# Patient Record
Sex: FEMALE | Race: WHITE | NOT HISPANIC OR LATINO | Employment: OTHER | URBAN - METROPOLITAN AREA
[De-identification: names, ages, dates, MRNs, and addresses within clinical notes are randomized per-mention and may not be internally consistent; named-entity substitution may affect disease eponyms.]

---

## 2023-04-21 ENCOUNTER — HOSPITAL ENCOUNTER (OUTPATIENT)
Dept: DATA CONVERSION | Facility: HOSPITAL | Age: 88
End: 2023-04-21
Attending: ANESTHESIOLOGY | Admitting: ANESTHESIOLOGY
Payer: MEDICARE

## 2023-04-21 DIAGNOSIS — M54.16 RADICULOPATHY, LUMBAR REGION: ICD-10-CM

## 2023-04-21 DIAGNOSIS — M54.50 LOW BACK PAIN, UNSPECIFIED: ICD-10-CM

## 2023-04-28 LAB — URINE CULTURE: NORMAL

## 2023-08-29 ENCOUNTER — HOSPITAL ENCOUNTER (OUTPATIENT)
Dept: DATA CONVERSION | Facility: HOSPITAL | Age: 88
End: 2023-08-29
Attending: ANESTHESIOLOGY | Admitting: ANESTHESIOLOGY
Payer: MEDICARE

## 2023-08-29 DIAGNOSIS — M54.50 LOW BACK PAIN, UNSPECIFIED: ICD-10-CM

## 2023-08-29 DIAGNOSIS — M54.16 RADICULOPATHY, LUMBAR REGION: ICD-10-CM

## 2023-09-07 VITALS — HEIGHT: 63 IN | BODY MASS INDEX: 39.97 KG/M2

## 2023-09-29 VITALS — HEIGHT: 65 IN | WEIGHT: 176.37 LBS | BODY MASS INDEX: 29.38 KG/M2

## 2023-10-18 PROBLEM — I87.2 VENOUS INSUFFICIENCY: Status: ACTIVE | Noted: 2023-10-18

## 2023-10-18 PROBLEM — E66.812 OBESITY, CLASS II, BMI 35-39.9: Status: ACTIVE | Noted: 2023-04-28

## 2023-10-18 PROBLEM — N18.30 STAGE 3 CHRONIC KIDNEY DISEASE (MULTI): Status: ACTIVE | Noted: 2023-05-08

## 2023-10-18 PROBLEM — M43.16 SPONDYLOLISTHESIS, LUMBAR REGION: Status: ACTIVE | Noted: 2023-10-18

## 2023-10-18 PROBLEM — R35.0 FREQUENT URINATION: Status: ACTIVE | Noted: 2023-10-18

## 2023-10-18 PROBLEM — I25.10 CORONARY ARTERY DISEASE: Status: ACTIVE | Noted: 2023-10-18

## 2023-10-18 PROBLEM — N39.41 URGE INCONTINENCE OF URINE: Status: ACTIVE | Noted: 2023-10-18

## 2023-10-18 PROBLEM — E78.5 HYPERLIPIDEMIA: Status: ACTIVE | Noted: 2023-10-18

## 2023-10-18 PROBLEM — R06.02 SHORTNESS OF BREATH AT REST: Status: ACTIVE | Noted: 2023-10-18

## 2023-10-18 PROBLEM — M54.50 CHRONIC LOW BACK PAIN: Status: ACTIVE | Noted: 2023-10-18

## 2023-10-18 PROBLEM — G89.29 CHRONIC LOW BACK PAIN: Status: ACTIVE | Noted: 2023-10-18

## 2023-10-18 PROBLEM — I48.21 PERMANENT ATRIAL FIBRILLATION (MULTI): Status: ACTIVE | Noted: 2023-10-18

## 2023-10-18 PROBLEM — E66.9 OBESITY, CLASS II, BMI 35-39.9: Status: ACTIVE | Noted: 2023-04-28

## 2023-10-18 PROBLEM — R60.0 LEG EDEMA: Status: ACTIVE | Noted: 2023-10-18

## 2023-10-18 PROBLEM — R39.15 URINARY URGENCY: Status: ACTIVE | Noted: 2023-10-18

## 2023-10-18 PROBLEM — M54.17 LUMBOSACRAL NEURITIS: Status: ACTIVE | Noted: 2023-10-18

## 2023-10-18 PROBLEM — R09.02 HYPOXIA: Status: ACTIVE | Noted: 2023-10-18

## 2023-10-18 PROBLEM — N39.3 STRESS INCONTINENCE, FEMALE: Status: ACTIVE | Noted: 2023-10-18

## 2023-10-18 PROBLEM — S99.929A TOE INJURY: Status: ACTIVE | Noted: 2023-10-18

## 2023-10-18 PROBLEM — R35.1 NOCTURIA: Status: ACTIVE | Noted: 2023-10-18

## 2023-10-18 PROBLEM — M51.26 LUMBAR DISC HERNIATION: Status: ACTIVE | Noted: 2023-10-18

## 2023-10-18 PROBLEM — M48.07 LUMBOSACRAL STENOSIS: Status: ACTIVE | Noted: 2023-10-18

## 2023-10-18 PROBLEM — I50.32 CHRONIC DIASTOLIC HEART FAILURE (MULTI): Status: ACTIVE | Noted: 2023-05-08

## 2023-10-18 PROBLEM — G47.33 OBSTRUCTIVE SLEEP APNEA, ADULT: Status: ACTIVE | Noted: 2023-10-18

## 2023-10-18 PROBLEM — E78.5 DYSLIPIDEMIA: Status: ACTIVE | Noted: 2023-10-18

## 2023-10-18 PROBLEM — M17.0 OSTEOARTHRITIS OF KNEES, BILATERAL: Status: ACTIVE | Noted: 2023-10-18

## 2023-10-18 RX ORDER — TOPIRAMATE 25 MG/1
TABLET ORAL
COMMUNITY
Start: 2018-06-19 | End: 2023-11-13 | Stop reason: WASHOUT

## 2023-10-18 RX ORDER — DILTIAZEM HYDROCHLORIDE 120 MG/1
120 CAPSULE, EXTENDED RELEASE ORAL DAILY
COMMUNITY
Start: 2023-04-20 | End: 2023-11-14 | Stop reason: SDUPTHER

## 2023-10-18 RX ORDER — RIVAROXABAN 20 MG/1
TABLET, FILM COATED ORAL
COMMUNITY
Start: 2018-03-05 | End: 2023-11-13 | Stop reason: WASHOUT

## 2023-10-18 RX ORDER — TORSEMIDE 10 MG/1
10 TABLET ORAL
COMMUNITY
Start: 2020-05-28 | End: 2024-01-12

## 2023-10-18 RX ORDER — RIVAROXABAN 15 MG/1
15 TABLET, FILM COATED ORAL
COMMUNITY
Start: 2023-06-03 | End: 2024-02-15 | Stop reason: DRUGHIGH

## 2023-10-18 RX ORDER — DULOXETIN HYDROCHLORIDE 30 MG/1
30 CAPSULE, DELAYED RELEASE ORAL 2 TIMES DAILY
COMMUNITY
Start: 2018-10-19 | End: 2023-12-19

## 2023-10-18 RX ORDER — OXYBUTYNIN CHLORIDE 5 MG/1
5 TABLET ORAL NIGHTLY
COMMUNITY
Start: 2020-07-25 | End: 2023-11-13 | Stop reason: WASHOUT

## 2023-10-18 RX ORDER — LOSARTAN POTASSIUM 100 MG/1
100 TABLET ORAL
COMMUNITY
Start: 2018-08-03 | End: 2023-11-14 | Stop reason: SDUPTHER

## 2023-10-18 RX ORDER — DOXYCYCLINE HYCLATE 100 MG
100 TABLET ORAL EVERY 12 HOURS
COMMUNITY
Start: 2023-05-10 | End: 2023-11-13 | Stop reason: WASHOUT

## 2023-10-18 RX ORDER — METOPROLOL SUCCINATE 50 MG/1
1 TABLET, EXTENDED RELEASE ORAL
COMMUNITY
Start: 2023-04-24

## 2023-10-18 RX ORDER — TROSPIUM CHLORIDE 20 MG/1
TABLET, FILM COATED ORAL
COMMUNITY
Start: 2020-09-15 | End: 2023-11-13 | Stop reason: WASHOUT

## 2023-10-18 RX ORDER — VIBEGRON 75 MG/1
1 TABLET, FILM COATED ORAL DAILY
COMMUNITY
End: 2024-02-08

## 2023-10-18 RX ORDER — BENAZEPRIL HYDROCHLORIDE 20 MG/1
1 TABLET ORAL DAILY
COMMUNITY
Start: 2017-03-07 | End: 2023-11-13 | Stop reason: WASHOUT

## 2023-10-18 RX ORDER — FLECAINIDE ACETATE 50 MG/1
50 TABLET ORAL 2 TIMES DAILY
COMMUNITY
Start: 2017-10-07 | End: 2023-11-13 | Stop reason: WASHOUT

## 2023-10-18 RX ORDER — PREGABALIN 100 MG/1
100 CAPSULE ORAL 2 TIMES DAILY
COMMUNITY
Start: 2019-08-22 | End: 2024-03-25 | Stop reason: SDUPTHER

## 2023-10-18 RX ORDER — ATORVASTATIN CALCIUM 20 MG/1
20 TABLET, FILM COATED ORAL
COMMUNITY
Start: 2023-01-25

## 2023-10-18 RX ORDER — AMLODIPINE BESYLATE 10 MG/1
10 TABLET ORAL
COMMUNITY
Start: 2020-09-18 | End: 2023-11-13 | Stop reason: WASHOUT

## 2023-10-18 RX ORDER — ATORVASTATIN CALCIUM 40 MG/1
1 TABLET, FILM COATED ORAL DAILY
COMMUNITY
End: 2023-11-13 | Stop reason: WASHOUT

## 2023-10-18 RX ORDER — NEOMYCIN/POLYMYXIN B/PRAMOXINE 3.5-10K-1
CREAM (GRAM) TOPICAL
COMMUNITY
End: 2023-11-13 | Stop reason: WASHOUT

## 2023-10-19 ENCOUNTER — OFFICE VISIT (OUTPATIENT)
Dept: UROLOGY | Facility: CLINIC | Age: 88
End: 2023-10-19
Payer: MEDICARE

## 2023-10-19 VITALS
BODY MASS INDEX: 40.85 KG/M2 | HEART RATE: 66 BPM | TEMPERATURE: 97.5 F | DIASTOLIC BLOOD PRESSURE: 68 MMHG | WEIGHT: 222 LBS | SYSTOLIC BLOOD PRESSURE: 122 MMHG | HEIGHT: 62 IN

## 2023-10-19 DIAGNOSIS — N39.41 URGENCY INCONTINENCE: ICD-10-CM

## 2023-10-19 DIAGNOSIS — R35.0 FREQUENCY OF MICTURITION: ICD-10-CM

## 2023-10-19 DIAGNOSIS — N39.41 URGE INCONTINENCE: Primary | ICD-10-CM

## 2023-10-19 PROCEDURE — 64566 NEUROELTRD STIM POST TIBIAL: CPT | Performed by: NURSE PRACTITIONER

## 2023-10-19 PROCEDURE — 1125F AMNT PAIN NOTED PAIN PRSNT: CPT | Performed by: NURSE PRACTITIONER

## 2023-10-19 PROCEDURE — 1159F MED LIST DOCD IN RCRD: CPT | Performed by: NURSE PRACTITIONER

## 2023-10-19 PROCEDURE — 3074F SYST BP LT 130 MM HG: CPT | Performed by: NURSE PRACTITIONER

## 2023-10-19 PROCEDURE — 3078F DIAST BP <80 MM HG: CPT | Performed by: NURSE PRACTITIONER

## 2023-10-19 PROCEDURE — 1036F TOBACCO NON-USER: CPT | Performed by: NURSE PRACTITIONER

## 2023-10-19 NOTE — PROGRESS NOTES
Subjective   Patient ID: Shreya Acuña is a 88 y.o. female.    HPI Happy w PTNS combined w medication; some bladder symptoms in past week but also more swelling in legs, has follow up next week w providers regarding swelling per patient;     Review of Systems    Objective   Physical Exam  Constitutional:       Appearance: Normal appearance. She is obese.   HENT:      Head: Normocephalic.   Pulmonary:      Effort: Pulmonary effort is normal.   Musculoskeletal:      Cervical back: Normal range of motion.      Comments: Wheel chair   Skin:     General: Skin is warm and dry.   Neurological:      General: No focal deficit present.      Mental Status: She is alert.   Psychiatric:         Mood and Affect: Mood normal.         Thought Content: Thought content normal.         Judgment: Judgment normal.           Percutaneous Tibial Nerve Stimulation    Date/Time: 10/19/2023 4:49 PM    Performed by: CHECO Siddiqi  Authorized by: CHECO Siddiqi    Procedure discussed: discussed risks, benefits and alternatives      Procedure Details     Indications: urge incontinence, urinary frequency and urinary urgency      PTNS session #: monthly maintenance    Ankle used: right      Stimulator intensity (mA): 2

## 2023-11-07 ENCOUNTER — APPOINTMENT (OUTPATIENT)
Dept: PAIN MEDICINE | Facility: CLINIC | Age: 88
End: 2023-11-07
Payer: MEDICARE

## 2023-11-13 ENCOUNTER — OFFICE VISIT (OUTPATIENT)
Dept: CARDIOLOGY | Facility: CLINIC | Age: 88
End: 2023-11-13
Payer: MEDICARE

## 2023-11-13 VITALS
SYSTOLIC BLOOD PRESSURE: 132 MMHG | HEIGHT: 64 IN | DIASTOLIC BLOOD PRESSURE: 70 MMHG | WEIGHT: 230 LBS | HEART RATE: 73 BPM | OXYGEN SATURATION: 97 % | BODY MASS INDEX: 39.27 KG/M2

## 2023-11-13 DIAGNOSIS — I50.32 CHRONIC DIASTOLIC HEART FAILURE (MULTI): ICD-10-CM

## 2023-11-13 DIAGNOSIS — I48.21 PERMANENT ATRIAL FIBRILLATION (MULTI): ICD-10-CM

## 2023-11-13 DIAGNOSIS — I10 HYPERTENSION, UNSPECIFIED TYPE: ICD-10-CM

## 2023-11-13 PROCEDURE — 99214 OFFICE O/P EST MOD 30 MIN: CPT | Performed by: INTERNAL MEDICINE

## 2023-11-13 PROCEDURE — 3075F SYST BP GE 130 - 139MM HG: CPT | Performed by: INTERNAL MEDICINE

## 2023-11-13 PROCEDURE — 3078F DIAST BP <80 MM HG: CPT | Performed by: INTERNAL MEDICINE

## 2023-11-13 PROCEDURE — 1125F AMNT PAIN NOTED PAIN PRSNT: CPT | Performed by: INTERNAL MEDICINE

## 2023-11-13 PROCEDURE — 1036F TOBACCO NON-USER: CPT | Performed by: INTERNAL MEDICINE

## 2023-11-13 PROCEDURE — 93000 ELECTROCARDIOGRAM COMPLETE: CPT | Performed by: INTERNAL MEDICINE

## 2023-11-13 PROCEDURE — 1159F MED LIST DOCD IN RCRD: CPT | Performed by: INTERNAL MEDICINE

## 2023-11-13 RX ORDER — SPIRONOLACTONE 25 MG/1
25 TABLET ORAL DAILY
Qty: 90 TABLET | Refills: 3 | Status: SHIPPED | OUTPATIENT
Start: 2023-11-13 | End: 2024-11-12

## 2023-11-13 ASSESSMENT — ENCOUNTER SYMPTOMS
WEAKNESS: 1
DYSPNEA ON EXERTION: 1

## 2023-11-13 NOTE — PATIENT INSTRUCTIONS
Start spironolactone 25 mg daily to help improve your swelling in your legs.    Reduce the xarelto to 10 mg daily

## 2023-11-13 NOTE — PROGRESS NOTES
Subjective   Shreya Acuña is a 88 y.o. female.    Chief Complaint:  Follow-up atrial fibrillation.    HPI    She is here for follow-up of congestive heart failure and her for atrial fibrillation.  Her major problems continue to be that of orthopedic problems.  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  Mostly she sits during the day.  This is resulted in increasing lower extremity edema.  She is very inactive during the day.  She also also had multiple falls.  Since her last visit she has had 2 falls 1 of which she hit her shoulder and head.    A stress test performed in 2019 showed an ejection fraction of 65% with no ischemia.     We initially saw her for an abnormal EKG. It turned out that she had atrial fibrillation. We were able to control this on medical management and with the use of flecainide therapy.     Her past cardiac history is significant for a history of hypertension. There is no history of diabetes. There is a past smoking history. There is no family history of premature coronary artery disease. She is being treated for hyperlipidemia.     Diagnosis of coronary artery disease is based on findings of calcifications in the distribution of the coronary arteries seen on the CT scan of the chest done in 2022.    Allergies  Medication    · No Known Drug Allergies   Recorded By: Cassi Solorio; 2017 9:09:32 AM     Family History  Mother    · Family history of   Father    · Family history of      Social History  Problems    · Does not use illicit drugs (V49.89) (Z78.9)   · Former smoker (V15.82) (Z87.891)   · Occasional alcohol use    Review of Systems   Constitutional: Positive for malaise/fatigue.   Cardiovascular:  Positive for dyspnea on exertion and leg swelling.   Musculoskeletal:  Positive for arthritis and joint pain.   Neurological:  Positive for weakness.       Visit Vitals  Smoking Status Former        Objective  "    Constitutional:       Appearance: Not in distress.   Neck:      Vascular: JVD normal.   Pulmonary:      Breath sounds: Normal breath sounds.   Cardiovascular:      Normal rate. Regular rhythm. Normal S1. Normal S2.       Murmurs: There is no murmur.      No gallop.    Pulses:     Intact distal pulses.   Edema:     Peripheral edema present.     Pretibial: bilateral 2+ edema of the pretibial area.     Ankle: bilateral 2+ edema of the ankle.  Abdominal:      General: There is no distension.      Palpations: Abdomen is soft.   Neurological:      Mental Status: Alert.         Lab Review:   Lab Results   Component Value Date     05/08/2023    K 3.5 05/08/2023     05/08/2023    CO2 30 05/08/2023    BUN 22 05/08/2023    CREATININE 0.90 05/08/2023    GLUCOSE 162 (H) 05/08/2023    CALCIUM 8.6 05/08/2023     No results found for: \"CHOL\", \"TRIG\", \"HDL\", \"LDLDIRECT\"    Assessment:    1.  Chronic diastolic congestive heart failure.  Increasing heart failure.  We will add spironolactone 25 mg daily.  She tends to run low potassium levels.  Discussed with her at length the potential side effects and complications of spironolactone therapy.  We will have her get a BMP in 1 week after initiation of therapy.    2.  Coronary artery disease.  No anginal symptoms.  However activity is levels are very limited.    3.  Atrial fibrillation.  Somewhat slow ventricular response.  However by exam heart rates in the 60s to 70s.    4.  Deconditioning.  Discussed some options to try to improve her activity levels.  "

## 2023-11-14 DIAGNOSIS — I10 HYPERTENSION, UNSPECIFIED TYPE: Primary | ICD-10-CM

## 2023-11-15 RX ORDER — DILTIAZEM HYDROCHLORIDE 120 MG/1
120 CAPSULE, EXTENDED RELEASE ORAL DAILY
Qty: 90 CAPSULE | Refills: 3 | Status: SHIPPED | OUTPATIENT
Start: 2023-11-15

## 2023-11-15 RX ORDER — LOSARTAN POTASSIUM 100 MG/1
100 TABLET ORAL
Qty: 90 TABLET | Refills: 3 | Status: SHIPPED | OUTPATIENT
Start: 2023-11-15 | End: 2024-05-06 | Stop reason: SDUPTHER

## 2023-11-21 ENCOUNTER — OFFICE VISIT (OUTPATIENT)
Dept: UROLOGY | Facility: CLINIC | Age: 88
End: 2023-11-21
Payer: MEDICARE

## 2023-11-21 DIAGNOSIS — R35.0 FREQUENCY OF MICTURITION: ICD-10-CM

## 2023-11-21 DIAGNOSIS — N39.41 URGE INCONTINENCE: Primary | ICD-10-CM

## 2023-11-21 DIAGNOSIS — N39.41 URGENCY INCONTINENCE: ICD-10-CM

## 2023-11-21 PROCEDURE — 64566 NEUROELTRD STIM POST TIBIAL: CPT | Performed by: NURSE PRACTITIONER

## 2023-11-21 PROCEDURE — 1125F AMNT PAIN NOTED PAIN PRSNT: CPT | Performed by: NURSE PRACTITIONER

## 2023-11-21 PROCEDURE — 1159F MED LIST DOCD IN RCRD: CPT | Performed by: NURSE PRACTITIONER

## 2023-11-21 PROCEDURE — 1036F TOBACCO NON-USER: CPT | Performed by: NURSE PRACTITIONER

## 2023-11-21 RX ORDER — CEPHALEXIN 500 MG/1
500 CAPSULE ORAL 2 TIMES DAILY
COMMUNITY
Start: 2023-11-15 | End: 2023-11-25

## 2023-11-21 NOTE — PROGRESS NOTES
11/21/23   [id number]    PTNS monthly maintenance     Subjective      HPI Shreya Acuña is a 88 y.o. female who presents for monthly maintenance PTNS.  [place] PTNS needed when she came today, combination therapy w Gemtesa has worked well; started on diuretic for swelling in legs, improving;    Objective     There were no vitals taken for this visit.   Physical Exam  General: Appears comfortable and in no apparent distress, well nourished  Head: Normocephalic, atraumatic  Neck: trachea midline  Respiratory: respirations unlabored, no wheezes, and no use of accessory muscles  Cardiovascular: at rest no dyspnea, well perfused  Skin: no visible rashes or lesions  Neurologic: grossly intact, oriented to person, place, and time  Psychiatric: mood and affect appropriate  Musculoskeletal: in chair for appt. no difficulty w upper body movement, in wheel chair for appt.      Assessment/Plan   Problem List Items Addressed This Visit    None  Visit Diagnoses       Urge incontinence    -  Primary    Frequency of micturition        Urgency incontinence              No orders of the defined types were placed in this encounter.     Continue PTNS  Gemtesa 75 mgPatient ID: Shreya Acuña is a 88 y.o. female.    Percutaneous Tibial Nerve Stimulation    Date/Time: 11/21/2023 4:25 PM    Performed by: CHECO Siddiqi  Authorized by: CHECO Siddiqi    Procedure Details     Indications: urge incontinence, urinary frequency and urinary urgency      Ankle used: right      Stimulator intensity (mA): 11        Continue as planned  Nurse line [phone] if anything needed       CHECO Siddiqi  Lab Results   Component Value Date    GLUCOSE 162 (H) 05/08/2023    CALCIUM 8.6 05/08/2023     05/08/2023    K 3.5 05/08/2023    CO2 30 05/08/2023     05/08/2023    BUN 22 05/08/2023    CREATININE 0.90 05/08/2023

## 2023-11-24 ENCOUNTER — APPOINTMENT (OUTPATIENT)
Dept: RADIOLOGY | Facility: CLINIC | Age: 88
End: 2023-11-24
Payer: MEDICARE

## 2023-11-30 ENCOUNTER — APPOINTMENT (OUTPATIENT)
Dept: RADIOLOGY | Facility: CLINIC | Age: 88
End: 2023-11-30
Payer: MEDICARE

## 2023-12-01 ENCOUNTER — HOSPITAL ENCOUNTER (OUTPATIENT)
Dept: PAIN MEDICINE | Facility: CLINIC | Age: 88
Discharge: HOME | End: 2023-12-01
Payer: MEDICARE

## 2023-12-01 ENCOUNTER — APPOINTMENT (OUTPATIENT)
Dept: LAB | Facility: LAB | Age: 88
End: 2023-12-01
Payer: MEDICARE

## 2023-12-01 ENCOUNTER — ANCILLARY PROCEDURE (OUTPATIENT)
Dept: RADIOLOGY | Facility: CLINIC | Age: 88
End: 2023-12-01
Payer: MEDICARE

## 2023-12-01 VITALS
DIASTOLIC BLOOD PRESSURE: 63 MMHG | SYSTOLIC BLOOD PRESSURE: 141 MMHG | RESPIRATION RATE: 20 BRPM | HEART RATE: 72 BPM | TEMPERATURE: 96.4 F | OXYGEN SATURATION: 97 %

## 2023-12-01 DIAGNOSIS — M25.561 PAIN IN RIGHT KNEE: ICD-10-CM

## 2023-12-01 DIAGNOSIS — M54.17 LUMBOSACRAL NEURITIS: ICD-10-CM

## 2023-12-01 DIAGNOSIS — M54.17 LUMBOSACRAL NEURITIS: Primary | ICD-10-CM

## 2023-12-01 DIAGNOSIS — I10 ESSENTIAL (PRIMARY) HYPERTENSION: Primary | ICD-10-CM

## 2023-12-01 PROBLEM — M25.512 CHRONIC LEFT SHOULDER PAIN: Status: ACTIVE | Noted: 2023-12-01

## 2023-12-01 PROBLEM — G89.29 CHRONIC LEFT SHOULDER PAIN: Status: ACTIVE | Noted: 2023-12-01

## 2023-12-01 LAB
ALBUMIN SERPL BCP-MCNC: 3.7 G/DL (ref 3.4–5)
ANION GAP SERPL CALC-SCNC: 14 MMOL/L (ref 10–20)
BUN SERPL-MCNC: 20 MG/DL (ref 6–23)
CALCIUM SERPL-MCNC: 9.6 MG/DL (ref 8.6–10.3)
CHLORIDE SERPL-SCNC: 101 MMOL/L (ref 98–107)
CO2 SERPL-SCNC: 28 MMOL/L (ref 21–32)
CREAT SERPL-MCNC: 1.23 MG/DL (ref 0.5–1.05)
GFR SERPL CREATININE-BSD FRML MDRD: 43 ML/MIN/1.73M*2
GLUCOSE SERPL-MCNC: 102 MG/DL (ref 74–99)
PHOSPHATE SERPL-MCNC: 3.8 MG/DL (ref 2.5–4.9)
POTASSIUM SERPL-SCNC: 5 MMOL/L (ref 3.5–5.3)
SODIUM SERPL-SCNC: 138 MMOL/L (ref 136–145)

## 2023-12-01 PROCEDURE — 2500000004 HC RX 250 GENERAL PHARMACY W/ HCPCS (ALT 636 FOR OP/ED)

## 2023-12-01 PROCEDURE — 2500000005 HC RX 250 GENERAL PHARMACY W/O HCPCS

## 2023-12-01 PROCEDURE — 7100000009 HC PHASE TWO TIME - INITIAL BASE CHARGE: Performed by: ANESTHESIOLOGY

## 2023-12-01 PROCEDURE — 36415 COLL VENOUS BLD VENIPUNCTURE: CPT

## 2023-12-01 PROCEDURE — 80069 RENAL FUNCTION PANEL: CPT

## 2023-12-01 PROCEDURE — 77003 FLUOROGUIDE FOR SPINE INJECT: CPT

## 2023-12-01 PROCEDURE — 20610 DRAIN/INJ JOINT/BURSA W/O US: CPT | Performed by: ANESTHESIOLOGY

## 2023-12-01 PROCEDURE — 7100000010 HC PHASE TWO TIME - EACH INCREMENTAL 1 MINUTE: Performed by: ANESTHESIOLOGY

## 2023-12-01 PROCEDURE — 62323 NJX INTERLAMINAR LMBR/SAC: CPT | Performed by: ANESTHESIOLOGY

## 2023-12-01 PROCEDURE — A4216 STERILE WATER/SALINE, 10 ML: HCPCS

## 2023-12-01 RX ORDER — LIDOCAINE HYDROCHLORIDE 5 MG/ML
INJECTION, SOLUTION INFILTRATION; INTRAVENOUS
Status: COMPLETED
Start: 2023-12-01 | End: 2023-12-01

## 2023-12-01 RX ORDER — ROPIVACAINE HYDROCHLORIDE 5 MG/ML
INJECTION, SOLUTION EPIDURAL; INFILTRATION; PERINEURAL
Status: COMPLETED
Start: 2023-12-01 | End: 2023-12-01

## 2023-12-01 RX ORDER — SODIUM CHLORIDE 9 MG/ML
INJECTION, SOLUTION INTRAMUSCULAR; INTRAVENOUS; SUBCUTANEOUS
Status: COMPLETED
Start: 2023-12-01 | End: 2023-12-01

## 2023-12-01 RX ORDER — TRIAMCINOLONE ACETONIDE 40 MG/ML
INJECTION, SUSPENSION INTRA-ARTICULAR; INTRAMUSCULAR
Status: COMPLETED
Start: 2023-12-01 | End: 2023-12-01

## 2023-12-01 RX ADMIN — LIDOCAINE HYDROCHLORIDE 250 MG: 5 INJECTION, SOLUTION INFILTRATION at 14:05

## 2023-12-01 RX ADMIN — SODIUM CHLORIDE 10 ML: 9 INJECTION, SOLUTION INTRAMUSCULAR; INTRAVENOUS; SUBCUTANEOUS at 14:05

## 2023-12-01 RX ADMIN — ROPIVACAINE HYDROCHLORIDE 100 MG: 5 INJECTION, SOLUTION EPIDURAL; INFILTRATION; PERINEURAL at 14:06

## 2023-12-01 RX ADMIN — TRIAMCINOLONE ACETONIDE 40 MG: 40 INJECTION, SUSPENSION INTRA-ARTICULAR; INTRAMUSCULAR at 14:04

## 2023-12-01 ASSESSMENT — ENCOUNTER SYMPTOMS
BLOOD IN STOOL: 0
DIZZINESS: 0
WEAKNESS: 0
SPEECH DIFFICULTY: 0
SHORTNESS OF BREATH: 0
NAUSEA: 0
DIAPHORESIS: 0
WHEEZING: 0
BACK PAIN: 1
EYE DISCHARGE: 0
DIFFICULTY URINATING: 0
FEVER: 0
DIARRHEA: 0
NECK PAIN: 0
CHILLS: 0
CHEST TIGHTNESS: 0
NUMBNESS: 1
COUGH: 0
VOMITING: 0
SEIZURES: 0
ARTHRALGIAS: 1
NECK STIFFNESS: 0
CONSTIPATION: 0

## 2023-12-01 ASSESSMENT — COLUMBIA-SUICIDE SEVERITY RATING SCALE - C-SSRS
1. IN THE PAST MONTH, HAVE YOU WISHED YOU WERE DEAD OR WISHED YOU COULD GO TO SLEEP AND NOT WAKE UP?: NO
2. HAVE YOU ACTUALLY HAD ANY THOUGHTS OF KILLING YOURSELF?: NO
6. HAVE YOU EVER DONE ANYTHING, STARTED TO DO ANYTHING, OR PREPARED TO DO ANYTHING TO END YOUR LIFE?: NO

## 2023-12-01 ASSESSMENT — PAIN SCALES - GENERAL
PAINLEVEL_OUTOF10: 10 - WORST POSSIBLE PAIN
PAINLEVEL_OUTOF10: 0 - NO PAIN

## 2023-12-01 ASSESSMENT — PAIN - FUNCTIONAL ASSESSMENT: PAIN_FUNCTIONAL_ASSESSMENT: 0-10

## 2023-12-01 NOTE — Clinical Note
Prepped with ChloraPrep, a minimum of 3 minute dry time, longer if needed, no pooling noted, patient draped in sterile fashion and back.

## 2023-12-01 NOTE — H&P
History Of Present Illness  Shreya Acuña is a 88 y.o. female presenting with low back and left shoulder pain     Past Medical History  Past Medical History:   Diagnosis Date    Other specified health status     No pertinent past medical history       Surgical History  Past Surgical History:   Procedure Laterality Date    OTHER SURGICAL HISTORY  12/02/2020    Cardioversion        Social History  She reports that she has quit smoking. Her smoking use included cigarettes. She has never used smokeless tobacco. She reports that she does not currently use alcohol. She reports that she does not use drugs.    Family History  No family history on file.     Allergies  Patient has no known allergies.    Review of Systems   Constitutional:  Negative for chills, diaphoresis and fever.   HENT:  Negative for ear discharge and tinnitus.    Eyes:  Negative for discharge.   Respiratory:  Negative for cough, chest tightness, shortness of breath and wheezing.    Cardiovascular:  Negative for chest pain.   Gastrointestinal:  Negative for blood in stool, constipation, diarrhea, nausea and vomiting.   Endocrine: Negative for polyuria.   Genitourinary:  Negative for difficulty urinating.   Musculoskeletal:  Positive for arthralgias, back pain and gait problem. Negative for neck pain and neck stiffness.   Skin:  Negative for rash.   Neurological:  Positive for numbness. Negative for dizziness, seizures, speech difficulty and weakness.        Physical Exam  Constitutional:       Appearance: Normal appearance.   HENT:      Head: Normocephalic.      Nose: Nose normal.      Mouth/Throat:      Mouth: Mucous membranes are moist.      Pharynx: Oropharynx is clear.   Eyes:      Extraocular Movements: Extraocular movements intact.      Conjunctiva/sclera: Conjunctivae normal.      Pupils: Pupils are equal, round, and reactive to light.   Cardiovascular:      Rate and Rhythm: Normal rate.   Pulmonary:      Effort: Pulmonary effort is normal. No  respiratory distress.      Breath sounds: No wheezing.   Chest:      Chest wall: No tenderness.   Abdominal:      Palpations: Abdomen is soft.   Musculoskeletal:      Cervical back: No rigidity.   Skin:     General: Skin is warm and dry.      Findings: No rash.   Neurological:      Mental Status: She is alert and oriented to person, place, and time.      Sensory: Sensory deficit present.      Motor: Weakness present.      Gait: Gait abnormal.   Psychiatric:         Mood and Affect: Mood normal.         Behavior: Behavior normal.          Last Recorded Vitals  Blood pressure 141/63, pulse 71, temperature 35.8 °C (96.4 °F), resp. rate 18, SpO2 97 %.       Assessment/Plan   1. Lumbosacral neuritis  FL pain management TC         Lumbar epidural injection  Left shoulder joint steroid injection     Jose Luis Quiñones MD

## 2023-12-01 NOTE — OP NOTE
12/1/2023    Pre procedure Diagnosis: Lumbar neuritis  Post procedure Diagnosis: Lumbar neuritis    Procedure:     1. L5/S1 interlaminar epidural steroid injection   2. Fluoroscopic guidance     Complications: None    Assistants: None     EBL: None    PROCEDURE: The patient was identified in the preoperative area. After risks and benefits were explained, informed consent was obtained. The patient was brought back to the operating room and placed in the prone position on the operating table. Standard ASA monitors were applied and monitored throughout the procedure. Their vital signs remained stable throughout the procedure. The patient's lumbosacral spine was prepped and draped in usual sterile fashion. Using fluoroscopic guidance the skin overlying the trajectory to the L5/S1 space was anesthetized with a total of 5 ml of 0.5% Lidocaine. Thereafter, a 3.5 in long 18G TouhDirecta Plus needle was advanced through the anesthitized skin. Then, the needle was advanced into the L5/S1 ligamentum flavum under multiplanar fluoroscopic guidance.  Then using a loss of resistance syringe and technique the epidural space was accessed. After negative aspiration for heme, or CSF a total of 4mL of Preservative Free Normal Saline and 20 mg of KENALOG was injected. The needle was removed and a sterile dressing was applied.     Next the patient's LEFT SHOULDER AREA was prepped and draped in usual sterile fashion. Using fluoroscopic guidance the skin overlying the trajectory to the shoulder joint was anesthetized with a total of 5ml of 0.5% lidocaine via a 25 G needle. Next the joint was accessed with a 22G spinal needle using the posterior approach. After negative aspiration a total of 4ml of 0.5% Ropivacaine and 20mg of KENALOG was injected. The needle was removed and a sterile dressing was applied. The patient tolerated the procedure well and was transported to PACU in stable condition.    PLAN: The pt will follow up in the office in one to  two months to report their results with the procedure. Discharge instructions were reviewed in recovery and provided to the patient in writing. They were advised to call should they have any questions or concerns.

## 2023-12-05 ENCOUNTER — ANCILLARY PROCEDURE (OUTPATIENT)
Dept: RADIOLOGY | Facility: CLINIC | Age: 88
End: 2023-12-05
Payer: MEDICARE

## 2023-12-05 DIAGNOSIS — R91.1 SOLITARY PULMONARY NODULE: ICD-10-CM

## 2023-12-05 PROCEDURE — 71250 CT THORAX DX C-: CPT | Performed by: STUDENT IN AN ORGANIZED HEALTH CARE EDUCATION/TRAINING PROGRAM

## 2023-12-05 PROCEDURE — 71250 CT THORAX DX C-: CPT

## 2023-12-06 ENCOUNTER — LAB (OUTPATIENT)
Dept: LAB | Facility: LAB | Age: 88
End: 2023-12-06
Payer: MEDICARE

## 2023-12-06 DIAGNOSIS — I10 HYPERTENSION, UNSPECIFIED TYPE: ICD-10-CM

## 2023-12-06 DIAGNOSIS — I50.32 CHRONIC DIASTOLIC HEART FAILURE (MULTI): ICD-10-CM

## 2023-12-06 PROCEDURE — 36415 COLL VENOUS BLD VENIPUNCTURE: CPT

## 2023-12-06 PROCEDURE — 80048 BASIC METABOLIC PNL TOTAL CA: CPT

## 2023-12-07 ENCOUNTER — TELEPHONE (OUTPATIENT)
Dept: UROLOGY | Facility: CLINIC | Age: 88
End: 2023-12-07
Payer: MEDICARE

## 2023-12-07 LAB
ANION GAP SERPL CALC-SCNC: 13 MMOL/L (ref 10–20)
BUN SERPL-MCNC: 26 MG/DL (ref 6–23)
CALCIUM SERPL-MCNC: 9.3 MG/DL (ref 8.6–10.6)
CHLORIDE SERPL-SCNC: 102 MMOL/L (ref 98–107)
CO2 SERPL-SCNC: 30 MMOL/L (ref 21–32)
CREAT SERPL-MCNC: 1.12 MG/DL (ref 0.5–1.05)
GFR SERPL CREATININE-BSD FRML MDRD: 48 ML/MIN/1.73M*2
GLUCOSE SERPL-MCNC: 105 MG/DL (ref 74–99)
POTASSIUM SERPL-SCNC: 4.1 MMOL/L (ref 3.5–5.3)
SODIUM SERPL-SCNC: 141 MMOL/L (ref 136–145)

## 2023-12-07 NOTE — TELEPHONE ENCOUNTER
Pt , Ender, left vague message asking for return call as he has some questions/concerns. Tried calling him back at [phone] but he did not answer. Left message asking him to call back and leave us a detailed message.

## 2023-12-08 NOTE — TELEPHONE ENCOUNTER
Spoke with  who states his wife is soaking the overnight diapers and is asking if we have any recommendations for a super absorbant overnight diapers. Spoke with Karina who doesn't really have any ideas and told Ender it is kind of a trial and error thing to see which ones work the best but I have heard that [employer] products are supposed to be good for incontinence.

## 2023-12-18 DIAGNOSIS — M54.17 LUMBOSACRAL NEURITIS: Primary | ICD-10-CM

## 2023-12-19 RX ORDER — DULOXETIN HYDROCHLORIDE 30 MG/1
30 CAPSULE, DELAYED RELEASE ORAL 2 TIMES DAILY
Qty: 180 CAPSULE | Refills: 2 | Status: SHIPPED | OUTPATIENT
Start: 2023-12-19

## 2023-12-21 ENCOUNTER — OFFICE VISIT (OUTPATIENT)
Dept: UROLOGY | Facility: CLINIC | Age: 88
End: 2023-12-21
Payer: MEDICARE

## 2023-12-21 DIAGNOSIS — R35.0 FREQUENCY OF MICTURITION: ICD-10-CM

## 2023-12-21 DIAGNOSIS — N39.41 URGE INCONTINENCE: Primary | ICD-10-CM

## 2023-12-21 DIAGNOSIS — N39.41 URGENCY INCONTINENCE: ICD-10-CM

## 2023-12-21 PROCEDURE — 1036F TOBACCO NON-USER: CPT | Performed by: NURSE PRACTITIONER

## 2023-12-21 PROCEDURE — 64566 NEUROELTRD STIM POST TIBIAL: CPT | Performed by: NURSE PRACTITIONER

## 2023-12-21 PROCEDURE — 1125F AMNT PAIN NOTED PAIN PRSNT: CPT | Performed by: NURSE PRACTITIONER

## 2023-12-21 PROCEDURE — 1159F MED LIST DOCD IN RCRD: CPT | Performed by: NURSE PRACTITIONER

## 2023-12-21 NOTE — PROGRESS NOTES
12/21/23   [id number]    PTNS     Subjective      HPI Shreya Acuña is a 88 y.o. female who presents for PTNS monthly maintenance; Happy w combination therapy PTNS w Gemtesa has worked well; More difficulty w diuretics but doing ok; swelling in lower extremities has improved and enjoying  holidays;        Objective     There were no vitals taken for this visit.   Physical Exam  General: Appears comfortable and in no apparent distress, well nourished  Head: Normocephalic, atraumatic  Neck: trachea midline  Respiratory: respirations unlabored, no wheezes, and no use of accessory muscles  Cardiovascular: at rest no dyspnea, well perfused  Skin: no visible rashes or lesions  Neurologic: grossly intact, oriented to person, place, and time  Psychiatric: mood and affect appropriate  Musculoskeletal: in wheel Patient ID: Shreya Acuña is a 88 y.o. female.    Percutaneous Tibial Nerve Stimulation    Date/Time: 12/21/2023 5:14 PM    Performed by: CHECO Siddiqi  Authorized by: CHECO Siddiqi    Procedure discussed: discussed risks, benefits and alternatives      Procedure Details     Indications: urge incontinence, urinary frequency and urinary urgency      PTNS session #: monthly maintenance    Bladder symptoms: unchanged      Ankle used: right      Stimulator intensity (mA): 5      chair for appt. no difficulty w upper body movement;    Assessment/Plan   Problem List Items Addressed This Visit    None  Visit Diagnoses       Urge incontinence    -  Primary    Frequency of micturition        Urgency incontinence              No orders of the defined types were placed in this encounter.     PTNS monthly maintenance  Gemtesa  Call nurse line if any issues [phone]       CHECO Siddiqi  Lab Results   Component Value Date    GLUCOSE 105 (H) 12/06/2023    CALCIUM 9.3 12/06/2023     12/06/2023    K 4.1 12/06/2023    CO2 30 12/06/2023     12/06/2023    BUN 26 (H) 12/06/2023    CREATININE  1.12 (H) 12/06/2023

## 2024-01-04 ENCOUNTER — APPOINTMENT (OUTPATIENT)
Dept: RADIOLOGY | Facility: HOSPITAL | Age: 89
End: 2024-01-04
Payer: MEDICARE

## 2024-01-04 ENCOUNTER — HOSPITAL ENCOUNTER (EMERGENCY)
Facility: HOSPITAL | Age: 89
Discharge: HOME | End: 2024-01-05
Attending: STUDENT IN AN ORGANIZED HEALTH CARE EDUCATION/TRAINING PROGRAM
Payer: MEDICARE

## 2024-01-04 ENCOUNTER — APPOINTMENT (OUTPATIENT)
Dept: CARDIOLOGY | Facility: HOSPITAL | Age: 89
End: 2024-01-04
Payer: MEDICARE

## 2024-01-04 DIAGNOSIS — R06.02 SHORTNESS OF BREATH: Primary | ICD-10-CM

## 2024-01-04 PROCEDURE — 93005 ELECTROCARDIOGRAM TRACING: CPT

## 2024-01-04 PROCEDURE — 99284 EMERGENCY DEPT VISIT MOD MDM: CPT | Performed by: STUDENT IN AN ORGANIZED HEALTH CARE EDUCATION/TRAINING PROGRAM

## 2024-01-04 PROCEDURE — 99283 EMERGENCY DEPT VISIT LOW MDM: CPT

## 2024-01-04 PROCEDURE — 71045 X-RAY EXAM CHEST 1 VIEW: CPT

## 2024-01-04 PROCEDURE — 96360 HYDRATION IV INFUSION INIT: CPT

## 2024-01-04 RX ORDER — IPRATROPIUM BROMIDE AND ALBUTEROL SULFATE 2.5; .5 MG/3ML; MG/3ML
3 SOLUTION RESPIRATORY (INHALATION) EVERY 20 MIN
Status: ACTIVE | OUTPATIENT
Start: 2024-01-04 | End: 2024-01-05

## 2024-01-04 ASSESSMENT — COLUMBIA-SUICIDE SEVERITY RATING SCALE - C-SSRS
1. IN THE PAST MONTH, HAVE YOU WISHED YOU WERE DEAD OR WISHED YOU COULD GO TO SLEEP AND NOT WAKE UP?: NO
6. HAVE YOU EVER DONE ANYTHING, STARTED TO DO ANYTHING, OR PREPARED TO DO ANYTHING TO END YOUR LIFE?: NO
2. HAVE YOU ACTUALLY HAD ANY THOUGHTS OF KILLING YOURSELF?: NO

## 2024-01-04 NOTE — ED TRIAGE NOTES
Pt presents to ED for chills, SOB, and wheezing. Pt has hx of emphysema. Pt has audible wheezing in triage. Pt denies SOB, CP. Pt has no complaints.

## 2024-01-05 ENCOUNTER — APPOINTMENT (OUTPATIENT)
Dept: PAIN MEDICINE | Facility: CLINIC | Age: 89
End: 2024-01-05
Payer: MEDICARE

## 2024-01-05 ENCOUNTER — APPOINTMENT (OUTPATIENT)
Dept: CARDIOLOGY | Facility: HOSPITAL | Age: 89
End: 2024-01-05
Payer: MEDICARE

## 2024-01-05 VITALS
DIASTOLIC BLOOD PRESSURE: 70 MMHG | TEMPERATURE: 96.8 F | HEART RATE: 74 BPM | RESPIRATION RATE: 16 BRPM | HEIGHT: 63 IN | SYSTOLIC BLOOD PRESSURE: 149 MMHG | BODY MASS INDEX: 37.21 KG/M2 | OXYGEN SATURATION: 96 % | WEIGHT: 210 LBS

## 2024-01-05 LAB
ANION GAP BLDV CALCULATED.4IONS-SCNC: 5 MMOL/L (ref 10–25)
ANION GAP SERPL CALC-SCNC: 12 MMOL/L (ref 10–20)
BASE EXCESS BLDV CALC-SCNC: 6.5 MMOL/L (ref -2–3)
BASOPHILS # BLD AUTO: 0.04 X10*3/UL (ref 0–0.1)
BASOPHILS NFR BLD AUTO: 0.9 %
BNP SERPL-MCNC: 82 PG/ML (ref 0–99)
BODY TEMPERATURE: 37 DEGREES CELSIUS
BUN SERPL-MCNC: 42 MG/DL (ref 6–23)
CA-I BLDV-SCNC: 1.38 MMOL/L (ref 1.1–1.33)
CALCIUM SERPL-MCNC: 9.6 MG/DL (ref 8.6–10.3)
CARDIAC TROPONIN I PNL SERPL HS: 10 NG/L (ref 0–13)
CHLORIDE BLDV-SCNC: 99 MMOL/L (ref 98–107)
CHLORIDE SERPL-SCNC: 100 MMOL/L (ref 98–107)
CO2 SERPL-SCNC: 28 MMOL/L (ref 21–32)
CREAT SERPL-MCNC: 1.75 MG/DL (ref 0.5–1.05)
EOSINOPHIL # BLD AUTO: 0.34 X10*3/UL (ref 0–0.4)
EOSINOPHIL NFR BLD AUTO: 7.3 %
ERYTHROCYTE [DISTWIDTH] IN BLOOD BY AUTOMATED COUNT: 15.6 % (ref 11.5–14.5)
FLUAV RNA RESP QL NAA+PROBE: NOT DETECTED
FLUBV RNA RESP QL NAA+PROBE: NOT DETECTED
GFR SERPL CREATININE-BSD FRML MDRD: 28 ML/MIN/1.73M*2
GLUCOSE BLDV-MCNC: 113 MG/DL (ref 74–99)
GLUCOSE SERPL-MCNC: 100 MG/DL (ref 74–99)
HCO3 BLDV-SCNC: 33.7 MMOL/L (ref 22–26)
HCT VFR BLD AUTO: 38.5 % (ref 36–46)
HCT VFR BLD EST: 46 % (ref 36–46)
HGB BLD-MCNC: 12.2 G/DL (ref 12–16)
HGB BLDV-MCNC: 15.2 G/DL (ref 12–16)
IMM GRANULOCYTES # BLD AUTO: 0.08 X10*3/UL (ref 0–0.5)
IMM GRANULOCYTES NFR BLD AUTO: 1.7 % (ref 0–0.9)
INHALED O2 CONCENTRATION: 21 %
LACTATE BLDV-SCNC: 1.1 MMOL/L (ref 0.4–2)
LYMPHOCYTES # BLD AUTO: 1.38 X10*3/UL (ref 0.8–3)
LYMPHOCYTES NFR BLD AUTO: 29.7 %
MCH RBC QN AUTO: 32 PG (ref 26–34)
MCHC RBC AUTO-ENTMCNC: 31.7 G/DL (ref 32–36)
MCV RBC AUTO: 101 FL (ref 80–100)
MONOCYTES # BLD AUTO: 0.8 X10*3/UL (ref 0.05–0.8)
MONOCYTES NFR BLD AUTO: 17.2 %
NEUTROPHILS # BLD AUTO: 2 X10*3/UL (ref 1.6–5.5)
NEUTROPHILS NFR BLD AUTO: 43.2 %
NRBC BLD-RTO: 0 /100 WBCS (ref 0–0)
OXYHGB MFR BLDV: 37.4 % (ref 45–75)
PCO2 BLDV: 57 MM HG (ref 41–51)
PH BLDV: 7.38 PH (ref 7.33–7.43)
PLATELET # BLD AUTO: 206 X10*3/UL (ref 150–450)
PO2 BLDV: 32 MM HG (ref 35–45)
POTASSIUM BLDV-SCNC: 5.2 MMOL/L (ref 3.5–5.3)
POTASSIUM SERPL-SCNC: 4.8 MMOL/L (ref 3.5–5.3)
Q ONSET: 215 MS
QRS COUNT: 11 BEATS
QRS DURATION: 86 MS
QT INTERVAL: 402 MS
QTC CALCULATION(BAZETT): 411 MS
QTC FREDERICIA: 408 MS
R AXIS: -15 DEGREES
RBC # BLD AUTO: 3.81 X10*6/UL (ref 4–5.2)
SAO2 % BLDV: 38 % (ref 45–75)
SARS-COV-2 RNA RESP QL NAA+PROBE: NOT DETECTED
SODIUM BLDV-SCNC: 132 MMOL/L (ref 136–145)
SODIUM SERPL-SCNC: 135 MMOL/L (ref 136–145)
T AXIS: 6 DEGREES
T OFFSET: 416 MS
VENTRICULAR RATE: 63 BPM
WBC # BLD AUTO: 4.6 X10*3/UL (ref 4.4–11.3)

## 2024-01-05 PROCEDURE — 83880 ASSAY OF NATRIURETIC PEPTIDE: CPT | Performed by: STUDENT IN AN ORGANIZED HEALTH CARE EDUCATION/TRAINING PROGRAM

## 2024-01-05 PROCEDURE — 87636 SARSCOV2 & INF A&B AMP PRB: CPT | Performed by: STUDENT IN AN ORGANIZED HEALTH CARE EDUCATION/TRAINING PROGRAM

## 2024-01-05 PROCEDURE — 71045 X-RAY EXAM CHEST 1 VIEW: CPT | Performed by: RADIOLOGY

## 2024-01-05 PROCEDURE — 85018 HEMOGLOBIN: CPT | Mod: 59 | Performed by: STUDENT IN AN ORGANIZED HEALTH CARE EDUCATION/TRAINING PROGRAM

## 2024-01-05 PROCEDURE — 85025 COMPLETE CBC W/AUTO DIFF WBC: CPT | Performed by: STUDENT IN AN ORGANIZED HEALTH CARE EDUCATION/TRAINING PROGRAM

## 2024-01-05 PROCEDURE — 93005 ELECTROCARDIOGRAM TRACING: CPT

## 2024-01-05 PROCEDURE — 84132 ASSAY OF SERUM POTASSIUM: CPT | Performed by: STUDENT IN AN ORGANIZED HEALTH CARE EDUCATION/TRAINING PROGRAM

## 2024-01-05 PROCEDURE — 96360 HYDRATION IV INFUSION INIT: CPT

## 2024-01-05 PROCEDURE — 36415 COLL VENOUS BLD VENIPUNCTURE: CPT | Performed by: STUDENT IN AN ORGANIZED HEALTH CARE EDUCATION/TRAINING PROGRAM

## 2024-01-05 PROCEDURE — 84484 ASSAY OF TROPONIN QUANT: CPT | Performed by: STUDENT IN AN ORGANIZED HEALTH CARE EDUCATION/TRAINING PROGRAM

## 2024-01-05 PROCEDURE — 2500000004 HC RX 250 GENERAL PHARMACY W/ HCPCS (ALT 636 FOR OP/ED): Performed by: STUDENT IN AN ORGANIZED HEALTH CARE EDUCATION/TRAINING PROGRAM

## 2024-01-05 RX ORDER — PREDNISONE 50 MG/1
50 TABLET ORAL DAILY
Qty: 4 TABLET | Refills: 0 | Status: SHIPPED | OUTPATIENT
Start: 2024-01-05 | End: 2024-01-09

## 2024-01-05 RX ORDER — AZITHROMYCIN 250 MG/1
250 TABLET, FILM COATED ORAL DAILY
Qty: 5 TABLET | Refills: 0 | Status: SHIPPED | OUTPATIENT
Start: 2024-01-05 | End: 2024-01-10

## 2024-01-05 RX ADMIN — SODIUM CHLORIDE, POTASSIUM CHLORIDE, SODIUM LACTATE AND CALCIUM CHLORIDE 500 ML: 600; 310; 30; 20 INJECTION, SOLUTION INTRAVENOUS at 01:17

## 2024-01-05 RX ADMIN — PREDNISONE 60 MG: 50 TABLET ORAL at 00:22

## 2024-01-05 ASSESSMENT — PAIN SCALES - GENERAL
PAINLEVEL_OUTOF10: 0 - NO PAIN

## 2024-01-05 ASSESSMENT — PAIN - FUNCTIONAL ASSESSMENT: PAIN_FUNCTIONAL_ASSESSMENT: 0-10

## 2024-01-05 NOTE — ED PROVIDER NOTES
HPI:    History provided by: Patient    88-year-old female history of emphysema not on oxygen, obstructive sleep apnea, heart failure, hypertension who presents to the emergency department for multiple complaints.  Patient has been feeling unwell over the past 2 to 3 days.  She has had a cough nonproductive sputum, intermittent shortness of breath.   is at bedside, and he states that he has noticed she has been wheezing more than usual as well.  She denies any chest pain, leg swelling associated with this.  She has had some nasal congestion, and some chills at home today as well.  She states that she is feeling okay right now, and would prefer to go home at this time.  She does not feel severely short of breath, and aside from the chills and cough she feels okay.  No sick contacts that they are aware of.  No fevers.    ROS: All pertinent positives and negatives reviewed in HPI    PMHx: Reviewed  PSHx: Reviewed  Social: no Etoh, no tobacco, no social drugs  Social Determinants of Health impacting care: NA  Allergies: Reviewed in EMR  FMHx: Noncontributory to patient's chief complaint  Medications: Reviewed        Vital signs and triage note reviewed per nursing documentation    Physical Exam:     GEN: Sitting in no acute distress. Well-appearing, appears stated age  HEAD: Atraumatic, normocephalic  EYES: EOMI. Pupils equal and reactive.   HEENT: MMM. No oropharyngeal erythema, exudates, or uvular deviation.   Neck: Supple, full ROM  CVS: Regular rate and rhythm, no murmurs, gallops, or rubs  PULM: Scant end expiratory wheezing with no focal rales, crackles  ABD: Soft, nontender to palpation. No rigidity, guarding, or tympany  EXT: +2 radial and DP pulses bilaterally. No pitting edema noted. No varicose veins  NEURO: Alert, keenly responsive. Moving all 4 extremities spontaneously with normal strength. Normal sensation in all 4 extremities     Emergency Department Course    Medications Ordered: Duoneb, PO  Prednisone    EKG: Regular ventricular rate, irregular rhythm, normal axis. TX, QRS, and QTC intervals within normal limits. No acute ST segment changes or T wave inversions.     Impression: Slow afib with no acute ischemia or arrhythmia.       MDM    88-year-old female with history of emphysema not on supplemental oxygen who presents to the emergency department for multiple medical complaints including nonproductive sputum, cough, nasal congestion.  Assessment reveals a hemodynamically stable, fairly well-appearing female in no acute distress.  Scant end expiratory wheezing on examination, but otherwise is well-appearing with clear bilateral breath sounds.  Given her age, cardiopulmonary workup was initiated with troponin, BNP testing.  Viral swab sent, chest x-ray ordered as well.  Patient given 1 breathing treatment, steroids.  Patient's workup reveals negative troponin level with a nonischemic EKG so I am not concerned for acute coronary syndrome.  BNP within normal limits, so no evidence for heart failure.  Chest x-ray reveals no evidence of pneumonia.  Patient does have a mild acute kidney injury, and was gently fluid resuscitated for this.  I performed shared decision-making conversation with patient, her  regarding the possibility of hospitalization versus discharge home.  At this time, the patient states that she would prefer to go home as she does not want to be in the emergency department and feels well.  She has stable oxygen saturation with no supplemental O2, and her and her  feel comfortable with this plan.  Therefore, I do believe is reasonable to trial outpatient treatment for bronchitis with steroids, azithromycin.  She was instructed that she needs to contact her physician to have repeat laboratory testing drawn next week, and if she were to have any worsening symptoms to come back to the emergency department.  Patient amenable to this.  Discharged in stable  condition.      Consultations:    NA    Medications Prescribed:    PO Prednisone, PO azithromycin    Disposition:    Discharged         Henrique Trejo MD  01/05/24 1729

## 2024-01-11 DIAGNOSIS — I50.32 CHRONIC DIASTOLIC HEART FAILURE (MULTI): Primary | ICD-10-CM

## 2024-01-12 RX ORDER — TORSEMIDE 10 MG/1
10 TABLET ORAL DAILY
Qty: 90 TABLET | Refills: 0 | Status: SHIPPED | OUTPATIENT
Start: 2024-01-12 | End: 2024-04-30 | Stop reason: SDUPTHER

## 2024-01-15 ENCOUNTER — APPOINTMENT (OUTPATIENT)
Dept: PAIN MEDICINE | Facility: CLINIC | Age: 89
End: 2024-01-15
Payer: MEDICARE

## 2024-01-17 ENCOUNTER — OFFICE VISIT (OUTPATIENT)
Dept: PAIN MEDICINE | Facility: CLINIC | Age: 89
End: 2024-01-17
Payer: MEDICARE

## 2024-01-17 VITALS
BODY MASS INDEX: 37.23 KG/M2 | HEIGHT: 63 IN | TEMPERATURE: 97.3 F | SYSTOLIC BLOOD PRESSURE: 124 MMHG | WEIGHT: 210.1 LBS | DIASTOLIC BLOOD PRESSURE: 60 MMHG | HEART RATE: 60 BPM | RESPIRATION RATE: 18 BRPM

## 2024-01-17 DIAGNOSIS — M51.26 LUMBAR DISC HERNIATION: ICD-10-CM

## 2024-01-17 DIAGNOSIS — M54.41 CHRONIC BILATERAL LOW BACK PAIN WITH BILATERAL SCIATICA: ICD-10-CM

## 2024-01-17 DIAGNOSIS — G89.29 CHRONIC BILATERAL LOW BACK PAIN WITH BILATERAL SCIATICA: ICD-10-CM

## 2024-01-17 DIAGNOSIS — M47.816 LUMBAR SPONDYLOSIS: Primary | ICD-10-CM

## 2024-01-17 DIAGNOSIS — M54.42 CHRONIC BILATERAL LOW BACK PAIN WITH BILATERAL SCIATICA: ICD-10-CM

## 2024-01-17 DIAGNOSIS — M43.16 SPONDYLOLISTHESIS, LUMBAR REGION: ICD-10-CM

## 2024-01-17 DIAGNOSIS — M54.17 LUMBOSACRAL NEURITIS: ICD-10-CM

## 2024-01-17 DIAGNOSIS — M47.817 FACET ARTHROPATHY, LUMBOSACRAL: ICD-10-CM

## 2024-01-17 DIAGNOSIS — M48.07 LUMBOSACRAL STENOSIS: ICD-10-CM

## 2024-01-17 PROCEDURE — 99214 OFFICE O/P EST MOD 30 MIN: CPT | Performed by: ANESTHESIOLOGY

## 2024-01-17 ASSESSMENT — PATIENT HEALTH QUESTIONNAIRE - PHQ9
SUM OF ALL RESPONSES TO PHQ9 QUESTIONS 1 AND 2: 0
2. FEELING DOWN, DEPRESSED OR HOPELESS: NOT AT ALL
1. LITTLE INTEREST OR PLEASURE IN DOING THINGS: NOT AT ALL

## 2024-01-17 ASSESSMENT — PAIN SCALES - GENERAL: PAINLEVEL: 10-WORST PAIN EVER

## 2024-01-17 ASSESSMENT — ENCOUNTER SYMPTOMS
OCCASIONAL FEELINGS OF UNSTEADINESS: 1
LOSS OF SENSATION IN FEET: 0
DEPRESSION: 0

## 2024-01-17 ASSESSMENT — COLUMBIA-SUICIDE SEVERITY RATING SCALE - C-SSRS
2. HAVE YOU ACTUALLY HAD ANY THOUGHTS OF KILLING YOURSELF?: NO
6. HAVE YOU EVER DONE ANYTHING, STARTED TO DO ANYTHING, OR PREPARED TO DO ANYTHING TO END YOUR LIFE?: NO
1. IN THE PAST MONTH, HAVE YOU WISHED YOU WERE DEAD OR WISHED YOU COULD GO TO SLEEP AND NOT WAKE UP?: NO

## 2024-01-17 NOTE — H&P (VIEW-ONLY)
History Of Present Illness  Shreya Acuña is a 88 y.o. female presenting with   Chief Complaint   Patient presents with    Follow-up     FUV: 10/10 Low back pain into bilateral legs with activity such as standing.     Patient returns regarding slow return of her Chronic low back pain , BUT continued LEG PAIN AND LEFT SHOULDER PAIN. Pt was in the ED with URI and was starting antibiotics and oral steroid taper.     Recall: The pt saw Dr. Barron in August of 2023 and is not considering total shoulder replacement.      Recall: She reports since early August 2018 she awoke one morning with severe pain in her neck and that radiates into both of her shoulders. Worse with rotating her neck.      The low back pain is constant, worse with prolonged standing and walking and better with rest. The pain is aching in her back and LLE. Denies LE paresthesias, weakness, saddle anesthesia, bowel or bladder incontinence. To manage this pain the patient has attempted Gabapentin 300mg TID and is now on Topamax and Cymbalta. The patients chronic HTN, DLD, A.FIB ON XARELTO, are stable.     PAIN SCORE: 10/10      Cards: Dr. Barnhart  PCP: Dr. Asencio     Past Medical History  She has a past medical history of Emphysema lung (CMS/HCC), Hypertension, and Other specified health status.    Surgical History  She has a past surgical history that includes Other surgical history (12/02/2020).     Social History  She reports that she has quit smoking. Her smoking use included cigarettes. She has never used smokeless tobacco. She reports that she does not currently use alcohol. She reports that she does not use drugs.    Family History  No family history on file.     Allergies  Patient has no known allergies.    Review of Systems    All other systems reviewed and negative for any deficits. Pertinent positives and negatives were considered in the medical decision making process.        Physical Exam  /60 (BP Location: Right arm, Patient  Position: Sitting, BP Cuff Size: Adult)   Pulse 60   Temp 36.3 °C (97.3 °F) (Skin)   Resp 18   Wt 95.3 kg (210 lb 1.6 oz)     General: Pt appears stated age     Eyes: Conjunctiva non-icteric and lids without obvious rash or drooping. Pupils are symmetric     ENT: Hearing is grossly intact     Neck: No JVD noted, tracheal position midline.     Musculoskeletal: Gait is grossly normal, favoring her right side      Digits/nails show no clubbing or cyanosis     Exam of muscles/joints/bones shows no gross atrophy and no abnormal/involuntary movements in the head/neckNo asymmetry or masses noted in the head/neck     Stability: no subluxation noted on movement of bilateral upper extremities or head/neck     Strength: 4/5 in B/L lower extremities      Positive straight leg raise bilaterally      Range of Motion: WNL (with exception as noted above) LIMITED LEFT SHOULDER ABDUCTION, limited to 45 deg     Sensation: decreased to sharp touch in LLE      Cranial nerves 2-12 are grossly intact     Psychiatric: Pt is alert and oriented to time, place and person.         Assessment/Plan   1. Lumbar disc herniation        2. Lumbosacral neuritis        3. Lumbosacral stenosis        4. Spondylolisthesis, lumbar region        5. Chronic bilateral low back pain with bilateral sciatica              Diagnoses/Problems     · Lumbar disc herniation (722.10) (M51.26)   · Lumbosacral stenosis (724.02) (M48.07)   · Chronic low back pain (724.2,338.29) (M54.50,G89.29)   · Lumbosacral neuritis (724.4) (M54.17)   · Neck pain (723.1) (M54.2)   · Left shoulder pain (719.41) (M25.512)     Provider Impressions     1. I have previously provided the patient with a list of physical therapy exercises to learn and perform.  The pt works on water therapy at home on a regular basis during the summer season.     Recall: I discussed referring her to water therapy but the pt did not want go to water therapy in the winter.      2. The pt was PREVIOUSLY  taking Gabapentin 300mg TID to help with nerve related pain. She did report LE edema and weight gain. She was weaned off of Gabapentin and is now on PREGABALIN 100mg BID instead without any side effects thus far.      She also tried Topamax 25mg in the AM and 50mg at bedtime, but reported hair loss since June of 2019 she did wean off of this medication.      I would recommend pt continue on Cymbalta 30mg BID to help with nerve pain. We discussed the risks, benefits, and side effects to this medication including the mechanism of action and the pt understands and agrees.     She signed a [employer] contract on 1- and again on 11-2-2022.  UDS was completed on 12- and again on 11-2-2022.     GFR was 38 in August of 2020.      3. She previously completed water therapy extensively. She reports she stopped doing water therapy exercises due to it being too cold out.      Pt working on stretching at home in her hot tub daily however, her hot tub broke and has still not been repaired.      4. Previously, I extensively reviewed the patients MRI findings in detail, including review of the actual images and provided a detailed explanation of the findings using a spine model. The pt has a grade I spondylolisthesis at L4 on L5 and L5 on S1 with moderate to severe canal stenosis L4/5 with a disc bulge at the L3/4 level.      5. The patient is a candidate for an bilateral L4/5 and L5/S1 medial branch blocks vs LESI at L5/S1 and LEFT SHOULDER JOINT INJECTION with RIGHT to LEFT sided bias to treat back and radicular pain. I spent time with the patient discussing all of the risks, benefits, and alternatives to this measure. Including but not limited to spinal infection, epidural hematoma/abscess, paralysis, nerve injury, steroid effects, and spinal headache. The patient understands and agrees to proceed as needed.      Her last injection was an LESI at L5/S1 on 8- and prior to that 11- that injection provided  her with 80% relief of her pain that has since worn off after 2.5 months. She reports since her last injection worked so well she was able to stand and cook with less pain. Since her injection has worn off her pain has become progressively worse with standing to cook.      We will contact the patients CARDIOLOGIST (Dr. Barnhart) to determine if it is safe to stop Xarelto for 5 days prior to A REPEAT LUMBAR BLOCK IF NEEDED. If Pt is to be bridged on Lovenox they will need to be off of Lovenox for 24 hours prior to the procedure. They will also need to have their INR checked the day of the procedure. We did discuss the risks for stopping anticoagulation including, but not limited to any cardiovascular event, embolism, and even death. The patient understands these risks and would like to proceed with the procedure.     6. In the past she underwent a NEIL on 8-28-18 and she reports 100% relief of her neck pain that is ongoing for her.      7. The pt is interested in following up with Ortho regarding her bilateral knee pain. She has attempted steroid injections with no relief. I did previously provide her with a referral to Ortho. She previously saw Dr. Jones and underwent steroid injection in her bilateral knees with 50% relief for approx 1 month.     Of note she was scheduled for Synthetic cartilage injections with Dr. Jones on Aug 1, 8, 15th.      8. 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 May be a candidate for GENICULAR nerve blocks followed by RFA.     I spent time with the patient discussing all of the risks, benefits, and alternatives to this measure. Including but not limited to joint infection, hematoma/abscess and nerve injury. The patient understands and agrees to proceed.        I spent time with the patient reviewing their imaging and discussing the risks benefits and alternatives to the above plan. A total of 30 minutes was spent reviewing the data and  greater than 50% of that time was with the patient during the face to face encounter discussing treatment options both surgical, non-surgical, and minimally invasive techniques.       Jose Luis Quiñones MD

## 2024-01-17 NOTE — PROGRESS NOTES
Patient verified by name and birth date.    10/10 Pain to Low back and bilateral legs with activity.    Last injection:12/01/2023: L5/S1 Lumbar epidural injection.  % relief: 50% that lasted almost 1 month.    Recent Xray/MRI: Chest X-Ray two weeks ago (ER for upper resp. infection).    Surgical HX : No spinal surgeries.    Non smoker

## 2024-01-18 ENCOUNTER — OFFICE VISIT (OUTPATIENT)
Dept: UROLOGY | Facility: CLINIC | Age: 89
End: 2024-01-18
Payer: MEDICARE

## 2024-01-18 DIAGNOSIS — N39.41 URGENCY INCONTINENCE: ICD-10-CM

## 2024-01-18 DIAGNOSIS — N39.41 URGE INCONTINENCE: ICD-10-CM

## 2024-01-18 DIAGNOSIS — R35.0 FREQUENCY OF MICTURITION: Primary | ICD-10-CM

## 2024-01-18 LAB
POC APPEARANCE, URINE: CLEAR
POC BILIRUBIN, URINE: NEGATIVE
POC BLOOD, URINE: NEGATIVE
POC COLOR, URINE: YELLOW
POC GLUCOSE, URINE: NEGATIVE MG/DL
POC KETONES, URINE: NEGATIVE MG/DL
POC LEUKOCYTES, URINE: NEGATIVE
POC NITRITE,URINE: NEGATIVE
POC PH, URINE: 5.5 PH
POC PROTEIN, URINE: NEGATIVE MG/DL
POC SPECIFIC GRAVITY, URINE: 1.02
POC UROBILINOGEN, URINE: 0.2 EU/DL

## 2024-01-18 PROCEDURE — 64566 NEUROELTRD STIM POST TIBIAL: CPT | Performed by: NURSE PRACTITIONER

## 2024-01-18 PROCEDURE — 1036F TOBACCO NON-USER: CPT | Performed by: NURSE PRACTITIONER

## 2024-01-18 PROCEDURE — 1159F MED LIST DOCD IN RCRD: CPT | Performed by: NURSE PRACTITIONER

## 2024-01-18 PROCEDURE — 1125F AMNT PAIN NOTED PAIN PRSNT: CPT | Performed by: NURSE PRACTITIONER

## 2024-01-18 PROCEDURE — 81003 URINALYSIS AUTO W/O SCOPE: CPT | Performed by: NURSE PRACTITIONER

## 2024-01-18 NOTE — PROGRESS NOTES
01/18/24   [id number]    PTNS monthly maintenance     Subjective      HPI Shreya Acuña is a 88 y.o. female who presents for PTNS monthly maintenance; PTNS per patient has improved her symptoms 80%; no problems during the day time, some nights up 2-3 x w leakage; discussed medication options w her , w her memory issues reluctant to Rx anticholinergics; last few nights did ok, feels w her diuretics she has had more issues; UA negative, would not recommend antibiotics without a UTI noted; Patient ID: Shreya Acuña is a 88 y.o. female.    Percutaneous Tibial Nerve Stimulation    Date/Time: 1/18/2024 4:22 PM    Performed by: CHECO Siddiqi  Authorized by: CHECO Siddiqi    Procedure discussed: discussed risks, benefits and alternatives      Procedure Details     Indications: urge incontinence, urinary frequency and urinary urgency      PTNS session #: monthly maintenance    Bladder symptoms: unchanged      Ankle used: right      Stimulator intensity (mA): 10              Objective     There were no vitals taken for this visit.   Physical Exam  General: Appears comfortable and in no apparent distress, well nourished  Head: Normocephalic, atraumatic  Neck: trachea midline  Respiratory: respirations unlabored, no wheezes, and no use of accessory muscles  Cardiovascular: at rest no dyspnea, well perfused  Skin: no visible rashes or lesions  Neurologic: grossly intact, oriented to person, place, and time  Psychiatric: mood and affect appropriate  Musculoskeletal: in wheel chair for appt. no difficulty w upper body movement      Assessment/Plan   Problem List Items Addressed This Visit    None  Visit Diagnoses       Urge incontinence    -  Primary    Frequency of micturition        Urgency incontinence              No orders of the defined types were placed in this encounter.     UA negative, no infection, would not recommend antibiotics  Doing well during the day, few issues at night, but not every  night  80% better w PTNS and Gemtesa has helped some as well  Would not recommend anticholinergics w memory issues  Follow up one month PTNS       Karina Del Valle, APRN-CNP  Lab Results   Component Value Date    GLUCOSE 100 (H) 01/05/2024    CALCIUM 9.6 01/05/2024     (L) 01/05/2024    K 4.8 01/05/2024    CO2 28 01/05/2024     01/05/2024    BUN 42 (H) 01/05/2024    CREATININE 1.75 (H) 01/05/2024

## 2024-01-18 NOTE — PATIENT INSTRUCTIONS
UA negative, no infection, would not recommend antibiotics  Doing well during the day, few issues at night, but not every night  80% better w PTNS and Gemtesa has helped some as well  Would not recommend anticholinergics w memory issues  Follow up one month PTNS

## 2024-02-01 ENCOUNTER — TELEPHONE (OUTPATIENT)
Dept: PAIN MEDICINE | Facility: CLINIC | Age: 89
End: 2024-02-01
Payer: MEDICARE

## 2024-02-01 NOTE — TELEPHONE ENCOUNTER
Actually she is having medial branch blocks per the schedule. Can still come in does not need to be off of her xarelto.

## 2024-02-01 NOTE — TELEPHONE ENCOUNTER
Pt took a Xarelto yesterday.  She is scheduled to come in tomorrow for an inj, should she reschedule?

## 2024-02-02 ENCOUNTER — HOSPITAL ENCOUNTER (OUTPATIENT)
Dept: PAIN MEDICINE | Facility: CLINIC | Age: 89
Discharge: HOME | End: 2024-02-02
Payer: MEDICARE

## 2024-02-02 ENCOUNTER — APPOINTMENT (OUTPATIENT)
Dept: RADIOLOGY | Facility: CLINIC | Age: 89
End: 2024-02-02
Payer: MEDICARE

## 2024-02-02 ENCOUNTER — APPOINTMENT (OUTPATIENT)
Dept: PAIN MEDICINE | Facility: CLINIC | Age: 89
End: 2024-02-02
Payer: MEDICARE

## 2024-02-02 ENCOUNTER — HOSPITAL ENCOUNTER (OUTPATIENT)
Dept: RADIOLOGY | Facility: CLINIC | Age: 89
Discharge: HOME | End: 2024-02-02
Payer: MEDICARE

## 2024-02-02 VITALS
OXYGEN SATURATION: 95 % | DIASTOLIC BLOOD PRESSURE: 65 MMHG | BODY MASS INDEX: 37.2 KG/M2 | SYSTOLIC BLOOD PRESSURE: 110 MMHG | TEMPERATURE: 97.2 F | HEART RATE: 63 BPM | WEIGHT: 210 LBS

## 2024-02-02 DIAGNOSIS — M47.817 FACET ARTHROPATHY, LUMBOSACRAL: ICD-10-CM

## 2024-02-02 PROCEDURE — 77003 FLUOROGUIDE FOR SPINE INJECT: CPT

## 2024-02-02 PROCEDURE — 2500000004 HC RX 250 GENERAL PHARMACY W/ HCPCS (ALT 636 FOR OP/ED)

## 2024-02-02 PROCEDURE — 64494 INJ PARAVERT F JNT L/S 2 LEV: CPT | Mod: RT

## 2024-02-02 PROCEDURE — 2500000005 HC RX 250 GENERAL PHARMACY W/O HCPCS

## 2024-02-02 PROCEDURE — 64493 INJ PARAVERT F JNT L/S 1 LEV: CPT | Mod: 50

## 2024-02-02 RX ORDER — LIDOCAINE HYDROCHLORIDE 5 MG/ML
INJECTION, SOLUTION INFILTRATION; INTRAVENOUS
Status: COMPLETED
Start: 2024-02-02 | End: 2024-02-02

## 2024-02-02 RX ORDER — ROPIVACAINE HYDROCHLORIDE 5 MG/ML
INJECTION, SOLUTION EPIDURAL; INFILTRATION; PERINEURAL
Status: COMPLETED
Start: 2024-02-02 | End: 2024-02-02

## 2024-02-02 RX ORDER — TRIAMCINOLONE ACETONIDE 40 MG/ML
INJECTION, SUSPENSION INTRA-ARTICULAR; INTRAMUSCULAR
Status: COMPLETED
Start: 2024-02-02 | End: 2024-02-02

## 2024-02-02 RX ADMIN — TRIAMCINOLONE ACETONIDE 40 MG: 40 INJECTION, SUSPENSION INTRA-ARTICULAR; INTRAMUSCULAR at 14:29

## 2024-02-02 RX ADMIN — LIDOCAINE HYDROCHLORIDE 250 MG: 5 INJECTION, SOLUTION INFILTRATION at 14:29

## 2024-02-02 RX ADMIN — ROPIVACAINE HYDROCHLORIDE 100 MG: 5 INJECTION, SOLUTION EPIDURAL; INFILTRATION; PERINEURAL at 14:29

## 2024-02-02 ASSESSMENT — COLUMBIA-SUICIDE SEVERITY RATING SCALE - C-SSRS
6. HAVE YOU EVER DONE ANYTHING, STARTED TO DO ANYTHING, OR PREPARED TO DO ANYTHING TO END YOUR LIFE?: NO
1. IN THE PAST MONTH, HAVE YOU WISHED YOU WERE DEAD OR WISHED YOU COULD GO TO SLEEP AND NOT WAKE UP?: NO
2. HAVE YOU ACTUALLY HAD ANY THOUGHTS OF KILLING YOURSELF?: NO

## 2024-02-02 ASSESSMENT — PAIN - FUNCTIONAL ASSESSMENT: PAIN_FUNCTIONAL_ASSESSMENT: 0-10

## 2024-02-02 ASSESSMENT — PAIN SCALES - GENERAL
PAINLEVEL_OUTOF10: 10 - WORST POSSIBLE PAIN
PAINLEVEL_OUTOF10: 3

## 2024-02-02 ASSESSMENT — ENCOUNTER SYMPTOMS
OCCASIONAL FEELINGS OF UNSTEADINESS: 0
LOSS OF SENSATION IN FEET: 0
DEPRESSION: 0

## 2024-02-02 ASSESSMENT — PAIN DESCRIPTION - DESCRIPTORS: DESCRIPTORS: SHARP;SHOOTING

## 2024-02-02 NOTE — OP NOTE
2/2/2024    Preop Diagnosis: Lumbar spondylosis  Postop Diagnosis: Lumbar spondylosis    Procedure:   1. Bilateral Lumbar medial branch blocks   2. Fluoroscopic guidance    Complications: None    EBL: None       PROCEDURE: The patient was identified in the preop hold after risks benefits and alternatives to the procedure were discussed, informed consent was obtained. The patient was then taken to the OR and placed in the prone position on the operating table with standard ASA monitors applied. The skin of their lumbar spine was prepped and draped in the usual sterile fashion using Chloraprep. Fluoroscopic guidance was used to efrain the skin for initial needle placement. Next the skin and the subcutaneous tissues was anesthetized with 4ml of 0.5% Lidocaine.  Then FOUR 20 g 3 1/2 inch SPINAL needles were advanced under fluoroscopic guidance towards the mid points of the articular process of L 4/5 and L5/S1 vertebra BILATERALLY. Once needle position was confirmed a total of 4ml of 0.5% Ropivacaine and 40mg of Kenalog was injected in equally divided doses at all sites. The needles were removed, hemostasis obtained and needle entry sites covered with a sterile dressing.  The patient tolerated the procedure well and was then taken to recover and discharged home in good condition.    PLAN: The pt will follow up in the office in one to two months' time to report their results with the procedure.

## 2024-02-06 DIAGNOSIS — R35.0 FREQUENCY OF MICTURITION: Primary | ICD-10-CM

## 2024-02-08 RX ORDER — VIBEGRON 75 MG/1
1 TABLET, FILM COATED ORAL DAILY
Qty: 30 TABLET | Refills: 0 | Status: SHIPPED | OUTPATIENT
Start: 2024-02-08 | End: 2024-03-21 | Stop reason: SDUPTHER

## 2024-02-15 ENCOUNTER — OFFICE VISIT (OUTPATIENT)
Dept: UROLOGY | Facility: CLINIC | Age: 89
End: 2024-02-15
Payer: MEDICARE

## 2024-02-15 DIAGNOSIS — N39.41 URGE INCONTINENCE: Primary | ICD-10-CM

## 2024-02-15 DIAGNOSIS — R35.0 FREQUENCY OF MICTURITION: ICD-10-CM

## 2024-02-15 DIAGNOSIS — N39.41 URGENCY INCONTINENCE: ICD-10-CM

## 2024-02-15 PROCEDURE — 1125F AMNT PAIN NOTED PAIN PRSNT: CPT | Performed by: NURSE PRACTITIONER

## 2024-02-15 PROCEDURE — 64566 NEUROELTRD STIM POST TIBIAL: CPT | Performed by: NURSE PRACTITIONER

## 2024-02-15 PROCEDURE — 1159F MED LIST DOCD IN RCRD: CPT | Performed by: NURSE PRACTITIONER

## 2024-02-15 PROCEDURE — 1036F TOBACCO NON-USER: CPT | Performed by: NURSE PRACTITIONER

## 2024-02-15 RX ORDER — MV-MIN/FA/VIT K/LUTEIN/ZEAXANT 200MCG-5MG
CAPSULE ORAL
COMMUNITY

## 2024-02-15 NOTE — PROGRESS NOTES
02/15/24   [id number]    PTNS    Subjective      HPI Shreya Acuña is a 88 y.o. female who presents for PTNS.     PTNS per patient has improved her symptoms > 50% and addition of Gemtessa 75 mg 80%; no problems during the day time, but this past month some nights sleeps thru the night and wets bed,  changing sheets; he held her diuretic yesterday and today to see if made a difference and yes she did better, used purewick in hospital, questioned if she could use at home;     Objective     There were no vitals taken for this visit.   Physical Exam  General: Appears comfortable and in no apparent distress, well nourished  Head: Normocephalic, atraumatic  Neck: trachea midline  Respiratory: respirations unlabored, no wheezes, and no use of accessory muscles  Cardiovascular: at rest no dyspnea, well perfused  Skin: no visible rashes or lesions  Neurologic: grossly intact, oriented to person, place, and time  Psychiatric: mood and affect appropriate  Musculoskeletal: in wheel chair for appt. no difficulty w upper body movement      Assessment/Plan   Problem List Items Addressed This Visit    None  Visit Diagnoses       Urge incontinence    -  Primary    Frequency of micturition        Urgency incontinence              No orders of the defined types were placed in this encounter.     PTNS monthly maintenance w combination therapy Gemtesa 75 mg daily  Discussed pure wick  Nurse line [phone] ID: Shreya Acuña is a 88 y.o. female.    Percutaneous Tibial Nerve Stimulation    Date/Time: 2/15/2024 3:15 PM    Performed by: CHECO Siddiqi  Authorized by: CHECO Siddiqi    Procedure discussed: discussed risks, benefits and alternatives      Procedure Details     Indications: urge incontinence, urinary frequency and urinary urgency      PTNS session #: monthly maintenance    Ankle used: right      Stimulator intensity (mA): 6             Karina L Del Valle, APRN-CNP  Lab Results   Component Value Date     GLUCOSE 100 (H) 01/05/2024    CALCIUM 9.6 01/05/2024     (L) 01/05/2024    K 4.8 01/05/2024    CO2 28 01/05/2024     01/05/2024    BUN 42 (H) 01/05/2024    CREATININE 1.75 (H) 01/05/2024

## 2024-02-15 NOTE — PATIENT INSTRUCTIONS
PTNS monthly maintenance w combination therapy Gemtesa 75 mg daily  Discussed pure guevara  Nurse line [phone]

## 2024-02-19 ENCOUNTER — TELEPHONE (OUTPATIENT)
Dept: CARDIOLOGY | Facility: CLINIC | Age: 89
End: 2024-02-19
Payer: MEDICARE

## 2024-02-19 NOTE — TELEPHONE ENCOUNTER
Ender LM on 2/16/24 regarding a question about medication.    Called Ender who states pt is taking Torsemide 10mg daily in the morning. Ender states pt has issues with wetting the bed at night and concerned Torsemide is contributing.    Reviewed pt's records. Pt sees Urology once a month for Urinary urgency, urinary frequency. Pt saw Urology 2/15/24 and urinary urgency/frequency is being addressed.    Informed Ender pt is taking a very small dose of Torsemide and Dr. Barnhart can review need for Torsemide or possibly adjust to as needed or possibly every other day. Informed Ender pt has urinary issues which are being addressed by her physician and Torsemide may contribute to frequency in a small manner but she should take in the morning and discuss with Dr. Barnhart at next ov on 3/22/24. Ender verbalizes understanding of all instructions and information provided. All questions and concerns addressed at this time.

## 2024-03-06 ENCOUNTER — OFFICE VISIT (OUTPATIENT)
Dept: PAIN MEDICINE | Facility: CLINIC | Age: 89
End: 2024-03-06
Payer: MEDICARE

## 2024-03-06 VITALS
BODY MASS INDEX: 37.2 KG/M2 | RESPIRATION RATE: 16 BRPM | DIASTOLIC BLOOD PRESSURE: 59 MMHG | TEMPERATURE: 97 F | SYSTOLIC BLOOD PRESSURE: 110 MMHG | HEART RATE: 59 BPM | WEIGHT: 210 LBS

## 2024-03-06 DIAGNOSIS — M54.17 LUMBOSACRAL NEURITIS: ICD-10-CM

## 2024-03-06 DIAGNOSIS — M47.817 FACET ARTHROPATHY, LUMBOSACRAL: ICD-10-CM

## 2024-03-06 DIAGNOSIS — M54.42 CHRONIC BILATERAL LOW BACK PAIN WITH BILATERAL SCIATICA: ICD-10-CM

## 2024-03-06 DIAGNOSIS — M54.41 CHRONIC BILATERAL LOW BACK PAIN WITH BILATERAL SCIATICA: ICD-10-CM

## 2024-03-06 DIAGNOSIS — M51.26 LUMBAR DISC HERNIATION: Primary | ICD-10-CM

## 2024-03-06 DIAGNOSIS — G89.29 CHRONIC BILATERAL LOW BACK PAIN WITH BILATERAL SCIATICA: ICD-10-CM

## 2024-03-06 DIAGNOSIS — M43.16 SPONDYLOLISTHESIS, LUMBAR REGION: ICD-10-CM
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
## 2024-03-06 PROBLEM — N39.0 URINARY TRACT INFECTION: Status: ACTIVE | Noted: 2024-03-06
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
## 2024-03-06 PROBLEM — R41.82 ALTERED MENTAL STATUS: Status: ACTIVE | Noted: 2024-03-06

## 2024-03-06 PROBLEM — M17.9 OSTEOARTHRITIS OF KNEE: Status: ACTIVE | Noted: 2023-04-17

## 2024-03-06 PROBLEM — G93.41 METABOLIC ENCEPHALOPATHY: Status: ACTIVE | Noted: 2023-05-10

## 2024-03-06 PROBLEM — M54.2 NECK PAIN: Status: ACTIVE | Noted: 2023-08-29

## 2024-03-06 PROBLEM — M79.606 PAIN OF LOWER EXTREMITY: Status: ACTIVE | Noted: 2024-03-06

## 2024-03-06 PROBLEM — I48.92 PAROXYSMAL ATRIAL FLUTTER (MULTI): Status: ACTIVE | Noted: 2022-11-29

## 2024-03-06 PROBLEM — R07.89 ATYPICAL CHEST PAIN: Status: ACTIVE | Noted: 2024-03-06

## 2024-03-06 PROBLEM — R09.02 HYPOXEMIA: Status: ACTIVE | Noted: 2023-05-10

## 2024-03-06 PROBLEM — U07.1 DISEASE DUE TO SEVERE ACUTE RESPIRATORY SYNDROME CORONAVIRUS 2 (SARS-COV-2): Status: ACTIVE | Noted: 2024-03-06

## 2024-03-06 PROCEDURE — 99214 OFFICE O/P EST MOD 30 MIN: CPT | Performed by: ANESTHESIOLOGY

## 2024-03-06 ASSESSMENT — ENCOUNTER SYMPTOMS
OCCASIONAL FEELINGS OF UNSTEADINESS: 1
LOSS OF SENSATION IN FEET: 0
DEPRESSION: 0

## 2024-03-06 ASSESSMENT — PAIN SCALES - GENERAL: PAINLEVEL: 10-WORST PAIN EVER

## 2024-03-06 ASSESSMENT — PATIENT HEALTH QUESTIONNAIRE - PHQ9
SUM OF ALL RESPONSES TO PHQ9 QUESTIONS 1 AND 2: 0
1. LITTLE INTEREST OR PLEASURE IN DOING THINGS: NOT AT ALL
2. FEELING DOWN, DEPRESSED OR HOPELESS: NOT AT ALL

## 2024-03-06 NOTE — PROGRESS NOTES
Low back pain down the legs.  Upper back to the arms.  History of knee pain.  Denies back and neck surgery.

## 2024-03-06 NOTE — PROGRESS NOTES
History Of Present Illness  Shreya Acuña is a 88 y.o. female presenting with   Chief Complaint   Patient presents with    Back Pain     Patient returns regarding slow return of her Chronic low back pain , BUT continued LEG PAIN AND LEFT SHOULDER PAIN. Pt was in the ED with URI and was starting antibiotics and oral steroid taper.     Recall: The pt saw Dr. Barron in August of 2023 and is not considering total shoulder replacement.      Recall: She reports since early August 2018 she awoke one morning with severe pain in her neck and that radiates into both of her shoulders. Worse with rotating her neck.      The low back pain is constant, worse with prolonged standing and walking and better with rest. The pain is aching in her back and LLE. Denies LE paresthesias, weakness, saddle anesthesia, bowel or bladder incontinence. To manage this pain the patient has attempted Gabapentin 300mg TID and is now on Topamax and Cymbalta. The patients chronic HTN, DLD, A.FIB ON XARELTO, are stable.     PAIN SCORE: 10/10      Cards: Dr. Barnhart  PCP: Dr. Asencio     Past Medical History  She has a past medical history of Emphysema lung (CMS/[facility]), Hypertension, and Other specified health status.    Surgical History  She has a past surgical history that includes Other surgical history (12/02/2020).     Social History  She reports that she has quit smoking. Her smoking use included cigarettes. She has never used smokeless tobacco. She reports that she does not currently use alcohol. She reports that she does not use drugs.    Family History  No family history on file.     Allergies  Patient has no known allergies.    Review of Systems    All other systems reviewed and negative for any deficits. Pertinent positives and negatives were considered in the medical decision making process.        Physical Exam  /59   Pulse 59   Temp 36.1 °C (97 °F)   Resp 16   Wt 95.3 kg (210 lb)     General: Pt appears stated age     Eyes:  Conjunctiva non-icteric and lids without obvious rash or drooping. Pupils are symmetric     ENT: Hearing is grossly intact     Neck: No JVD noted, tracheal position midline.     Musculoskeletal: Gait is grossly normal, favoring her right side      Digits/nails show no clubbing or cyanosis     Exam of muscles/joints/bones shows no gross atrophy and no abnormal/involuntary movements in the head/neckNo asymmetry or masses noted in the head/neck     Stability: no subluxation noted on movement of bilateral upper extremities or head/neck     Strength: 4/5 in B/L lower extremities      Positive straight leg raise bilaterally      Range of Motion: WNL (with exception as noted above) LIMITED LEFT SHOULDER ABDUCTION, limited to 45 deg     Sensation: decreased to sharp touch in LLE      Cranial nerves 2-12 are grossly intact     Psychiatric: Pt is alert and oriented to time, place and person.         Assessment/Plan   1. Lumbar disc herniation        2. Lumbosacral neuritis        3. Chronic bilateral low back pain with bilateral sciatica        4. 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 I have previously provided the patient with a list of physical therapy exercises to learn and perform.  The pt works on water therapy at home on a regular basis during the summer season.     Recall: I discussed referring her to water therapy but the pt did not want go to water therapy in the winter.      2. The pt was PREVIOUSLY taking Gabapentin 300mg TID to help with nerve related pain. She did report LE edema and weight gain. She was weaned off of Gabapentin and is now on PREGABALIN 100mg BID instead without any side effects  thus far.      She also tried Topamax 25mg in the AM and 50mg at bedtime, but reported hair loss since June of 2019 she did wean off of this medication.      I would recommend pt continue on Cymbalta 30mg BID to help with nerve pain. We discussed the risks, benefits, and side effects to this medication including the mechanism of action and the pt understands and agrees.     She signed a [employer] contract on 1- and again on 11-2-2022.  UDS was completed on 12- and again on 11-2-2022.     GFR was 38 in August of 2020.      3. She previously completed water therapy extensively. She reports she stopped doing water therapy exercises due to it being too cold out.      Pt working on stretching at home in her hot tub daily however, her hot tub broke and has still not been repaired.      4. Previously, I extensively reviewed the patients MRI findings in detail, including review of the actual images and provided a detailed explanation of the findings using a spine model. The pt has a grade I spondylolisthesis at L4 on L5 and L5 on S1 with moderate to severe canal stenosis L4/5 with a disc bulge at the L3/4 level.      5. The patient is a candidate for an bilateral L4/5 and L5/S1 medial branch blocks under fluoro guidance. She underwent that procedure on 2-2-2024 and she reported 80% relief of her pain that lasted for 2 weeks time and gradually wore off. She is also  LESI at L5/S1 and LEFT SHOULDER JOINT INJECTION with RIGHT to LEFT sided bias to treat back and radicular pain. I spent time with the patient discussing all of the risks, benefits, and alternatives to this measure. Including but not limited to spinal infection, epidural hematoma/abscess, paralysis, nerve injury, steroid effects, and spinal headache. The patient understands and agrees to proceed as needed.      Her last injection was an LESI at L5/S1 on 8- and prior to that 11- that injection provided her with 80% relief of her pain that  has since worn off after 2.5 months. She reports since her last injection worked so well she was able to stand and cook with less pain. Since her injection has worn off her pain has become progressively worse with standing to cook.      We will contact the patients CARDIOLOGIST (Dr. Barnhart) to determine if it is safe to stop Xarelto for 5 days prior to A REPEAT LUMBAR BLOCK IF NEEDED. If Pt is to be bridged on Lovenox they will need to be off of Lovenox for 24 hours prior to the procedure. They will also need to have their INR checked the day of the procedure. We did discuss the risks for stopping anticoagulation including, but not limited to any cardiovascular event, embolism, and even death. The patient understands these risks and would like to proceed with the procedure.     6. In the past she underwent a NEIL on 8-28-18 and she reports 100% relief of her neck pain that is ongoing for her.      7. The pt is interested in following up with Ortho regarding her bilateral knee pain. She has attempted steroid injections with no relief. I did previously provide her with a referral to Ortho. She previously saw Dr. Jones and underwent steroid injection in her bilateral knees with 50% relief for approx 1 month.     Of note she was scheduled for Synthetic cartilage injections with Dr. Jones on Aug 1, 8, 15th.      8. 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 May be a candidate for GENICULAR nerve blocks followed by RFA.     I spent time with the patient discussing all of the risks, benefits, and alternatives to this measure. Including but not limited to joint infection, hematoma/abscess and nerve injury. The patient understands and agrees to proceed.        I spent time with the patient reviewing their imaging and discussing the risks benefits and alternatives to the above plan. A total of 30 minutes was spent reviewing the data and greater than 50% of that time was with  the patient during the face to face encounter discussing treatment options both surgical, non-surgical, and minimally invasive techniques.       Jose Luis Quiñones MD

## 2024-03-06 NOTE — H&P (VIEW-ONLY)
History Of Present Illness  Shreya Acuña is a 88 y.o. female presenting with   Chief Complaint   Patient presents with    Back Pain     Patient returns regarding slow return of her Chronic low back pain , BUT continued LEG PAIN AND LEFT SHOULDER PAIN. Pt was in the ED with URI and was starting antibiotics and oral steroid taper.     Recall: The pt saw Dr. Barron in August of 2023 and is not considering total shoulder replacement.      Recall: She reports since early August 2018 she awoke one morning with severe pain in her neck and that radiates into both of her shoulders. Worse with rotating her neck.      The low back pain is constant, worse with prolonged standing and walking and better with rest. The pain is aching in her back and LLE. Denies LE paresthesias, weakness, saddle anesthesia, bowel or bladder incontinence. To manage this pain the patient has attempted Gabapentin 300mg TID and is now on Topamax and Cymbalta. The patients chronic HTN, DLD, A.FIB ON XARELTO, are stable.     PAIN SCORE: 10/10      Cards: Dr. Barnhart  PCP: Dr. Asencio     Past Medical History  She has a past medical history of Emphysema lung (CMS/[facility]), Hypertension, and Other specified health status.    Surgical History  She has a past surgical history that includes Other surgical history (12/02/2020).     Social History  She reports that she has quit smoking. Her smoking use included cigarettes. She has never used smokeless tobacco. She reports that she does not currently use alcohol. She reports that she does not use drugs.    Family History  No family history on file.     Allergies  Patient has no known allergies.    Review of Systems    All other systems reviewed and negative for any deficits. Pertinent positives and negatives were considered in the medical decision making process.        Physical Exam  /59   Pulse 59   Temp 36.1 °C (97 °F)   Resp 16   Wt 95.3 kg (210 lb)     General: Pt appears stated age     Eyes:  Conjunctiva non-icteric and lids without obvious rash or drooping. Pupils are symmetric     ENT: Hearing is grossly intact     Neck: No JVD noted, tracheal position midline.     Musculoskeletal: Gait is grossly normal, favoring her right side      Digits/nails show no clubbing or cyanosis     Exam of muscles/joints/bones shows no gross atrophy and no abnormal/involuntary movements in the head/neckNo asymmetry or masses noted in the head/neck     Stability: no subluxation noted on movement of bilateral upper extremities or head/neck     Strength: 4/5 in B/L lower extremities      Positive straight leg raise bilaterally      Range of Motion: WNL (with exception as noted above) LIMITED LEFT SHOULDER ABDUCTION, limited to 45 deg     Sensation: decreased to sharp touch in LLE      Cranial nerves 2-12 are grossly intact     Psychiatric: Pt is alert and oriented to time, place and person.         Assessment/Plan   1. Lumbar disc herniation        2. Lumbosacral neuritis        3. Chronic bilateral low back pain with bilateral sciatica        4. 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 I have previously provided the patient with a list of physical therapy exercises to learn and perform.  The pt works on water therapy at home on a regular basis during the summer season.     Recall: I discussed referring her to water therapy but the pt did not want go to water therapy in the winter.      2. The pt was PREVIOUSLY taking Gabapentin 300mg TID to help with nerve related pain. She did report LE edema and weight gain. She was weaned off of Gabapentin and is now on PREGABALIN 100mg BID instead without any side effects  thus far.      She also tried Topamax 25mg in the AM and 50mg at bedtime, but reported hair loss since June of 2019 she did wean off of this medication.      I would recommend pt continue on Cymbalta 30mg BID to help with nerve pain. We discussed the risks, benefits, and side effects to this medication including the mechanism of action and the pt understands and agrees.     She signed a [employer] contract on 1- and again on 11-2-2022.  UDS was completed on 12- and again on 11-2-2022.     GFR was 38 in August of 2020.      3. She previously completed water therapy extensively. She reports she stopped doing water therapy exercises due to it being too cold out.      Pt working on stretching at home in her hot tub daily however, her hot tub broke and has still not been repaired.      4. Previously, I extensively reviewed the patients MRI findings in detail, including review of the actual images and provided a detailed explanation of the findings using a spine model. The pt has a grade I spondylolisthesis at L4 on L5 and L5 on S1 with moderate to severe canal stenosis L4/5 with a disc bulge at the L3/4 level.      5. The patient is a candidate for an bilateral L4/5 and L5/S1 medial branch blocks under fluoro guidance. She underwent that procedure on 2-2-2024 and she reported 80% relief of her pain that lasted for 2 weeks time and gradually wore off. She is also  LESI at L5/S1 and LEFT SHOULDER JOINT INJECTION with RIGHT to LEFT sided bias to treat back and radicular pain. I spent time with the patient discussing all of the risks, benefits, and alternatives to this measure. Including but not limited to spinal infection, epidural hematoma/abscess, paralysis, nerve injury, steroid effects, and spinal headache. The patient understands and agrees to proceed as needed.      Her last injection was an LESI at L5/S1 on 8- and prior to that 11- that injection provided her with 80% relief of her pain that  has since worn off after 2.5 months. She reports since her last injection worked so well she was able to stand and cook with less pain. Since her injection has worn off her pain has become progressively worse with standing to cook.      We will contact the patients CARDIOLOGIST (Dr. Barnhart) to determine if it is safe to stop Xarelto for 5 days prior to A REPEAT LUMBAR BLOCK IF NEEDED. If Pt is to be bridged on Lovenox they will need to be off of Lovenox for 24 hours prior to the procedure. They will also need to have their INR checked the day of the procedure. We did discuss the risks for stopping anticoagulation including, but not limited to any cardiovascular event, embolism, and even death. The patient understands these risks and would like to proceed with the procedure.     6. In the past she underwent a NEIL on 8-28-18 and she reports 100% relief of her neck pain that is ongoing for her.      7. The pt is interested in following up with Ortho regarding her bilateral knee pain. She has attempted steroid injections with no relief. I did previously provide her with a referral to Ortho. She previously saw Dr. Jones and underwent steroid injection in her bilateral knees with 50% relief for approx 1 month.     Of note she was scheduled for Synthetic cartilage injections with Dr. Jones on Aug 1, 8, 15th.      8. 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 May be a candidate for GENICULAR nerve blocks followed by RFA.     I spent time with the patient discussing all of the risks, benefits, and alternatives to this measure. Including but not limited to joint infection, hematoma/abscess and nerve injury. The patient understands and agrees to proceed.        I spent time with the patient reviewing their imaging and discussing the risks benefits and alternatives to the above plan. A total of 30 minutes was spent reviewing the data and greater than 50% of that time was with  the patient during the face to face encounter discussing treatment options both surgical, non-surgical, and minimally invasive techniques.       Jose Luis Quiñones MD

## 2024-03-10 NOTE — PROGRESS NOTES
Subjective   Shreya Acuña is a 88 y.o. female.    Chief Complaint:  Follow-up congestive heart failure.  Follow-up atrial fibrillation.    HPI    Activities remain very limited because of orthopedic issues.  She has significant degenerative arthritis.  Also notes some lower extremity edema which I feel is improved in comparison to the prior evaluation.  She continues to experience poor activity levels.  She is very weak.  Denies orthopnea or paroxysmal nocturnal dyspnea.  He is able to sleep in bed.  No further falls since her last visit.    A stress test performed in 2019 showed an ejection fraction of 65% with no ischemia.     We initially saw her for an abnormal EKG. It turned out that she had atrial fibrillation. We were able to control this on medical management and with the use of flecainide therapy.     Her past cardiac history is significant for a history of hypertension. There is no history of diabetes. There is a past smoking history. There is no family history of premature coronary artery disease. She is being treated for hyperlipidemia.     Diagnosis of coronary artery disease is based on findings of calcifications in the distribution of the coronary arteries seen on the CT scan of the chest done in 2022.     Allergies  Medication    · No Known Drug Allergies   Recorded By: Cassi Solorio; 2017 9:09:32 AM     Family History  Mother    · Family history of   Father    · Family history of      Social History  Problems    · Does not use illicit drugs (V49.89) (Z78.9)   · Former smoker (V15.82) (Z87.891)   · Occasional alcohol use     Review of Systems   Constitutional: Positive for malaise/fatigue.   Cardiovascular:  Positive for dyspnea on exertion and leg swelling.   Musculoskeletal:  Positive for arthritis and joint pain.   Neurological:  Positive for weakness.       Visit Vitals  /66 (BP Location: Right arm, Patient Position: Sitting)   Pulse 64   Ht 1.6  "m (5' 3\")   Wt 95.3 kg (210 lb)   SpO2 96%   BMI 37.20 kg/m²   Smoking Status Former   BSA 2.06 m²        Objective     Constitutional:       Appearance: Not in distress.   Neck:      Vascular: JVD normal.   Pulmonary:      Breath sounds: Normal breath sounds.   Cardiovascular:      Normal rate. Regular rhythm. Normal S1. Normal S2.       Murmurs: There is a grade 1/6 systolic murmur.      No gallop.    Pulses:     Intact distal pulses.   Edema:     Peripheral edema present.     Pretibial: bilateral 1+ edema of the pretibial area.     Ankle: bilateral 1+ edema of the ankle.  Abdominal:      General: There is no distension.      Palpations: Abdomen is soft.   Neurological:      Mental Status: Alert.         Lab Review:   Lab Results   Component Value Date     (L) 01/05/2024    K 4.8 01/05/2024     01/05/2024    CO2 28 01/05/2024    BUN 42 (H) 01/05/2024    CREATININE 1.75 (H) 01/05/2024    GLUCOSE 100 (H) 01/05/2024    CALCIUM 9.6 01/05/2024       Assessment:    1.  Diastolic congestive heart failure.  We added spironolactone on her last visit.  Potassium is up to 4.8.  We got cut back on this and we will have her take it 3 days/week.    2.  Paroxysmal atrial fibrillation/flutter.  Heart rates are in the 60s.  Will need to cut back on metoprolol in the future.    3.  Coronary artery disease.  No typical anginal symptoms.  Activity levels are very limited.  Given her multiple medical problems we plan a conservative approach.    4.  Renal failure.  Creatinine is 1.75.    5.  Anemia.  Appears quite anemic on today's evaluation.  Will get laboratory data today.  "

## 2024-03-11 ENCOUNTER — LAB (OUTPATIENT)
Dept: LAB | Facility: LAB | Age: 89
End: 2024-03-11
Payer: MEDICARE

## 2024-03-11 ENCOUNTER — OFFICE VISIT (OUTPATIENT)
Dept: CARDIOLOGY | Facility: CLINIC | Age: 89
End: 2024-03-11
Payer: MEDICARE

## 2024-03-11 VITALS
WEIGHT: 210 LBS | HEIGHT: 63 IN | OXYGEN SATURATION: 96 % | HEART RATE: 64 BPM | DIASTOLIC BLOOD PRESSURE: 66 MMHG | SYSTOLIC BLOOD PRESSURE: 110 MMHG | BODY MASS INDEX: 37.21 KG/M2

## 2024-03-11 DIAGNOSIS — E78.5 DYSLIPIDEMIA: ICD-10-CM

## 2024-03-11 DIAGNOSIS — I48.21 PERMANENT ATRIAL FIBRILLATION (MULTI): ICD-10-CM

## 2024-03-11 DIAGNOSIS — I10 PRIMARY HYPERTENSION: Primary | ICD-10-CM

## 2024-03-11 DIAGNOSIS — I87.2 VENOUS INSUFFICIENCY: ICD-10-CM

## 2024-03-11 DIAGNOSIS — I50.32 CHRONIC DIASTOLIC HEART FAILURE (MULTI): ICD-10-CM

## 2024-03-11 DIAGNOSIS — E78.49 OTHER HYPERLIPIDEMIA: ICD-10-CM

## 2024-03-11 DIAGNOSIS — I48.92 PAROXYSMAL ATRIAL FLUTTER (MULTI): ICD-10-CM

## 2024-03-11 DIAGNOSIS — I10 PRIMARY HYPERTENSION: ICD-10-CM

## 2024-03-11 DIAGNOSIS — I25.10 CORONARY ARTERY DISEASE INVOLVING NATIVE CORONARY ARTERY OF NATIVE HEART WITHOUT ANGINA PECTORIS: ICD-10-CM

## 2024-03-11 DIAGNOSIS — R60.0 LEG EDEMA: ICD-10-CM

## 2024-03-11 LAB
ANION GAP SERPL CALC-SCNC: 13 MMOL/L (ref 10–20)
BUN SERPL-MCNC: 34 MG/DL (ref 6–23)
CALCIUM SERPL-MCNC: 9.4 MG/DL (ref 8.6–10.6)
CHLORIDE SERPL-SCNC: 100 MMOL/L (ref 98–107)
CO2 SERPL-SCNC: 30 MMOL/L (ref 21–32)
CREAT SERPL-MCNC: 1.62 MG/DL (ref 0.5–1.05)
EGFRCR SERPLBLD CKD-EPI 2021: 31 ML/MIN/1.73M*2
ERYTHROCYTE [DISTWIDTH] IN BLOOD BY AUTOMATED COUNT: 14.3 % (ref 11.5–14.5)
GLUCOSE SERPL-MCNC: 121 MG/DL (ref 74–99)
HCT VFR BLD AUTO: 38.9 % (ref 36–46)
HGB BLD-MCNC: 12.8 G/DL (ref 12–16)
MCH RBC QN AUTO: 33 PG (ref 26–34)
MCHC RBC AUTO-ENTMCNC: 32.9 G/DL (ref 32–36)
MCV RBC AUTO: 100 FL (ref 80–100)
NRBC BLD-RTO: 0 /100 WBCS (ref 0–0)
PLATELET # BLD AUTO: 159 X10*3/UL (ref 150–450)
POTASSIUM SERPL-SCNC: 4.6 MMOL/L (ref 3.5–5.3)
RBC # BLD AUTO: 3.88 X10*6/UL (ref 4–5.2)
SODIUM SERPL-SCNC: 138 MMOL/L (ref 136–145)
WBC # BLD AUTO: 6.9 X10*3/UL (ref 4.4–11.3)

## 2024-03-11 PROCEDURE — 1036F TOBACCO NON-USER: CPT | Performed by: INTERNAL MEDICINE

## 2024-03-11 PROCEDURE — 1125F AMNT PAIN NOTED PAIN PRSNT: CPT | Performed by: INTERNAL MEDICINE

## 2024-03-11 PROCEDURE — 80048 BASIC METABOLIC PNL TOTAL CA: CPT

## 2024-03-11 PROCEDURE — 3078F DIAST BP <80 MM HG: CPT | Performed by: INTERNAL MEDICINE

## 2024-03-11 PROCEDURE — 3074F SYST BP LT 130 MM HG: CPT | Performed by: INTERNAL MEDICINE

## 2024-03-11 PROCEDURE — 85027 COMPLETE CBC AUTOMATED: CPT

## 2024-03-11 PROCEDURE — 36415 COLL VENOUS BLD VENIPUNCTURE: CPT

## 2024-03-11 PROCEDURE — 1159F MED LIST DOCD IN RCRD: CPT | Performed by: INTERNAL MEDICINE

## 2024-03-11 PROCEDURE — 99214 OFFICE O/P EST MOD 30 MIN: CPT | Performed by: INTERNAL MEDICINE

## 2024-03-11 NOTE — PATIENT INSTRUCTIONS
Take spironolactone 25 mg on Monday Wednesday and Friday.    No changes in your other medications.

## 2024-03-12 ENCOUNTER — TELEPHONE (OUTPATIENT)
Dept: CARDIOLOGY | Facility: CLINIC | Age: 89
End: 2024-03-12
Payer: MEDICARE

## 2024-03-12 NOTE — TELEPHONE ENCOUNTER
----- Message from Daryn Barnhart MD sent at 3/12/2024  8:16 AM EDT -----  Notify Shreya that labs look good

## 2024-03-13 ENCOUNTER — TELEPHONE (OUTPATIENT)
Dept: CARDIOLOGY | Facility: CLINIC | Age: 89
End: 2024-03-13
Payer: MEDICARE

## 2024-03-13 NOTE — TELEPHONE ENCOUNTER
Pt's  called asking if pt is anemic d/t recent RBC level. Reviewed with Dr. Barnhart. Per Dr. Barnhart - patient is not anemic. Spoke to patient's  and made aware.

## 2024-03-21 ENCOUNTER — OFFICE VISIT (OUTPATIENT)
Dept: UROLOGY | Facility: CLINIC | Age: 89
End: 2024-03-21
Payer: MEDICARE

## 2024-03-21 VITALS
HEART RATE: 80 BPM | SYSTOLIC BLOOD PRESSURE: 111 MMHG | DIASTOLIC BLOOD PRESSURE: 77 MMHG | WEIGHT: 210 LBS | HEIGHT: 63 IN | BODY MASS INDEX: 37.21 KG/M2

## 2024-03-21 DIAGNOSIS — R35.0 FREQUENCY OF MICTURITION: ICD-10-CM

## 2024-03-21 DIAGNOSIS — N39.41 URGE INCONTINENCE: Primary | ICD-10-CM

## 2024-03-21 DIAGNOSIS — N39.41 URGENCY INCONTINENCE: ICD-10-CM

## 2024-03-21 PROCEDURE — 1036F TOBACCO NON-USER: CPT | Performed by: NURSE PRACTITIONER

## 2024-03-21 PROCEDURE — 3074F SYST BP LT 130 MM HG: CPT | Performed by: NURSE PRACTITIONER

## 2024-03-21 PROCEDURE — 64566 NEUROELTRD STIM POST TIBIAL: CPT | Performed by: NURSE PRACTITIONER

## 2024-03-21 PROCEDURE — 3078F DIAST BP <80 MM HG: CPT | Performed by: NURSE PRACTITIONER

## 2024-03-21 PROCEDURE — 1159F MED LIST DOCD IN RCRD: CPT | Performed by: NURSE PRACTITIONER

## 2024-03-21 RX ORDER — VIBEGRON 75 MG/1
1 TABLET, FILM COATED ORAL DAILY
Qty: 28 TABLET | Refills: 0 | COMMUNITY
Start: 2024-03-21 | End: 2024-04-18

## 2024-03-21 RX ORDER — VIBEGRON 75 MG/1
1 TABLET, FILM COATED ORAL DAILY
Qty: 30 TABLET | Refills: 0 | Status: SHIPPED | OUTPATIENT
Start: 2024-03-21 | End: 2024-03-21 | Stop reason: SDUPTHER

## 2024-03-21 NOTE — PROGRESS NOTES
"03/21/24   [id number]    Chief Complaint   Patient presents with   • PTNS      Subjective      HPI Shreya Acuña is a 88 y.o. female who presents for PTNS monthly maintenance; difficult month w diuretics; does ok during the day  time but difficulty w leakage at night; ran out of Gemtesa $, samples given; discussed botox, not interested, would not be able to self cath if retention;         Objective     /77   Pulse 80   Ht 1.6 m (5' 3\")   Wt 95.3 kg (210 lb)   BMI 37.20 kg/m²    Physical Exam  General: Appears comfortable and in no apparent distress, well nourished  Head: Normocephalic, atraumatic  Neck: trachea midline  Respiratory: respirations unlabored, no wheezes, and no use of accessory muscles  Cardiovascular: at rest no dyspnea, well perfused  Skin: no visible rashes or lesions  Neurologic: grossly intact, oriented to person, place, and time  Psychiatric: mood and affect appropriate  Musculoskeletal: in wheel chair for appt. no difficulty w upper body movement      Assessment/Plan   Problem List Items Addressed This Visit    None  Visit Diagnoses       Urge incontinence    -  Primary    Frequency of micturition        Urgency incontinence              No orders of the defined types were placed in this encounter.   Patient ID: Shreya Acuña is a 88 y.o. female.    Percutaneous Tibial Nerve Stimulation    Date/Time: 3/21/2024 3:19 PM    Performed by: CHECO Siddiqi  Authorized by: CHECO Siddiqi    Procedure discussed: discussed risks, benefits and alternatives      Procedure Details     Indications: urge incontinence, urinary frequency and urinary urgency      PTNS session #: monthly maintenance    Bladder symptoms: unchanged      Ankle used: left      Stimulator intensity (mA): 10           PTNS  Gemtesa discussed moving timing of dose to either supper if taken in a.m. or a.m. if taken at supper  To see if any effect on night time, patient didn't remember what time she took it as " her  takes care of her medicines  Will update him w this information  Follow up 4 weeks PTNS  Lab Results   Component Value Date    GLUCOSE 121 (H) 03/11/2024    CALCIUM 9.4 03/11/2024     03/11/2024    K 4.6 03/11/2024    CO2 30 03/11/2024     03/11/2024    BUN 34 (H) 03/11/2024    CREATININE 1.62 (H) 03/11/2024

## 2024-03-21 NOTE — PATIENT INSTRUCTIONS
PTNS  Gemtesa discussed moving timing of dose to either supper if taken in a.m. or a.m. if taken at supper  To see if any effect on night time, patient didn't remember what time she took it as her  takes care of her medicines  Will update him w this information  Follow up 4 weeks PTNS

## 2024-03-25 DIAGNOSIS — M54.41 CHRONIC BILATERAL LOW BACK PAIN WITH BILATERAL SCIATICA: Primary | ICD-10-CM

## 2024-03-25 DIAGNOSIS — M54.42 CHRONIC BILATERAL LOW BACK PAIN WITH BILATERAL SCIATICA: Primary | ICD-10-CM

## 2024-03-25 DIAGNOSIS — G89.29 CHRONIC BILATERAL LOW BACK PAIN WITH BILATERAL SCIATICA: Primary | ICD-10-CM

## 2024-03-25 RX ORDER — PREGABALIN 100 MG/1
100 CAPSULE ORAL DAILY
Qty: 180 CAPSULE | Refills: 0 | Status: SHIPPED | OUTPATIENT
Start: 2024-03-25 | End: 2024-09-21

## 2024-04-05 ENCOUNTER — HOSPITAL ENCOUNTER (OUTPATIENT)
Dept: PAIN MEDICINE | Facility: CLINIC | Age: 89
Discharge: HOME | End: 2024-04-05
Payer: MEDICARE

## 2024-04-05 ENCOUNTER — TELEPHONE (OUTPATIENT)
Dept: PAIN MEDICINE | Facility: CLINIC | Age: 89
End: 2024-04-05
Payer: MEDICARE

## 2024-04-05 ENCOUNTER — HOSPITAL ENCOUNTER (OUTPATIENT)
Dept: RADIOLOGY | Facility: CLINIC | Age: 89
Discharge: HOME | End: 2024-04-05
Payer: MEDICARE

## 2024-04-05 VITALS
TEMPERATURE: 97.2 F | HEART RATE: 76 BPM | DIASTOLIC BLOOD PRESSURE: 58 MMHG | RESPIRATION RATE: 16 BRPM | SYSTOLIC BLOOD PRESSURE: 122 MMHG | OXYGEN SATURATION: 97 %

## 2024-04-05 DIAGNOSIS — M47.817 FACET ARTHROPATHY, LUMBOSACRAL: ICD-10-CM

## 2024-04-05 DIAGNOSIS — M54.42 CHRONIC BILATERAL LOW BACK PAIN WITH BILATERAL SCIATICA: ICD-10-CM

## 2024-04-05 DIAGNOSIS — M54.41 CHRONIC BILATERAL LOW BACK PAIN WITH BILATERAL SCIATICA: ICD-10-CM

## 2024-04-05 DIAGNOSIS — M51.26 LUMBAR DISC HERNIATION: Primary | ICD-10-CM

## 2024-04-05 DIAGNOSIS — G89.29 CHRONIC BILATERAL LOW BACK PAIN WITH BILATERAL SCIATICA: ICD-10-CM

## 2024-04-05 DIAGNOSIS — M48.07 LUMBOSACRAL STENOSIS: ICD-10-CM

## 2024-04-05 DIAGNOSIS — M54.17 LUMBOSACRAL NEURITIS: ICD-10-CM

## 2024-04-05 PROCEDURE — 2500000005 HC RX 250 GENERAL PHARMACY W/O HCPCS

## 2024-04-05 PROCEDURE — 64494 INJ PARAVERT F JNT L/S 2 LEV: CPT

## 2024-04-05 PROCEDURE — 64493 INJ PARAVERT F JNT L/S 1 LEV: CPT

## 2024-04-05 PROCEDURE — 2500000004 HC RX 250 GENERAL PHARMACY W/ HCPCS (ALT 636 FOR OP/ED)

## 2024-04-05 RX ORDER — TRIAMCINOLONE ACETONIDE 40 MG/ML
INJECTION, SUSPENSION INTRA-ARTICULAR; INTRAMUSCULAR
Status: COMPLETED
Start: 2024-04-05 | End: 2024-04-05

## 2024-04-05 RX ORDER — ROPIVACAINE HYDROCHLORIDE 5 MG/ML
INJECTION, SOLUTION EPIDURAL; INFILTRATION; PERINEURAL
Status: COMPLETED
Start: 2024-04-05 | End: 2024-04-05

## 2024-04-05 RX ORDER — LIDOCAINE HYDROCHLORIDE 5 MG/ML
INJECTION, SOLUTION INFILTRATION; INTRAVENOUS
Status: COMPLETED
Start: 2024-04-05 | End: 2024-04-05

## 2024-04-05 RX ADMIN — LIDOCAINE HYDROCHLORIDE 250 MG: 5 INJECTION, SOLUTION INFILTRATION at 13:36

## 2024-04-05 RX ADMIN — TRIAMCINOLONE ACETONIDE 40 MG: 40 INJECTION, SUSPENSION INTRA-ARTICULAR; INTRAMUSCULAR at 13:36

## 2024-04-05 RX ADMIN — ROPIVACAINE HYDROCHLORIDE 100 MG: 5 INJECTION, SOLUTION EPIDURAL; INFILTRATION; PERINEURAL at 13:36

## 2024-04-05 ASSESSMENT — PAIN SCALES - GENERAL
PAINLEVEL_OUTOF10: 8
PAINLEVEL_OUTOF10: 10 - WORST POSSIBLE PAIN

## 2024-04-05 ASSESSMENT — ENCOUNTER SYMPTOMS
OCCASIONAL FEELINGS OF UNSTEADINESS: 1
DEPRESSION: 0
LOSS OF SENSATION IN FEET: 0

## 2024-04-05 ASSESSMENT — PATIENT HEALTH QUESTIONNAIRE - PHQ9
1. LITTLE INTEREST OR PLEASURE IN DOING THINGS: NOT AT ALL
SUM OF ALL RESPONSES TO PHQ9 QUESTIONS 1 AND 2: 0
2. FEELING DOWN, DEPRESSED OR HOPELESS: NOT AT ALL

## 2024-04-05 ASSESSMENT — PAIN - FUNCTIONAL ASSESSMENT: PAIN_FUNCTIONAL_ASSESSMENT: 0-10

## 2024-04-05 NOTE — OP NOTE
4/5/2024    Preop Diagnosis: Lumbar spondylosis  Postop Diagnosis: Lumbar spondylosis    Procedure:   1. Bilateral Lumbar medial branch blocks at L4/5 and L5/S1  2. Fluoroscopic guidance    Complications: None    EBL: None       PROCEDURE: The patient was identified in the preop hold after risks benefits and alternatives to the procedure were discussed, informed consent was obtained. The patient was then taken to the OR and placed in the prone position on the operating table with standard ASA monitors applied. The skin of their lumbar spine was prepped and draped in the usual sterile fashion using Chloraprep. Fluoroscopic guidance was used to efrain the skin for initial needle placement. Next the skin and the subcutaneous tissues was anesthetized with 4ml of 0.5% Lidocaine.  Then FOUR 20 g 3 1/2 inch SPINAL needles were advanced under fluoroscopic guidance towards the mid points of the articular process of L 4/5 and L5/S1 vertebra BILATERALLY. Once needle position was confirmed a total of 4ml of 0.5% Ropivacaine and 40mg of Kenalog was injected in equally divided doses at all sites. The needles were removed, hemostasis obtained and needle entry sites covered with a sterile dressing.  The patient tolerated the procedure well and was then taken to recover and discharged home in good condition.    PLAN: The pt will follow up in the office in one to two months' time to report their results with the procedure.

## 2024-04-06 ENCOUNTER — DOCUMENTATION (OUTPATIENT)
Dept: HOME HEALTH SERVICES | Facility: HOME HEALTH | Age: 89
End: 2024-04-06
Payer: MEDICARE

## 2024-04-06 ENCOUNTER — HOME HEALTH ADMISSION (OUTPATIENT)
Dept: HOME HEALTH SERVICES | Facility: HOME HEALTH | Age: 89
End: 2024-04-06
Payer: MEDICARE

## 2024-04-06 NOTE — HH CARE COORDINATION
Home Care received a Referral for Physical Therapy. We have processed the referral for a Start of Care on 4.9.24.     If you have any questions or concerns, please feel free to contact us at [phone]. Follow the prompts, enter your five digit zip code, and you will be directed to your care team on [facility].

## 2024-04-10 ENCOUNTER — HOME CARE VISIT (OUTPATIENT)
Dept: HOME HEALTH SERVICES | Facility: HOME HEALTH | Age: 89
End: 2024-04-10
Payer: MEDICARE

## 2024-04-10 VITALS — DIASTOLIC BLOOD PRESSURE: 76 MMHG | SYSTOLIC BLOOD PRESSURE: 134 MMHG | HEART RATE: 82 BPM

## 2024-04-10 PROCEDURE — 1090000002 HH PPS REVENUE DEBIT

## 2024-04-10 PROCEDURE — 169592 NO-PAY CLAIM PROCEDURE

## 2024-04-10 PROCEDURE — 1090000001 HH PPS REVENUE CREDIT

## 2024-04-10 PROCEDURE — G0151 HHCP-SERV OF PT,EA 15 MIN: HCPCS | Mod: HHH

## 2024-04-10 PROCEDURE — 0023 HH SOC

## 2024-04-10 ASSESSMENT — ENCOUNTER SYMPTOMS
PAIN LOCATION: BACK
LOWEST PAIN SEVERITY IN PAST 24 HOURS: 0/10
HYPERTENSION: 1
PAIN LOCATION: LEFT LEG
HIGHEST PAIN SEVERITY IN PAST 24 HOURS: 10/10
PAIN LOCATION: RIGHT LEG
PAIN: 1
PERSON REPORTING PAIN: PATIENT

## 2024-04-10 ASSESSMENT — ACTIVITIES OF DAILY LIVING (ADL)
ENTERING_EXITING_HOME: MODERATE ASSIST
OASIS_M1830: 05

## 2024-04-11 PROCEDURE — 1090000001 HH PPS REVENUE CREDIT

## 2024-04-11 PROCEDURE — 1090000002 HH PPS REVENUE DEBIT

## 2024-04-12 ENCOUNTER — HOME CARE VISIT (OUTPATIENT)
Dept: HOME HEALTH SERVICES | Facility: HOME HEALTH | Age: 89
End: 2024-04-12
Payer: MEDICARE

## 2024-04-12 PROCEDURE — G0157 HHC PT ASSISTANT EA 15: HCPCS | Mod: CQ,HHH

## 2024-04-12 PROCEDURE — 1090000002 HH PPS REVENUE DEBIT

## 2024-04-12 PROCEDURE — 1090000001 HH PPS REVENUE CREDIT

## 2024-04-13 PROCEDURE — 1090000002 HH PPS REVENUE DEBIT

## 2024-04-13 PROCEDURE — 1090000001 HH PPS REVENUE CREDIT

## 2024-04-13 ASSESSMENT — ENCOUNTER SYMPTOMS
PERSON REPORTING PAIN: PATIENT
PAIN LOCATION: LEFT KNEE
PAIN SEVERITY GOAL: 3/10
PAIN LOCATION: BACK
HIGHEST PAIN SEVERITY IN PAST 24 HOURS: 8/10
SUBJECTIVE PAIN PROGRESSION: WAXING AND WANING
LOWEST PAIN SEVERITY IN PAST 24 HOURS: 4/10
PAIN: 1
PAIN LOCATION: RIGHT KNEE

## 2024-04-14 PROCEDURE — 1090000002 HH PPS REVENUE DEBIT

## 2024-04-14 PROCEDURE — 1090000001 HH PPS REVENUE CREDIT

## 2024-04-15 ENCOUNTER — HOME CARE VISIT (OUTPATIENT)
Dept: HOME HEALTH SERVICES | Facility: HOME HEALTH | Age: 89
End: 2024-04-15
Payer: MEDICARE

## 2024-04-15 PROCEDURE — G0157 HHC PT ASSISTANT EA 15: HCPCS | Mod: CQ,HHH

## 2024-04-15 PROCEDURE — 1090000002 HH PPS REVENUE DEBIT

## 2024-04-15 PROCEDURE — 1090000001 HH PPS REVENUE CREDIT

## 2024-04-15 ASSESSMENT — ENCOUNTER SYMPTOMS
LOWEST PAIN SEVERITY IN PAST 24 HOURS: 0/10
SUBJECTIVE PAIN PROGRESSION: WAXING AND WANING
HIGHEST PAIN SEVERITY IN PAST 24 HOURS: 10/10
PAIN SEVERITY GOAL: 0/10
PAIN LOCATION: BACK
PAIN: 1
PERSON REPORTING PAIN: PATIENT

## 2024-04-16 PROCEDURE — 1090000001 HH PPS REVENUE CREDIT

## 2024-04-16 PROCEDURE — 1090000002 HH PPS REVENUE DEBIT

## 2024-04-17 ENCOUNTER — HOME CARE VISIT (OUTPATIENT)
Dept: HOME HEALTH SERVICES | Facility: HOME HEALTH | Age: 89
End: 2024-04-17
Payer: MEDICARE

## 2024-04-17 PROCEDURE — 1090000001 HH PPS REVENUE CREDIT

## 2024-04-17 PROCEDURE — G0157 HHC PT ASSISTANT EA 15: HCPCS | Mod: CQ,HHH

## 2024-04-17 PROCEDURE — 1090000002 HH PPS REVENUE DEBIT

## 2024-04-17 ASSESSMENT — ENCOUNTER SYMPTOMS
PAIN: 1
SUBJECTIVE PAIN PROGRESSION: WAXING AND WANING
PERSON REPORTING PAIN: PATIENT
LOWEST PAIN SEVERITY IN PAST 24 HOURS: 0/10
HIGHEST PAIN SEVERITY IN PAST 24 HOURS: 10/10
PAIN SEVERITY GOAL: 0/10
PAIN LOCATION: BACK

## 2024-04-18 ENCOUNTER — OFFICE VISIT (OUTPATIENT)
Dept: UROLOGY | Facility: CLINIC | Age: 89
End: 2024-04-18
Payer: MEDICARE

## 2024-04-18 DIAGNOSIS — N39.41 URGENCY INCONTINENCE: ICD-10-CM

## 2024-04-18 DIAGNOSIS — R35.0 FREQUENCY OF MICTURITION: ICD-10-CM

## 2024-04-18 DIAGNOSIS — N39.41 URGE INCONTINENCE: Primary | ICD-10-CM

## 2024-04-18 PROCEDURE — 64566 NEUROELTRD STIM POST TIBIAL: CPT | Performed by: NURSE PRACTITIONER

## 2024-04-18 PROCEDURE — 1090000001 HH PPS REVENUE CREDIT

## 2024-04-18 PROCEDURE — 1090000002 HH PPS REVENUE DEBIT

## 2024-04-18 PROCEDURE — 1159F MED LIST DOCD IN RCRD: CPT | Performed by: NURSE PRACTITIONER

## 2024-04-18 NOTE — PROGRESS NOTES
04/18/24   [id number]    PTNS     Subjective      HPI Shreya Acuña is a 88 y.o. female who presents for PTNS monthly maintenance; happy w day time w combination therapy PTNS w Gemtesa; better month per patient; using incontinence purewick at night;     PMH, PSH, FH, SH reviewed      Objective     There were no vitals taken for this visit.   Physical Exam  General: Appears comfortable and in no apparent distress, well nourished  Head: Normocephalic, atraumatic  Neck: trachea midline  Respiratory: respirations unlabored, no wheezes, and no use of accessory muscles  Cardiovascular: at rest no dyspnea, well perfused  Skin: no visible rashes or lesions  Neurologic: grossly intact, oriented to person, place, and time  Psychiatric: mood and affect appropriate  Musculoskeletal: in wheel Patient ID: Shreya Acuña is a 88 y.o. female.    Percutaneous Tibial Nerve Stimulation    Date/Time: 4/18/2024 5:15 PM    Performed by: CHECO Siddiqi  Authorized by: CHECO Siddiqi    Procedure discussed: discussed risks, benefits and alternatives      Procedure Details     Indications: urge incontinence, urinary frequency and urinary urgency      PTNS session #: monthly maintenance    Bladder symptoms: improved      Ankle used: right      Stimulator intensity (mA): 12      chair for appt. no difficulty w upper body movement      Assessment/Plan   Problem List Items Addressed This Visit    None  Visit Diagnoses       Urge incontinence    -  Primary    Frequency of micturition        Urgency incontinence              No orders of the defined types were placed in this encounter.     PTNS monthly maintenance  Nurse line [phone]       CHECO Siddiqi  Lab Results   Component Value Date    GLUCOSE 121 (H) 03/11/2024    CALCIUM 9.4 03/11/2024     03/11/2024    K 4.6 03/11/2024    CO2 30 03/11/2024     03/11/2024    BUN 34 (H) 03/11/2024    CREATININE 1.62 (H) 03/11/2024

## 2024-04-19 PROCEDURE — 1090000002 HH PPS REVENUE DEBIT

## 2024-04-19 PROCEDURE — 1090000001 HH PPS REVENUE CREDIT

## 2024-04-20 PROCEDURE — 1090000001 HH PPS REVENUE CREDIT

## 2024-04-20 PROCEDURE — 1090000002 HH PPS REVENUE DEBIT

## 2024-04-21 PROCEDURE — 1090000001 HH PPS REVENUE CREDIT

## 2024-04-21 PROCEDURE — 1090000002 HH PPS REVENUE DEBIT

## 2024-04-22 ENCOUNTER — HOME CARE VISIT (OUTPATIENT)
Dept: HOME HEALTH SERVICES | Facility: HOME HEALTH | Age: 89
End: 2024-04-22
Payer: MEDICARE

## 2024-04-22 PROCEDURE — G0157 HHC PT ASSISTANT EA 15: HCPCS | Mod: CQ,HHH

## 2024-04-22 PROCEDURE — 1090000002 HH PPS REVENUE DEBIT

## 2024-04-22 PROCEDURE — 1090000001 HH PPS REVENUE CREDIT

## 2024-04-23 PROCEDURE — 1090000002 HH PPS REVENUE DEBIT

## 2024-04-23 PROCEDURE — 1090000001 HH PPS REVENUE CREDIT

## 2024-04-23 ASSESSMENT — ENCOUNTER SYMPTOMS
PAIN LOCATION: LEFT KNEE
LOWEST PAIN SEVERITY IN PAST 24 HOURS: 1/10
PAIN SEVERITY GOAL: 0/10
PERSON REPORTING PAIN: PATIENT
PAIN LOCATION: BACK
SUBJECTIVE PAIN PROGRESSION: WAXING AND WANING
HIGHEST PAIN SEVERITY IN PAST 24 HOURS: 10/10
PAIN: 1

## 2024-04-24 ENCOUNTER — HOME CARE VISIT (OUTPATIENT)
Dept: HOME HEALTH SERVICES | Facility: HOME HEALTH | Age: 89
End: 2024-04-24
Payer: MEDICARE

## 2024-04-24 PROCEDURE — 1090000002 HH PPS REVENUE DEBIT

## 2024-04-24 PROCEDURE — 1090000001 HH PPS REVENUE CREDIT

## 2024-04-24 PROCEDURE — G0157 HHC PT ASSISTANT EA 15: HCPCS | Mod: CQ,HHH

## 2024-04-25 PROCEDURE — 1090000002 HH PPS REVENUE DEBIT

## 2024-04-25 PROCEDURE — 1090000001 HH PPS REVENUE CREDIT

## 2024-04-25 ASSESSMENT — ENCOUNTER SYMPTOMS
PAIN LOCATION: BACK
HIGHEST PAIN SEVERITY IN PAST 24 HOURS: 10/10
PAIN: 1
PAIN SEVERITY GOAL: 0/10
SUBJECTIVE PAIN PROGRESSION: WAXING AND WANING
PAIN LOCATION: LEFT KNEE
PERSON REPORTING PAIN: PATIENT
LOWEST PAIN SEVERITY IN PAST 24 HOURS: 0/10

## 2024-04-26 PROCEDURE — 1090000002 HH PPS REVENUE DEBIT

## 2024-04-26 PROCEDURE — 1090000001 HH PPS REVENUE CREDIT

## 2024-04-27 PROCEDURE — 1090000002 HH PPS REVENUE DEBIT

## 2024-04-27 PROCEDURE — 1090000001 HH PPS REVENUE CREDIT

## 2024-04-28 PROCEDURE — 1090000002 HH PPS REVENUE DEBIT

## 2024-04-28 PROCEDURE — 1090000001 HH PPS REVENUE CREDIT

## 2024-04-29 ENCOUNTER — HOME CARE VISIT (OUTPATIENT)
Dept: HOME HEALTH SERVICES | Facility: HOME HEALTH | Age: 89
End: 2024-04-29
Payer: MEDICARE

## 2024-04-29 PROCEDURE — 1090000001 HH PPS REVENUE CREDIT

## 2024-04-29 PROCEDURE — G0157 HHC PT ASSISTANT EA 15: HCPCS | Mod: CQ,HHH

## 2024-04-29 PROCEDURE — 1090000002 HH PPS REVENUE DEBIT

## 2024-04-29 ASSESSMENT — ENCOUNTER SYMPTOMS
LOWEST PAIN SEVERITY IN PAST 24 HOURS: 0/10
SUBJECTIVE PAIN PROGRESSION: WAXING AND WANING
PAIN: 1
PAIN LOCATION: BACK
HIGHEST PAIN SEVERITY IN PAST 24 HOURS: 8/10
PAIN SEVERITY GOAL: 0/10
PAIN LOCATION: LEFT KNEE
PERSON REPORTING PAIN: PATIENT

## 2024-04-30 DIAGNOSIS — I50.32 CHRONIC DIASTOLIC HEART FAILURE (MULTI): ICD-10-CM

## 2024-04-30 PROCEDURE — 1090000001 HH PPS REVENUE CREDIT

## 2024-04-30 PROCEDURE — 1090000002 HH PPS REVENUE DEBIT

## 2024-04-30 RX ORDER — TORSEMIDE 10 MG/1
10 TABLET ORAL DAILY
Qty: 90 TABLET | Refills: 3 | Status: SHIPPED | OUTPATIENT
Start: 2024-04-30

## 2024-05-01 ENCOUNTER — HOME CARE VISIT (OUTPATIENT)
Dept: HOME HEALTH SERVICES | Facility: HOME HEALTH | Age: 89
End: 2024-05-01
Payer: MEDICARE

## 2024-05-01 PROCEDURE — G0157 HHC PT ASSISTANT EA 15: HCPCS | Mod: CQ,HHH

## 2024-05-01 PROCEDURE — 1090000002 HH PPS REVENUE DEBIT

## 2024-05-01 PROCEDURE — 1090000001 HH PPS REVENUE CREDIT

## 2024-05-01 ASSESSMENT — ENCOUNTER SYMPTOMS
PAIN LOCATION: LEFT KNEE
SUBJECTIVE PAIN PROGRESSION: WAXING AND WANING
PAIN LOCATION: RIGHT KNEE
PAIN LOCATION: BACK
PERSON REPORTING PAIN: PATIENT
PAIN: 1
HIGHEST PAIN SEVERITY IN PAST 24 HOURS: 8/10
LOWEST PAIN SEVERITY IN PAST 24 HOURS: 0/10
PAIN SEVERITY GOAL: 0/10

## 2024-05-02 PROCEDURE — 1090000001 HH PPS REVENUE CREDIT

## 2024-05-02 PROCEDURE — 1090000002 HH PPS REVENUE DEBIT

## 2024-05-03 PROCEDURE — 1090000002 HH PPS REVENUE DEBIT

## 2024-05-03 PROCEDURE — 1090000001 HH PPS REVENUE CREDIT

## 2024-05-04 PROCEDURE — 1090000001 HH PPS REVENUE CREDIT

## 2024-05-04 PROCEDURE — 1090000002 HH PPS REVENUE DEBIT

## 2024-05-05 PROCEDURE — 1090000002 HH PPS REVENUE DEBIT

## 2024-05-05 PROCEDURE — 1090000001 HH PPS REVENUE CREDIT

## 2024-05-06 ENCOUNTER — HOME CARE VISIT (OUTPATIENT)
Dept: HOME HEALTH SERVICES | Facility: HOME HEALTH | Age: 89
End: 2024-05-06
Payer: MEDICARE

## 2024-05-06 DIAGNOSIS — I10 HYPERTENSION, UNSPECIFIED TYPE: ICD-10-CM

## 2024-05-06 PROCEDURE — 1090000001 HH PPS REVENUE CREDIT

## 2024-05-06 PROCEDURE — 1090000002 HH PPS REVENUE DEBIT

## 2024-05-06 PROCEDURE — G0151 HHCP-SERV OF PT,EA 15 MIN: HCPCS | Mod: HHH

## 2024-05-06 RX ORDER — LOSARTAN POTASSIUM 100 MG/1
100 TABLET ORAL DAILY
Qty: 90 TABLET | Refills: 3 | Status: SHIPPED | OUTPATIENT
Start: 2024-05-06

## 2024-05-06 SDOH — HEALTH STABILITY: PHYSICAL HEALTH: EXERCISE TYPE: SITTING  EXS X 15 REPS , SUPINE EXS X 15 REPS

## 2024-05-06 ASSESSMENT — ENCOUNTER SYMPTOMS
PERSON REPORTING PAIN: PATIENT
PAIN LOCATION: RIGHT LEG
PAIN LOCATION: BACK
LOWEST PAIN SEVERITY IN PAST 24 HOURS: 0/10
HIGHEST PAIN SEVERITY IN PAST 24 HOURS: 5/10
PAIN LOCATION: LEFT LEG
PAIN: 1

## 2024-05-06 ASSESSMENT — ACTIVITIES OF DAILY LIVING (ADL): AMBULATION ASSISTANCE ON FLAT SURFACES: 1

## 2024-05-06 NOTE — HOME HEALTH
patient seen for pt reassessment today with  present.  she states she is very happy with the care that alex has been providing and is pleased with the progress she has been making .  she agrees with extending pt to cont working on increasing her safety, function and mobility.

## 2024-05-07 PROCEDURE — 1090000002 HH PPS REVENUE DEBIT

## 2024-05-07 PROCEDURE — 1090000001 HH PPS REVENUE CREDIT

## 2024-05-08 ENCOUNTER — TELEPHONE (OUTPATIENT)
Dept: UROLOGY | Facility: CLINIC | Age: 89
End: 2024-05-08

## 2024-05-08 PROCEDURE — 1090000001 HH PPS REVENUE CREDIT

## 2024-05-08 PROCEDURE — 1090000002 HH PPS REVENUE DEBIT

## 2024-05-09 DIAGNOSIS — N39.41 URGE INCONTINENCE: Primary | ICD-10-CM

## 2024-05-09 PROCEDURE — 1090000002 HH PPS REVENUE DEBIT

## 2024-05-09 PROCEDURE — 1090000001 HH PPS REVENUE CREDIT

## 2024-05-10 ENCOUNTER — HOME CARE VISIT (OUTPATIENT)
Dept: HOME HEALTH SERVICES | Facility: HOME HEALTH | Age: 89
End: 2024-05-10
Payer: MEDICARE

## 2024-05-10 PROCEDURE — 1090000002 HH PPS REVENUE DEBIT

## 2024-05-10 PROCEDURE — 1090000001 HH PPS REVENUE CREDIT

## 2024-05-11 PROCEDURE — 1090000002 HH PPS REVENUE DEBIT

## 2024-05-11 PROCEDURE — 1090000001 HH PPS REVENUE CREDIT

## 2024-05-12 PROCEDURE — 1090000002 HH PPS REVENUE DEBIT

## 2024-05-12 PROCEDURE — 1090000001 HH PPS REVENUE CREDIT

## 2024-05-13 PROCEDURE — 1090000001 HH PPS REVENUE CREDIT

## 2024-05-13 PROCEDURE — 1090000002 HH PPS REVENUE DEBIT

## 2024-05-14 PROCEDURE — 1090000002 HH PPS REVENUE DEBIT

## 2024-05-14 PROCEDURE — 1090000001 HH PPS REVENUE CREDIT

## 2024-05-15 ENCOUNTER — HOME CARE VISIT (OUTPATIENT)
Dept: HOME HEALTH SERVICES | Facility: HOME HEALTH | Age: 89
End: 2024-05-15
Payer: MEDICARE

## 2024-05-15 PROCEDURE — 1090000001 HH PPS REVENUE CREDIT

## 2024-05-15 PROCEDURE — G0157 HHC PT ASSISTANT EA 15: HCPCS | Mod: CQ,HHH

## 2024-05-15 PROCEDURE — 0023 HH SOC

## 2024-05-15 PROCEDURE — 1090000002 HH PPS REVENUE DEBIT

## 2024-05-15 ASSESSMENT — ENCOUNTER SYMPTOMS
PAIN LOCATION: RIGHT KNEE
SUBJECTIVE PAIN PROGRESSION: WAXING AND WANING
PAIN: 1
PAIN SEVERITY GOAL: 0/10
PAIN LOCATION: LEFT KNEE
HIGHEST PAIN SEVERITY IN PAST 24 HOURS: 10/10
PAIN LOCATION: BACK
PERSON REPORTING PAIN: PATIENT
LOWEST PAIN SEVERITY IN PAST 24 HOURS: 0/10

## 2024-05-16 ENCOUNTER — OFFICE VISIT (OUTPATIENT)
Dept: UROLOGY | Facility: CLINIC | Age: 89
End: 2024-05-16
Payer: MEDICARE

## 2024-05-16 VITALS
SYSTOLIC BLOOD PRESSURE: 119 MMHG | HEIGHT: 63 IN | BODY MASS INDEX: 37.21 KG/M2 | HEART RATE: 64 BPM | WEIGHT: 210 LBS | DIASTOLIC BLOOD PRESSURE: 74 MMHG

## 2024-05-16 DIAGNOSIS — R35.0 FREQUENCY OF MICTURITION: ICD-10-CM

## 2024-05-16 DIAGNOSIS — N39.41 URGENCY INCONTINENCE: ICD-10-CM

## 2024-05-16 DIAGNOSIS — N39.41 URGE INCONTINENCE: Primary | ICD-10-CM

## 2024-05-16 PROCEDURE — 1159F MED LIST DOCD IN RCRD: CPT | Performed by: NURSE PRACTITIONER

## 2024-05-16 PROCEDURE — 3078F DIAST BP <80 MM HG: CPT | Performed by: NURSE PRACTITIONER

## 2024-05-16 PROCEDURE — 64566 NEUROELTRD STIM POST TIBIAL: CPT | Performed by: NURSE PRACTITIONER

## 2024-05-16 PROCEDURE — 1090000002 HH PPS REVENUE DEBIT

## 2024-05-16 PROCEDURE — 3074F SYST BP LT 130 MM HG: CPT | Performed by: NURSE PRACTITIONER

## 2024-05-16 PROCEDURE — 1090000001 HH PPS REVENUE CREDIT

## 2024-05-17 ENCOUNTER — HOME CARE VISIT (OUTPATIENT)
Dept: HOME HEALTH SERVICES | Facility: HOME HEALTH | Age: 89
End: 2024-05-17
Payer: MEDICARE

## 2024-05-17 PROCEDURE — 1090000002 HH PPS REVENUE DEBIT

## 2024-05-17 PROCEDURE — 1090000001 HH PPS REVENUE CREDIT

## 2024-05-17 PROCEDURE — G0157 HHC PT ASSISTANT EA 15: HCPCS | Mod: CQ,HHH

## 2024-05-17 ASSESSMENT — ENCOUNTER SYMPTOMS
PAIN SEVERITY GOAL: 0/10
PERSON REPORTING PAIN: PATIENT
PAIN: 1
PAIN LOCATION: BACK
SUBJECTIVE PAIN PROGRESSION: WAXING AND WANING
HIGHEST PAIN SEVERITY IN PAST 24 HOURS: 10/10
LOWEST PAIN SEVERITY IN PAST 24 HOURS: 0/10
PAIN LOCATION: RIGHT KNEE
PAIN LOCATION: LEFT KNEE

## 2024-05-17 NOTE — PROGRESS NOTES
"05/16/24   [id number]    PTNS monthly maintenance     Subjective      HPI Shreya Acuña is a 88 y.o. female who presents for PTNS monthly maintenance.    good w day time getting to bathroom w combination therapy PTNS w Gemtesa; better at night w using incontinence purewick, doesn't get up at all at night;     Objective     /74   Pulse 64   Ht 1.6 m (5' 3\")   Wt 95.3 kg (210 lb)   BMI 37.20 kg/m²    Physical Exam  General: Appears comfortable and in no apparent distress, well nourished  Head: Normocephalic, atraumatic  Neck: trachea midline  Respiratory: respirations unlabored, no wheezes, and no use of accessory muscles  Cardiovascular: at rest no dyspnea, well perfused  Skin: no visible rashes or lesions  Neurologic: grossly intact, oriented to person, place, and time  Psychiatric: mood and affect appropriate  Musculoskeletal: in chair for appt. no difficulty w upper body movement, wheel chair;Patient ID: Shreya Acuña is a 88 y.o. female.    Percutaneous Tibial Nerve Stimulation    Date/Time: 5/16/2024 8:29 PM    Performed by: CHECO Siddiqi  Authorized by: CHECO Siddiqi    Procedure discussed: discussed risks, benefits and alternatives      Procedure Details     Indications: urge incontinence, urinary frequency and urinary urgency      PTNS session #: monthly maintenance    Bladder symptoms: unchanged      Ankle used: right      Stimulator intensity (mA): 2          Assessment/Plan   Problem List Items Addressed This Visit    None  Visit Diagnoses       Urge incontinence    -  Primary    Frequency of micturition        Urgency incontinence              Orders Placed This Encounter   Procedures    Percutaneous Tibial Nerve Stimulation     This order was created via procedure documentation      Continues combination therapy Gemtesa w PTNS monthly maintenance  4 weeks PTNS       CHECO Siddiqi  Lab Results   Component Value Date    GLUCOSE 121 (H) 03/11/2024    CALCIUM 9.4 " 03/11/2024     03/11/2024    K 4.6 03/11/2024    CO2 30 03/11/2024     03/11/2024    BUN 34 (H) 03/11/2024    CREATININE 1.62 (H) 03/11/2024

## 2024-05-18 PROCEDURE — 1090000001 HH PPS REVENUE CREDIT

## 2024-05-18 PROCEDURE — 1090000002 HH PPS REVENUE DEBIT

## 2024-05-19 PROCEDURE — 1090000002 HH PPS REVENUE DEBIT

## 2024-05-19 PROCEDURE — 1090000001 HH PPS REVENUE CREDIT

## 2024-05-20 PROCEDURE — 1090000002 HH PPS REVENUE DEBIT

## 2024-05-20 PROCEDURE — 1090000001 HH PPS REVENUE CREDIT

## 2024-05-21 ENCOUNTER — HOME CARE VISIT (OUTPATIENT)
Dept: HOME HEALTH SERVICES | Facility: HOME HEALTH | Age: 89
End: 2024-05-21
Payer: MEDICARE

## 2024-05-21 PROCEDURE — 1090000002 HH PPS REVENUE DEBIT

## 2024-05-21 PROCEDURE — G0157 HHC PT ASSISTANT EA 15: HCPCS | Mod: CQ,HHH

## 2024-05-21 PROCEDURE — 1090000001 HH PPS REVENUE CREDIT

## 2024-05-21 ASSESSMENT — ENCOUNTER SYMPTOMS
PAIN LOCATION: RIGHT KNEE
HIGHEST PAIN SEVERITY IN PAST 24 HOURS: 10/10
PAIN LOCATION: BACK
PAIN LOCATION: RIGHT SHOULDER
PAIN SEVERITY GOAL: 0/10
LOWEST PAIN SEVERITY IN PAST 24 HOURS: 0/10
SUBJECTIVE PAIN PROGRESSION: WAXING AND WANING
PERSON REPORTING PAIN: PATIENT
PAIN: 1
PAIN LOCATION: LEFT KNEE

## 2024-05-22 PROCEDURE — 1090000002 HH PPS REVENUE DEBIT

## 2024-05-22 PROCEDURE — 1090000001 HH PPS REVENUE CREDIT

## 2024-05-23 PROCEDURE — 1090000001 HH PPS REVENUE CREDIT

## 2024-05-23 PROCEDURE — 1090000002 HH PPS REVENUE DEBIT

## 2024-05-24 ENCOUNTER — HOME CARE VISIT (OUTPATIENT)
Dept: HOME HEALTH SERVICES | Facility: HOME HEALTH | Age: 89
End: 2024-05-24
Payer: MEDICARE

## 2024-05-24 PROCEDURE — G0157 HHC PT ASSISTANT EA 15: HCPCS | Mod: CQ,HHH

## 2024-05-24 PROCEDURE — 1090000002 HH PPS REVENUE DEBIT

## 2024-05-24 PROCEDURE — 1090000001 HH PPS REVENUE CREDIT

## 2024-05-24 ASSESSMENT — ENCOUNTER SYMPTOMS
SUBJECTIVE PAIN PROGRESSION: WAXING AND WANING
PAIN: 1
HIGHEST PAIN SEVERITY IN PAST 24 HOURS: 10/10
LOWEST PAIN SEVERITY IN PAST 24 HOURS: 2/10
PAIN SEVERITY GOAL: 2/10
PAIN LOCATION: RIGHT LEG

## 2024-05-25 PROCEDURE — 1090000001 HH PPS REVENUE CREDIT

## 2024-05-25 PROCEDURE — 1090000002 HH PPS REVENUE DEBIT

## 2024-05-26 PROCEDURE — 1090000001 HH PPS REVENUE CREDIT

## 2024-05-26 PROCEDURE — 1090000002 HH PPS REVENUE DEBIT

## 2024-05-27 PROCEDURE — 1090000002 HH PPS REVENUE DEBIT

## 2024-05-27 PROCEDURE — 1090000001 HH PPS REVENUE CREDIT

## 2024-05-28 PROCEDURE — 1090000002 HH PPS REVENUE DEBIT

## 2024-05-28 PROCEDURE — 1090000001 HH PPS REVENUE CREDIT

## 2024-05-29 ENCOUNTER — HOME CARE VISIT (OUTPATIENT)
Dept: HOME HEALTH SERVICES | Facility: HOME HEALTH | Age: 89
End: 2024-05-29
Payer: MEDICARE

## 2024-05-29 PROCEDURE — 1090000002 HH PPS REVENUE DEBIT

## 2024-05-29 PROCEDURE — G0157 HHC PT ASSISTANT EA 15: HCPCS | Mod: CQ,HHH

## 2024-05-29 PROCEDURE — 1090000001 HH PPS REVENUE CREDIT

## 2024-05-29 ASSESSMENT — ENCOUNTER SYMPTOMS
SUBJECTIVE PAIN PROGRESSION: WAXING AND WANING
HIGHEST PAIN SEVERITY IN PAST 24 HOURS: 10/10
LOWEST PAIN SEVERITY IN PAST 24 HOURS: 0/10
PAIN LOCATION: BACK
PERSON REPORTING PAIN: PATIENT
PAIN: 1
PAIN LOCATION: LEFT KNEE
PAIN SEVERITY GOAL: 0/10
PAIN LOCATION: LEFT SHOULDER

## 2024-05-30 PROCEDURE — 1090000002 HH PPS REVENUE DEBIT

## 2024-05-30 PROCEDURE — 1090000001 HH PPS REVENUE CREDIT

## 2024-05-31 ENCOUNTER — HOME CARE VISIT (OUTPATIENT)
Dept: HOME HEALTH SERVICES | Facility: HOME HEALTH | Age: 89
End: 2024-05-31
Payer: MEDICARE

## 2024-05-31 PROCEDURE — 1090000001 HH PPS REVENUE CREDIT

## 2024-05-31 PROCEDURE — G0157 HHC PT ASSISTANT EA 15: HCPCS | Mod: CQ,HHH

## 2024-05-31 PROCEDURE — 1090000002 HH PPS REVENUE DEBIT

## 2024-06-01 PROCEDURE — 1090000001 HH PPS REVENUE CREDIT

## 2024-06-01 PROCEDURE — 1090000002 HH PPS REVENUE DEBIT

## 2024-06-02 PROCEDURE — 1090000001 HH PPS REVENUE CREDIT

## 2024-06-02 PROCEDURE — 1090000002 HH PPS REVENUE DEBIT

## 2024-06-03 PROCEDURE — 1090000002 HH PPS REVENUE DEBIT

## 2024-06-03 PROCEDURE — 1090000001 HH PPS REVENUE CREDIT

## 2024-06-03 ASSESSMENT — ENCOUNTER SYMPTOMS
PAIN SEVERITY GOAL: 0/10
PAIN LOCATION: LEFT KNEE
PAIN: 1
PAIN LOCATION: RIGHT KNEE
LOWEST PAIN SEVERITY IN PAST 24 HOURS: 0/10
PAIN LOCATION: BACK
HIGHEST PAIN SEVERITY IN PAST 24 HOURS: 10/10
SUBJECTIVE PAIN PROGRESSION: WAXING AND WANING
PERSON REPORTING PAIN: PATIENT

## 2024-06-04 PROCEDURE — 1090000002 HH PPS REVENUE DEBIT

## 2024-06-04 PROCEDURE — 1090000001 HH PPS REVENUE CREDIT

## 2024-06-05 PROCEDURE — 1090000002 HH PPS REVENUE DEBIT

## 2024-06-05 PROCEDURE — 1090000001 HH PPS REVENUE CREDIT

## 2024-06-06 ENCOUNTER — HOME CARE VISIT (OUTPATIENT)
Dept: HOME HEALTH SERVICES | Facility: HOME HEALTH | Age: 89
End: 2024-06-06
Payer: MEDICARE

## 2024-06-06 PROCEDURE — G0151 HHCP-SERV OF PT,EA 15 MIN: HCPCS | Mod: HHH

## 2024-06-06 PROCEDURE — 1090000001 HH PPS REVENUE CREDIT

## 2024-06-06 PROCEDURE — 1090000002 HH PPS REVENUE DEBIT

## 2024-06-06 SDOH — HEALTH STABILITY: PHYSICAL HEALTH: EXERCISE TYPE: SITTING EXS X 20 REPS , PATIENT ALSO USING A PEDALER FOR HER LEGS

## 2024-06-06 ASSESSMENT — ACTIVITIES OF DAILY LIVING (ADL): AMBULATION ASSISTANCE ON FLAT SURFACES: 1

## 2024-06-06 ASSESSMENT — ENCOUNTER SYMPTOMS
PAIN: 1
PERSON REPORTING PAIN: PATIENT
PAIN LOCATION: RIGHT LEG
LOWEST PAIN SEVERITY IN PAST 24 HOURS: 0/10
PAIN LOCATION: BACK
PAIN LOCATION: LEFT LEG

## 2024-06-06 NOTE — HOME HEALTH
patient seen for agency dc today.  she states she is very happy with [facility] services and the care that has been provided.  she denies any questions or concerns re her hep or activity.  she agrees with dc today.

## 2024-06-07 ENCOUNTER — TELEPHONE (OUTPATIENT)
Dept: UROLOGY | Facility: HOSPITAL | Age: 89
End: 2024-06-07

## 2024-06-07 DIAGNOSIS — N30.00 ACUTE CYSTITIS WITHOUT HEMATURIA: Primary | ICD-10-CM

## 2024-06-07 DIAGNOSIS — R30.0 DYSURIA: Primary | ICD-10-CM

## 2024-06-07 RX ORDER — NITROFURANTOIN 25; 75 MG/1; MG/1
100 CAPSULE ORAL 2 TIMES DAILY
Qty: 14 CAPSULE | Refills: 0 | Status: SHIPPED | OUTPATIENT
Start: 2024-06-07 | End: 2024-06-11 | Stop reason: ALTCHOICE

## 2024-06-07 ASSESSMENT — ACTIVITIES OF DAILY LIVING (ADL)
OASIS_M1830: 03
HOME_HEALTH_OASIS: 01

## 2024-06-07 NOTE — TELEPHONE ENCOUNTER
Spoke with pt , Ender who states he will be bringing in a urine sample today to the  lab in [facility]. Orders placed in chart. Please advise on treatment as the pt is feeling symptomatic and he would like her to start treatment today, thanks.

## 2024-06-07 NOTE — TELEPHONE ENCOUNTER
Pt's wife calling in saying he thinks Shreya has a UTI. She has some discomfort, is not feeling well, burning & urine is not the normal color

## 2024-06-08 ENCOUNTER — LAB (OUTPATIENT)
Dept: LAB | Facility: LAB | Age: 89
End: 2024-06-08
Payer: MEDICARE

## 2024-06-08 DIAGNOSIS — R30.0 DYSURIA: ICD-10-CM

## 2024-06-08 PROCEDURE — 87086 URINE CULTURE/COLONY COUNT: CPT

## 2024-06-08 PROCEDURE — 87186 SC STD MICRODIL/AGAR DIL: CPT

## 2024-06-08 PROCEDURE — 81001 URINALYSIS AUTO W/SCOPE: CPT

## 2024-06-09 LAB
APPEARANCE UR: CLEAR
BACTERIA #/AREA URNS AUTO: ABNORMAL /HPF
BILIRUB UR STRIP.AUTO-MCNC: NEGATIVE MG/DL
COLOR UR: ABNORMAL
GLUCOSE UR STRIP.AUTO-MCNC: NORMAL MG/DL
KETONES UR STRIP.AUTO-MCNC: NEGATIVE MG/DL
LEUKOCYTE ESTERASE UR QL STRIP.AUTO: ABNORMAL
MUCOUS THREADS #/AREA URNS AUTO: ABNORMAL /LPF
NITRITE UR QL STRIP.AUTO: NEGATIVE
PH UR STRIP.AUTO: 6.5 [PH]
PROT UR STRIP.AUTO-MCNC: NEGATIVE MG/DL
RBC # UR STRIP.AUTO: NEGATIVE /UL
RBC #/AREA URNS AUTO: ABNORMAL /HPF
SP GR UR STRIP.AUTO: 1.01
UROBILINOGEN UR STRIP.AUTO-MCNC: NORMAL MG/DL
WBC #/AREA URNS AUTO: ABNORMAL /HPF

## 2024-06-11 DIAGNOSIS — N30.00 ACUTE CYSTITIS WITHOUT HEMATURIA: Primary | ICD-10-CM

## 2024-06-11 LAB — BACTERIA UR CULT: ABNORMAL

## 2024-06-11 RX ORDER — CIPROFLOXACIN 500 MG/1
500 TABLET ORAL 2 TIMES DAILY
Qty: 14 TABLET | Refills: 0 | Status: SHIPPED | OUTPATIENT
Start: 2024-06-11 | End: 2024-06-18

## 2024-06-12 ENCOUNTER — OFFICE VISIT (OUTPATIENT)
Dept: PAIN MEDICINE | Facility: CLINIC | Age: 89
End: 2024-06-12
Payer: MEDICARE

## 2024-06-12 VITALS
TEMPERATURE: 98.1 F | HEART RATE: 62 BPM | WEIGHT: 210 LBS | OXYGEN SATURATION: 96 % | BODY MASS INDEX: 37.2 KG/M2 | DIASTOLIC BLOOD PRESSURE: 59 MMHG | SYSTOLIC BLOOD PRESSURE: 119 MMHG | RESPIRATION RATE: 16 BRPM

## 2024-06-12 DIAGNOSIS — M48.07 LUMBOSACRAL STENOSIS: ICD-10-CM

## 2024-06-12 DIAGNOSIS — M47.817 FACET ARTHRITIS OF LUMBOSACRAL REGION: ICD-10-CM

## 2024-06-12 DIAGNOSIS — M54.42 CHRONIC BILATERAL LOW BACK PAIN WITH BILATERAL SCIATICA: ICD-10-CM

## 2024-06-12 DIAGNOSIS — M17.0 PRIMARY OSTEOARTHRITIS OF BOTH KNEES: ICD-10-CM

## 2024-06-12 DIAGNOSIS — M54.17 LUMBOSACRAL NEURITIS: ICD-10-CM

## 2024-06-12 DIAGNOSIS — M51.26 LUMBAR DISC HERNIATION: Primary | ICD-10-CM

## 2024-06-12 DIAGNOSIS — G89.29 CHRONIC BILATERAL LOW BACK PAIN WITH BILATERAL SCIATICA: ICD-10-CM

## 2024-06-12 DIAGNOSIS — M54.41 CHRONIC BILATERAL LOW BACK PAIN WITH BILATERAL SCIATICA: ICD-10-CM

## 2024-06-12 PROCEDURE — 99214 OFFICE O/P EST MOD 30 MIN: CPT | Performed by: ANESTHESIOLOGY

## 2024-06-12 ASSESSMENT — ENCOUNTER SYMPTOMS
LOSS OF SENSATION IN FEET: 1
OCCASIONAL FEELINGS OF UNSTEADINESS: 1

## 2024-06-12 ASSESSMENT — PAIN SCALES - GENERAL: PAINLEVEL: 10-WORST PAIN EVER

## 2024-06-12 NOTE — PROGRESS NOTES
History Of Present Illness  Shreya Acuña is a 88 y.o. female presenting with   Chief Complaint   Patient presents with    Follow-up     Patient returns regarding slow return of her chronic low back pain, BUT continued LEG PAIN worse on the right side.     Pt was in the ED with UTI and is now on Cipro for 7 days started on 6-.      Recall: The pt saw Dr. Barron in August of 2023 and is not considering total shoulder replacement.      Recall: She reports since early August 2018 she awoke one morning with severe pain in her neck and that radiates into both of her shoulders. Worse with rotating her neck.      The low back pain is constant, worse with prolonged standing and walking and better with rest. The pain is aching in her back and LLE. Denies LE paresthesias, weakness, saddle anesthesia, bowel or bladder incontinence. To manage this pain the patient has attempted Gabapentin 300mg TID and is now on Topamax and Cymbalta. The patients chronic HTN, DLD, A.FIB ON XARELTO, are stable.     PAIN SCORE: 10/10 with moving and walking and 0/10 with sitting      Cards: Dr. Barnhart  PCP: Dr. Asencio     Past Medical History  She has a past medical history of Emphysema lung (Multi), Hypertension, and Other specified health status.    Surgical History  She has a past surgical history that includes Other surgical history (12/02/2020).     Social History  She reports that she has quit smoking. Her smoking use included cigarettes. She has never used smokeless tobacco. She reports that she does not currently use alcohol. She reports that she does not use drugs.    Family History  No family history on file.     Allergies  Patient has no known allergies.    Review of Systems    All other systems reviewed and negative for any deficits. Pertinent positives and negatives were considered in the medical decision making process.        Physical Exam  /59   Pulse 62   Temp 36.7 °C (98.1 °F)   Resp 16   Wt 95.3 kg (210  lb)   SpO2 96%     General: Pt appears stated age     Eyes: Conjunctiva non-icteric and lids without obvious rash or drooping. Pupils are symmetric     ENT: Hearing is grossly intact     Neck: No JVD noted, tracheal position midline.     Musculoskeletal: Gait is grossly normal, favoring her right side      Digits/nails show no clubbing or cyanosis     Exam of muscles/joints/bones shows no gross atrophy and no abnormal/involuntary movements in the head/neckNo asymmetry or masses noted in the head/neck     Stability: no subluxation noted on movement of bilateral upper extremities or head/neck     Strength: 4/5 in B/L lower extremities      Positive straight leg raise bilaterally      Range of Motion: WNL (with exception as noted above) LIMITED LEFT SHOULDER ABDUCTION, limited to 45 deg     Sensation: decreased to sharp touch in LLE      Cranial nerves 2-12 are grossly intact     Psychiatric: Pt is alert and oriented to time, place and person.         Assessment/Plan   1. Lumbar disc herniation        2. Lumbosacral neuritis  FL pain management      3. 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 Primary osteoarthritis of both knees        6. Facet arthritis of lumbosacral region  Radiofrequency Ablation              Diagnoses/Problems   · Lumbar disc herniation (722.10) (M51.26)   · Lumbosacral stenosis (724.02) (M48.07)   · Chronic low back pain (724.2,338.29) (M54.50,G89.29)   · Lumbosacral neuritis (724.4) (M54.17)   · Neck pain (723.1) (M54.2)   · Left shoulder pain (719.41) (M25.512)     Provider Impressions     1. I have previously provided the patient with a list of physical therapy exercises to learn and perform.  The pt works on water therapy at home on a regular basis during the summer season.     Recall: I discussed referring her to water therapy but the pt did not want go to water therapy in the winter.      2. The pt was PREVIOUSLY taking Gabapentin  300mg TID to help with nerve related pain. She did report LE edema and weight gain. She was weaned off of Gabapentin and is now on PREGABALIN 100mg BID instead without any side effects thus far.      She also tried Topamax 25mg in the AM and 50mg at bedtime, but reported hair loss since June of 2019 she did wean off of this medication.      I would recommend pt continue on Cymbalta 30mg BID to help with nerve pain. We discussed the risks, benefits, and side effects to this medication including the mechanism of action and the pt understands and agrees.     She signed a [employer] contract on 1- and again on 11-2-2022.  UDS was completed on 12- and again on 11-2-2022.     GFR was 38 in August of 2020.      3. She previously completed water therapy extensively. She reports she stopped doing water therapy exercises due to it being too cold out.      Pt working on stretching at home in her hot tub daily however, her hot tub broke and has still not been repaired.      4. Previously, I extensively reviewed the patients MRI findings in detail, including review of the actual images and provided a detailed explanation of the findings using a spine model. The pt has a grade I spondylolisthesis at L4 on L5 and L5 on S1 with moderate to severe canal stenosis L4/5 with a disc bulge at the L3/4 level.      5. The patient is a candidate for an RFA of bilateral L4/5 and L5/S1 medial branch nerves under fluoro guidance. She underwent the diagnostic procedure on 4-5-2024 and prior to that 2-2-2024 and she reported 80% relief of her pain that lasted for 2 weeks EACH time and gradually wore off.     6. She is also  LESI at L5/S1 and LEFT SHOULDER JOINT INJECTION with RIGHT to LEFT sided bias to treat back and radicular pain. I spent time with the patient discussing all of the risks, benefits, and alternatives to this measure. Including but not limited to spinal infection, epidural hematoma/abscess, paralysis, nerve injury,  steroid effects, and spinal headache. The patient understands and agrees to proceed as needed.      Her last injection was an LESI at L5/S1 on 8- and prior to that 11- that injection provided her with 80% relief of her pain that has since worn off after 2.5 months. She reports since her last injection worked so well she was able to stand and cook with less pain. Since her injection has worn off her pain has become progressively worse with standing to cook.      We will contact the patients CARDIOLOGIST (Dr. Barnhart) to determine if it is safe to stop Xarelto for 5 days prior to A REPEAT LUMBAR BLOCK IF NEEDED. If Pt is to be bridged on Lovenox they will need to be off of Lovenox for 24 hours prior to the procedure. They will also need to have their INR checked the day of the procedure. We did discuss the risks for stopping anticoagulation including, but not limited to any cardiovascular event, embolism, and even death. The patient understands these risks and would like to proceed with the procedure.     7. In the past she underwent a NEIL on 8-28-18 and she reports 100% relief of her neck pain that is o  ngoing for her.      8. The pt is interested in following up with Ortho regarding her bilateral knee pain. She has attempted steroid injections with no relief. I did previously provide her with a referral to Ortho. She previously saw Dr. Jones and underwent steroid injection / synthetic cartilage in her bilateral knees with 50% relief for approx 1 month.     Of note she was scheduled for Synthetic cartilage injections with Dr. Jones on Aug 1, 8, 15th, 2023.      8. 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 May be a candidate for GENICULAR nerve blocks followed by RFA.     I spent time with the patient discussing all of the risks, benefits, and alternatives to this measure. Including but not limited to joint infection, hematoma/abscess and nerve injury.  The patient understands and agrees to proceed.        I spent time with the patient reviewing their imaging and discussing the risks benefits and alternatives to the above plan. A total of 30 minutes was spent reviewing the data and greater than 50% of that time was with the patient during the face to face encounter discussing treatment options both surgical, non-surgical, and minimally invasive techniques.       Jose Luis Quiñones MD

## 2024-06-13 ENCOUNTER — APPOINTMENT (OUTPATIENT)
Dept: UROLOGY | Facility: CLINIC | Age: 89
End: 2024-06-13
Payer: MEDICARE

## 2024-06-13 VITALS
BODY MASS INDEX: 38.09 KG/M2 | WEIGHT: 215 LBS | TEMPERATURE: 97.3 F | SYSTOLIC BLOOD PRESSURE: 134 MMHG | DIASTOLIC BLOOD PRESSURE: 69 MMHG | HEIGHT: 63 IN | HEART RATE: 70 BPM

## 2024-06-13 DIAGNOSIS — R35.0 FREQUENCY OF MICTURITION: ICD-10-CM

## 2024-06-13 DIAGNOSIS — N39.41 URGE INCONTINENCE: Primary | ICD-10-CM

## 2024-06-13 DIAGNOSIS — N39.41 URGENCY INCONTINENCE: ICD-10-CM

## 2024-06-13 PROCEDURE — 1159F MED LIST DOCD IN RCRD: CPT | Performed by: NURSE PRACTITIONER

## 2024-06-13 PROCEDURE — 64566 NEUROELTRD STIM POST TIBIAL: CPT | Performed by: NURSE PRACTITIONER

## 2024-06-13 PROCEDURE — 3075F SYST BP GE 130 - 139MM HG: CPT | Performed by: NURSE PRACTITIONER

## 2024-06-13 PROCEDURE — 3078F DIAST BP <80 MM HG: CPT | Performed by: NURSE PRACTITIONER

## 2024-06-13 PROCEDURE — 1036F TOBACCO NON-USER: CPT | Performed by: NURSE PRACTITIONER

## 2024-06-13 NOTE — PATIENT INSTRUCTIONS
Discussed plan w   Finish Cipro  Continue Gemtesa/PTNS combination therapy  Schedule July 23rd at 2:40 will add on PTNS  Schedule Aug 27th at 2:40 will add on PTNS  Nurse line [phone]

## 2024-06-13 NOTE — PROGRESS NOTES
"06/13/24   [id number]    PTNS monthly maintenance     Subjective      HPI Shreya Acuña is a 88 y.o. female who presents for PTNS monthly maintenance.     Treated for UTI over weekend culture not sensitive to macrobid, Cipro sent in and discussed w  over phone, patient acting better per , patient noted she was feeling UTI clearing up;     maybe incontinence during day related to UTI; not making it,  felt it was instantaneous over past couple weeks, today was able to get to bathroom in timely manner;     Good w day time getting to bathroom w combination therapy PTNS w Gemtesa; better at night w using incontinence purewick, doesn't get up at all at night;     Pmh, psh, FH, SH reviewed    Objective     /69   Pulse 70   Temp 36.3 °C (97.3 °F)   Ht 1.6 m (5' 3\")   Wt 97.5 kg (215 lb)   BMI 38.09 kg/m²    Physical Exam  General: Appears comfortable and in no apparent distress, well nourished  Head: Normocephalic, atraumatic  Neck: trachea midline  Respiratory: respirations unlabored, no wheezes, and no use of accessory muscles  Cardiovascular: at rest no dyspnea, well perfused  Skin: no visible rashes or lesions  Neurologic: grossly intact, oriented to person, place, and time  Psychiatric: mood and affect appropriate  Musculoskeletal: in chair for appt. no difficulty w upper body movement, wheel chair;    Patient ID: Shreya Acuña is a 88 y.o. female.    Percutaneous Tibial Nerve Stimulation    Date/Time: 6/13/2024 3:35 PM    Performed by: CHECO Siddiqi  Authorized by: CHECO Siddiqi    Procedure discussed: discussed risks, benefits and alternatives      Procedure Details     Indications: urge incontinence, urinary frequency and urinary urgency      PTNS session #: monthly maintenance    Bladder symptoms: unchanged      Ankle used: right      Stimulator intensity (mA): 4          Assessment/Plan   Problem List Items Addressed This Visit    None  Visit Diagnoses       Urge " incontinence    -  Primary    Frequency of micturition        Urgency incontinence                No orders of the defined types were placed in this encounter.     Discussed plan w   Finish Cipro  Continue Gemtesa/PTNS combination therapy  Schedule July 23rd at 2:40 will add on PTNS  Schedule Aug 27th at 2:40 will add on PTNS  Nurse line [phone]       Karina Del Valle, APRN-CNP  Lab Results   Component Value Date    GLUCOSE 121 (H) 03/11/2024    CALCIUM 9.4 03/11/2024     03/11/2024    K 4.6 03/11/2024    CO2 30 03/11/2024     03/11/2024    BUN 34 (H) 03/11/2024    CREATININE 1.62 (H) 03/11/2024

## 2024-06-17 ENCOUNTER — TELEPHONE (OUTPATIENT)
Dept: UROLOGY | Facility: CLINIC | Age: 89
End: 2024-06-17
Payer: MEDICARE

## 2024-06-17 DIAGNOSIS — R30.0 DYSURIA: Primary | ICD-10-CM

## 2024-06-17 DIAGNOSIS — N39.0 RECURRENT UTI: ICD-10-CM

## 2024-06-17 NOTE — TELEPHONE ENCOUNTER
Pt  left message asking for return call. Spoke with Ender who is asking if he can drop off another urine for her on Fri, once she has finished all her abx to check and make sure that the infection is gone. I told him we don't usually do that, especially if she is feeling better, which she is, but he states he would rather be safe than sorry so the infection does not spiral out of control. I told him I will place orders in her chart and he will drop off specimen on Fri for his peace of mind, thanks.

## 2024-06-21 ENCOUNTER — LAB (OUTPATIENT)
Dept: LAB | Facility: LAB | Age: 89
End: 2024-06-21
Payer: MEDICARE

## 2024-06-21 DIAGNOSIS — N39.0 RECURRENT UTI: ICD-10-CM

## 2024-06-21 DIAGNOSIS — R30.0 DYSURIA: ICD-10-CM

## 2024-06-21 LAB
APPEARANCE UR: CLEAR
BILIRUB UR STRIP.AUTO-MCNC: NEGATIVE MG/DL
COLOR UR: COLORLESS
GLUCOSE UR STRIP.AUTO-MCNC: NORMAL MG/DL
KETONES UR STRIP.AUTO-MCNC: NEGATIVE MG/DL
LEUKOCYTE ESTERASE UR QL STRIP.AUTO: NEGATIVE
NITRITE UR QL STRIP.AUTO: NEGATIVE
PH UR STRIP.AUTO: 5.5 [PH]
PROT UR STRIP.AUTO-MCNC: NEGATIVE MG/DL
RBC # UR STRIP.AUTO: NEGATIVE /UL
SP GR UR STRIP.AUTO: 1.01
UROBILINOGEN UR STRIP.AUTO-MCNC: NORMAL MG/DL

## 2024-06-21 PROCEDURE — 81003 URINALYSIS AUTO W/O SCOPE: CPT

## 2024-06-21 PROCEDURE — 87086 URINE CULTURE/COLONY COUNT: CPT

## 2024-06-22 LAB — BACTERIA UR CULT: NORMAL

## 2024-06-24 ENCOUNTER — TELEPHONE (OUTPATIENT)
Dept: UROLOGY | Facility: CLINIC | Age: 89
End: 2024-06-24
Payer: MEDICARE

## 2024-06-24 NOTE — TELEPHONE ENCOUNTER
----- Message from CHECO Siddiqi sent at 6/24/2024  8:15 AM EDT -----  Urine looks good  ----- Message -----  From: Lab, Background User  Sent: 6/21/2024   5:25 PM EDT  To: CHECO Siddiqi

## 2024-07-03 ENCOUNTER — LAB (OUTPATIENT)
Dept: LAB | Facility: LAB | Age: 89
End: 2024-07-03
Payer: MEDICARE

## 2024-07-03 DIAGNOSIS — I10 ESSENTIAL (PRIMARY) HYPERTENSION: ICD-10-CM

## 2024-07-03 DIAGNOSIS — Z13.31 ENCOUNTER FOR SCREENING FOR DEPRESSION: ICD-10-CM
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
## 2024-07-03 DIAGNOSIS — E66.01 MORBID (SEVERE) OBESITY DUE TO EXCESS CALORIES (MULTI): ICD-10-CM

## 2024-07-03 LAB
25(OH)D3 SERPL-MCNC: 34 NG/ML (ref 30–100)
ALBUMIN SERPL BCP-MCNC: 4 G/DL (ref 3.4–5)
ALP SERPL-CCNC: 84 U/L (ref 33–136)
ALT SERPL W P-5'-P-CCNC: 10 U/L (ref 7–45)
ANION GAP SERPL CALC-SCNC: 13 MMOL/L (ref 10–20)
AST SERPL W P-5'-P-CCNC: 14 U/L (ref 9–39)
BASOPHILS # BLD AUTO: 0.01 X10*3/UL (ref 0–0.1)
BASOPHILS NFR BLD AUTO: 0.3 %
BILIRUB DIRECT SERPL-MCNC: 0.1 MG/DL (ref 0–0.3)
BILIRUB SERPL-MCNC: 0.6 MG/DL (ref 0–1.2)
BUN SERPL-MCNC: 40 MG/DL (ref 6–23)
CALCIUM SERPL-MCNC: 10 MG/DL (ref 8.6–10.6)
CHLORIDE SERPL-SCNC: 103 MMOL/L (ref 98–107)
CHOLEST SERPL-MCNC: 128 MG/DL (ref 0–199)
CHOLESTEROL/HDL RATIO: 2.9
CO2 SERPL-SCNC: 29 MMOL/L (ref 21–32)
CREAT SERPL-MCNC: 1.64 MG/DL (ref 0.5–1.05)
EGFRCR SERPLBLD CKD-EPI 2021: 30 ML/MIN/1.73M*2
EOSINOPHIL # BLD AUTO: 0.1 X10*3/UL (ref 0–0.4)
EOSINOPHIL NFR BLD AUTO: 2.7 %
ERYTHROCYTE [DISTWIDTH] IN BLOOD BY AUTOMATED COUNT: 13.2 % (ref 11.5–14.5)
EST. AVERAGE GLUCOSE BLD GHB EST-MCNC: 108 MG/DL
GLUCOSE SERPL-MCNC: 124 MG/DL (ref 74–99)
HBA1C MFR BLD: 5.4 %
HCT VFR BLD AUTO: 38.6 % (ref 36–46)
HDLC SERPL-MCNC: 44.1 MG/DL
HGB BLD-MCNC: 12.1 G/DL (ref 12–16)
IMM GRANULOCYTES # BLD AUTO: 0.02 X10*3/UL (ref 0–0.5)
IMM GRANULOCYTES NFR BLD AUTO: 0.5 % (ref 0–0.9)
LDLC SERPL CALC-MCNC: 56 MG/DL
LYMPHOCYTES # BLD AUTO: 0.99 X10*3/UL (ref 0.8–3)
LYMPHOCYTES NFR BLD AUTO: 26.3 %
MAGNESIUM SERPL-MCNC: 2.42 MG/DL (ref 1.6–2.4)
MCH RBC QN AUTO: 33.2 PG (ref 26–34)
MCHC RBC AUTO-ENTMCNC: 31.3 G/DL (ref 32–36)
MCV RBC AUTO: 106 FL (ref 80–100)
MONOCYTES # BLD AUTO: 0.36 X10*3/UL (ref 0.05–0.8)
MONOCYTES NFR BLD AUTO: 9.6 %
NEUTROPHILS # BLD AUTO: 2.28 X10*3/UL (ref 1.6–5.5)
NEUTROPHILS NFR BLD AUTO: 60.6 %
NON HDL CHOLESTEROL: 84 MG/DL (ref 0–149)
NRBC BLD-RTO: 0 /100 WBCS (ref 0–0)
PHOSPHATE SERPL-MCNC: 3.8 MG/DL (ref 2.5–4.9)
PLATELET # BLD AUTO: 156 X10*3/UL (ref 150–450)
POTASSIUM SERPL-SCNC: 4.8 MMOL/L (ref 3.5–5.3)
PROT SERPL-MCNC: 6.3 G/DL (ref 6.4–8.2)
RBC # BLD AUTO: 3.65 X10*6/UL (ref 4–5.2)
SODIUM SERPL-SCNC: 140 MMOL/L (ref 136–145)
TRIGL SERPL-MCNC: 139 MG/DL (ref 0–149)
VIT B12 SERPL-MCNC: 262 PG/ML (ref 211–911)
VLDL: 28 MG/DL (ref 0–40)
WBC # BLD AUTO: 3.8 X10*3/UL (ref 4.4–11.3)

## 2024-07-03 PROCEDURE — 85025 COMPLETE CBC W/AUTO DIFF WBC: CPT

## 2024-07-03 PROCEDURE — 80061 LIPID PANEL: CPT

## 2024-07-03 PROCEDURE — 83735 ASSAY OF MAGNESIUM: CPT

## 2024-07-03 PROCEDURE — 84100 ASSAY OF PHOSPHORUS: CPT

## 2024-07-03 PROCEDURE — 82607 VITAMIN B-12: CPT

## 2024-07-03 PROCEDURE — 82248 BILIRUBIN DIRECT: CPT

## 2024-07-03 PROCEDURE — 83036 HEMOGLOBIN GLYCOSYLATED A1C: CPT | Mod: WAIVER OF LIABILITY ON FILE

## 2024-07-03 PROCEDURE — 82306 VITAMIN D 25 HYDROXY: CPT | Mod: WAIVER OF LIABILITY ON FILE

## 2024-07-03 PROCEDURE — 80053 COMPREHEN METABOLIC PANEL: CPT

## 2024-07-08 ENCOUNTER — APPOINTMENT (OUTPATIENT)
Dept: NEUROLOGY | Facility: CLINIC | Age: 89
End: 2024-07-08
Payer: MEDICARE

## 2024-07-08 VITALS — DIASTOLIC BLOOD PRESSURE: 72 MMHG | SYSTOLIC BLOOD PRESSURE: 113 MMHG | HEART RATE: 65 BPM | RESPIRATION RATE: 18 BRPM

## 2024-07-08 DIAGNOSIS — R93.89 ABNORMAL FINDINGS ON DIAGNOSTIC IMAGING OF OTHER SPECIFIED BODY STRUCTURES: Primary | ICD-10-CM

## 2024-07-08 DIAGNOSIS — R26.89 BALANCE PROBLEM: ICD-10-CM

## 2024-07-08 PROCEDURE — 1160F RVW MEDS BY RX/DR IN RCRD: CPT | Performed by: PSYCHIATRY & NEUROLOGY

## 2024-07-08 PROCEDURE — 1159F MED LIST DOCD IN RCRD: CPT | Performed by: PSYCHIATRY & NEUROLOGY

## 2024-07-08 PROCEDURE — 3078F DIAST BP <80 MM HG: CPT | Performed by: PSYCHIATRY & NEUROLOGY

## 2024-07-08 PROCEDURE — 99204 OFFICE O/P NEW MOD 45 MIN: CPT | Performed by: PSYCHIATRY & NEUROLOGY

## 2024-07-08 PROCEDURE — 1036F TOBACCO NON-USER: CPT | Performed by: PSYCHIATRY & NEUROLOGY

## 2024-07-08 PROCEDURE — 3074F SYST BP LT 130 MM HG: CPT | Performed by: PSYCHIATRY & NEUROLOGY

## 2024-07-08 ASSESSMENT — UNIFIED PARKINSONS DISEASE RATING SCALE (UPDRS)
RIGIDITY_LUE: 1
POSTURAL_STABILITY: 4
RIGIDITY_RUE: 0
HANDMOVEMENTS_RIGHT: 2
RIGIDITY_NECK: 1
AMPLITUDE_LLE: 0
AMPLITUDE_LIP_JAW: 0
LEG_AGILITY_LEFT: 0
LEVODOPA: NO
GAIT: 4
FACIAL_EXPRESSION: 0
SPEECH: 0
POSTURAL_TREMOR_LEFTHAND: 0
FREEZING_GAIT: 4
TOETAPPING_LEFT: 1
SPONTANEITY_OF_MOVEMENT: 1
AMPLITUDE_LUE: 0
AMPLITUDE_RLE: 0
FINGER_TAPPING_RIGHT: 1
AMPLITUDE_RUE: 0
TOETAPPING_RIGHT: 1
TOTAL_SCORE: 31
CHAIR_RISING_SCALE: 4
FINGER_TAPPING_LEFT: 2
POSTURE: 1
PRONATION_SUPINATION_RIGHT: 2
RIGIDITY_LLE: 1
PRONATION_SUPINATION_LEFT: 0
LEG_AGILITY_RIGHT: 0
KINETIC_TREMOR_LEFTHAND: 1
CONSTANCY_TREMOR_ATREST: 0
KINETIC_TREMOR_RIGHTHAND: 0
RIGIDITY_RLE: 0
POSTURAL_TREMOR_RIGHTHAND: 0

## 2024-07-08 ASSESSMENT — ENCOUNTER SYMPTOMS
OCCASIONAL FEELINGS OF UNSTEADINESS: 1
DEPRESSION: 0
LOSS OF SENSATION IN FEET: 0

## 2024-07-08 NOTE — LETTER
July 9, 2024     Casa Asencio MD  [address]  [address]  [facility] [address]    Patient: Shreya Acuña   YOB: 1935   Date of Visit: 7/8/2024       Dear Dr. Casa Asencio MD:    Thank you for allowing Shreya Acuña to see  me for evaluation. Below are my notes for this consultation.  If you have questions, please do not hesitate to call me. I look forward to following your patient along with you.       Sincerely,     Erik Spencer MD      CC: No Recipients  ______________________________________________________________________________________    Subjective    Shreya Acuña is a right handed  88 y.o. year old female with past medical history of severe arthritis, lumbar spinal lithiasis, DVT on Xarelto who presents with bilateral upper extremity tremor Patient is accompanied by: spouse  Visit type: new patient visit     Patient presents with  with report of trembling of bilateral hands (worse on the right) that started about 1.5 years ago and is progressively worsening. No family hx of tremors.  Per patient worse with action and disappears at rest. Patient does not drink so does not know effect on tremors. Tremor does not interfere with any daily activity.  It is more of an annoyance.    Additionally, about 1.5 years ago patient was hospitalized with PNA.  Following that hospitalization she no longer able to ambulate use her rollator.  Patient is now wheelchair-bound needing 2 person assist to transfer. It is unclear exactly when patient started experiencing balance/gait issues.  Patient reports having a lower back MRI done several years ago.  Patient follows with pain management with plan for an ablation in the near future for lower spine arthritis.    Review of Motor symptoms:  Tremor:  Location- Bilateral hands R>L                 Rest/postural/action-per patient, tremor present during activities appears at rest  Stiffness/rigidity:  No  Slowness:  No  Trouble walking:  Wheelchair bound (Arthritis in back and b/l knee)  Freezing of gait:  No  Balance problems:  yes, chronic from arthritis  Falls:  Yes, during transfers  Changes in speech:  Word and name finding but no change in voice or intonation  Swallowing difficulties:  No  Abnormal postures:  No  Handwriting: Scribbling but no micrographia  Activities of daily living (buttoning clothes, bathing, cutting food, etc):  No issues    Review of Non-Motor symptoms:  Cognition:  Memory- Yes, worn and name finding                        Hallucinations-  No                          Mood:         Depression-  No                       Anxiety: No                       Obsessive/compulsive behaviors- No  Sleep disturbances including REM behavior disorder: No  Sensory changes (ie, smell or taste):  No  Cramps/pain:  Leg pain with some cramping  Gastrointestinal complaints/Constipation:  No  Urinary retention or frequency:  urgency  Positional lightheadedness and/or syncope:  No  Excessive saliva/drooling:  No  Fatigue:  Yes, medication makes her sleep too much         Patient Active Problem List   Diagnosis   • Chronic diastolic heart failure (Multi)   • Chronic low back pain   • Coronary artery disease   • Dyslipidemia   • Frequent urination   • Hyperlipidemia   • Hypertension   • Hypoxia   • Leg edema   • Lumbar disc herniation   • Lumbosacral neuritis   • Lumbosacral stenosis   • Nocturia   • Obesity, Class II, BMI 35-39.9   • Obstructive sleep apnea, adult   • Osteoarthritis of knees, bilateral   • Permanent atrial fibrillation (Multi)   • Shortness of breath at rest   • Spondylolisthesis, lumbar region   • Stage 3 chronic kidney disease (Multi)   • Stress incontinence, female   • Toe injury   • Urge incontinence of urine   • Urinary urgency   • Venous insufficiency   • Chronic left shoulder pain   • Altered mental status   • Atypical chest pain   • Disease due to severe acute respiratory syndrome  coronavirus 2 (SARS-CoV-2)   • Fall in home   • Pain of lower extremity   • Metabolic encephalopathy   • Hypoxemia   • Urinary tract infection   • Paroxysmal atrial flutter (Multi)   • Osteoarthritis of knee   • Neck pain      Past Medical History:   Diagnosis Date   • Emphysema lung (Multi)    • Hypertension    • Other specified health status     No pertinent past medical history      Past Surgical History:   Procedure Laterality Date   • OTHER SURGICAL HISTORY  12/02/2020    Cardioversion      Social History     Socioeconomic History   • Marital status:      Spouse name: Not on file   • Number of children: Not on file   • Years of education: Not on file   • Highest education level: Not on file   Occupational History   • Not on file   Tobacco Use   • Smoking status: Former     Types: Cigarettes   • Smokeless tobacco: Never   Substance and Sexual Activity   • Alcohol use: Not Currently   • Drug use: Never   • Sexual activity: Not on file   Other Topics Concern   • Not on file   Social History Narrative   • Not on file     Social Determinants of Health     Financial Resource Strain: Not on file   Food Insecurity: Not on file   Transportation Needs: No Transportation Needs (6/6/2024)    OASIS : Transportation    • Lack of Transportation (Medical): No    • Lack of Transportation (Non-Medical): No    • Patient Unable or Declines to Respond: No   Physical Activity: Not on file   Stress: Not on file   Social Connections: Feeling Socially Integrated (6/6/2024)    OASIS : Social Isolation    • Frequency of experiencing loneliness or isolation: Never   Intimate Partner Violence: Not on file   Housing Stability: Not on file      No family history on file.   Patient Health Questionnaire-2 Score: 0          Review of Systems  All other system have been reviewed and are negative for complaint.  Objective  Neurological Exam  Mental Status   Oriented only to person and situation. Memory: Impaired, unable to list  the seasons of the year.  Able to name objects and parts.  Defers to  to answer most questions.. Speech is normal. Attention and concentration are normal. Fund of knowledge is abnormal.    Cranial Nerves  CN II: Visual fields full to confrontation.  CN III, IV, VI: Extraocular movements intact bilaterally. Normal lids and orbits bilaterally. Pupils equal round and reactive to light bilaterally.  CN V: Facial sensation is normal.  CN VII: Full and symmetric facial movement.  CN VIII: Hearing is normal.  CN IX, X: Palate elevates symmetrically  CN XI: Shoulder shrug strength is normal.  CN XII: Tongue midline without atrophy or fasciculations.    Motor    Limited due to shoulder pain and being wheelchair-bound.  At least 5 -/5 in bilateral upper extremities and 4+/5 in bilateral lower extremities.  Myoclonic jerks observed right> left worse with posturing  Very little to no action tremor however at rest has intermittent rhythmic tremor  Mildly increased tone in left upper extremity compared to right  Mild bradykinesia in left upper extremity compared to right  .    Sensory  Intact to light touch in upper extremities and lower extremities.    Reflexes                                            Right                      Left  Brachioradialis                    3+                         3+  Biceps                                 3+                         3+  Patellar                                3+                         3+  Achilles                                3+                         2+    Right pathological reflexes: Emma's absent.  Left pathological reflexes: Emma's absent.    Coordination  Right: Finger-to-nose normal. Rapid alternating movement normal.Left: Finger-to-nose normal. Rapid alternating movement abnormality:    Gait    Able to stand with a lot of difficulties despite two-person assist.  Only able to take 2 shuffling steps.        MDS UPDRS 1st Score: Motor Examination  Is the  patient on Levodopa?: No  Speech: 0  Facial Expression: 0  Rigidty Neck: 1  Rigidty RUE: 0  Rigidity - LUE: 1  Rigidity RLE: 0  Rigidity LLE: 1  Finger Tapping Right Hand: 1  Finger Tapping Left Hand: 2  Hand Movements- Right Hand: 2  Hand Movements- Left Hand: 0  Pronatiaon-Supination Movments - Right Hand: 2  Pronatiaon-Supination Movments Left Hand: 0  Toe Tapping Right Foot: 1  Toe Tapping - Left Foot: 1  Leg Agility - Right Le  Leg Agility - Left le  Arising from Chair: 4  Gait: 4  Freezing of Gait: 4  Postural Stability: 4  Posture: 1  Global Spontanteity of Movment ( Body Bradykinesia): 1  Postural Tremor - Right Hand: 0  Postural Tremor - Left hand: 0  Kinetic Tremor - Right hand: 0  Kinetic Tremor - Left hand: 1  Rest Tremor Amplitude - RUE: 0  Rest Tremor Amplitude - LUE: 0  Rest Tremor Amplitude - RLE: 0  Rest Tremor Amplitude - LLE: 0  Rest Tremor Amplitude - Lip/Jaw: 0  Constancy of Rest Tremor: 0  MDS UPDRS Total Score: 31                  Hemoglobin A1C   Date Value Ref Range Status   2024 5.4 see below % Final     Estimated Average Glucose   Date Value Ref Range Status   2024 108 Not Established mg/dL Final   2022 108 mg/dL Final     Comment:     eAG: (Estimated average glucose) is a calculated value from HgbA1c and is   representative of the average blood glucose level in the last 2-3 month   period.         Assessment/Plan    Shreya Acuña is a 88 y.o. year old female presenting for gradually worsening bilateral upper extremity tremor in the past year and a half as well as being wheelchair-bound for the past few months after progressive difficulty with gait.  On evaluation tremor consistent with myoclonus likely related to her kidney disease and patient also on pregabalin however dosage appropriate for creatinine clearance.  Exam also significant for inability to ambulate more than 2 steps despite fairly good lower extremity strength with diffuse hyperreflexia.  In  "addition, patient with significant memory deficit along with mild bradykinesia in left upper extremity when compared to the right.    Although mild parkinsonism signs observed in exam, additional workup warranted to rule out other causes of movement and gait difficulties.  Plan as follows.  -TSH, B12 and B1 ordered  -MRI brain  -MRI C-spine  -Follow-up in 1 to 2 months to discuss results and next steps    Attestation signed by Erik Spencer MD at 7/9/2024  8:12 AM:  I saw and evaluated the patient. I personally obtained the key and critical portions of the history and physical exam or was physically present for key and critical portions performed by the resident/fellow. I reviewed the resident/fellow's documentation and discussed the patient with the resident/fellow. I agree with the resident/fellow's medical decision making as documented in the note.    Progressive gait disorder, which she attributes entirely to her knees (\"bone on bone.\")  She had a LS spine MRI reportedly years ago (we could not find a report, but it is mentioned in her notes), she tells us done in [place], but no c/t spine imaging.  There is no weakness on exam, but diffuse hyper-reflexia so it makes sense to reimage the brain and c-spine with mri.  She has lower extremity predominant parkinsonism.  We offered ldopa, but she is not typical for PD.  Although CT head was unrevealing a year ago, repeat brain imaging to r/o NPH is needed.  Cannot exclude NPH, PPFOG, early PSP, or cspine pathology.  Memory loss workup - she has word finding problems and STM loss.  Some incontinence is urge-type.  Follow up to review test results.    Erik Spencer MD, FAAN  [facility]  [facility]  , Neurology  [facility] [employer]       CODING and DOCUMENTATION DETERMINATION  For the Evaluation and Management of this patient, the level of Medical " Decision Making for this visit was determined based on the following:  The level of COMPLEXITY AND NUMBER OF PROBLEMS ADDRESSED was [HIGH, MODERATE, LOW] as determined by:   MODERATE:  one chronic illnesses with exacerbation, progression or side effect of Rx.  The level of RISK OF COMPLICATIONS was MODERATE as determined by: MODERATE: prescription drug management.  Thus, the level of medical decision making (based on the lower of the two highest elements) was determined to be MODERATE

## 2024-07-08 NOTE — PROGRESS NOTES
Subjective     Shreya Acuña is a right handed  88 y.o. year old female with past medical history of severe arthritis, lumbar spinal lithiasis, DVT on Xarelto who presents with bilateral upper extremity tremor Patient is accompanied by: spouse  Visit type: new patient visit     Patient presents with  with report of trembling of bilateral hands (worse on the right) that started about 1.5 years ago and is progressively worsening. No family hx of tremors.  Per patient worse with action and disappears at rest. Patient does not drink so does not know effect on tremors. Tremor does not interfere with any daily activity.  It is more of an annoyance.    Additionally, about 1.5 years ago patient was hospitalized with PNA.  Following that hospitalization she no longer able to ambulate use her rollator.  Patient is now wheelchair-bound needing 2 person assist to transfer. It is unclear exactly when patient started experiencing balance/gait issues.  Patient reports having a lower back MRI done several years ago.  Patient follows with pain management with plan for an ablation in the near future for lower spine arthritis.    Review of Motor symptoms:  Tremor:  Location- Bilateral hands R>L                 Rest/postural/action-per patient, tremor present during activities appears at rest  Stiffness/rigidity: No  Slowness:  No  Trouble walking:  Wheelchair bound (Arthritis in back and b/l knee)  Freezing of gait:  No  Balance problems:  yes, chronic from arthritis  Falls:  Yes, during transfers  Changes in speech:  Word and name finding but no change in voice or intonation  Swallowing difficulties:  No  Abnormal postures:  No  Handwriting: Scribbling but no micrographia  Activities of daily living (buttoning clothes, bathing, cutting food, etc):  No issues    Review of Non-Motor symptoms:  Cognition:  Memory- Yes, worn and name finding                        Hallucinations-  No                          Mood:          Depression-  No                       Anxiety: No                       Obsessive/compulsive behaviors- No  Sleep disturbances including REM behavior disorder: No  Sensory changes (ie, smell or taste):  No  Cramps/pain:  Leg pain with some cramping  Gastrointestinal complaints/Constipation:  No  Urinary retention or frequency:  urgency  Positional lightheadedness and/or syncope:  No  Excessive saliva/drooling:  No  Fatigue:  Yes, medication makes her sleep too much         Patient Active Problem List   Diagnosis    Chronic diastolic heart failure (Multi)    Chronic low back pain    Coronary artery disease    Dyslipidemia    Frequent urination    Hyperlipidemia    Hypertension    Hypoxia    Leg edema    Lumbar disc herniation    Lumbosacral neuritis    Lumbosacral stenosis    Nocturia    Obesity, Class II, BMI 35-39.9    Obstructive sleep apnea, adult    Osteoarthritis of knees, bilateral    Permanent atrial fibrillation (Multi)    Shortness of breath at rest    Spondylolisthesis, lumbar region    Stage 3 chronic kidney disease (Multi)    Stress incontinence, female    Toe injury    Urge incontinence of urine    Urinary urgency    Venous insufficiency    Chronic left shoulder pain    Altered mental status    Atypical chest pain    Disease due to severe acute respiratory syndrome coronavirus 2 (SARS-CoV-2)    Fall in home    Pain of lower extremity    Metabolic encephalopathy    Hypoxemia    Urinary tract infection    Paroxysmal atrial flutter (Multi)    Osteoarthritis of knee    Neck pain      Past Medical History:   Diagnosis Date    Emphysema lung (Multi)     Hypertension     Other specified health status     No pertinent past medical history      Past Surgical History:   Procedure Laterality Date    OTHER SURGICAL HISTORY  12/02/2020    Cardioversion      Social History     Socioeconomic History    Marital status:      Spouse name: Not on file    Number of children: Not on file    Years of education: Not on  file    Highest education level: Not on file   Occupational History    Not on file   Tobacco Use    Smoking status: Former     Types: Cigarettes    Smokeless tobacco: Never   Substance and Sexual Activity    Alcohol use: Not Currently    Drug use: Never    Sexual activity: Not on file   Other Topics Concern    Not on file   Social History Narrative    Not on file     Social Determinants of Health     Financial Resource Strain: Not on file   Food Insecurity: Not on file   Transportation Needs: No Transportation Needs (6/6/2024)    OASIS : Transportation     Lack of Transportation (Medical): No     Lack of Transportation (Non-Medical): No     Patient Unable or Declines to Respond: No   Physical Activity: Not on file   Stress: Not on file   Social Connections: Feeling Socially Integrated (6/6/2024)    OASIS : Social Isolation     Frequency of experiencing loneliness or isolation: Never   Intimate Partner Violence: Not on file   Housing Stability: Not on file      No family history on file.   Patient Health Questionnaire-2 Score: 0          Review of Systems  All other system have been reviewed and are negative for complaint.  Objective   Neurological Exam  Mental Status   Oriented only to person and situation. Memory: Impaired, unable to list the seasons of the year.  Able to name objects and parts.  Defers to  to answer most questions.. Speech is normal. Attention and concentration are normal. Fund of knowledge is abnormal.    Cranial Nerves  CN II: Visual fields full to confrontation.  CN III, IV, VI: Extraocular movements intact bilaterally. Normal lids and orbits bilaterally. Pupils equal round and reactive to light bilaterally.  CN V: Facial sensation is normal.  CN VII: Full and symmetric facial movement.  CN VIII: Hearing is normal.  CN IX, X: Palate elevates symmetrically  CN XI: Shoulder shrug strength is normal.  CN XII: Tongue midline without atrophy or fasciculations.    Motor    Limited  due to shoulder pain and being wheelchair-bound.  At least 5 -/5 in bilateral upper extremities and 4+/5 in bilateral lower extremities.  Myoclonic jerks observed right> left worse with posturing  Very little to no action tremor however at rest has intermittent rhythmic tremor  Mildly increased tone in left upper extremity compared to right  Mild bradykinesia in left upper extremity compared to right  .    Sensory  Intact to light touch in upper extremities and lower extremities.    Reflexes                                            Right                      Left  Brachioradialis                    3+                         3+  Biceps                                 3+                         3+  Patellar                                3+                         3+  Achilles                                3+                         2+    Right pathological reflexes: Emma's absent.  Left pathological reflexes: Emma's absent.    Coordination  Right: Finger-to-nose normal. Rapid alternating movement normal.Left: Finger-to-nose normal. Rapid alternating movement abnormality:    Gait    Able to stand with a lot of difficulties despite two-person assist.  Only able to take 2 shuffling steps.        MDS UPDRS 1st Score: Motor Examination  Is the patient on Levodopa?: No  Speech: 0  Facial Expression: 0  Rigidty Neck: 1  Rigidty RUE: 0  Rigidity - LUE: 1  Rigidity RLE: 0  Rigidity LLE: 1  Finger Tapping Right Hand: 1  Finger Tapping Left Hand: 2  Hand Movements- Right Hand: 2  Hand Movements- Left Hand: 0  Pronatiaon-Supination Movments - Right Hand: 2  Pronatiaon-Supination Movments Left Hand: 0  Toe Tapping Right Foot: 1  Toe Tapping - Left Foot: 1  Leg Agility - Right Le  Leg Agility - Left le  Arising from Chair: 4  Gait: 4  Freezing of Gait: 4  Postural Stability: 4  Posture: 1  Global Spontanteity of Movment ( Body Bradykinesia): 1  Postural Tremor - Right Hand: 0  Postural Tremor - Left hand:  0  Kinetic Tremor - Right hand: 0  Kinetic Tremor - Left hand: 1  Rest Tremor Amplitude - RUE: 0  Rest Tremor Amplitude - LUE: 0  Rest Tremor Amplitude - RLE: 0  Rest Tremor Amplitude - LLE: 0  Rest Tremor Amplitude - Lip/Jaw: 0  Constancy of Rest Tremor: 0  MDS UPDRS Total Score: 31                  Hemoglobin A1C   Date Value Ref Range Status   07/03/2024 5.4 see below % Final     Estimated Average Glucose   Date Value Ref Range Status   07/03/2024 108 Not Established mg/dL Final   02/11/2022 108 mg/dL Final     Comment:     eAG: (Estimated average glucose) is a calculated value from HgbA1c and is   representative of the average blood glucose level in the last 2-3 month   period.         Assessment/Plan     Shreya Acuña is a 88 y.o. year old female presenting for gradually worsening bilateral upper extremity tremor in the past year and a half as well as being wheelchair-bound for the past few months after progressive difficulty with gait.  On evaluation tremor consistent with myoclonus likely related to her kidney disease and patient also on pregabalin however dosage appropriate for creatinine clearance.  Exam also significant for inability to ambulate more than 2 steps despite fairly good lower extremity strength with diffuse hyperreflexia.  In addition, patient with significant memory deficit along with mild bradykinesia in left upper extremity when compared to the right.    Although mild parkinsonism signs observed in exam, additional workup warranted to rule out other causes of movement and gait difficulties.  Plan as follows.  -TSH, B12 and B1 ordered  -MRI brain  -MRI C-spine  -Follow-up in 1 to 2 months to discuss results and next steps

## 2024-07-08 NOTE — PATIENT INSTRUCTIONS
Pleasure meeting you today. As discussed, the tremor you are experiencing are not what we typically see with Parkinson's Disease. It looks like a tremor called myoclonus which is typically caused by conditions such as kidney disease or medications such a pregabalin which you are on (you are an appropriate dose of the pregabalin based on your kidney function). The best     We are also concerned about you difficulty with walking. We understand that you have arthritis but given some of the finding on our physical exam (increased reflexes) we would like to get am MRI of your brain and neck to make sure that there is nothing we are missing. We also would like to get a few blood tests. Please come back to see our nurse practitioner in 1-2 months after all the testing is done so we can decide on next steps.

## 2024-07-09 ENCOUNTER — HOSPITAL ENCOUNTER (OUTPATIENT)
Dept: PAIN MEDICINE | Facility: CLINIC | Age: 89
Discharge: HOME | End: 2024-07-09
Payer: MEDICARE

## 2024-07-09 VITALS
RESPIRATION RATE: 18 BRPM | SYSTOLIC BLOOD PRESSURE: 98 MMHG | TEMPERATURE: 97.7 F | OXYGEN SATURATION: 95 % | HEART RATE: 60 BPM | DIASTOLIC BLOOD PRESSURE: 49 MMHG

## 2024-07-09 DIAGNOSIS — M54.17 LUMBOSACRAL NEURITIS: ICD-10-CM

## 2024-07-09 DIAGNOSIS — M47.817 FACET ARTHRITIS OF LUMBOSACRAL REGION: ICD-10-CM

## 2024-07-09 PROCEDURE — 2720000007 HC OR 272 NO HCPCS

## 2024-07-09 PROCEDURE — 2500000005 HC RX 250 GENERAL PHARMACY W/O HCPCS: Performed by: ANESTHESIOLOGY

## 2024-07-09 PROCEDURE — 2500000004 HC RX 250 GENERAL PHARMACY W/ HCPCS (ALT 636 FOR OP/ED): Performed by: ANESTHESIOLOGY

## 2024-07-09 PROCEDURE — 64636 DESTROY L/S FACET JNT ADDL: CPT | Performed by: ANESTHESIOLOGY

## 2024-07-09 PROCEDURE — 64635 DESTROY LUMB/SAC FACET JNT: CPT | Performed by: ANESTHESIOLOGY

## 2024-07-09 RX ORDER — ROPIVACAINE HYDROCHLORIDE 5 MG/ML
INJECTION, SOLUTION EPIDURAL; INFILTRATION; PERINEURAL AS NEEDED
Status: COMPLETED | OUTPATIENT
Start: 2024-07-09 | End: 2024-07-09

## 2024-07-09 RX ORDER — LIDOCAINE HYDROCHLORIDE 5 MG/ML
INJECTION, SOLUTION INFILTRATION; INTRAVENOUS AS NEEDED
Status: COMPLETED | OUTPATIENT
Start: 2024-07-09 | End: 2024-07-09

## 2024-07-09 ASSESSMENT — PAIN SCALES - GENERAL
PAINLEVEL_OUTOF10: 0 - NO PAIN
PAINLEVEL_OUTOF10: 10 - WORST POSSIBLE PAIN

## 2024-07-09 ASSESSMENT — PAIN - FUNCTIONAL ASSESSMENT
PAIN_FUNCTIONAL_ASSESSMENT: 0-10
PAIN_FUNCTIONAL_ASSESSMENT: 0-10

## 2024-07-09 ASSESSMENT — PAIN DESCRIPTION - DESCRIPTORS: DESCRIPTORS: ACHING

## 2024-07-09 NOTE — Clinical Note
[facility] [phone] [facility] [id number]  Grounding pad left calf  Settings per Dr. Quiñones

## 2024-07-11 ENCOUNTER — APPOINTMENT (OUTPATIENT)
Dept: UROLOGY | Facility: CLINIC | Age: 89
End: 2024-07-11
Payer: MEDICARE

## 2024-07-11 VITALS
SYSTOLIC BLOOD PRESSURE: 116 MMHG | HEIGHT: 63 IN | BODY MASS INDEX: 39.87 KG/M2 | TEMPERATURE: 96.1 F | DIASTOLIC BLOOD PRESSURE: 74 MMHG | HEART RATE: 62 BPM | WEIGHT: 225 LBS

## 2024-07-11 DIAGNOSIS — N39.41 URGENCY INCONTINENCE: ICD-10-CM

## 2024-07-11 DIAGNOSIS — R35.0 FREQUENCY OF MICTURITION: ICD-10-CM

## 2024-07-11 DIAGNOSIS — N39.41 URGE INCONTINENCE: Primary | ICD-10-CM

## 2024-07-11 PROCEDURE — 64566 NEUROELTRD STIM POST TIBIAL: CPT | Performed by: NURSE PRACTITIONER

## 2024-07-11 PROCEDURE — 1036F TOBACCO NON-USER: CPT | Performed by: NURSE PRACTITIONER

## 2024-07-11 PROCEDURE — 3074F SYST BP LT 130 MM HG: CPT | Performed by: NURSE PRACTITIONER

## 2024-07-11 PROCEDURE — 1159F MED LIST DOCD IN RCRD: CPT | Performed by: NURSE PRACTITIONER

## 2024-07-11 PROCEDURE — 3078F DIAST BP <80 MM HG: CPT | Performed by: NURSE PRACTITIONER

## 2024-07-11 NOTE — PATIENT INSTRUCTIONS
Discussed plan w   Continue Gemtesa/PTNS combination therapy  Schedule July 23rd at 2:40 will add on PTNS  Schedule Aug 27th at 2:40 will add on PTNS  Nurse line [phone]

## 2024-07-11 NOTE — PROGRESS NOTES
"07/11/24   [id number]    PTNS monthly maintenance     Subjective      HPI Shreya Acuña is a 88 y.o. female who presents for PTNS monthly maintenance.     Per patient since UTI treated doing much better;     Good w day time getting to bathroom w combination therapy PTNS w Gemtesa; better at night w using incontinence purewick, doesn't get up at all at night;     Pmh, psh, FH, SH reviewed    Objective     /74   Pulse 62   Temp 35.6 °C (96.1 °F) (Temporal)   Ht 1.6 m (5' 3\")   Wt 102 kg (225 lb)   BMI 39.86 kg/m²    Physical Exam  General: Appears comfortable and in no apparent distress, well nourished  Head: Normocephalic, atraumatic  Neck: trachea midline  Respiratory: respirations unlabored, no wheezes, and no use of accessory muscles  Cardiovascular: at rest no dyspnea, well perfused  Skin: no visible rashes or lesions  Neurologic: grossly intact, oriented to person, place, and time  Psychiatric: mood and affect appropriate  Musculoskeletal: in chair for appt. no difficulty w upper body movement, wheel chair;    Patient ID: Shreya Acuña is a 88 y.o. female.    Percutaneous Tibial Nerve Stimulation    Date/Time: 7/11/2024 2:55 PM    Performed by: CHECO Siddiqi  Authorized by: CHECO Siddiqi    Procedure discussed: discussed risks, benefits and alternatives      Procedure Details     Indications: urge incontinence, urinary frequency and urinary urgency      PTNS session #: monthly maintenance    Bladder symptoms: unchanged      Ankle used: right      Stimulator intensity (mA): 14          Assessment/Plan   Problem List Items Addressed This Visit    None        Orders Placed This Encounter   Procedures    Percutaneous Tibial Nerve Stimulation     This order was created via procedure documentation      Discussed plan w   Continue Gemtesa/PTNS combination therapy  Schedule July 23rd at 2:40 will add on PTNS  Schedule Aug 27th at 2:40 will add on PTNS  Nurse line [phone]   "     Karina Del Valle, APRN-CNP  Lab Results   Component Value Date    GLUCOSE 124 (H) 07/03/2024    CALCIUM 10.0 07/03/2024     07/03/2024    K 4.8 07/03/2024    CO2 29 07/03/2024     07/03/2024    BUN 40 (H) 07/03/2024    CREATININE 1.64 (H) 07/03/2024

## 2024-07-12 DIAGNOSIS — I10 HYPERTENSION, UNSPECIFIED TYPE: ICD-10-CM

## 2024-07-14 RX ORDER — DILTIAZEM HYDROCHLORIDE 120 MG/1
120 CAPSULE, EXTENDED RELEASE ORAL DAILY
Qty: 90 CAPSULE | Refills: 3 | Status: SHIPPED | OUTPATIENT
Start: 2024-07-14

## 2024-07-23 ENCOUNTER — APPOINTMENT (OUTPATIENT)
Dept: UROLOGY | Facility: CLINIC | Age: 89
End: 2024-07-23
Payer: MEDICARE

## 2024-07-25 ENCOUNTER — APPOINTMENT (OUTPATIENT)
Dept: RADIOLOGY | Facility: HOSPITAL | Age: 89
End: 2024-07-25
Payer: MEDICARE

## 2024-07-29 ENCOUNTER — HOSPITAL ENCOUNTER (OUTPATIENT)
Dept: RADIOLOGY | Facility: CLINIC | Age: 89
Discharge: HOME | End: 2024-07-29
Payer: MEDICARE

## 2024-07-29 DIAGNOSIS — R26.89 BALANCE PROBLEM: ICD-10-CM

## 2024-07-29 PROCEDURE — 70551 MRI BRAIN STEM W/O DYE: CPT | Performed by: RADIOLOGY

## 2024-07-29 PROCEDURE — 70551 MRI BRAIN STEM W/O DYE: CPT

## 2024-07-29 PROCEDURE — 72141 MRI NECK SPINE W/O DYE: CPT

## 2024-07-29 PROCEDURE — 72141 MRI NECK SPINE W/O DYE: CPT | Performed by: RADIOLOGY

## 2024-07-30 ENCOUNTER — APPOINTMENT (OUTPATIENT)
Dept: NEUROLOGY | Facility: CLINIC | Age: 89
End: 2024-07-30
Payer: MEDICARE

## 2024-07-30 ENCOUNTER — LAB (OUTPATIENT)
Dept: LAB | Facility: LAB | Age: 89
End: 2024-07-30
Payer: MEDICARE

## 2024-07-30 VITALS — RESPIRATION RATE: 18 BRPM | HEART RATE: 68 BPM | DIASTOLIC BLOOD PRESSURE: 67 MMHG | SYSTOLIC BLOOD PRESSURE: 104 MMHG

## 2024-07-30 DIAGNOSIS — R93.89 ABNORMAL FINDINGS ON DIAGNOSTIC IMAGING OF OTHER SPECIFIED BODY STRUCTURES: ICD-10-CM

## 2024-07-30 DIAGNOSIS — R26.89 BALANCE PROBLEM: ICD-10-CM

## 2024-07-30 DIAGNOSIS — G20.C PARKINSONISM, UNSPECIFIED PARKINSONISM TYPE (MULTI): Primary | ICD-10-CM

## 2024-07-30 LAB
FOLATE SERPL-MCNC: 15.1 NG/ML
TSH SERPL-ACNC: 2.66 MIU/L (ref 0.44–3.98)

## 2024-07-30 PROCEDURE — 83921 ORGANIC ACID SINGLE QUANT: CPT

## 2024-07-30 PROCEDURE — 1159F MED LIST DOCD IN RCRD: CPT | Performed by: NURSE PRACTITIONER

## 2024-07-30 PROCEDURE — 1160F RVW MEDS BY RX/DR IN RCRD: CPT | Performed by: NURSE PRACTITIONER

## 2024-07-30 PROCEDURE — 3078F DIAST BP <80 MM HG: CPT | Performed by: NURSE PRACTITIONER

## 2024-07-30 PROCEDURE — 84425 ASSAY OF VITAMIN B-1: CPT

## 2024-07-30 PROCEDURE — 1036F TOBACCO NON-USER: CPT | Performed by: NURSE PRACTITIONER

## 2024-07-30 PROCEDURE — 99214 OFFICE O/P EST MOD 30 MIN: CPT | Performed by: NURSE PRACTITIONER

## 2024-07-30 PROCEDURE — 82746 ASSAY OF FOLIC ACID SERUM: CPT

## 2024-07-30 PROCEDURE — 3074F SYST BP LT 130 MM HG: CPT | Performed by: NURSE PRACTITIONER

## 2024-07-30 PROCEDURE — 36415 COLL VENOUS BLD VENIPUNCTURE: CPT

## 2024-07-30 PROCEDURE — 84443 ASSAY THYROID STIM HORMONE: CPT

## 2024-07-30 ASSESSMENT — UNIFIED PARKINSONS DISEASE RATING SCALE (UPDRS)
KINETIC_TREMOR_LEFTHAND: 1
LEG_AGILITY_LEFT: 3
RIGIDITY_LUE: 1
AMPLITUDE_RLE: 0
AMPLITUDE_LIP_JAW: 0
AMPLITUDE_RUE: 0
CONSTANCY_TREMOR_ATREST: 0
SPONTANEITY_OF_MOVEMENT: 2
TOETAPPING_RIGHT: 3
SPEECH: 0
POSTURAL_TREMOR_RIGHTHAND: 1
CHAIR_RISING_SCALE: 3
HANDMOVEMENTS_RIGHT: 2
DYSKINESIAS_PRESENT: NO
TOETAPPING_LEFT: 3
RIGIDITY_NECK: 1
LEVODOPA: NO
KINETIC_TREMOR_RIGHTHAND: 0
FINGER_TAPPING_RIGHT: 1
PRONATION_SUPINATION_LEFT: 3
FACIAL_EXPRESSION: 2
LEG_AGILITY_RIGHT: 3
POSTURE: 2
FINGER_TAPPING_LEFT: 1
FREEZING_GAIT: 0
RIGIDITY_RUE: 0
GAIT: 4
PRONATION_SUPINATION_RIGHT: 3
TOTAL_SCORE: 46
AMPLITUDE_LUE: 0
POSTURAL_STABILITY: 3
AMPLITUDE_LLE: 0
POSTURAL_TREMOR_LEFTHAND: 1
RIGIDITY_RLE: 0
RIGIDITY_LLE: 0
PARKINSONS_MEDS: NO

## 2024-07-30 ASSESSMENT — ENCOUNTER SYMPTOMS
OCCASIONAL FEELINGS OF UNSTEADINESS: 1
DEPRESSION: 0
LOSS OF SENSATION IN FEET: 0

## 2024-07-30 ASSESSMENT — PATIENT HEALTH QUESTIONNAIRE - PHQ9
2. FEELING DOWN, DEPRESSED OR HOPELESS: NOT AT ALL
SUM OF ALL RESPONSES TO PHQ9 QUESTIONS 1 AND 2: 0
1. LITTLE INTEREST OR PLEASURE IN DOING THINGS: NOT AT ALL

## 2024-07-30 NOTE — PROGRESS NOTES
Subjective     Shreya Acuña is a 88 y.o. year old female who presents with Gait Problem, here for follow up visit.    HPI  Comes w/ her .  Initial visit w/ Dr. Spencer and dx: balance problems  -TSH, B12 and B1 ordered--did not do blood work.   -MRI brain  -MRI C-spine    Tremor for >1 year, not interfering with ADLs, not as bothersome as gait issues.   Balance progressively worsening, not walking much x 1.5 yrs, but using rollator someone needs to be behind her and cannot go that far.     Last fall backwards 2 weeks ago but she denies head injury.   Falling about once a month the past 3M--always trying to do something on her own.   Bad fall a few months ago in the bathroom and hit her head against the wall, did not go to the ER--no changes/new sx after that.   Denies any other head injury.    always has to assist her with walking/transferring.       MRI brain and c-spine 7/30/24  MRI brain:      1. There is hemosiderin deposition located along the linings of the  posterior left parasagittal parieto-occipital lobes and few scattered  punctate foci of hemosiderin located within the bilateral posterior  cerebral hemispheres.      Hemosiderin deposition along the linings of the brain parenchyma is  most consistent with sequela of prior subarachnoid hemorrhage.  Punctate foci of hemosiderin within the bilateral cerebral  hemispheres may be within sulci or within the gyri. Of note, some of  the foci of susceptibility artifact could reflect coursing vessels,  noting that evaluation is degraded due to patient motion. There is  mild volume loss involving the posterior left parietal lobe.      2. Findings of probable chronic small vessel ischemic changes.      3. Ventricles, sulci, and basilar cisterns are overall normal in  size, shape, and configuration for patient's age.      MRI cervical spine:  1. Multilevel degenerative changes of the cervical spine without  striking spinal canal stenosis at any level.  The cervical spinal cord  is normal in signal and caliber, noting that evaluation is degraded  due to patient motion.      2. Variable degree of neural foraminal narrowing at multiple levels  as detailed above.      This study was interpreted at [facility].      MACRO:  None    Patient Health Questionnaire-2 Score: 0            Current Outpatient Medications:     atorvastatin (Lipitor) 20 mg tablet, Take 1 tablet (20 mg) by mouth once daily., Disp: , Rfl:     carbidopa-levodopa (Sinemet)  mg tablet, WEEK 1: take 0.5 tab (half) tab 3 times a day. WEEK 2: take 1 tab 3 times a day thereafter. Take 4-5hrs apart., Disp: 90 tablet, Rfl: 3    DILT- mg 24 hr capsule, Take 1 capsule (120 mg) by mouth once daily., Disp: 90 capsule, Rfl: 3    DULoxetine (Cymbalta) 30 mg DR capsule, TAKE ONE CAPSULE BY MOUTH TWO TIMES A DAY, Disp: 180 capsule, Rfl: 2    losartan (Cozaar) 100 mg tablet, Take 1 tablet (100 mg) by mouth once daily., Disp: 90 tablet, Rfl: 3    metoprolol succinate XL (Toprol-XL) 50 mg 24 hr tablet, Take 1 tablet (50 mg) by mouth once daily., Disp: , Rfl:     mv-min-FA-vit K-lutein-zeaxant (PreserVision AREDS 2 Plus MV) 200 mcg-15 mcg- 5 mg-1 mg capsule, Take by mouth once daily., Disp: , Rfl:     pregabalin (Lyrica) 100 mg capsule, Take 1 capsule (100 mg) by mouth once daily., Disp: 180 capsule, Rfl: 0    rivaroxaban (Xarelto) 10 mg tablet, Take 1 tablet (10 mg) by mouth once daily., Disp: 90 tablet, Rfl: 3    spironolactone (Aldactone) 25 mg tablet, Take 1 tablet (25 mg) by mouth once daily., Disp: 90 tablet, Rfl: 3    torsemide (Demadex) 10 mg tablet, Take 1 tablet (10 mg) by mouth once daily., Disp: 90 tablet, Rfl: 3    vibegron 75 mg tablet, Take 1 tablet (75 mg) by mouth once daily., Disp: 90 tablet, Rfl: 3       Objective   Vitals:    07/30/24 1528   BP: 104/67   BP Location: Right arm   Patient Position: Sitting   BP Cuff Size: Large adult   Pulse: 68   Resp:  18   Weight: Comment: wheelchair                 Physical Exam    MDS UPDRS 1st Score: Motor Examination  Is the patient on medication for treating the symptoms of Parkinson's Disease?: No  Is the patient on Levodopa?: No  Speech: 0  Facial Expression: 2  Rigidty Neck: 1  Rigidty RUE: 0  Rigidity - LUE: 1  Rigidity RLE: 0  Rigidity LLE: 0  Finger Tapping Right Hand: 1  Finger Tapping Left Hand: 1  Hand Movements- Right Hand: 2  Hand Movements- Left Hand: 3  Pronatiaon-Supination Movments - Right Hand: 3  Pronatiaon-Supination Movments Left Hand: 3  Toe Tapping Right Foot: 3  Toe Tapping - Left Foot: 3  Leg Agility - Right Leg: 3  Leg Agility - Left leg: 3  Arising from Chair: 3  Gait: 4  Freezing of Gait: 0  Postural Stability: 3  Posture: 2  Global Spontanteity of Movment ( Body Bradykinesia): 2  Postural Tremor - Right Hand: 1  Postural Tremor - Left hand: 1  Kinetic Tremor - Right hand: 0  Kinetic Tremor - Left hand: 1  Rest Tremor Amplitude - RUE: 0  Rest Tremor Amplitude - LUE: 0  Rest Tremor Amplitude - RLE: 0  Rest Tremor Amplitude - LLE: 0  Rest Tremor Amplitude - Lip/Jaw: 0  Constancy of Rest Tremor: 0  MDS UPDRS Total Score: 46  Were dyskinesias (chorea or dystonia) present during examination?: No        Assessment/Plan   Ms. Shreya Acuña is a 88 y.o. F who presented at initial visit with Dr. Spencer earlier this month with gradually worsening bilateral upper extremity tremor in the past year and a half as well as being wheelchair-bound for the past few months after progressive difficulty with gait. 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 She had hyperreflexia, memory deficits and bradykinesia LUE>RUE and LE predominant parkinsonism. Labs ordered for cognitive decline but not completed and MRI brain and c-spine completed to r/o secondary causes, DDX was NPH, PPFOG, early PSP, or cspine pathology. At that time, declined Sinemet trial and noted sx were atypical  "of PD.   MRI brain reviewed by Dr. 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  This may correlate with her reported head trauma months ago without seeking medical care.  No significant findings on the spine MRI to explain balance issues.\"    Dr. Barnhart is cardiologist-sent message re:  Xarelto as is concerning if she will be ambulatory, given her severe postural instability.       Today, on exam, clear amplitude decrement on the L>R and LE parkinsonism predominantly, inability to ambulate, very mild tremor. She and  are agreeable to Sinemet trial which we reviewed potential SE and benefits and also which Dr. Spencer agrees with.     Diagnoses and all orders for this visit:  Parkinsonism, unspecified Parkinsonism type (Multi)  -     carbidopa-levodopa (Sinemet)  mg tablet; WEEK 1: take 0.5 tab (half) tab 3 times a day. WEEK 2: take 1 tab 3 times a day thereafter. Take 4-5hrs apart.  Balance problem  Abnormal findings on diagnostic imaging of other specified body structures        #Message sent to Dr. Barnhart re: Chloe and recent head bleed on MRI brain--will wait to hear back from him to see what his thoughts are on continuing     # To help with tremors, stiffness and slowness of movement including walking    I recommend starting carbidopa/levodopa (Sinemet) 25/100mg tablets with the following titration schedule:  WEEK 1: take 0.5 tab (half) tab 3 times a day  WEEK 2: take 1 tab 3 times a day and stay at this dose  You may take these tablets with every meal (breakfast, lunch, and dinner- roughly every 4-5 hours).  #WEEK 3: update me on how she is doing and if we will increase it    After about 6 weeks at this dose, let me know how you are doing.     Side effects include but are not limited to: sleepiness, nausea, constipation, dizziness, hallucinations, or involuntary wiggling movements. If you experience any side effects please call our " clinic.    #Follow up with me in 3 months and 6-7 months with Dr. Spencer (Please call [phone] ([phone]) to schedule an apt.)        Davon Cowart NP-C  Adult/Gerontological Nurse Practitioner   [facility], Department of Neurology  Neurological [place]  [facility]  [address] [id number]  Phone: [phone]  Fax: [phone]

## 2024-08-01 RX ORDER — CARBIDOPA AND LEVODOPA 25; 100 MG/1; MG/1
TABLET ORAL
Qty: 90 TABLET | Refills: 3 | Status: SHIPPED | OUTPATIENT
Start: 2024-08-01

## 2024-08-01 NOTE — PATIENT INSTRUCTIONS
#Message sent to Dr. Barnhart re: Xarelto and recent head bleed on MRI brain--will wait to hear back from him to see what his thoughts are on continuing     # To help with tremors, stiffness and slowness of movement including walking    I recommend starting carbidopa/levodopa (Sinemet) 25/100mg tablets with the following titration schedule:  WEEK 1: take 0.5 tab (half) tab 3 times a day  WEEK 2: take 1 tab 3 times a day and stay at this dose  You may take these tablets with every meal (breakfast, lunch, and dinner- roughly every 4-5 hours).  #WEEK 3: update me on how she is doing and if we will increase it    After about 6 weeks at this dose, let me know how you are doing.     Side effects include but are not limited to: sleepiness, nausea, constipation, dizziness, hallucinations, or involuntary wiggling movements. If you experience any side effects please call our clinic.    #Follow up with me in 3 months (Please call [phone] ([phone] to schedule an apt.)    Davon Cowart, NP-C  Adult/Gerontological Nurse Practitioner   [facility], Department of Neurology  Neurological [place]  [facility]  [address] [place] [address]  Phone: [phone]  Fax: [phone]

## 2024-08-02 LAB — METHYLMALONATE SERPL-SCNC: 0.82 UMOL/L (ref 0–0.4)

## 2024-08-04 LAB — VIT B1 PYROPHOSHATE BLD-SCNC: 136 NMOL/L (ref 70–180)

## 2024-08-06 ENCOUNTER — TELEPHONE (OUTPATIENT)
Dept: NEUROLOGY | Facility: CLINIC | Age: 89
End: 2024-08-06
Payer: MEDICARE

## 2024-08-06 NOTE — TELEPHONE ENCOUNTER
----- Message from Severine Kako sent at 8/5/2024  5:48 PM EDT -----  Regarding: B12 deficiency  Please let patient know he has mild B12 deficiently. Recommend starting PO supplementation 1000 mcg daily. Can get over the counter. And can follow up with PCP for this. Thanks  ----- Message -----  From: Lab, Background User  Sent: 7/30/2024  10:56 PM EDT  To: Severine L Kako, MD

## 2024-08-08 ENCOUNTER — APPOINTMENT (OUTPATIENT)
Dept: UROLOGY | Facility: CLINIC | Age: 89
End: 2024-08-08
Payer: MEDICARE

## 2024-08-22 DIAGNOSIS — M54.41 CHRONIC BILATERAL LOW BACK PAIN WITH BILATERAL SCIATICA: ICD-10-CM

## 2024-08-22 DIAGNOSIS — M54.42 CHRONIC BILATERAL LOW BACK PAIN WITH BILATERAL SCIATICA: ICD-10-CM

## 2024-08-22 DIAGNOSIS — G89.29 CHRONIC BILATERAL LOW BACK PAIN WITH BILATERAL SCIATICA: ICD-10-CM

## 2024-08-23 ENCOUNTER — TELEPHONE (OUTPATIENT)
Dept: PAIN MEDICINE | Facility: CLINIC | Age: 89
End: 2024-08-23
Payer: MEDICARE

## 2024-08-23 DIAGNOSIS — M54.42 CHRONIC BILATERAL LOW BACK PAIN WITH BILATERAL SCIATICA: ICD-10-CM

## 2024-08-23 DIAGNOSIS — G89.29 CHRONIC BILATERAL LOW BACK PAIN WITH BILATERAL SCIATICA: ICD-10-CM

## 2024-08-23 DIAGNOSIS — M54.41 CHRONIC BILATERAL LOW BACK PAIN WITH BILATERAL SCIATICA: ICD-10-CM

## 2024-08-23 RX ORDER — PREGABALIN 100 MG/1
100 CAPSULE ORAL DAILY
Qty: 180 CAPSULE | Refills: 0 | Status: SHIPPED | OUTPATIENT
Start: 2024-08-23 | End: 2024-08-23 | Stop reason: SDUPTHER

## 2024-08-23 RX ORDER — PREGABALIN 100 MG/1
100 CAPSULE ORAL 2 TIMES DAILY
Qty: 180 CAPSULE | Refills: 2 | Status: SHIPPED | OUTPATIENT
Start: 2024-08-23 | End: 2024-11-21

## 2024-08-23 NOTE — TELEPHONE ENCOUNTER
[facility] calling regarding Shreya Melaadia regarding prescription for   Prescription for Pregabalin says to take 1 capsule once daily  Patient says it should be 1 capsule twice daily  If it should be twice daily, please put in a new prescription, if not please call (or let one of us to call)  [phone]

## 2024-08-27 ENCOUNTER — APPOINTMENT (OUTPATIENT)
Dept: UROLOGY | Facility: CLINIC | Age: 89
End: 2024-08-27
Payer: MEDICARE

## 2024-09-05 ENCOUNTER — APPOINTMENT (OUTPATIENT)
Dept: UROLOGY | Facility: CLINIC | Age: 89
End: 2024-09-05
Payer: MEDICARE

## 2024-09-05 VITALS
HEIGHT: 63 IN | DIASTOLIC BLOOD PRESSURE: 59 MMHG | HEART RATE: 84 BPM | SYSTOLIC BLOOD PRESSURE: 95 MMHG | WEIGHT: 225 LBS | TEMPERATURE: 96.5 F | BODY MASS INDEX: 39.87 KG/M2

## 2024-09-05 DIAGNOSIS — N39.41 URGENCY INCONTINENCE: ICD-10-CM

## 2024-09-05 DIAGNOSIS — N39.41 URGE INCONTINENCE: Primary | ICD-10-CM

## 2024-09-05 DIAGNOSIS — R35.0 FREQUENCY OF MICTURITION: ICD-10-CM

## 2024-09-05 PROCEDURE — 1036F TOBACCO NON-USER: CPT | Performed by: NURSE PRACTITIONER

## 2024-09-05 PROCEDURE — 3078F DIAST BP <80 MM HG: CPT | Performed by: NURSE PRACTITIONER

## 2024-09-05 PROCEDURE — 64566 NEUROELTRD STIM POST TIBIAL: CPT | Performed by: NURSE PRACTITIONER

## 2024-09-05 PROCEDURE — 1159F MED LIST DOCD IN RCRD: CPT | Performed by: NURSE PRACTITIONER

## 2024-09-05 PROCEDURE — 3074F SYST BP LT 130 MM HG: CPT | Performed by: NURSE PRACTITIONER

## 2024-09-05 NOTE — PROGRESS NOTES
Patient ID: Shreya Acuña is a 89 y.o. female.    Percutaneous Tibial Nerve Stimulation    Date/Time: 9/5/2024 6:39 PM    Performed by: CHECO Siddiqi  Authorized by: CHECO Siddiqi    Procedure Details     Indications: urge incontinence, urinary frequency and urinary urgency      PTNS session #: monthly maintenance    Bladder symptoms: unchanged      Ankle used: right      Stimulator intensity (mA): 6        Follow up 4 weeks PTNS, more leakage at night in past month, Taking Gemtesa in combination with PTNS.  Discussed options for procedures botox and implantable neuromodulation. Patient not interested in other procedures.

## 2024-09-09 ENCOUNTER — HOSPITAL ENCOUNTER (OUTPATIENT)
Dept: RADIOLOGY | Facility: CLINIC | Age: 89
Discharge: HOME | End: 2024-09-09
Payer: MEDICARE

## 2024-09-09 ENCOUNTER — APPOINTMENT (OUTPATIENT)
Dept: CARDIOLOGY | Facility: CLINIC | Age: 89
End: 2024-09-09
Payer: MEDICARE

## 2024-09-09 VITALS
HEART RATE: 64 BPM | BODY MASS INDEX: 38.98 KG/M2 | HEIGHT: 63 IN | WEIGHT: 220 LBS | OXYGEN SATURATION: 96 % | DIASTOLIC BLOOD PRESSURE: 66 MMHG | SYSTOLIC BLOOD PRESSURE: 118 MMHG

## 2024-09-09 DIAGNOSIS — I10 PRIMARY HYPERTENSION: ICD-10-CM

## 2024-09-09 DIAGNOSIS — R06.02 SHORTNESS OF BREATH AT REST: ICD-10-CM

## 2024-09-09 DIAGNOSIS — I50.32 CHRONIC DIASTOLIC HEART FAILURE (MULTI): ICD-10-CM

## 2024-09-09 DIAGNOSIS — I48.21 PERMANENT ATRIAL FIBRILLATION (MULTI): ICD-10-CM

## 2024-09-09 DIAGNOSIS — E78.49 OTHER HYPERLIPIDEMIA: ICD-10-CM

## 2024-09-09 DIAGNOSIS — R60.0 LEG EDEMA: ICD-10-CM

## 2024-09-09 DIAGNOSIS — I25.10 CORONARY ARTERY DISEASE INVOLVING NATIVE CORONARY ARTERY OF NATIVE HEART WITHOUT ANGINA PECTORIS: ICD-10-CM

## 2024-09-09 DIAGNOSIS — I87.2 VENOUS INSUFFICIENCY: Primary | ICD-10-CM

## 2024-09-09 PROBLEM — I48.92 PAROXYSMAL ATRIAL FLUTTER (MULTI): Status: RESOLVED | Noted: 2022-11-29 | Resolved: 2024-09-09

## 2024-09-09 PROBLEM — E78.5 DYSLIPIDEMIA: Status: RESOLVED | Noted: 2023-10-18 | Resolved: 2024-09-09

## 2024-09-09 PROCEDURE — 3074F SYST BP LT 130 MM HG: CPT | Performed by: INTERNAL MEDICINE

## 2024-09-09 PROCEDURE — 71046 X-RAY EXAM CHEST 2 VIEWS: CPT | Performed by: RADIOLOGY

## 2024-09-09 PROCEDURE — 71046 X-RAY EXAM CHEST 2 VIEWS: CPT

## 2024-09-09 PROCEDURE — 1036F TOBACCO NON-USER: CPT | Performed by: INTERNAL MEDICINE

## 2024-09-09 PROCEDURE — 3078F DIAST BP <80 MM HG: CPT | Performed by: INTERNAL MEDICINE

## 2024-09-09 PROCEDURE — 99213 OFFICE O/P EST LOW 20 MIN: CPT | Performed by: INTERNAL MEDICINE

## 2024-09-09 NOTE — PROGRESS NOTES
Subjective   Shreya Acuña is a 89 y.o. female.    Chief Complaint:  Follow-up congestive heart failure atrial fibrillation.    HPI    Comes in today in a wheelchair.  Has difficulty with ambulation.  No anginal symptoms.  Feels somewhat weak.  Chronic dyspnea.    A stress test performed in 2019 showed an ejection fraction of 65% with no ischemia.     We initially saw her for an abnormal EKG. It turned out that she had atrial fibrillation. We were able to control this on medical management and with the use of flecainide therapy.     Her past cardiac history is significant for a history of hypertension. There is no history of diabetes. There is a past smoking history. There is no family history of premature coronary artery disease. She is being treated for hyperlipidemia.     Diagnosis of coronary artery disease is based on findings of calcifications in the distribution of the coronary arteries seen on the CT scan of the chest done in 2022.     Allergies  Medication    · No Known Drug Allergies   Recorded By: Cassi Solorio; 2017 9:09:32 AM     Family History  Mother    · Family history of   Father    · Family history of      Social History  Problems    · Does not use illicit drugs (V49.89) (Z78.9)   · Former smoker (V15.82) (Z87.891)   · Occasional alcohol use     Review of Systems   Constitutional: Positive for malaise/fatigue.   Cardiovascular:  Positive for dyspnea on exertion and leg swelling.   Musculoskeletal:  Positive for arthritis and joint pain.   Neurological:  Positive for weakness.       Current Outpatient Medications   Medication Sig Dispense Refill    atorvastatin (Lipitor) 20 mg tablet Take 1 tablet (20 mg) by mouth once daily.      carbidopa-levodopa (Sinemet)  mg tablet WEEK 1: take 0.5 tab (half) tab 3 times a day. WEEK 2: take 1 tab 3 times a day thereafter. Take 4-5hrs apart. 90 tablet 3    DILT- mg 24 hr capsule Take 1 capsule (120 mg) by  "mouth once daily. 90 capsule 3    DULoxetine (Cymbalta) 30 mg DR capsule TAKE ONE CAPSULE BY MOUTH TWO TIMES A  capsule 2    losartan (Cozaar) 100 mg tablet Take 1 tablet (100 mg) by mouth once daily. 90 tablet 3    metoprolol succinate XL (Toprol-XL) 50 mg 24 hr tablet Take 1 tablet (50 mg) by mouth once daily.      mv-min-FA-vit K-lutein-zeaxant (PreserVision AREDS 2 Plus MV) 200 mcg-15 mcg- 5 mg-1 mg capsule Take by mouth once daily.      pregabalin (Lyrica) 100 mg capsule Take 1 capsule (100 mg) by mouth 2 times a day. 180 capsule 2    rivaroxaban (Xarelto) 10 mg tablet Take 1 tablet (10 mg) by mouth once daily. 90 tablet 3    spironolactone (Aldactone) 25 mg tablet Take 1 tablet (25 mg) by mouth once daily. 90 tablet 3    torsemide (Demadex) 10 mg tablet Take 1 tablet (10 mg) by mouth once daily. 90 tablet 3    vibegron 75 mg tablet Take 1 tablet (75 mg) by mouth once daily. 90 tablet 3     No current facility-administered medications for this visit.        Visit Vitals  /66   Pulse 64   Ht 1.6 m (5' 3\")   Wt 99.8 kg (220 lb)   SpO2 96%   BMI 38.97 kg/m²   Smoking Status Former   BSA 2.11 m²        Objective     Constitutional:       Appearance: Not in distress.   Neck:      Vascular: JVD normal.   Pulmonary:      Breath sounds: Normal breath sounds.   Cardiovascular:      Normal rate. Irregularly irregular rhythm. Normal S1. Normal S2.       Murmurs: There is no murmur.      No gallop.    Pulses:     Intact distal pulses.   Edema:     Peripheral edema absent.   Abdominal:      General: There is no distension.      Palpations: Abdomen is soft.   Neurological:      Mental Status: Alert.         Lab Review:   Lab Results   Component Value Date     07/03/2024    K 4.8 07/03/2024     07/03/2024    CO2 29 07/03/2024    BUN 40 (H) 07/03/2024    CREATININE 1.64 (H) 07/03/2024    GLUCOSE 124 (H) 07/03/2024    CALCIUM 10.0 07/03/2024     Lab Results   Component Value Date    CHOL 128 07/03/2024 "    TRIG 139 07/03/2024    HDL 44.1 07/03/2024       Assessment:    1.  Chronic diastolic congestive heart failure.  On a combination of spironolactone and torsemide.  Latest labs show potassium 4.8, BUN 40, creatinine 1.6.  This is similar to previous laboratory data including labs from January 2024.    2.  Permanent atrial fibrillation.  Heart rates are in the 70s.    3.  Chronic renal failure.  Creatinine 1.64.    4.  Coronary artery disease.  Medical management given her advanced age and multiple medical comorbidities.

## 2024-09-10 ENCOUNTER — LAB (OUTPATIENT)
Dept: LAB | Facility: LAB | Age: 89
End: 2024-09-10
Payer: MEDICARE

## 2024-09-10 DIAGNOSIS — I50.32 CHRONIC DIASTOLIC HEART FAILURE (MULTI): ICD-10-CM

## 2024-09-10 LAB — BNP SERPL-MCNC: 143 PG/ML (ref 0–99)

## 2024-09-10 PROCEDURE — 83880 ASSAY OF NATRIURETIC PEPTIDE: CPT

## 2024-09-10 PROCEDURE — 36415 COLL VENOUS BLD VENIPUNCTURE: CPT

## 2024-09-11 ENCOUNTER — TELEPHONE (OUTPATIENT)
Dept: CARDIOLOGY | Facility: CLINIC | Age: 89
End: 2024-09-11
Payer: MEDICARE

## 2024-09-11 NOTE — TELEPHONE ENCOUNTER
Left patient detailed message for patient and asked her to call us back with any questions/concerns.

## 2024-09-11 NOTE — TELEPHONE ENCOUNTER
----- Message from Daryn Barnhart sent at 9/11/2024  8:35 AM EDT -----  Notify patient that labs and chest xray look ok by my review.

## 2024-09-17 ENCOUNTER — TELEPHONE (OUTPATIENT)
Dept: CARDIOLOGY | Facility: CLINIC | Age: 89
End: 2024-09-17
Payer: MEDICARE

## 2024-10-03 ENCOUNTER — APPOINTMENT (OUTPATIENT)
Dept: UROLOGY | Facility: CLINIC | Age: 89
End: 2024-10-03
Payer: MEDICARE

## 2024-10-03 VITALS
WEIGHT: 220 LBS | TEMPERATURE: 96.5 F | SYSTOLIC BLOOD PRESSURE: 88 MMHG | DIASTOLIC BLOOD PRESSURE: 49 MMHG | HEIGHT: 63 IN | HEART RATE: 66 BPM | BODY MASS INDEX: 38.98 KG/M2

## 2024-10-03 DIAGNOSIS — N39.41 URGENCY INCONTINENCE: ICD-10-CM

## 2024-10-03 DIAGNOSIS — N39.41 URGE INCONTINENCE: Primary | ICD-10-CM

## 2024-10-03 DIAGNOSIS — R35.0 FREQUENCY OF MICTURITION: ICD-10-CM

## 2024-10-03 PROCEDURE — 3074F SYST BP LT 130 MM HG: CPT | Performed by: NURSE PRACTITIONER

## 2024-10-03 PROCEDURE — 64566 NEUROELTRD STIM POST TIBIAL: CPT | Performed by: NURSE PRACTITIONER

## 2024-10-03 PROCEDURE — 1036F TOBACCO NON-USER: CPT | Performed by: NURSE PRACTITIONER

## 2024-10-03 PROCEDURE — 3078F DIAST BP <80 MM HG: CPT | Performed by: NURSE PRACTITIONER

## 2024-10-03 NOTE — PROGRESS NOTES
Patient ID: Shreya Acuña is a 89 y.o. female.    Percutaneous Tibial Nerve Stimulation    Date/Time: 10/3/2024 2:57 PM    Performed by: CHECO Siddiqi  Authorized by: CHECO Siddiqi    Procedure Details     Indications: urge incontinence, urinary frequency and urinary urgency      PTNS session #: monthly maintenance    Bladder symptoms: unchanged      Ankle used: right      Stimulator intensity (mA): 5        Follow up 4 weeks PTNS, more leakage at night in past month, Taking Gemtesa in combination with PTNS.  Discussed options for procedures botox and implantable neuromodulation. Patient not interested in other procedures.  Difficult month as she is having to change medications, more difficulty w breathing, diuretics contributing to incontinence. Patient not interested in other treatment options.

## 2024-10-08 ENCOUNTER — TELEPHONE (OUTPATIENT)
Dept: CARDIOLOGY | Facility: CLINIC | Age: 89
End: 2024-10-08
Payer: MEDICARE

## 2024-10-08 DIAGNOSIS — I50.32 CHRONIC DIASTOLIC HEART FAILURE: Primary | ICD-10-CM

## 2024-10-08 DIAGNOSIS — I10 PRIMARY HYPERTENSION: ICD-10-CM

## 2024-10-08 NOTE — TELEPHONE ENCOUNTER
Had an laci BORRERO out to the house for an apt  Her weight went from 210 to 236 in the span of 6 weeks  They were suggesting increasing torsemide  Wondering if he should be giving her double the dosage

## 2024-10-09 ENCOUNTER — OFFICE VISIT (OUTPATIENT)
Dept: PAIN MEDICINE | Facility: CLINIC | Age: 89
End: 2024-10-09
Payer: MEDICARE

## 2024-10-09 VITALS
HEART RATE: 61 BPM | DIASTOLIC BLOOD PRESSURE: 99 MMHG | OXYGEN SATURATION: 96 % | TEMPERATURE: 97.2 F | HEIGHT: 63 IN | WEIGHT: 220 LBS | BODY MASS INDEX: 38.98 KG/M2 | RESPIRATION RATE: 15 BRPM | SYSTOLIC BLOOD PRESSURE: 133 MMHG

## 2024-10-09 DIAGNOSIS — M47.816 LUMBAR SPONDYLOSIS: Primary | ICD-10-CM

## 2024-10-09 DIAGNOSIS — M51.26 LUMBAR DISC HERNIATION: ICD-10-CM

## 2024-10-09 DIAGNOSIS — M54.17 LUMBOSACRAL NEURITIS: ICD-10-CM

## 2024-10-09 DIAGNOSIS — M48.07 LUMBOSACRAL STENOSIS: ICD-10-CM

## 2024-10-09 DIAGNOSIS — G89.29 CHRONIC BILATERAL LOW BACK PAIN WITH BILATERAL SCIATICA: ICD-10-CM

## 2024-10-09 DIAGNOSIS — M54.42 CHRONIC BILATERAL LOW BACK PAIN WITH BILATERAL SCIATICA: ICD-10-CM

## 2024-10-09 DIAGNOSIS — M54.41 CHRONIC BILATERAL LOW BACK PAIN WITH BILATERAL SCIATICA: ICD-10-CM

## 2024-10-09 PROCEDURE — 99214 OFFICE O/P EST MOD 30 MIN: CPT | Performed by: ANESTHESIOLOGY

## 2024-10-09 ASSESSMENT — PAIN DESCRIPTION - DESCRIPTORS: DESCRIPTORS: ACHING

## 2024-10-09 ASSESSMENT — PAIN - FUNCTIONAL ASSESSMENT: PAIN_FUNCTIONAL_ASSESSMENT: 0-10

## 2024-10-09 ASSESSMENT — PAIN SCALES - GENERAL
PAINLEVEL: 9
PAINLEVEL_OUTOF10: 9

## 2024-10-09 NOTE — PROGRESS NOTES
History Of Present Illness  Shreya Acuña is a 89 y.o. female presenting with   Chief Complaint   Patient presents with    Follow-up     Patient returns regarding slow return of her chronic low back pain, BUT continued LEG PAIN worse on the right side.     Pt was in the ED with UTI and is now on Cipro for 7 days started on 6-.      Recall: The pt saw Dr. Barron in August of 2023 and is not considering total shoulder replacement.      Recall: She reports since early August 2018 she awoke one morning with severe pain in her neck and that radiates into both of her shoulders. Worse with rotating her neck.      The low back pain is constant, worse with prolonged standing and walking and better with rest. The pain is aching in her back and LLE. Denies LE paresthesias, weakness, saddle anesthesia, bowel or bladder incontinence. To manage this pain the patient has attempted Gabapentin 300mg TID and is now on Topamax and Cymbalta. The patients chronic HTN, DLD, A.FIB ON XARELTO, are stable.     PAIN SCORE: 10/10 with moving and walking and 0/10 with sitting      Cards: Dr. Barnhart  PCP: Dr. Asencio     Past Medical History  She has a past medical history of Emphysema lung (Multi), Hypertension, and Other specified health status.    Surgical History  She has a past surgical history that includes Other surgical history (12/02/2020).     Social History  She reports that she has quit smoking. Her smoking use included cigarettes. She has never used smokeless tobacco. She reports that she does not currently use alcohol. She reports that she does not use drugs.    Family History  No family history on file.     Allergies  Patient has no known allergies.    Review of Systems    All other systems reviewed and negative for any deficits. Pertinent positives and negatives were considered in the medical decision making process.        Physical Exam  BP (!) 133/99   Pulse 61   Temp 36.2 °C (97.2 °F)   Resp 15   Wt 99.8 kg  (220 lb)   SpO2 96%     General: Pt appears stated age     Eyes: Conjunctiva non-icteric and lids without obvious rash or drooping. Pupils are symmetric     ENT: Hearing is grossly intact     Neck: No JVD noted, tracheal position midline.     Musculoskeletal: Gait is grossly normal, favoring her right side      Digits/nails show no clubbing or cyanosis     Exam of muscles/joints/bones shows no gross atrophy and no abnormal/involuntary movements in the head/neckNo asymmetry or masses noted in the head/neck     Stability: no subluxation noted on movement of bilateral upper extremities or head/neck     Strength: 4/5 in B/L lower extremities      Positive straight leg raise bilaterally      Range of Motion: WNL (with exception as noted above) LIMITED LEFT SHOULDER ABDUCTION, limited to 45 deg     Sensation: decreased to sharp touch in LLE      Cranial nerves 2-12 are grossly intact     Psychiatric: Pt is alert and oriented to time, place and person.         Assessment/Plan   1. Lumbar spondylosis  Radiofrequency Ablation    FL pain management      2. Lumbar disc herniation        3. Lumbosacral neuritis        4. Lumbosacral stenosis        5. Chronic bilateral low back pain with bilateral sciatica                  Diagnoses/Problems   · Lumbar disc herniation (722.10) (M51.26)   · Lumbosacral stenosis (724.02) (M48.07)   · Chronic low back pain (724.2,338.29) (M54.50,G89.29)   · Lumbosacral neuritis (724.4) (M54.17)   · Neck pain (723.1) (M54.2)   · Left shoulder pain (719.41) (M25.512)     Provider Impressions     1. I have previously provided the patient with a list of physical therapy exercises to learn and perform.  The pt works on water therapy at home on a regular basis during the summer season.     Recall: I discussed referring her to water therapy but the pt did not want go to water therapy in the winter.      2. The pt was PREVIOUSLY taking Gabapentin 300mg TID to help with nerve related pain. She did report  LE edema and weight gain. She was weaned off of Gabapentin and is now on PREGABALIN 100mg BID instead without any side effects thus far.      She also tried Topamax 25mg in the AM and 50mg at bedtime, but reported hair loss since June of 2019 she did wean off of this medication.      I would recommend pt continue on Cymbalta 30mg BID to help with nerve pain. We discussed the risks, benefits, and side effects to this medication including the mechanism of action and the pt understands and agrees.     She signed a [employer] contract on 1- and again on 11-2-2022.  UDS was completed on 12- and again on 11-2-2022.     GFR was 38 in August of 2020.      3. She previously completed water therapy extensively. She reports she stopped doing water therapy exercises due to it being too cold out.      Pt working on stretching at home in her hot tub daily however, her hot tub broke and has still not been repaired.      4. Previously, I extensively reviewed the patients MRI findings in detail, including review of the actual images and provided a detailed explanation of the findings using a spine model. The pt has a grade I spondylolisthesis at L4 on L5 and L5 on S1 with moderate to severe canal stenosis L4/5 with a disc bulge at the L3/4 level.      5. The patient is a candidate for an RFA of bilateral L4/5 and L5/S1 medial branch nerves under fluoro guidance. She underwent the diagnostic procedure on 4-5-2024 and prior to that 2-2-2024 and she reported 80% relief of her pain that lasted for 2 weeks EACH time and gradually wore off.     Her RFA was on 7-9-2024 and she reported a 50% reduction in her pain lasting for 3 months time. She was able to stand/walk with much less pain.     6. She is also is a candidate for an LESI at L5/S1 and LEFT SHOULDER JOINT INJECTION with RIGHT to LEFT sided bias to treat back and radicular pain. I spent time with the patient discussing all of the risks, benefits, and alternatives to this  measure. Including but not limited to spinal infection, epidural hematoma/abscess, paralysis, nerve injury, steroid effects, and spinal headache. The patient understands and agrees to proceed as needed.      Her last injection was an LESI at L5/S1 on 8- and prior to that 11- that injection provided her with 80% relief of her pain that has since worn off after 2.5 months. She reports since her last injection worked so well she was able to stand and cook with less pain. Since her injection has worn off her pain has become progressively worse with standing to cook.      We will contact the patients CARDIOLOGIST (Dr. Barnhart) to determine if it is safe to stop Xarelto for 5 days prior to A REPEAT LUMBAR BLOCK IF NEEDED. If Pt is to be bridged on Lovenox they will need to be off of Lovenox for 24 hours prior to the procedure. They will also need to have their INR checked the day of the procedure. We did discuss the risks for stopping anticoagulation including, but not limited to any cardiovascular event, embolism, and even death. The patient understands these risks and would like to proceed with the procedure.     7. In the past she underwent a NEIL on 8-28-18 and she reports 100% relief of her neck pain that is o  ngoing for her.      8. The pt is interested in following up with Ortho regarding her bilateral knee pain. She has attempted steroid injections with no relief. I did previously provide her with a referral to Ortho. She previously saw Dr. Jones and underwent steroid injection / synthetic cartilage in her bilateral knees with 50% relief for approx 1 month.     Of note she was scheduled for Synthetic cartilage injections with Dr. Jones on Aug 1, 8, 15th, 2023.      8. 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 May be a candidate for GENICULAR nerve blocks followed by RFA.     I spent time with the patient discussing all of the risks, benefits, and  alternatives to this measure. Including but not limited to joint infection, hematoma/abscess and nerve injury. The patient understands and agrees to proceed.        I spent time with the patient reviewing their imaging and discussing the risks benefits and alternatives to the above plan. A total of 30 minutes was spent reviewing the data and greater than 50% of that time was with the patient during the face to face encounter discussing treatment options both surgical, non-surgical, and minimally invasive techniques.       Jose Luis Quiñones MD

## 2024-10-14 NOTE — TELEPHONE ENCOUNTER
Ender called this morning with an update. Pt's weight today is 238 lbs. Weight was 236 lbs on Wed. 10/8/24.  Ender states pt's weight was 210 lbs approx 7 weeks ago. Ender states pt's legs do not look swollen, there is a slight indent when pushing on them but not much. Ender states pt has some wheezing but no SOB with activities. He has eliminated sodium in pt's diet.    Pt taking: Spironolactone 25mg daily, Torsemide 10mg daily.    Last BMP was 7/3/24: B/C 40, 1.64. BNP 9/10/24 was 143.    Next ov 3/4/25.    Please advise. Thanks.

## 2024-10-15 RX ORDER — TORSEMIDE 10 MG/1
20 TABLET ORAL DAILY
Qty: 180 TABLET | Refills: 3 | Status: SHIPPED | OUTPATIENT
Start: 2024-10-15

## 2024-10-15 NOTE — TELEPHONE ENCOUNTER
Called and notified Ender of Torsemide dose change and labs in 1 week per Dr. Barnhart. Ender verbalizes understanding. Labs entered. Will pend new rx to Dr. Barnhart.

## 2024-10-31 ENCOUNTER — APPOINTMENT (OUTPATIENT)
Dept: UROLOGY | Facility: CLINIC | Age: 89
End: 2024-10-31
Payer: MEDICARE

## 2024-10-31 DIAGNOSIS — N39.41 URGE INCONTINENCE: ICD-10-CM

## 2024-10-31 DIAGNOSIS — N39.41 URGENCY INCONTINENCE: ICD-10-CM

## 2024-10-31 DIAGNOSIS — R35.0 FREQUENCY OF MICTURITION: Primary | ICD-10-CM

## 2024-10-31 PROCEDURE — 64566 NEUROELTRD STIM POST TIBIAL: CPT | Performed by: NURSE PRACTITIONER

## 2024-10-31 RX ORDER — ALBUTEROL SULFATE 90 UG/1
2 INHALANT RESPIRATORY (INHALATION) EVERY 4 HOURS PRN
COMMUNITY
Start: 2024-09-04

## 2024-11-01 DIAGNOSIS — I50.32 CHRONIC DIASTOLIC HEART FAILURE: ICD-10-CM

## 2024-11-03 RX ORDER — SPIRONOLACTONE 25 MG/1
25 TABLET ORAL DAILY
Qty: 90 TABLET | Refills: 3 | Status: SHIPPED | OUTPATIENT
Start: 2024-11-03 | End: 2024-11-05 | Stop reason: SDUPTHER

## 2024-11-05 DIAGNOSIS — M54.17 LUMBOSACRAL NEURITIS: ICD-10-CM

## 2024-11-05 DIAGNOSIS — I50.32 CHRONIC DIASTOLIC HEART FAILURE: Primary | ICD-10-CM

## 2024-11-05 RX ORDER — SPIRONOLACTONE 25 MG/1
25 TABLET ORAL DAILY
Qty: 90 TABLET | Refills: 3 | Status: SHIPPED | OUTPATIENT
Start: 2024-11-05

## 2024-11-05 RX ORDER — DULOXETIN HYDROCHLORIDE 30 MG/1
30 CAPSULE, DELAYED RELEASE ORAL 2 TIMES DAILY
Qty: 180 CAPSULE | Refills: 2 | Status: SHIPPED | OUTPATIENT
Start: 2024-11-05

## 2024-11-05 NOTE — TELEPHONE ENCOUNTER
LOV 10/09/24, IBRAHIMA 01/16/25.   Taltz Counseling: I discussed with the patient the risks of ixekizumab including but not limited to immunosuppression, serious infections, worsening of inflammatory bowel disease and drug reactions.  The patient understands that monitoring is required including a PPD at baseline and must alert us or the primary physician if symptoms of infection or other concerning signs are noted.

## 2024-11-08 ENCOUNTER — HOSPITAL ENCOUNTER (EMERGENCY)
Facility: HOSPITAL | Age: 89
Discharge: HOME | End: 2024-11-08
Attending: STUDENT IN AN ORGANIZED HEALTH CARE EDUCATION/TRAINING PROGRAM
Payer: MEDICARE

## 2024-11-08 ENCOUNTER — APPOINTMENT (OUTPATIENT)
Dept: RADIOLOGY | Facility: HOSPITAL | Age: 89
End: 2024-11-08
Payer: MEDICARE

## 2024-11-08 ENCOUNTER — HOSPITAL ENCOUNTER (OUTPATIENT)
Dept: CARDIOLOGY | Facility: HOSPITAL | Age: 89
Discharge: HOME | End: 2024-11-08
Payer: MEDICARE

## 2024-11-08 VITALS
TEMPERATURE: 97.9 F | WEIGHT: 237 LBS | SYSTOLIC BLOOD PRESSURE: 120 MMHG | HEART RATE: 52 BPM | OXYGEN SATURATION: 99 % | BODY MASS INDEX: 40.46 KG/M2 | DIASTOLIC BLOOD PRESSURE: 74 MMHG | RESPIRATION RATE: 20 BRPM | HEIGHT: 64 IN

## 2024-11-08 DIAGNOSIS — M79.89 LEG SWELLING: Primary | ICD-10-CM

## 2024-11-08 PROCEDURE — 93971 EXTREMITY STUDY: CPT | Performed by: RADIOLOGY

## 2024-11-08 PROCEDURE — 99284 EMERGENCY DEPT VISIT MOD MDM: CPT | Mod: 25

## 2024-11-08 PROCEDURE — 93971 EXTREMITY STUDY: CPT

## 2024-11-08 PROCEDURE — 93005 ELECTROCARDIOGRAM TRACING: CPT

## 2024-11-08 RX ORDER — TORSEMIDE 20 MG/1
20 TABLET ORAL ONCE
Status: DISCONTINUED | OUTPATIENT
Start: 2024-11-08 | End: 2024-11-08 | Stop reason: HOSPADM

## 2024-11-08 ASSESSMENT — COLUMBIA-SUICIDE SEVERITY RATING SCALE - C-SSRS
6. HAVE YOU EVER DONE ANYTHING, STARTED TO DO ANYTHING, OR PREPARED TO DO ANYTHING TO END YOUR LIFE?: NO
2. HAVE YOU ACTUALLY HAD ANY THOUGHTS OF KILLING YOURSELF?: NO
1. IN THE PAST MONTH, HAVE YOU WISHED YOU WERE DEAD OR WISHED YOU COULD GO TO SLEEP AND NOT WAKE UP?: NO

## 2024-11-08 ASSESSMENT — PAIN SCALES - GENERAL: PAINLEVEL_OUTOF10: 0 - NO PAIN

## 2024-11-08 ASSESSMENT — PAIN - FUNCTIONAL ASSESSMENT: PAIN_FUNCTIONAL_ASSESSMENT: 0-10

## 2024-11-08 NOTE — ED TRIAGE NOTES
Pt is 89 year old female that presents to department from home with increased LLE swelling. Pt sent over by PCP for dvt r/o. Pt denies any redness or warmth to leg. Pt nonambulatory at baseline. Pt on xarelto. VSS.No acute distress noted.

## 2024-11-08 NOTE — DISCHARGE INSTRUCTIONS
You are seen for evaluation of left lower extremity swelling and did not have any DVT seen on your ultrasound.  This is just a screening tool and you will need a repeat ultrasound in 1 week for surveillance for blood clots.  Your swelling is most likely related to your heart failure.  You can take an extra dose of your water pills to help take off a little bit of extra fluid.  I will also send a prescription for compression stockings for you.  Please help with your primary care doctor regarding over this today.

## 2024-11-08 NOTE — ED PROVIDER NOTES
History of Present Illness     History provided by: Patient  Limitations to History: None Identified  External Records Reviewed with Brief Summary: Previous ED visits/recent PCP notes for PMH     HPI:  Shreya Acuña is a 89 y.o. female with chronic back pain with spinal stenosis, COPD TAMMY CHF A-fib on Xarelto hypertension who presents for evaluation of left lower extremity swelling after being sent in by her providers with concern for DVT.  She has had swelling over the last day.  Endorses some mild increase in shortness of breath with standing or exertion though no cough difficulty breathing wheezing.  She states that her breathing treatments help.  She has swelling in both of her legs but is more significant on her left.  She does not wear compression stockings usually.  She is mentally and Kyung at her baseline.  She gets additional help from her home health aides.  She has not missed any doses of her medication    Physical Exam   Triage vitals:  T 36.6 °C (97.9 °F)  HR 52  /74  RR 20  O2 99 % None (Room air)    General: Awake, alert, in no acute distress  Eyes: Gaze conjugate.  No scleral icterus or injection  HENT: Normo-cephalic, atraumatic. No stridor  CV: RRR. Radial/PT pulses 2+ bilaterally  Resp: Breathing non-labored, speaking in full sentences.  Clear to auscultation bilaterally  GI: Soft, non-distended, non-tender. No rebound or guarding.  : Deferred  MSK/Extremities: No gross bony deformities. Moving all extremities, bilateral lower extremity swelling left greater than right with 2+ pitting edema skin: Warm. Appropriate color  Neuro: Alert. Oriented. Face symmetric. Speech is fluent.  Gross strength and sensation intact in b/l UE and LEs  Psych: Appropriate mood and affect      Medical Decision Making & ED Course   Medical Decision Makin y.o. female presents for evaluation of left lower extremity swelling.  She is hemodynamically stable saturating 99% on room air with no fever  or tachycardia.  Will obtain EKG.  Patient is not in any significant pain or discomfort.  Did obtain a DVT ultrasound that did not show any acute occlusive DVTs.  Patient is already on Xarelto and has not missed any doses.  Discussed the importance of getting a follow-up ultrasound in 1 week as this is not a definitive test.  Patient overall has edema in the setting of CHF and is on torsemide at her baseline.  Did give additional dose of torsemide here and sent prescription for a compression stocking.  Patient instructed to elevate the leg and to return if any worsening shortness of breath cough difficulty breathing chest pain or passes out.  Patient and patient's  updated on plan of care and agreeable with plan for home-going.  Did consider labs however patient is well-appearing not experiencing any chest pain or significant shortness of breath at this time believe symptoms are related to mild shift in fluid status.  No focal findings on her lungs concerning for need for chest x-ray.  ----     Social Determinants of Health which Significantly Impact Care: None identified    EKG Independent Interpretation: EKG interpreted by myself. Please see ED Course and [facility] for full interpretation.    Independent Result Review and Interpretation: Results were independently reviewed and interpreted by myself. Please see ED course and [facility] for full interpretation.    Chronic conditions affecting the patient's care: As documented in the MDM    Care Considerations: As per [facility]    ED Course:  ED Course as of 11/08/24 2221 Fri Nov 08, 2024   1856 Interpreted by the Emergency Department Attending: ECG revealed atrial fibrillation at a rate of 52 beats per minute with KY interval * , QRS of 98 , QTc of 418.  No acute injury pattern. Previous EKG on 24th revealed no significant changes.    [MG]      ED Course User Index  [MG] Dirk Rob DO         Diagnoses as of 11/08/24 2221   Leg swelling     Disposition   As a result  of the work-up, the patient was discharged home.  she was informed of her diagnosis and instructed to come back with any concerns or worsening of condition.  she and was agreeable to the plan as discussed above.  she was given the opportunity to ask questions.  All of the patient's questions were answered.    Procedures   Procedures    Patient seen and discussed with ED attending physician.    Marj Gomez DO  PGY-3 Emergency Medicine    I did evaluate the patient independently from the resident and was present for key medical decision making.  Agree with disposition.  Any discrepancies between residents findings are detailed below.    This is an 89-year-old female presenting to the emergency department with significant other for left lower extremity increased swelling.  She spoke with her primary provider today and was recommended to the emergency department to rule out DVT.  Does have underlying A-fib and has been compliant with her Xarelto.  She also has heart failure has been taking her torsemide as directed.  She endorses some mild shortness of breath with exertion although notes this is baseline for her and is unchanged.  She denies any further associated symptoms at this time.    VS: As documented in the triage note from today's date and EMR flowsheet were reviewed.  Gen: Well developed. No acute distress. Seated in bed. Appears nontoxic.   Skin: Warm. Dry. Intact. No rashes or lesions.  Eyes: Pupils equally round and reactive to light. Clear sclera.   HENT: Atraumatic appearance. Mucosal membranes moist. No oral lesions, uvula midline, airway patent.   CV: Regular rate and irregular rhythm. S1, S2.  +2 bilateral pitting pedal edema slightly increased left lower extremity. Warm extremities.  Resp: Nonlabored breathing Clear to auscultation bilaterally. No increased work of breathing.   GI: Soft and nontender. No rebound or guarding. Bowel sounds x4 present.   MSK: Symmetric muscle bulk. No joint swelling in  the extremities. Compartments are soft. Neurovascularly intact x4 extremities. Radial pulses +2 equal bilaterally.  Pedal pulses +2 equal bilaterally.  Neuro: Alert. Speech fluent. Moving all extremities. No focal deficits.   Psych: Appropriate. Kempt.    Patient arrives hemodynamically stable mentating appropriately.  Duplex is ordered no evidence of DVT in the left lower extremity.  She has equal bilateral pulses I have a low concern for vascular pathology.  Does have minimal pedal edema bilaterally.  Her lung sounds are clear to auscultatory exam she appears to be in no acute distress.  I see no indication for lab work or chest x-ray at this time.  Her EKG shows chronic atrial fibrillation no acute injury pattern.  She is recommended to take an additional dose of her torsemide for diuresis.  She is to follow-up with her primary provider as well as cardiologist for continuous monitoring.  She is given strict return precautions and appropriate follow-up.  She is appreciative of care agreeable with this plan.  Discharged in stable condition.    Dirk Rob, DO Dirk Rob,   11/08/24 2224       Dirk Rob,   11/08/24 2224

## 2024-11-11 ENCOUNTER — TELEPHONE (OUTPATIENT)
Dept: CARDIOLOGY | Facility: CLINIC | Age: 89
End: 2024-11-11
Payer: MEDICARE

## 2024-11-11 NOTE — TELEPHONE ENCOUNTER
Pt's , Ender LM to report pt went to ER on Saturday for increased edema to left leg and asked for a return call. Called Ender to discuss.    Per Ender, pt went to ER for left leg swelling and was informed she did not have a clot. Per ER records, Torsemide 20mg was increased to 30mg daily on Saturday for 4 days. Pt will take through tomorrow. Ender states swelling is in left knee and thigh, worse on inner aspect. Pt's caregiver is at home and provided detailed information. Per caregiver, she noticed edema on Friday when assisting pt with shower. Swelling located from left knee up and around thigh. Per caregiver, the back of left thigh feels tight, painful/sensitive to touch (pt rates 5/10), no redness. Per Ender, pt has a little wheezing, no CP and does not c/o of increased SOB but not really walking due to pain and swelling. Per Ender, pt has gained 15 lbs in the last month.     Next ov 3/24/24. Sent secure message to Dr. Barnhart.

## 2024-11-11 NOTE — TELEPHONE ENCOUNTER
Per Dr. Barnhart, schedule ov for tomorrow 11/12/24 at 2:00 pm.  Called and notified Ender who is agreeable and will bring pt tomorrow.

## 2024-11-11 NOTE — TELEPHONE ENCOUNTER
Verena, care coordinator from the Clinic who works with PT's PCP , Dr. Asencio, is calling on behalf of the PT. She called the PT to follow up on her ER visit from 11/8/24. PT went to ER with leg swelling and Pain in her left leg, ER did not find anything in her leg. Upon speaking with the PT today, PT has more swelling now, also in ankle and foot, and 10 out of 10 pain in the left leg. Please call Verena to advise.  [phone]

## 2024-11-12 ENCOUNTER — OFFICE VISIT (OUTPATIENT)
Dept: CARDIOLOGY | Facility: CLINIC | Age: 89
End: 2024-11-12
Payer: MEDICARE

## 2024-11-12 ENCOUNTER — LAB (OUTPATIENT)
Dept: LAB | Facility: LAB | Age: 89
End: 2024-11-12
Payer: MEDICARE

## 2024-11-12 ENCOUNTER — HOSPITAL ENCOUNTER (OUTPATIENT)
Dept: CARDIOLOGY | Facility: CLINIC | Age: 89
Discharge: HOME | End: 2024-11-12
Payer: MEDICARE

## 2024-11-12 VITALS
HEART RATE: 68 BPM | DIASTOLIC BLOOD PRESSURE: 70 MMHG | OXYGEN SATURATION: 95 % | SYSTOLIC BLOOD PRESSURE: 122 MMHG | BODY MASS INDEX: 41.32 KG/M2 | HEIGHT: 64 IN | WEIGHT: 242 LBS

## 2024-11-12 DIAGNOSIS — R60.0 LEG EDEMA: ICD-10-CM

## 2024-11-12 DIAGNOSIS — I87.2 VENOUS INSUFFICIENCY: ICD-10-CM

## 2024-11-12 DIAGNOSIS — I50.31 ACUTE DIASTOLIC (CONGESTIVE) HEART FAILURE: Primary | ICD-10-CM

## 2024-11-12 DIAGNOSIS — I10 ESSENTIAL (PRIMARY) HYPERTENSION: Primary | ICD-10-CM

## 2024-11-12 DIAGNOSIS — E66.01 MORBID (SEVERE) OBESITY DUE TO EXCESS CALORIES (MULTI): ICD-10-CM

## 2024-11-12 DIAGNOSIS — R06.02 SHORTNESS OF BREATH: ICD-10-CM

## 2024-11-12 DIAGNOSIS — E78.49 OTHER HYPERLIPIDEMIA: ICD-10-CM

## 2024-11-12 DIAGNOSIS — I10 PRIMARY HYPERTENSION: ICD-10-CM

## 2024-11-12 DIAGNOSIS — I48.21 PERMANENT ATRIAL FIBRILLATION (MULTI): ICD-10-CM

## 2024-11-12 DIAGNOSIS — I50.32 CHRONIC DIASTOLIC HEART FAILURE: ICD-10-CM

## 2024-11-12 DIAGNOSIS — I50.31 ACUTE DIASTOLIC (CONGESTIVE) HEART FAILURE: ICD-10-CM

## 2024-11-12 DIAGNOSIS — I25.10 CORONARY ARTERY DISEASE INVOLVING NATIVE CORONARY ARTERY OF NATIVE HEART WITHOUT ANGINA PECTORIS: ICD-10-CM

## 2024-11-12 PROBLEM — E66.813 CLASS 3 SEVERE OBESITY WITH BODY MASS INDEX (BMI) OF 40.0 TO 44.9 IN ADULT: Status: ACTIVE | Noted: 2023-04-28

## 2024-11-12 LAB
AORTIC VALVE MEAN GRADIENT: 2 MMHG
AORTIC VALVE PEAK VELOCITY: 0.93 M/S
AV PEAK GRADIENT: 3 MMHG
AVA (PEAK VEL): 3.16 CM2
AVA (VTI): 3.15 CM2
BASOPHILS # BLD AUTO: 0.03 X10*3/UL (ref 0–0.1)
BASOPHILS NFR BLD AUTO: 0.5 %
EJECTION FRACTION APICAL 4 CHAMBER: 56.4
EJECTION FRACTION: 63 %
EOSINOPHIL # BLD AUTO: 0.25 X10*3/UL (ref 0–0.4)
EOSINOPHIL NFR BLD AUTO: 4.5 %
ERYTHROCYTE [DISTWIDTH] IN BLOOD BY AUTOMATED COUNT: 13.3 % (ref 11.5–14.5)
HCT VFR BLD AUTO: 38.2 % (ref 36–46)
HGB BLD-MCNC: 12.3 G/DL (ref 12–16)
IMM GRANULOCYTES # BLD AUTO: 0.03 X10*3/UL (ref 0–0.5)
IMM GRANULOCYTES NFR BLD AUTO: 0.5 % (ref 0–0.9)
LEFT VENTRICLE INTERNAL DIMENSION DIASTOLE: 4.65 CM (ref 3.5–6)
LEFT VENTRICULAR OUTFLOW TRACT DIAMETER: 1.96 CM
LYMPHOCYTES # BLD AUTO: 1.34 X10*3/UL (ref 0.8–3)
LYMPHOCYTES NFR BLD AUTO: 24.3 %
MCH RBC QN AUTO: 32.8 PG (ref 26–34)
MCHC RBC AUTO-ENTMCNC: 32.2 G/DL (ref 32–36)
MCV RBC AUTO: 102 FL (ref 80–100)
MONOCYTES # BLD AUTO: 0.76 X10*3/UL (ref 0.05–0.8)
MONOCYTES NFR BLD AUTO: 13.8 %
NEUTROPHILS # BLD AUTO: 3.1 X10*3/UL (ref 1.6–5.5)
NEUTROPHILS NFR BLD AUTO: 56.4 %
NRBC BLD-RTO: 0 /100 WBCS (ref 0–0)
PLATELET # BLD AUTO: 187 X10*3/UL (ref 150–450)
RBC # BLD AUTO: 3.75 X10*6/UL (ref 4–5.2)
WBC # BLD AUTO: 5.5 X10*3/UL (ref 4.4–11.3)

## 2024-11-12 PROCEDURE — 84443 ASSAY THYROID STIM HORMONE: CPT

## 2024-11-12 PROCEDURE — 93308 TTE F-UP OR LMTD: CPT

## 2024-11-12 PROCEDURE — 80061 LIPID PANEL: CPT

## 2024-11-12 PROCEDURE — 85025 COMPLETE CBC W/AUTO DIFF WBC: CPT

## 2024-11-12 PROCEDURE — 1159F MED LIST DOCD IN RCRD: CPT | Performed by: INTERNAL MEDICINE

## 2024-11-12 PROCEDURE — 36415 COLL VENOUS BLD VENIPUNCTURE: CPT

## 2024-11-12 PROCEDURE — 3078F DIAST BP <80 MM HG: CPT | Performed by: INTERNAL MEDICINE

## 2024-11-12 PROCEDURE — 3074F SYST BP LT 130 MM HG: CPT | Performed by: INTERNAL MEDICINE

## 2024-11-12 PROCEDURE — 83735 ASSAY OF MAGNESIUM: CPT

## 2024-11-12 PROCEDURE — 99214 OFFICE O/P EST MOD 30 MIN: CPT | Mod: 25 | Performed by: INTERNAL MEDICINE

## 2024-11-12 PROCEDURE — 1036F TOBACCO NON-USER: CPT | Performed by: INTERNAL MEDICINE

## 2024-11-12 PROCEDURE — 99214 OFFICE O/P EST MOD 30 MIN: CPT | Performed by: INTERNAL MEDICINE

## 2024-11-12 PROCEDURE — 80069 RENAL FUNCTION PANEL: CPT

## 2024-11-12 PROCEDURE — 82306 VITAMIN D 25 HYDROXY: CPT | Mod: WAIVER OF LIABILITY ON FILE

## 2024-11-12 PROCEDURE — 93308 TTE F-UP OR LMTD: CPT | Performed by: INTERNAL MEDICINE

## 2024-11-12 RX ORDER — TORSEMIDE 20 MG/1
20 TABLET ORAL DAILY
Qty: 90 TABLET | Refills: 3 | Status: SHIPPED | OUTPATIENT
Start: 2024-11-12 | End: 2025-11-12

## 2024-11-12 NOTE — PROGRESS NOTES
Subjective   Shreya Acuña is a 89 y.o. female.    Chief Complaint:  Leg swelling.    HPI    She presented to the emergency room on 2024 with lower extremity edema.  She has also had a 20 pound weight gain since her last visit.  She has some shortness of breath and dyspnea with exertion.  Her workup in the emergency room was negative.  Basically she had a venous duplex study which was negative for DVT.    We initially saw her for an abnormal EKG. It turned out that she had atrial fibrillation. We were able to control this on medical management and with the use of flecainide therapy.     Her past cardiac history is significant for a history of hypertension. There is no history of diabetes. There is a past smoking history. There is no family history of premature coronary artery disease. She is being treated for hyperlipidemia.     Diagnosis of coronary artery disease is based on findings of calcifications in the distribution of the coronary arteries seen on the CT scan of the chest done in 2022.     Allergies  Medication    · No Known Drug Allergies   Recorded By: Cassi Solorio; 2017 9:09:32 AM     Family History  Mother    · Family history of   Father    · Family history of      Social History  Problems    · Does not use illicit drugs (V49.89) (Z78.9)   · Former smoker (V15.82) (Z87.891)   · Occasional alcohol use     Review of Systems   Constitutional: Positive for malaise/fatigue.   Cardiovascular:  Positive for dyspnea on exertion and leg swelling.   Musculoskeletal:  Positive for arthritis and joint pain.   Neurological:  Positive for weakness.     Current Outpatient Medications   Medication Sig Dispense Refill    albuterol 90 mcg/actuation inhaler Inhale 2 puffs every 4 hours if needed.      atorvastatin (Lipitor) 20 mg tablet Take 1 tablet (20 mg) by mouth once daily.      carbidopa-levodopa (Sinemet)  mg tablet WEEK 1: take 0.5 tab (half) tab 3 times a day. WEEK 2:  "take 1 tab 3 times a day thereafter. Take 4-5hrs apart. 90 tablet 3    DILT- mg 24 hr capsule Take 1 capsule (120 mg) by mouth once daily. 90 capsule 3    DULoxetine (Cymbalta) 30 mg DR capsule Take 1 capsule (30 mg) by mouth 2 times a day. 180 capsule 2    losartan (Cozaar) 100 mg tablet Take 1 tablet (100 mg) by mouth once daily. 90 tablet 3    metoprolol succinate XL (Toprol-XL) 50 mg 24 hr tablet Take 1 tablet (50 mg) by mouth once daily.      mv-min-FA-vit K-lutein-zeaxant (PreserVision AREDS 2 Plus MV) 200 mcg-15 mcg- 5 mg-1 mg capsule Take by mouth once daily.      pregabalin (Lyrica) 100 mg capsule Take 1 capsule (100 mg) by mouth 2 times a day. 180 capsule 2    rivaroxaban (Xarelto) 10 mg tablet Take 1 tablet (10 mg) by mouth once daily. 90 tablet 3    spironolactone (Aldactone) 25 mg tablet Take 1 tablet (25 mg) by mouth once daily. 90 tablet 3    vibegron 75 mg tablet Take 1 tablet (75 mg) by mouth once daily. 90 tablet 3    torsemide (Demadex) 20 mg tablet Take 1 tablet (20 mg) by mouth once daily. 90 tablet 3     No current facility-administered medications for this visit.        Visit Vitals  /70 (BP Location: Right arm)   Pulse 68   Ht 1.626 m (5' 4\")   Wt 110 kg (242 lb)   SpO2 95%   BMI 41.54 kg/m²   Smoking Status Former   BSA 2.23 m²        Objective     Constitutional:       Appearance: Not in distress.   Neck:      Vascular: JVD normal.   Pulmonary:      Breath sounds: Normal breath sounds.   Cardiovascular:      Normal rate. Irregularly irregular rhythm. Normal S1. Normal S2.       Murmurs: There is no murmur.      No gallop.    Pulses:     Intact distal pulses.   Edema:     Peripheral edema present.     Pretibial: bilateral 2+ edema of the pretibial area.     Ankle: bilateral 2+ edema of the ankle.  Abdominal:      General: There is no distension.      Palpations: Abdomen is soft.   Neurological:      Mental Status: Alert.         Lab Review:   Lab Results   Component Value Date    "  07/03/2024    K 4.8 07/03/2024     07/03/2024    CO2 29 07/03/2024    BUN 40 (H) 07/03/2024    CREATININE 1.64 (H) 07/03/2024    GLUCOSE 124 (H) 07/03/2024    CALCIUM 10.0 07/03/2024     Lab Results   Component Value Date    CHOL 128 07/03/2024    TRIG 139 07/03/2024    HDL 44.1 07/03/2024       Assessment:    1.  Lower extremity edema.  Unclear etiology.  We did a stat echocardiographic study which showed normal left ventricular systolic function.  Pulmonary artery pressures appear to be normal.  She is on a combination of spironolactone and torsemide.  Will increase her torsemide to 20 mg daily.  Have her get follow-up.  Latest BNP was 143.  Unclear as to why she has such significant lower extremity edema and weight gain.    2.  Coronary artery disease.  Medical management given her advanced age and multiple comorbidities.  No anginal symptoms.    3.  Permanent atrial fibrillation.  In a controlled ventricular response on today's visit.  Heart rates in the 70s.

## 2024-11-13 ENCOUNTER — APPOINTMENT (OUTPATIENT)
Dept: NEUROLOGY | Facility: CLINIC | Age: 89
End: 2024-11-13
Payer: MEDICARE

## 2024-11-13 VITALS — DIASTOLIC BLOOD PRESSURE: 56 MMHG | HEART RATE: 56 BPM | RESPIRATION RATE: 18 BRPM | SYSTOLIC BLOOD PRESSURE: 107 MMHG

## 2024-11-13 DIAGNOSIS — R26.9 GAIT DISORDER: Primary | ICD-10-CM

## 2024-11-13 DIAGNOSIS — G20.C PRIMARY PARKINSONISM (MULTI): ICD-10-CM

## 2024-11-13 LAB
25(OH)D3 SERPL-MCNC: 40 NG/ML (ref 30–100)
ALBUMIN SERPL BCP-MCNC: 4.2 G/DL (ref 3.4–5)
ANION GAP SERPL CALC-SCNC: 17 MMOL/L (ref 10–20)
BUN SERPL-MCNC: 52 MG/DL (ref 6–23)
CALCIUM SERPL-MCNC: 9.9 MG/DL (ref 8.6–10.6)
CHLORIDE SERPL-SCNC: 87 MMOL/L (ref 98–107)
CHOLEST SERPL-MCNC: 102 MG/DL (ref 0–199)
CHOLESTEROL/HDL RATIO: 2.6
CO2 SERPL-SCNC: 26 MMOL/L (ref 21–32)
CREAT SERPL-MCNC: 1.77 MG/DL (ref 0.5–1.05)
EGFRCR SERPLBLD CKD-EPI 2021: 28 ML/MIN/1.73M*2
GLUCOSE SERPL-MCNC: 107 MG/DL (ref 74–99)
HDLC SERPL-MCNC: 38.8 MG/DL
LDLC SERPL CALC-MCNC: 35 MG/DL
MAGNESIUM SERPL-MCNC: 2.25 MG/DL (ref 1.6–2.4)
NON HDL CHOLESTEROL: 63 MG/DL (ref 0–149)
PHOSPHATE SERPL-MCNC: 4.9 MG/DL (ref 2.5–4.9)
POTASSIUM SERPL-SCNC: 5.5 MMOL/L (ref 3.5–5.3)
SODIUM SERPL-SCNC: 124 MMOL/L (ref 136–145)
TRIGL SERPL-MCNC: 139 MG/DL (ref 0–149)
TSH SERPL-ACNC: 3.11 MIU/L (ref 0.44–3.98)
VLDL: 28 MG/DL (ref 0–40)

## 2024-11-13 PROCEDURE — 1160F RVW MEDS BY RX/DR IN RCRD: CPT | Performed by: PSYCHIATRY & NEUROLOGY

## 2024-11-13 PROCEDURE — 3078F DIAST BP <80 MM HG: CPT | Performed by: PSYCHIATRY & NEUROLOGY

## 2024-11-13 PROCEDURE — 1159F MED LIST DOCD IN RCRD: CPT | Performed by: PSYCHIATRY & NEUROLOGY

## 2024-11-13 PROCEDURE — 1036F TOBACCO NON-USER: CPT | Performed by: PSYCHIATRY & NEUROLOGY

## 2024-11-13 PROCEDURE — 99215 OFFICE O/P EST HI 40 MIN: CPT | Performed by: PSYCHIATRY & NEUROLOGY

## 2024-11-13 PROCEDURE — 3074F SYST BP LT 130 MM HG: CPT | Performed by: PSYCHIATRY & NEUROLOGY

## 2024-11-13 PROCEDURE — G2211 COMPLEX E/M VISIT ADD ON: HCPCS | Performed by: PSYCHIATRY & NEUROLOGY

## 2024-11-13 ASSESSMENT — ENCOUNTER SYMPTOMS
DEPRESSION: 0
LOSS OF SENSATION IN FEET: 0
OCCASIONAL FEELINGS OF UNSTEADINESS: 0

## 2024-11-13 ASSESSMENT — UNIFIED PARKINSONS DISEASE RATING SCALE (UPDRS)
FINGER_TAPPING_LEFT: 2
SPONTANEITY_OF_MOVEMENT: 2
RIGIDITY_LUE: 2
KINETIC_TREMOR_LEFTHAND: 0
RIGIDITY_RUE: 2
SPEECH: 2
AMPLITUDE_LLE: 0
CONSTANCY_TREMOR_ATREST: 1
TOETAPPING_RIGHT: 1
PRONATION_SUPINATION_RIGHT: 1
TOETAPPING_LEFT: 2
GAIT: 4
LEVODOPA: YES
PARKINSONS_MEDS: YES
POSTURAL_TREMOR_LEFTHAND: 0
AMPLITUDE_LUE: 0
MINUTES_SINCE_LEVODOPA: 360
RIGIDITY_LLE: 1
AMPLITUDE_RUE: 1
POSTURE: 2
LEG_AGILITY_LEFT: 2
RIGIDITY_RLE: 1
RIGIDITY_NECK: 2
LEG_AGILITY_RIGHT: 1
CLINICAL_STATE: OFF
POSTURAL_TREMOR_RIGHTHAND: 0
KINETIC_TREMOR_RIGHTHAND: 0
AMPLITUDE_RLE: 0
AMPLITUDE_LIP_JAW: 0
CHAIR_RISING_SCALE: 4
HANDMOVEMENTS_RIGHT: 2
FINGER_TAPPING_RIGHT: 2
FACIAL_EXPRESSION: 2
PRONATION_SUPINATION_LEFT: 2

## 2024-11-13 NOTE — PATIENT INSTRUCTIONS
"It was a pleasure seeing you today for parkinsonism.  Possibility of multiple system atrophy.    --Move carbidopa/levodopa 25/100 2 3x/day to 10am, 2pm, 6pm  --1 week later, raise to 2.5 tabs 3x/day  --1 week later, raise to 3 tabs 3x/day  --1 week later, raise to 3 tabs 4x/day    Continue Vitamin B12    Consider skin punch biopsy    MRI thoracic and lumbar spine    Please make a follow up appointment at the  or by calling the office in 3-4 months.     For any urgent issues or questions call [phone], option for doctor's , during our office hours Monday-Friday 8am-4:30pm, and leave a voicemail with your concern.  My office will try to reach back you as soon as possible within 24 (business) hours.  If you have an emergency please call 911 or visit a local urgent care or nearest emergency room.      Please understand that [employer] is a useful communication tool for simple \"normal\" results or a refill request but I would not recommend using this tool for emergent or urgent issues.  I am happy to ask my staff to arrange a follow up visit or a virtual visit sooner than requested with myself or Davon (Nurse Practitioner), if appropriate for your care.        "

## 2024-11-13 NOTE — PROGRESS NOTES
Subjective     Shreya Acuña is a 89 y.o. year old female who presents with parkinsonism, here for follow up visit.    HPI    Balance problem for 3-4 years.  Now she tells me she never had MRI Lspine for insurance reasons.  She has had some mild improvement on levodopa.  She takes carbidopa/levodopa 25/100 2 tabs TID.  She takes the last dose at 11pm.  [id number].    Comes w/ her .    Tremor for >1 year, not interfering with ADLs, not as bothersome as gait issues.   Balance progressively worsening, not walking much x 1.5 yrs, but using rollator someone needs to be behind her and cannot go that far.     Falling about once a month --always trying to do something on her own.   More recently she is non-ambulatory  Bad fall a few months ago in the bathroom and hit her head against the wall, did not go to the ER--no changes/new sx after that.   Denies any other head injury.    always has to assist her with walking/transferring.       MRI brain and c-spine 7/30/24  MRI brain:      1. There is hemosiderin deposition located along the linings of the  posterior left parasagittal parieto-occipital lobes and few scattered  punctate foci of hemosiderin located within the bilateral posterior  cerebral hemispheres.      Hemosiderin deposition along the linings of the brain parenchyma is  most consistent with sequela of prior subarachnoid hemorrhage.  Punctate foci of hemosiderin within the bilateral cerebral  hemispheres may be within sulci or within the gyri. Of note, some of  the foci of susceptibility artifact could reflect coursing vessels,  noting that evaluation is degraded due to patient motion. There is  mild volume loss involving the posterior left parietal lobe.      2. Findings of probable chronic small vessel ischemic changes.      3. Ventricles, sulci, and basilar cisterns are overall normal in  size, shape, and configuration for patient's age.      MRI cervical spine:  1. Multilevel degenerative  changes of the cervical spine without  striking spinal canal stenosis at any level. The cervical spinal cord  is normal in signal and caliber, noting that evaluation is degraded  due to patient motion.      2. Variable degree of neural foraminal narrowing at multiple levels  as detailed above.      This study was interpreted at [facility].      MACRO:  None    Patient Health Questionnaire-2 Score: 0            Current Outpatient Medications:     albuterol 90 mcg/actuation inhaler, Inhale 2 puffs every 4 hours if needed., Disp: , Rfl:     atorvastatin (Lipitor) 20 mg tablet, Take 1 tablet (20 mg) by mouth once daily., Disp: , Rfl:     carbidopa-levodopa (Sinemet)  mg tablet, WEEK 1: take 0.5 tab (half) tab 3 times a day. WEEK 2: take 1 tab 3 times a day thereafter. Take 4-5hrs apart., Disp: 90 tablet, Rfl: 3    DILT- mg 24 hr capsule, Take 1 capsule (120 mg) by mouth once daily., Disp: 90 capsule, Rfl: 3    DULoxetine (Cymbalta) 30 mg DR capsule, Take 1 capsule (30 mg) by mouth 2 times a day., Disp: 180 capsule, Rfl: 2    losartan (Cozaar) 100 mg tablet, Take 1 tablet (100 mg) by mouth once daily., Disp: 90 tablet, Rfl: 3    metoprolol succinate XL (Toprol-XL) 50 mg 24 hr tablet, Take 1 tablet (50 mg) by mouth once daily., Disp: , Rfl:     mv-min-FA-vit K-lutein-zeaxant (PreserVision AREDS 2 Plus MV) 200 mcg-15 mcg- 5 mg-1 mg capsule, Take by mouth once daily., Disp: , Rfl:     pregabalin (Lyrica) 100 mg capsule, Take 1 capsule (100 mg) by mouth 2 times a day., Disp: 180 capsule, Rfl: 2    rivaroxaban (Xarelto) 10 mg tablet, Take 1 tablet (10 mg) by mouth once daily., Disp: 90 tablet, Rfl: 3    spironolactone (Aldactone) 25 mg tablet, Take 1 tablet (25 mg) by mouth once daily., Disp: 90 tablet, Rfl: 3    torsemide (Demadex) 20 mg tablet, Take 1 tablet (20 mg) by mouth once daily., Disp: 90 tablet, Rfl: 3    vibegron 75 mg tablet, Take 1 tablet (75 mg) by mouth once  daily., Disp: 90 tablet, Rfl: 3       Objective   Vitals:    24 1638   BP: 107/56   BP Location: Left arm   Patient Position: Sitting   BP Cuff Size: Adult   Pulse: 56   Resp: 18   Weight: Comment: wheelchair                 Physical Exam    Cranial nerve examination was unremarkable.  Extraocular movements are normal.  There is no clear dysmetria with finger-nose-finger.  Reflexes are normal in the upper extremities, absent in the lower extremities although with lower extremity edema and very severe arthritis of the knees.  Toes are downgoing.  Strength is normal throughout, no pronator drift.  She has some reduced vibratory sensation below the knees, but pinprick was relatively intact in the feet.    MDS UPDRS 1st Score: Motor Examination  Is the patient on medication for treating the symptoms of Parkinson's Disease?: Yes  Patients receiving medication for treating the symptoms of Parkinson's Disease, efrain the patient's clinical state.: Off  Is the patient on Levodopa?: Yes  Minutes since last Levodopa dose: 360  Speech: 2  Facial Expression: 2  Rigidty Neck: 2  Rigidty RUE: 2  Rigidity - LUE: 2  Rigidity RLE: 1  Rigidity LLE: 1  Finger Tapping Right Hand: 2  Finger Tapping Left Hand: 2  Hand Movements- Right Hand: 2  Hand Movements- Left Hand: 2  Pronatiaon-Supination Movments - Right Hand: 1  Pronatiaon-Supination Movments Left Hand: 2  Toe Tapping Right Foot: 1  Toe Tapping - Left Foot: 2  Leg Agility - Right Le  Leg Agility - Left le  Arising from Chair: 4  Gait: 4  Posture: 2  Global Spontanteity of Movment ( Body Bradykinesia): 2  Postural Tremor - Right Hand: 0  Postural Tremor - Left hand: 0  Kinetic Tremor - Right hand: 0  Kinetic Tremor - Left hand: 0  Rest Tremor Amplitude - RUE: 1  Rest Tremor Amplitude - LUE: 0  Rest Tremor Amplitude - RLE: 0  Rest Tremor Amplitude - LLE: 0  Rest Tremor Amplitude - Lip/Jaw: 0  Constancy of Rest Tremor: 1              Assessment/Plan   Ms. Shreya Acuña  is a 89 y.o. F who presented at initial visit with gradually worsening bilateral upper extremity tremor in the past year and a half as well as being wheelchair-bound for the past few months after progressive difficulty with gait. 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 She had hyperreflexia, memory deficits and bradykinesia LUE>RUE and LE predominant parkinsonism.  Now, on reexamination she still has myoclonus and does not have any classic rest tremor.  She does have mild upper extremity parkinsonism, but then needs two-person assistance to stand up, her knees make a loud crackling sound, and she is unable to walk.  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  This raises suspicion for MSA-P, a type of multiple system atrophy.  This would explain also the parkinsonism and incontinence.  However, with that said she has not had thoracic or lumbar spine imaging.  She previously told us her lumbar spine had been imaged, now she thinks she never had MRI of the spine before.  Therefore, I ordered MRI thoracic lumbar spine to rule out a neurological etiology for inability to walk and incontinence, despite the normal strength on formal motor examination.  MRI brain had shown hemosiderin, but that can be explained by her previous fall with head trauma on blood thinner.  She is now nonambulatory.  I discussed with her skin punch biopsy as a potential diagnostic test.  She will think about it.  In the meantime, we will titrate levodopa.    Move carbidopa/levodopa 25/100 2 3x/day to 10am, 2pm, 6pm  --1 week later, raise to 2.5 tabs 3x/day  --1 week later, raise to 3 tabs 3x/day  --1 week later, raise to 3 tabs 4x/day    Continue Vitamin B12    Consider skin punch biopsy    MRI thoracic and lumbar spine    She will follow-up with Davon (or with me) in about 4 months.    Erik Spencer MD, FAAN  [facility]  [facility]  , Neurology  [facility] [employer]     44 minutes total were spent, reviewing her medical records, MRI reports, brain MRI films, labs, seeing the patient, ordering further MRI imaging, and on documentation.

## 2024-11-14 ENCOUNTER — TELEPHONE (OUTPATIENT)
Dept: CARDIOLOGY | Facility: CLINIC | Age: 89
End: 2024-11-14
Payer: MEDICARE

## 2024-11-14 NOTE — TELEPHONE ENCOUNTER
----- Message from Daryn Barnhart sent at 11/13/2024  8:24 AM EST -----  Take spironolactone 25 mg Monday Wednesday and Friday

## 2024-11-16 LAB
Q ONSET: 209 MS
QRS COUNT: 9 BEATS
QRS DURATION: 98 MS
QT INTERVAL: 450 MS
QTC CALCULATION(BAZETT): 418 MS
QTC FREDERICIA: 429 MS
R AXIS: -11 DEGREES
T AXIS: 13 DEGREES
T OFFSET: 434 MS
VENTRICULAR RATE: 52 BPM

## 2024-11-19 DIAGNOSIS — G20.C PARKINSONISM, UNSPECIFIED PARKINSONISM TYPE (MULTI): ICD-10-CM

## 2024-11-19 RX ORDER — CARBIDOPA AND LEVODOPA 25; 100 MG/1; MG/1
3 TABLET ORAL 3 TIMES DAILY
Qty: 270 TABLET | Refills: 11 | Status: SHIPPED | OUTPATIENT
Start: 2024-11-19 | End: 2025-11-19

## 2024-11-19 RX ORDER — CARBIDOPA AND LEVODOPA 25; 100 MG/1; MG/1
TABLET ORAL
Qty: 90 TABLET | Refills: 3 | Status: CANCELLED | OUTPATIENT
Start: 2024-11-19

## 2024-11-25 ENCOUNTER — TELEPHONE (OUTPATIENT)
Dept: CARDIOLOGY | Facility: CLINIC | Age: 89
End: 2024-11-25
Payer: MEDICARE

## 2024-11-25 DIAGNOSIS — I10 HYPERTENSION, UNSPECIFIED TYPE: ICD-10-CM

## 2024-11-25 DIAGNOSIS — I50.32 CHRONIC DIASTOLIC HEART FAILURE: Primary | ICD-10-CM

## 2024-11-25 NOTE — TELEPHONE ENCOUNTER
Ender called to provide an update as requested by Dr. Barnhart at last ov.     Per Ender, neurology has been adjusting medication for Parkinson's. Per Ender, pt seems a little more drowsy and urine is dark and cloudy. Informed Ender that he should discuss with PCP because pt may have a UTI which should be treated sooner rather than later to avoid a systemic infection. Per Ender, pt's weight is 235 lbs (pt was 242 lbs at ov on 11/12/24). Ender states pt has occasional dry cough with some wheezing. No fever and no SOB.    Pt taking Torsemide 20mg daily as instructed at last ov.     Please advise if any changes needed. Thanks.

## 2024-12-02 ENCOUNTER — LAB (OUTPATIENT)
Dept: LAB | Facility: LAB | Age: 89
End: 2024-12-02
Payer: MEDICARE

## 2024-12-02 DIAGNOSIS — I50.32 CHRONIC DIASTOLIC HEART FAILURE: ICD-10-CM

## 2024-12-02 DIAGNOSIS — I10 HYPERTENSION, UNSPECIFIED TYPE: ICD-10-CM

## 2024-12-02 PROCEDURE — 80048 BASIC METABOLIC PNL TOTAL CA: CPT

## 2024-12-02 PROCEDURE — 36415 COLL VENOUS BLD VENIPUNCTURE: CPT

## 2024-12-02 PROCEDURE — 85027 COMPLETE CBC AUTOMATED: CPT

## 2024-12-02 RX ORDER — METOPROLOL SUCCINATE 50 MG/1
50 TABLET, EXTENDED RELEASE ORAL
Status: CANCELLED | OUTPATIENT
Start: 2024-12-02

## 2024-12-03 ENCOUNTER — TELEPHONE (OUTPATIENT)
Dept: CARDIOLOGY | Facility: CLINIC | Age: 89
End: 2024-12-03
Payer: MEDICARE

## 2024-12-03 ENCOUNTER — HOSPITAL ENCOUNTER (EMERGENCY)
Facility: HOSPITAL | Age: 89
Discharge: HOME | End: 2024-12-04
Attending: GENERAL PRACTICE
Payer: MEDICARE

## 2024-12-03 ENCOUNTER — HOSPITAL ENCOUNTER (EMERGENCY)
Dept: CARDIOLOGY | Facility: HOSPITAL | Age: 89
Discharge: HOME | End: 2024-12-03
Payer: MEDICARE

## 2024-12-03 ENCOUNTER — APPOINTMENT (OUTPATIENT)
Dept: RADIOLOGY | Facility: HOSPITAL | Age: 89
End: 2024-12-03
Payer: MEDICARE

## 2024-12-03 VITALS
DIASTOLIC BLOOD PRESSURE: 74 MMHG | OXYGEN SATURATION: 95 % | BODY MASS INDEX: 41.46 KG/M2 | HEIGHT: 63 IN | WEIGHT: 234 LBS | HEART RATE: 95 BPM | TEMPERATURE: 97.3 F | SYSTOLIC BLOOD PRESSURE: 145 MMHG | RESPIRATION RATE: 22 BRPM

## 2024-12-03 DIAGNOSIS — U07.1 COVID-19: ICD-10-CM

## 2024-12-03 DIAGNOSIS — U07.1 COVID-19: Primary | ICD-10-CM

## 2024-12-03 DIAGNOSIS — R06.02 SOB (SHORTNESS OF BREATH): ICD-10-CM

## 2024-12-03 LAB
ALBUMIN SERPL BCP-MCNC: 4.4 G/DL (ref 3.4–5)
ALP SERPL-CCNC: 89 U/L (ref 33–136)
ALT SERPL W P-5'-P-CCNC: 10 U/L (ref 7–45)
ANION GAP SERPL CALC-SCNC: 12 MMOL/L (ref 10–20)
ANION GAP SERPL CALC-SCNC: 13 MMOL/L (ref 10–20)
AST SERPL W P-5'-P-CCNC: 13 U/L (ref 9–39)
BASOPHILS # BLD AUTO: 0.03 X10*3/UL (ref 0–0.1)
BASOPHILS NFR BLD AUTO: 0.2 %
BILIRUB SERPL-MCNC: 0.7 MG/DL (ref 0–1.2)
BNP SERPL-MCNC: 225 PG/ML (ref 0–99)
BUN SERPL-MCNC: 46 MG/DL (ref 6–23)
BUN SERPL-MCNC: 47 MG/DL (ref 6–23)
CALCIUM SERPL-MCNC: 10.2 MG/DL (ref 8.6–10.3)
CALCIUM SERPL-MCNC: 9.7 MG/DL (ref 8.6–10.6)
CARDIAC TROPONIN I PNL SERPL HS: 20 NG/L (ref 0–13)
CARDIAC TROPONIN I PNL SERPL HS: 23 NG/L (ref 0–13)
CHLORIDE SERPL-SCNC: 95 MMOL/L (ref 98–107)
CHLORIDE SERPL-SCNC: 96 MMOL/L (ref 98–107)
CO2 SERPL-SCNC: 31 MMOL/L (ref 21–32)
CO2 SERPL-SCNC: 33 MMOL/L (ref 21–32)
CREAT SERPL-MCNC: 1.72 MG/DL (ref 0.5–1.05)
CREAT SERPL-MCNC: 1.73 MG/DL (ref 0.5–1.05)
EGFRCR SERPLBLD CKD-EPI 2021: 28 ML/MIN/1.73M*2
EGFRCR SERPLBLD CKD-EPI 2021: 29 ML/MIN/1.73M*2
EOSINOPHIL # BLD AUTO: 0.31 X10*3/UL (ref 0–0.4)
EOSINOPHIL NFR BLD AUTO: 2.4 %
ERYTHROCYTE [DISTWIDTH] IN BLOOD BY AUTOMATED COUNT: 13.3 % (ref 11.5–14.5)
ERYTHROCYTE [DISTWIDTH] IN BLOOD BY AUTOMATED COUNT: 13.4 % (ref 11.5–14.5)
FLUAV RNA RESP QL NAA+PROBE: NOT DETECTED
FLUBV RNA RESP QL NAA+PROBE: NOT DETECTED
GLUCOSE SERPL-MCNC: 106 MG/DL (ref 74–99)
GLUCOSE SERPL-MCNC: 108 MG/DL (ref 74–99)
HCT VFR BLD AUTO: 35.4 % (ref 36–46)
HCT VFR BLD AUTO: 35.9 % (ref 36–46)
HGB BLD-MCNC: 11.3 G/DL (ref 12–16)
HGB BLD-MCNC: 11.7 G/DL (ref 12–16)
IMM GRANULOCYTES # BLD AUTO: 0.06 X10*3/UL (ref 0–0.5)
IMM GRANULOCYTES NFR BLD AUTO: 0.5 % (ref 0–0.9)
LYMPHOCYTES # BLD AUTO: 1.03 X10*3/UL (ref 0.8–3)
LYMPHOCYTES NFR BLD AUTO: 7.8 %
MCH RBC QN AUTO: 32.4 PG (ref 26–34)
MCH RBC QN AUTO: 32.9 PG (ref 26–34)
MCHC RBC AUTO-ENTMCNC: 31.9 G/DL (ref 32–36)
MCHC RBC AUTO-ENTMCNC: 32.6 G/DL (ref 32–36)
MCV RBC AUTO: 101 FL (ref 80–100)
MCV RBC AUTO: 101 FL (ref 80–100)
MONOCYTES # BLD AUTO: 0.73 X10*3/UL (ref 0.05–0.8)
MONOCYTES NFR BLD AUTO: 5.5 %
NEUTROPHILS # BLD AUTO: 11.02 X10*3/UL (ref 1.6–5.5)
NEUTROPHILS NFR BLD AUTO: 83.6 %
NRBC BLD-RTO: 0 /100 WBCS (ref 0–0)
NRBC BLD-RTO: 0 /100 WBCS (ref 0–0)
PLATELET # BLD AUTO: 177 X10*3/UL (ref 150–450)
PLATELET # BLD AUTO: 194 X10*3/UL (ref 150–450)
POTASSIUM SERPL-SCNC: 4.7 MMOL/L (ref 3.5–5.3)
POTASSIUM SERPL-SCNC: 5.3 MMOL/L (ref 3.5–5.3)
PROT SERPL-MCNC: 7.4 G/DL (ref 6.4–8.2)
RBC # BLD AUTO: 3.49 X10*6/UL (ref 4–5.2)
RBC # BLD AUTO: 3.56 X10*6/UL (ref 4–5.2)
SARS-COV-2 RNA RESP QL NAA+PROBE: DETECTED
SODIUM SERPL-SCNC: 134 MMOL/L (ref 136–145)
SODIUM SERPL-SCNC: 136 MMOL/L (ref 136–145)
WBC # BLD AUTO: 13.2 X10*3/UL (ref 4.4–11.3)
WBC # BLD AUTO: 6.6 X10*3/UL (ref 4.4–11.3)

## 2024-12-03 PROCEDURE — 96374 THER/PROPH/DIAG INJ IV PUSH: CPT

## 2024-12-03 PROCEDURE — 2500000004 HC RX 250 GENERAL PHARMACY W/ HCPCS (ALT 636 FOR OP/ED): Performed by: GENERAL PRACTICE

## 2024-12-03 PROCEDURE — 83880 ASSAY OF NATRIURETIC PEPTIDE: CPT | Performed by: GENERAL PRACTICE

## 2024-12-03 PROCEDURE — 71045 X-RAY EXAM CHEST 1 VIEW: CPT | Performed by: RADIOLOGY

## 2024-12-03 PROCEDURE — 84075 ASSAY ALKALINE PHOSPHATASE: CPT | Performed by: GENERAL PRACTICE

## 2024-12-03 PROCEDURE — 85025 COMPLETE CBC W/AUTO DIFF WBC: CPT | Performed by: GENERAL PRACTICE

## 2024-12-03 PROCEDURE — 36415 COLL VENOUS BLD VENIPUNCTURE: CPT | Performed by: GENERAL PRACTICE

## 2024-12-03 PROCEDURE — 84484 ASSAY OF TROPONIN QUANT: CPT | Performed by: GENERAL PRACTICE

## 2024-12-03 PROCEDURE — 87636 SARSCOV2 & INF A&B AMP PRB: CPT | Performed by: GENERAL PRACTICE

## 2024-12-03 PROCEDURE — 2500000002 HC RX 250 W HCPCS SELF ADMINISTERED DRUGS (ALT 637 FOR MEDICARE OP, ALT 636 FOR OP/ED): Performed by: GENERAL PRACTICE

## 2024-12-03 PROCEDURE — 94640 AIRWAY INHALATION TREATMENT: CPT

## 2024-12-03 PROCEDURE — 71045 X-RAY EXAM CHEST 1 VIEW: CPT

## 2024-12-03 PROCEDURE — 99284 EMERGENCY DEPT VISIT MOD MDM: CPT | Performed by: GENERAL PRACTICE

## 2024-12-03 RX ORDER — GUAIFENESIN 600 MG/1
600 TABLET, EXTENDED RELEASE ORAL 2 TIMES DAILY
Qty: 14 TABLET | Refills: 0 | Status: SHIPPED | OUTPATIENT
Start: 2024-12-03 | End: 2024-12-10

## 2024-12-03 RX ORDER — BENZONATATE 100 MG/1
100 CAPSULE ORAL 3 TIMES DAILY PRN
Qty: 21 CAPSULE | Refills: 0 | Status: SHIPPED | OUTPATIENT
Start: 2024-12-03 | End: 2024-12-10

## 2024-12-03 RX ORDER — IPRATROPIUM BROMIDE AND ALBUTEROL SULFATE 2.5; .5 MG/3ML; MG/3ML
3 SOLUTION RESPIRATORY (INHALATION) ONCE
Status: COMPLETED | OUTPATIENT
Start: 2024-12-03 | End: 2024-12-03

## 2024-12-03 RX ORDER — ALBUTEROL SULFATE 90 UG/1
2 INHALANT RESPIRATORY (INHALATION) EVERY 4 HOURS PRN
Qty: 18 G | Refills: 0 | Status: SHIPPED | OUTPATIENT
Start: 2024-12-03 | End: 2025-01-02

## 2024-12-03 RX ORDER — METHYLPREDNISOLONE 4 MG/1
TABLET ORAL
Qty: 21 TABLET | Refills: 0 | Status: SHIPPED | OUTPATIENT
Start: 2024-12-03 | End: 2024-12-09

## 2024-12-03 ASSESSMENT — LIFESTYLE VARIABLES
HAVE YOU EVER FELT YOU SHOULD CUT DOWN ON YOUR DRINKING: NO
TOTAL SCORE: 0
EVER FELT BAD OR GUILTY ABOUT YOUR DRINKING: NO
EVER HAD A DRINK FIRST THING IN THE MORNING TO STEADY YOUR NERVES TO GET RID OF A HANGOVER: NO
HAVE PEOPLE ANNOYED YOU BY CRITICIZING YOUR DRINKING: NO

## 2024-12-03 ASSESSMENT — PAIN - FUNCTIONAL ASSESSMENT: PAIN_FUNCTIONAL_ASSESSMENT: 0-10

## 2024-12-03 NOTE — TELEPHONE ENCOUNTER
----- Message from Daryn Barnhart sent at 12/3/2024  8:27 AM EST -----  Labs look ok.  Very mild anemia.

## 2024-12-04 PROCEDURE — 93005 ELECTROCARDIOGRAM TRACING: CPT

## 2024-12-04 NOTE — ED TRIAGE NOTES
Pt has had wheezing and coughing about a week ago, getting progressively worth. Came in today via EMS with SOB. Denies CP, HA,  Pt has history of Afib.

## 2024-12-05 ENCOUNTER — APPOINTMENT (OUTPATIENT)
Dept: UROLOGY | Facility: CLINIC | Age: 89
End: 2024-12-05
Payer: MEDICARE

## 2024-12-05 LAB
ATRIAL RATE: 128 BPM
P AXIS: 131 DEGREES
PR INTERVAL: 128 MS
Q ONSET: 252 MS
QRS COUNT: 12 BEATS
QRS DURATION: 111 MS
QT INTERVAL: 375 MS
QTC CALCULATION(BAZETT): 428 MS
QTC FREDERICIA: 409 MS
R AXIS: -3 DEGREES
T AXIS: 29 DEGREES
T OFFSET: 440 MS
VENTRICULAR RATE: 78 BPM

## 2024-12-06 ENCOUNTER — APPOINTMENT (OUTPATIENT)
Dept: RADIOLOGY | Facility: HOSPITAL | Age: 89
End: 2024-12-06
Payer: MEDICARE

## 2024-12-17 ENCOUNTER — HOSPITAL ENCOUNTER (OUTPATIENT)
Dept: RADIOLOGY | Facility: CLINIC | Age: 89
Discharge: HOME | End: 2024-12-17
Payer: MEDICARE

## 2024-12-17 ENCOUNTER — APPOINTMENT (OUTPATIENT)
Dept: RADIOLOGY | Facility: HOSPITAL | Age: 89
End: 2024-12-17
Payer: MEDICARE

## 2024-12-17 ENCOUNTER — HOSPITAL ENCOUNTER (OUTPATIENT)
Dept: RADIOLOGY | Facility: CLINIC | Age: 89
End: 2024-12-17
Payer: MEDICARE

## 2024-12-17 DIAGNOSIS — R26.9 GAIT DISORDER: ICD-10-CM

## 2024-12-17 PROCEDURE — 72146 MRI CHEST SPINE W/O DYE: CPT

## 2024-12-17 PROCEDURE — 72146 MRI CHEST SPINE W/O DYE: CPT | Performed by: RADIOLOGY

## 2024-12-23 PROBLEM — G20.A1 PARKINSON'S DISEASE (MULTI): Status: ACTIVE | Noted: 2024-11-27

## 2024-12-23 PROBLEM — R09.02 HYPOXEMIA: Status: RESOLVED | Noted: 2023-05-10 | Resolved: 2024-12-23

## 2024-12-23 PROBLEM — U07.1 DISEASE DUE TO SEVERE ACUTE RESPIRATORY SYNDROME CORONAVIRUS 2 (SARS-COV-2): Status: RESOLVED | Noted: 2024-03-06 | Resolved: 2024-12-23

## 2024-12-23 PROBLEM — N39.41 URGE INCONTINENCE OF URINE: Status: RESOLVED | Noted: 2023-10-18 | Resolved: 2024-12-23

## 2024-12-23 PROBLEM — N39.0 URINARY TRACT INFECTION: Status: RESOLVED | Noted: 2024-03-06 | Resolved: 2024-12-23

## 2024-12-23 PROBLEM — S99.929A TOE INJURY: Status: RESOLVED | Noted: 2023-10-18 | Resolved: 2024-12-23

## 2024-12-23 PROBLEM — N18.4 CHRONIC KIDNEY DISEASE, STAGE 4 (SEVERE) (MULTI): Status: ACTIVE | Noted: 2023-05-08

## 2024-12-26 ENCOUNTER — TELEPHONE (OUTPATIENT)
Dept: NEUROLOGY | Facility: CLINIC | Age: 89
End: 2024-12-26
Payer: MEDICARE

## 2024-12-26 NOTE — TELEPHONE ENCOUNTER
I contacted the patient and discussed the information relayed by the provider. Discussed C/L side effects- she is currently taking  2 tabs tid, seeing some benefit and tolerable side effects- Does not wish to increase at this time. Educated on constipation mgmt related to C/L dosing.  Will follow up at Feb visit w SHENA Cowart.

## 2024-12-26 NOTE — TELEPHONE ENCOUNTER
----- Message from Erik Spencer sent at 12/22/2024  1:46 PM EST -----  Please let her know there is a slipped disc at T67 with mild indentation of the spinal cord, but it does not clearly explain her symptoms and signs; reviewed films, mild indentation but no signal change and there remains ample dorsal CSF space

## 2024-12-31 ENCOUNTER — HOSPITAL ENCOUNTER (OUTPATIENT)
Dept: RADIOLOGY | Facility: CLINIC | Age: 89
End: 2024-12-31
Payer: MEDICARE

## 2025-01-02 ENCOUNTER — APPOINTMENT (OUTPATIENT)
Dept: UROLOGY | Facility: CLINIC | Age: 89
End: 2025-01-02
Payer: MEDICARE

## 2025-01-02 VITALS
DIASTOLIC BLOOD PRESSURE: 68 MMHG | TEMPERATURE: 96.8 F | HEART RATE: 88 BPM | BODY MASS INDEX: 39.51 KG/M2 | WEIGHT: 223 LBS | HEIGHT: 63 IN | SYSTOLIC BLOOD PRESSURE: 101 MMHG

## 2025-01-02 DIAGNOSIS — N39.41 URGE INCONTINENCE: ICD-10-CM

## 2025-01-02 DIAGNOSIS — R35.0 FREQUENCY OF MICTURITION: Primary | ICD-10-CM

## 2025-01-02 DIAGNOSIS — N39.41 URGENCY INCONTINENCE: ICD-10-CM

## 2025-01-02 PROCEDURE — 64566 NEUROELTRD STIM POST TIBIAL: CPT | Performed by: NURSE PRACTITIONER

## 2025-01-02 PROCEDURE — 1159F MED LIST DOCD IN RCRD: CPT | Performed by: NURSE PRACTITIONER

## 2025-01-02 PROCEDURE — 3074F SYST BP LT 130 MM HG: CPT | Performed by: NURSE PRACTITIONER

## 2025-01-02 PROCEDURE — 3078F DIAST BP <80 MM HG: CPT | Performed by: NURSE PRACTITIONER

## 2025-01-02 RX ORDER — CEPHALEXIN 500 MG/1
CAPSULE ORAL
COMMUNITY
Start: 2024-12-13

## 2025-01-02 NOTE — PROGRESS NOTES
Patient ID: Shreya Acuña is a 89 y.o. female.    Percutaneous Tibial Nerve Stimulation    Date/Time: 1/2/2025 3:27 PM    Performed by: CHECO Siddiqi  Authorized by: CHECO Siddiqi    Procedure Details     Indications: urge incontinence, urinary frequency and urinary urgency      PTNS session #: monthly maintenance    Bladder symptoms: unchanged      Ankle used: right      Stimulator intensity (mA): 6        Follow up 4 weeks PTNS; Taking Gemtesa in combination with PTNS.  Bladder has good days, other times rough as on more diuretics now; Not interested in other 3rd line such as botox and implantable neuromodulation.  Purewick at night working, waking up dry; Patient not interested in other treatment options.

## 2025-01-03 ENCOUNTER — TELEPHONE (OUTPATIENT)
Dept: CARDIOLOGY | Facility: CLINIC | Age: 89
End: 2025-01-03
Payer: MEDICARE

## 2025-01-03 NOTE — TELEPHONE ENCOUNTER
Pt's , Ender LM to report pt is wheezing and has a persistent cough. Ender asking for direction.    Reviewed pt records. Pt tested positive for COVID on 12/3/24. Discussed with Dr. Barnhart. Per Dr. Barnhart, defer to PCP.    Called and notified Ender. Per Ender, he called PCP office who is out of town and advised to call cardiology. Informed Ender that Dr. Barnhart believed symptoms due to COVID infection and pt may need antibiotic and/or steroids. Informed Ender if PCP unable to assist pt then pt should go to and Urgent Care or ER for treatment. Discussed red flag sx's that would make it necessary to seek medical treatment. Ender verbalizes understanding of all instructions and information provided. All questions and concerns addressed at this time.

## 2025-01-13 DIAGNOSIS — N39.41 URGE INCONTINENCE: ICD-10-CM

## 2025-01-13 RX ORDER — MIRABEGRON 25 MG/1
25 TABLET, FILM COATED, EXTENDED RELEASE ORAL DAILY
Qty: 30 TABLET | Refills: 11 | Status: SHIPPED | OUTPATIENT
Start: 2025-01-13 | End: 2026-01-13

## 2025-01-13 NOTE — PROGRESS NOTES
called, gemtesa increased in price. Reviewed options, sent in Myrbetriq 25 mg will take in evening, opposite of metoprolol she takes in a.m. will monitor bp daily first week, then weekly until she comes in; plan to start after gemtesa runs out;

## 2025-01-16 ENCOUNTER — APPOINTMENT (OUTPATIENT)
Dept: CARDIOLOGY | Facility: CLINIC | Age: 89
End: 2025-01-16
Payer: MEDICARE

## 2025-01-16 ENCOUNTER — APPOINTMENT (OUTPATIENT)
Dept: PAIN MEDICINE | Facility: CLINIC | Age: 89
End: 2025-01-16
Payer: MEDICARE

## 2025-01-16 ENCOUNTER — HOSPITAL ENCOUNTER (OUTPATIENT)
Dept: PAIN MEDICINE | Facility: CLINIC | Age: 89
Discharge: HOME | End: 2025-01-16
Payer: MEDICARE

## 2025-01-16 VITALS
TEMPERATURE: 98.8 F | RESPIRATION RATE: 15 BRPM | SYSTOLIC BLOOD PRESSURE: 112 MMHG | DIASTOLIC BLOOD PRESSURE: 54 MMHG | HEART RATE: 65 BPM | OXYGEN SATURATION: 95 %

## 2025-01-16 DIAGNOSIS — M47.816 LUMBAR SPONDYLOSIS: ICD-10-CM

## 2025-01-16 PROCEDURE — 2500000004 HC RX 250 GENERAL PHARMACY W/ HCPCS (ALT 636 FOR OP/ED): Performed by: ANESTHESIOLOGY

## 2025-01-16 PROCEDURE — 64636 DESTROY L/S FACET JNT ADDL: CPT | Mod: 50 | Performed by: ANESTHESIOLOGY

## 2025-01-16 PROCEDURE — 2720000007 HC OR 272 NO HCPCS

## 2025-01-16 RX ORDER — LIDOCAINE HYDROCHLORIDE 5 MG/ML
INJECTION, SOLUTION INFILTRATION; INTRAVENOUS AS NEEDED
Status: COMPLETED | OUTPATIENT
Start: 2025-01-16 | End: 2025-01-16

## 2025-01-16 RX ORDER — ROPIVACAINE HYDROCHLORIDE 5 MG/ML
INJECTION, SOLUTION EPIDURAL; INFILTRATION; PERINEURAL AS NEEDED
Status: COMPLETED | OUTPATIENT
Start: 2025-01-16 | End: 2025-01-16

## 2025-01-16 RX ADMIN — ROPIVACAINE HYDROCHLORIDE 5 ML: 5 INJECTION, SOLUTION EPIDURAL; INFILTRATION; PERINEURAL at 14:56

## 2025-01-16 RX ADMIN — LIDOCAINE HYDROCHLORIDE 5 ML: 5 INJECTION, SOLUTION INFILTRATION at 14:55

## 2025-01-16 SDOH — ECONOMIC STABILITY: FOOD INSECURITY: WITHIN THE PAST 12 MONTHS, YOU WORRIED THAT YOUR FOOD WOULD RUN OUT BEFORE YOU GOT MONEY TO BUY MORE.: NEVER TRUE

## 2025-01-16 SDOH — ECONOMIC STABILITY: FOOD INSECURITY: WITHIN THE PAST 12 MONTHS, THE FOOD YOU BOUGHT JUST DIDN'T LAST AND YOU DIDN'T HAVE MONEY TO GET MORE.: NEVER TRUE

## 2025-01-16 ASSESSMENT — ENCOUNTER SYMPTOMS
SHORTNESS OF BREATH: 0
DIZZINESS: 0
DIARRHEA: 0
OCCASIONAL FEELINGS OF UNSTEADINESS: 1
DIAPHORESIS: 0
NECK STIFFNESS: 0
BACK PAIN: 1
WHEEZING: 0
NECK PAIN: 0
LOSS OF SENSATION IN FEET: 0
BLOOD IN STOOL: 0
NUMBNESS: 1
FEVER: 0
CHILLS: 0
CHEST TIGHTNESS: 0
CONSTIPATION: 0
DIFFICULTY URINATING: 0
VOMITING: 0
SEIZURES: 0
SPEECH DIFFICULTY: 0
EYE DISCHARGE: 0
WEAKNESS: 0
ARTHRALGIAS: 1
DEPRESSION: 0
NAUSEA: 0
COUGH: 0

## 2025-01-16 ASSESSMENT — PATIENT HEALTH QUESTIONNAIRE - PHQ9
1. LITTLE INTEREST OR PLEASURE IN DOING THINGS: NOT AT ALL
SUM OF ALL RESPONSES TO PHQ9 QUESTIONS 1 & 2: 0
2. FEELING DOWN, DEPRESSED OR HOPELESS: NOT AT ALL

## 2025-01-16 ASSESSMENT — LIFESTYLE VARIABLES
HOW OFTEN DO YOU HAVE A DRINK CONTAINING ALCOHOL: NEVER
HOW OFTEN DO YOU HAVE SIX OR MORE DRINKS ON ONE OCCASION: NEVER
SKIP TO QUESTIONS 9-10: 1
AUDIT-C TOTAL SCORE: 0
HOW MANY STANDARD DRINKS CONTAINING ALCOHOL DO YOU HAVE ON A TYPICAL DAY: PATIENT DOES NOT DRINK

## 2025-01-16 ASSESSMENT — PAIN SCALES - GENERAL
PAINLEVEL_OUTOF10: 10 - WORST POSSIBLE PAIN
PAINLEVEL_OUTOF10: 3

## 2025-01-16 ASSESSMENT — PAIN - FUNCTIONAL ASSESSMENT: PAIN_FUNCTIONAL_ASSESSMENT: 0-10

## 2025-01-16 NOTE — H&P
History Of Present Illness  Shreya Acuña is a 89 y.o. female presenting with lumbar spondylosis .     Past Medical History  Past Medical History:   Diagnosis Date    Emphysema lung (Multi)     Hypertension     Other specified health status     No pertinent past medical history       Surgical History  Past Surgical History:   Procedure Laterality Date    OTHER SURGICAL HISTORY  12/02/2020    Cardioversion        Social History  She reports that she has quit smoking. Her smoking use included cigarettes. She has never used smokeless tobacco. She reports that she does not currently use alcohol. She reports that she does not use drugs.    Family History  No family history on file.     Allergies  Patient has no known allergies.    Review of Systems   Constitutional:  Negative for chills, diaphoresis and fever.   HENT:  Negative for ear discharge and tinnitus.    Eyes:  Negative for discharge.   Respiratory:  Negative for cough, chest tightness, shortness of breath and wheezing.    Cardiovascular:  Negative for chest pain.   Gastrointestinal:  Negative for blood in stool, constipation, diarrhea, nausea and vomiting.   Endocrine: Negative for polyuria.   Genitourinary:  Negative for difficulty urinating.   Musculoskeletal:  Positive for arthralgias, back pain and gait problem. Negative for neck pain and neck stiffness.   Skin:  Negative for rash.   Neurological:  Positive for numbness. Negative for dizziness, seizures, speech difficulty and weakness.        Physical Exam  Constitutional:       Appearance: Normal appearance.   HENT:      Head: Normocephalic.      Nose: Nose normal.      Mouth/Throat:      Mouth: Mucous membranes are moist.      Pharynx: Oropharynx is clear.   Eyes:      Extraocular Movements: Extraocular movements intact.      Conjunctiva/sclera: Conjunctivae normal.      Pupils: Pupils are equal, round, and reactive to light.   Cardiovascular:      Rate and Rhythm: Normal rate.   Pulmonary:      Effort:  Pulmonary effort is normal. No respiratory distress.      Breath sounds: No wheezing.   Chest:      Chest wall: No tenderness.   Abdominal:      Palpations: Abdomen is soft.   Musculoskeletal:      Cervical back: No rigidity.   Skin:     General: Skin is warm and dry.      Findings: No rash.   Neurological:      Mental Status: She is alert and oriented to person, place, and time.      Sensory: No sensory deficit.      Motor: Weakness present.      Gait: Gait abnormal.   Psychiatric:         Mood and Affect: Mood normal.         Behavior: Behavior normal.          Last Recorded Vitals  Blood pressure 112/54, pulse 67, temperature 37.1 °C (98.8 °F), temperature source Temporal, resp. rate 16, SpO2 98%.       Assessment/Plan   1. Lumbar spondylosis  Radiofrequency Ablation    Radiofrequency Ablation    FL pain management    FL pain management           Jose Luis Quiñones MD

## 2025-01-20 ENCOUNTER — TELEPHONE (OUTPATIENT)
Dept: CARDIOLOGY | Facility: CLINIC | Age: 89
End: 2025-01-20
Payer: MEDICARE

## 2025-01-20 NOTE — TELEPHONE ENCOUNTER
Ender called to report pt is having chest pain. I spoke with patient who states she feels a tight squeezing around her chest and slight cough. Informed pt and  that we cannot diagnose over the phone. Instructed pt and  for pt to go to the ER for evaluation due to significant history. Ender verbalizes understanding.

## 2025-01-21 ENCOUNTER — APPOINTMENT (OUTPATIENT)
Dept: RADIOLOGY | Facility: HOSPITAL | Age: 89
End: 2025-01-21
Payer: MEDICARE

## 2025-01-21 ENCOUNTER — APPOINTMENT (OUTPATIENT)
Dept: CARDIOLOGY | Facility: HOSPITAL | Age: 89
End: 2025-01-21
Payer: MEDICARE

## 2025-01-21 ENCOUNTER — HOSPITAL ENCOUNTER (EMERGENCY)
Facility: HOSPITAL | Age: 89
Discharge: HOME | End: 2025-01-22
Attending: EMERGENCY MEDICINE
Payer: MEDICARE

## 2025-01-21 ENCOUNTER — OFFICE VISIT (OUTPATIENT)
Dept: URGENT CARE | Age: 89
End: 2025-01-21
Payer: MEDICARE

## 2025-01-21 VITALS
SYSTOLIC BLOOD PRESSURE: 107 MMHG | DIASTOLIC BLOOD PRESSURE: 68 MMHG | RESPIRATION RATE: 20 BRPM | TEMPERATURE: 97.8 F | OXYGEN SATURATION: 96 % | HEART RATE: 69 BPM

## 2025-01-21 VITALS
HEART RATE: 63 BPM | DIASTOLIC BLOOD PRESSURE: 80 MMHG | SYSTOLIC BLOOD PRESSURE: 127 MMHG | BODY MASS INDEX: 40.93 KG/M2 | HEIGHT: 63 IN | RESPIRATION RATE: 20 BRPM | OXYGEN SATURATION: 98 % | WEIGHT: 231 LBS | TEMPERATURE: 97.5 F

## 2025-01-21 DIAGNOSIS — R07.9 CHEST PAIN, UNSPECIFIED TYPE: Primary | ICD-10-CM

## 2025-01-21 DIAGNOSIS — R10.11 RIGHT UPPER QUADRANT ABDOMINAL PAIN: ICD-10-CM

## 2025-01-21 DIAGNOSIS — K80.20 CALCULUS OF GALLBLADDER WITHOUT CHOLECYSTITIS WITHOUT OBSTRUCTION: Primary | ICD-10-CM

## 2025-01-21 LAB
ALBUMIN SERPL BCP-MCNC: 4 G/DL (ref 3.4–5)
ALP SERPL-CCNC: 76 U/L (ref 33–136)
ALT SERPL W P-5'-P-CCNC: 6 U/L (ref 7–45)
ANION GAP SERPL CALC-SCNC: 11 MMOL/L (ref 10–20)
AST SERPL W P-5'-P-CCNC: 14 U/L (ref 9–39)
BASOPHILS # BLD AUTO: 0.03 X10*3/UL (ref 0–0.1)
BASOPHILS NFR BLD AUTO: 0.5 %
BILIRUB SERPL-MCNC: 0.5 MG/DL (ref 0–1.2)
BUN SERPL-MCNC: 38 MG/DL (ref 6–23)
CALCIUM SERPL-MCNC: 9.5 MG/DL (ref 8.6–10.3)
CARDIAC TROPONIN I PNL SERPL HS: 14 NG/L (ref 0–13)
CHLORIDE SERPL-SCNC: 99 MMOL/L (ref 98–107)
CO2 SERPL-SCNC: 32 MMOL/L (ref 21–32)
CREAT SERPL-MCNC: 1.62 MG/DL (ref 0.5–1.05)
EGFRCR SERPLBLD CKD-EPI 2021: 31 ML/MIN/1.73M*2
EOSINOPHIL # BLD AUTO: 0.23 X10*3/UL (ref 0–0.4)
EOSINOPHIL NFR BLD AUTO: 3.7 %
ERYTHROCYTE [DISTWIDTH] IN BLOOD BY AUTOMATED COUNT: 14.9 % (ref 11.5–14.5)
GLUCOSE SERPL-MCNC: 109 MG/DL (ref 74–99)
HCT VFR BLD AUTO: 38.2 % (ref 36–46)
HGB BLD-MCNC: 12.4 G/DL (ref 12–16)
IMM GRANULOCYTES # BLD AUTO: 0.04 X10*3/UL (ref 0–0.5)
IMM GRANULOCYTES NFR BLD AUTO: 0.6 % (ref 0–0.9)
LIPASE SERPL-CCNC: 155 U/L (ref 9–82)
LYMPHOCYTES # BLD AUTO: 1.4 X10*3/UL (ref 0.8–3)
LYMPHOCYTES NFR BLD AUTO: 22.6 %
MCH RBC QN AUTO: 33.8 PG (ref 26–34)
MCHC RBC AUTO-ENTMCNC: 32.5 G/DL (ref 32–36)
MCV RBC AUTO: 104 FL (ref 80–100)
MONOCYTES # BLD AUTO: 0.67 X10*3/UL (ref 0.05–0.8)
MONOCYTES NFR BLD AUTO: 10.8 %
NEUTROPHILS # BLD AUTO: 3.83 X10*3/UL (ref 1.6–5.5)
NEUTROPHILS NFR BLD AUTO: 61.8 %
NRBC BLD-RTO: 0 /100 WBCS (ref 0–0)
PLATELET # BLD AUTO: 159 X10*3/UL (ref 150–450)
POTASSIUM SERPL-SCNC: 4 MMOL/L (ref 3.5–5.3)
PROT SERPL-MCNC: 6.7 G/DL (ref 6.4–8.2)
RBC # BLD AUTO: 3.67 X10*6/UL (ref 4–5.2)
SODIUM SERPL-SCNC: 138 MMOL/L (ref 136–145)
WBC # BLD AUTO: 6.2 X10*3/UL (ref 4.4–11.3)

## 2025-01-21 PROCEDURE — 36415 COLL VENOUS BLD VENIPUNCTURE: CPT | Performed by: PHYSICIAN ASSISTANT

## 2025-01-21 PROCEDURE — 36415 COLL VENOUS BLD VENIPUNCTURE: CPT | Performed by: EMERGENCY MEDICINE

## 2025-01-21 PROCEDURE — 71046 X-RAY EXAM CHEST 2 VIEWS: CPT

## 2025-01-21 PROCEDURE — 93005 ELECTROCARDIOGRAM TRACING: CPT

## 2025-01-21 PROCEDURE — 71046 X-RAY EXAM CHEST 2 VIEWS: CPT | Mod: FOREIGN READ | Performed by: RADIOLOGY

## 2025-01-21 PROCEDURE — 99285 EMERGENCY DEPT VISIT HI MDM: CPT | Mod: 25 | Performed by: EMERGENCY MEDICINE

## 2025-01-21 PROCEDURE — 74176 CT ABD & PELVIS W/O CONTRAST: CPT

## 2025-01-21 PROCEDURE — 84484 ASSAY OF TROPONIN QUANT: CPT | Performed by: PHYSICIAN ASSISTANT

## 2025-01-21 PROCEDURE — 80053 COMPREHEN METABOLIC PANEL: CPT | Performed by: PHYSICIAN ASSISTANT

## 2025-01-21 PROCEDURE — 85025 COMPLETE CBC W/AUTO DIFF WBC: CPT | Performed by: PHYSICIAN ASSISTANT

## 2025-01-21 PROCEDURE — 74176 CT ABD & PELVIS W/O CONTRAST: CPT | Mod: FOREIGN READ | Performed by: RADIOLOGY

## 2025-01-21 PROCEDURE — 84484 ASSAY OF TROPONIN QUANT: CPT | Performed by: EMERGENCY MEDICINE

## 2025-01-21 PROCEDURE — 83690 ASSAY OF LIPASE: CPT | Performed by: EMERGENCY MEDICINE

## 2025-01-21 PROCEDURE — 76705 ECHO EXAM OF ABDOMEN: CPT

## 2025-01-21 PROCEDURE — 76705 ECHO EXAM OF ABDOMEN: CPT | Mod: FOREIGN READ | Performed by: RADIOLOGY

## 2025-01-21 ASSESSMENT — LIFESTYLE VARIABLES
HAVE PEOPLE ANNOYED YOU BY CRITICIZING YOUR DRINKING: NO
EVER HAD A DRINK FIRST THING IN THE MORNING TO STEADY YOUR NERVES TO GET RID OF A HANGOVER: NO
HAVE YOU EVER FELT YOU SHOULD CUT DOWN ON YOUR DRINKING: NO
TOTAL SCORE: 0
EVER FELT BAD OR GUILTY ABOUT YOUR DRINKING: NO

## 2025-01-21 ASSESSMENT — PAIN SCALES - GENERAL: PAINLEVEL_OUTOF10: 0-NO PAIN

## 2025-01-21 ASSESSMENT — COLUMBIA-SUICIDE SEVERITY RATING SCALE - C-SSRS
2. HAVE YOU ACTUALLY HAD ANY THOUGHTS OF KILLING YOURSELF?: NO
1. IN THE PAST MONTH, HAVE YOU WISHED YOU WERE DEAD OR WISHED YOU COULD GO TO SLEEP AND NOT WAKE UP?: NO
6. HAVE YOU EVER DONE ANYTHING, STARTED TO DO ANYTHING, OR PREPARED TO DO ANYTHING TO END YOUR LIFE?: NO

## 2025-01-21 ASSESSMENT — ENCOUNTER SYMPTOMS
FEVER: 0
CHILLS: 0
COUGH: 1

## 2025-01-21 NOTE — ED TRIAGE NOTES
Pt presents to ED for tightness in middle of abdomen. Pt states when palpating abdomen there is a tightness. Pt denies pain upon palpation. Pt denies n/v/d, CP, SOB

## 2025-01-21 NOTE — ED TRIAGE NOTES
The patient was seen and examined in triage.    History of Present Illness: The patient is a 89-year-old female who presents emergency department for tightness in her abdomen for at least 6 months.  She reports that her  wanted her to be assessed for this.  She denies any pain to the area.  She has not had nausea or vomiting.  She denies chest pains or shortness of breath.  She went to urgent care who recommended she go to the ER for cardiac rule out.    Brief Physical Exam:  Exam is limited by the patient sitting in a chair in triage.   Heart: Regular rate and rhythm.   Lungs: Clear to auscultation bilaterally.   Abdomen: Soft, obese, nondistended.  No tenderness.  There is a tightness just superior to the umbilicus.  No tenderness.    Plan: Appropriate labs and diagnostic imaging were ordered.      For the remainder of the patient's workup and ED course, please refer to the main ED provider note. We discussed need for diagnostic testing including laboratory studies and imaging.  We also discussed that they may be asked to wait in the waiting room while these tests are pending.  They understand that if they choose to leave without having the testing completed or resulted that we cannot rule out acute life threatening illnesses and the risks involved could lead to worsening condition, permanent disability or even death.      Disclaimer: This note was dictated by speech recognition. Minor errors in transcription may be present. Please call if questions.

## 2025-01-21 NOTE — PROGRESS NOTES
"Subjective   Patient ID: Shreya Acuña is a 89 y.o. female. They present today with a chief complaint of Cough (Cough, chest congestion X 2 months. ).    History of Present Illness    Cough  Associated symptoms include chest pain. Pertinent negatives include no chills or fever.       Patient presents to urgent care with  for complaints of chest pain and cough.  Patient reports that she had cold symptoms for 2 weeks and was placed on a antibiotic.  She was on antibiotics for 1 week and states that cold symptoms are resolved.  She describes that she feels like she has a \"band \"around her chest.  Past medical history includes A-fib, hypertension, chronic kidney disease stage IV, and Parkinson's disease.  Reviewing patient's chart,  message cardiologist yesterday.  They recommended to go to the emergency room to get evaluated.      Past Medical History  Allergies as of 01/21/2025    (No Known Allergies)       (Not in a hospital admission)       Past Medical History:   Diagnosis Date    Emphysema lung (Multi)     Hypertension     Other specified health status     No pertinent past medical history       Past Surgical History:   Procedure Laterality Date    OTHER SURGICAL HISTORY  12/02/2020    Cardioversion        reports that she has quit smoking. Her smoking use included cigarettes. She has never used smokeless tobacco. She reports that she does not currently use alcohol. She reports that she does not use drugs.    Review of Systems  Review of Systems   Constitutional:  Negative for chills and fever.   HENT:  Negative for congestion.    Respiratory:  Positive for cough.    Cardiovascular:  Positive for chest pain.                                  Objective    Vitals:    01/21/25 1513   BP: 107/68   Pulse: 69   Resp: 20   Temp: 36.6 °C (97.8 °F)   SpO2: 96%     No LMP recorded.    Physical Exam  Vitals reviewed.   Constitutional:       Appearance: Normal appearance.   HENT:      Head: Normocephalic. "   Cardiovascular:      Rate and Rhythm: Normal rate. Rhythm irregular.      Heart sounds: Normal heart sounds.   Pulmonary:      Effort: Pulmonary effort is normal.      Breath sounds: Examination of the right-lower field reveals decreased breath sounds. Examination of the left-lower field reveals decreased breath sounds. Decreased breath sounds present.   Neurological:      Mental Status: She is alert.         Procedures    Point of Care Test & Imaging Results from this visit  No results found for this visit on 01/21/25.   ECG 12 Lead    Result Date: 1/21/2025  Atrial fibrillation as stated.  No obvious ischemic changes noted acutely.  No ectopy.     Diagnostic study results (if any) were reviewed by CHECO Wesley.    Assessment/Plan   Allergies, medications, history, and pertinent labs/EKGs/Imaging reviewed by CHECO Wesley.     Medical Decision Making  EKG was done in office.  EKG was A-fib with a rate of 61 bpm.  No obvious ischemic changes noted, no ectopy.  Discussed case with Dr. Erickson, who recommends patient be evaluated in the ER to rule out acute cardiac event. Patient declined EMS transport. AMA form was signed.         Orders and Diagnoses  Diagnoses and all orders for this visit:  Chest pain, unspecified type  -     ECG 12 Lead      Medical Admin Record      Patient disposition: ED    Electronically signed by CHECO Wesley  3:43 PM

## 2025-01-22 LAB
ATRIAL RATE: 0 BPM
ATRIAL RATE: 57 BPM
CARDIAC TROPONIN I PNL SERPL HS: 16 NG/L (ref 0–13)
P AXIS: 61 DEGREES
P OFFSET: 163 MS
P ONSET: 126 MS
PR INTERVAL: 160 MS
PR INTERVAL: 188 MS
Q ONSET: 220 MS
Q ONSET: 255 MS
QRS COUNT: 10 BEATS
QRS COUNT: 11 BEATS
QRS DURATION: 84 MS
QRS DURATION: 91 MS
QT INTERVAL: 435 MS
QT INTERVAL: 450 MS
QTC CALCULATION(BAZETT): 438 MS
QTC CALCULATION(BAZETT): 466 MS
QTC FREDERICIA: 442 MS
QTC FREDERICIA: 456 MS
R AXIS: -3 DEGREES
R AXIS: 17 DEGREES
T AXIS: 25 DEGREES
T AXIS: 38 DEGREES
T OFFSET: 445 MS
T OFFSET: 472 MS
VENTRICULAR RATE: 57 BPM
VENTRICULAR RATE: 69 BPM

## 2025-01-22 NOTE — ED PROVIDER NOTES
HPI   Chief Complaint   Patient presents with    Abdominal Pain       This is an 89 years old female patient presented to the emergency department with a chief complaint of upper abdominal pain.  Stated that started 2 months ago, denies nausea, vomiting, diarrhea, constipation.  Reported chest tightness/coughing.  Denies fever, chills, body aches, weakness, numbness, dysuria, urgency or frequency of urination.  Stated that the discomfort in the upper abdomen is 4 out of 10 in severity, is not related to eating.    Review of system: As stated above in the HPI section.              Patient History   Past Medical History:   Diagnosis Date    Emphysema lung (Multi)     Hypertension     Other specified health status     No pertinent past medical history     Past Surgical History:   Procedure Laterality Date    OTHER SURGICAL HISTORY  12/02/2020    Cardioversion     No family history on file.  Social History     Tobacco Use    Smoking status: Former     Types: Cigarettes    Smokeless tobacco: Never   Substance Use Topics    Alcohol use: Not Currently    Drug use: Never       Physical Exam   ED Triage Vitals [01/21/25 1703]   Temperature Heart Rate Respirations BP   36.4 °C (97.5 °F) 63 20 127/80      Pulse Ox Temp Source Heart Rate Source Patient Position   98 % Tympanic Monitor Sitting      BP Location FiO2 (%)     Left arm --       Physical Exam  Vitals and nursing note reviewed.   Constitutional:       General: She is not in acute distress.     Appearance: She is well-developed. She is obese.   HENT:      Head: Normocephalic and atraumatic.   Eyes:      Conjunctiva/sclera: Conjunctivae normal.   Cardiovascular:      Rate and Rhythm: Normal rate and regular rhythm.      Heart sounds: No murmur heard.  Pulmonary:      Effort: Pulmonary effort is normal. No respiratory distress.      Breath sounds: Normal breath sounds.   Abdominal:      Palpations: Abdomen is soft.      Tenderness: There is abdominal tenderness in the  right upper quadrant and epigastric area.   Musculoskeletal:         General: No swelling.      Cervical back: Neck supple.   Skin:     General: Skin is warm and dry.      Capillary Refill: Capillary refill takes less than 2 seconds.   Neurological:      Mental Status: She is alert.   Psychiatric:         Mood and Affect: Mood normal.           ED Course & MDM   Diagnoses as of 01/22/25 0036   Right upper quadrant abdominal pain   Calculus of gallbladder without cholecystitis without obstruction                 No data recorded     Belia Coma Scale Score: 15 (01/21/25 2131 : Cooper Michelle RN)                           Medical Decision Making  Patient seen and examined, will obtain cardiac workup, lipase, chest x-ray, CT abdomen pelvis given the upper abdominal pain.  CT scan revealed constipation as well as cholelithiasis.  Will obtain gallbladder ultrasound, repeat the patient's troponin, will obtain a second EKG.  Will obtain urine analysis.    EKG revealed sinus bradycardia with a rate of 57 bpm with no acute ST segment or T wave pathology.    Gallbladder ultrasound is unremarkable for acute cholecystitis.  Lipase level slightly elevated.  Patient is discharged to follow-up with general surgery team as well as gastroenterology team regarding the concern of the cholelithiasis, upper abdominal pain, and elevated lipase level.  Second troponin with no significant delta change.  Patient is discharged in stable condition with instruction to return to the ED if alarming symptoms arise as per discharge instruction.  We could not obtain urine analysis during the visit and the patient is denying urinary symptoms at this point.        Procedure  Procedures     Rohan Cantu,   01/22/25 0037

## 2025-01-22 NOTE — DISCHARGE INSTRUCTIONS
Please follow-up with your primary care provider as well as general surgery team and the gastroenterologist regarding the stones in the gallbladder and the upper abdominal discomfort.  Also follow-up with the gastroenterology team regarding the slightly elevated lipase level.  Return to the emergency department if you develop any worsening pain, nausea, vomiting, fever or if concerns arise.

## 2025-01-27 RX ORDER — BENZONATATE 100 MG/1
CAPSULE ORAL
COMMUNITY
Start: 2025-01-06

## 2025-01-28 ENCOUNTER — APPOINTMENT (OUTPATIENT)
Dept: SURGERY | Facility: CLINIC | Age: 89
End: 2025-01-28
Payer: MEDICARE

## 2025-01-28 VITALS
WEIGHT: 230 LBS | OXYGEN SATURATION: 93 % | TEMPERATURE: 98.3 F | HEIGHT: 63 IN | BODY MASS INDEX: 40.75 KG/M2 | HEART RATE: 87 BPM | RESPIRATION RATE: 16 BRPM | SYSTOLIC BLOOD PRESSURE: 114 MMHG | DIASTOLIC BLOOD PRESSURE: 65 MMHG

## 2025-01-28 DIAGNOSIS — R10.11 RIGHT UPPER QUADRANT ABDOMINAL PAIN: ICD-10-CM

## 2025-01-28 DIAGNOSIS — Z71.9 ENCOUNTER FOR CONSULTATION: ICD-10-CM

## 2025-01-28 DIAGNOSIS — K42.9 UMBILICAL HERNIA WITHOUT OBSTRUCTION AND WITHOUT GANGRENE: ICD-10-CM

## 2025-01-28 DIAGNOSIS — K80.20 CALCULUS OF GALLBLADDER WITHOUT CHOLECYSTITIS WITHOUT OBSTRUCTION: ICD-10-CM

## 2025-01-28 DIAGNOSIS — K80.70 CALCULUS OF GALLBLADDER AND BILE DUCT WITHOUT CHOLECYSTITIS OR OBSTRUCTION: ICD-10-CM

## 2025-01-28 DIAGNOSIS — R09.1 PLEURISY: Primary | ICD-10-CM

## 2025-01-28 PROCEDURE — 99203 OFFICE O/P NEW LOW 30 MIN: CPT | Performed by: SURGERY

## 2025-01-28 PROCEDURE — 3074F SYST BP LT 130 MM HG: CPT | Performed by: SURGERY

## 2025-01-28 PROCEDURE — 3078F DIAST BP <80 MM HG: CPT | Performed by: SURGERY

## 2025-01-28 PROCEDURE — 1036F TOBACCO NON-USER: CPT | Performed by: SURGERY

## 2025-01-28 PROCEDURE — 1159F MED LIST DOCD IN RCRD: CPT | Performed by: SURGERY

## 2025-01-28 PROCEDURE — 1126F AMNT PAIN NOTED NONE PRSNT: CPT | Performed by: SURGERY

## 2025-01-28 ASSESSMENT — ENCOUNTER SYMPTOMS
ABDOMINAL PAIN: 1
SHORTNESS OF BREATH: 1
UNEXPECTED WEIGHT CHANGE: 1
VOMITING: 0
NAUSEA: 0
CHEST TIGHTNESS: 1
EYES NEGATIVE: 1
ENDOCRINE NEGATIVE: 1

## 2025-01-28 ASSESSMENT — PAIN SCALES - GENERAL: PAINLEVEL_OUTOF10: 0-NO PAIN

## 2025-01-28 NOTE — PROGRESS NOTES
Subjective   Patient ID: Shreya Acuañ is a 89 y.o. female who presents for No chief complaint on file..  HPI    88 YO F BMI 41 patient presented to the emergency department 01/21/2025 with a chief complaint of upper abdominal pain.  Stated that started 2 months ago, denies nausea, vomiting, diarrhea, constipation.  Reported chest tightness/coughing.  Denies fever, chills, body aches, weakness, numbness, dysuria, urgency or frequency of urination.  Stated that the discomfort in the upper abdomen is 4 out of 10 in severity, is not related to eating. She states she has a band like epigastric discomfort with deep breaths. Labs normal in ED.    CT ABD PEL 01/21/2025. High attenuation material seen within the gallbladder  consistent with cholelithiasis. There is an umbilical hernia containing fat.     RUQ US 01/21/2025. Prominent pancreatic duct. Cholelithiasis. No gallbladder wall  thickening or biliary dilatation is appreciated.    Used to follow with pulmonology.    Review of Systems   Constitutional:  Positive for unexpected weight change.   HENT: Negative.     Eyes: Negative.    Respiratory:  Positive for chest tightness and shortness of breath.    Gastrointestinal:  Positive for abdominal pain. Negative for nausea and vomiting.   Endocrine: Negative.    Genitourinary: Negative.    Musculoskeletal:  Positive for gait problem.       Objective   Physical Exam  Constitutional:       Appearance: She is obese.      Comments: In wheelchair.   HENT:      Head: Atraumatic.      Nose: Nose normal.      Mouth/Throat:      Mouth: Mucous membranes are moist.   Cardiovascular:      Rate and Rhythm: Normal rate and regular rhythm.   Pulmonary:      Effort: Pulmonary effort is normal.      Breath sounds: Normal breath sounds.   Abdominal:      General: There is no distension.      Palpations: Abdomen is soft.      Tenderness: There is abdominal tenderness. There is no guarding or rebound.      Comments: No mass or hernia felt.  No pain with palpation.   Skin:     General: Skin is warm.   Neurological:      Mental Status: She is alert. Mental status is at baseline.   Psychiatric:         Mood and Affect: Mood normal.         Thought Content: Thought content normal.         Judgment: Judgment normal.         Assessment/Plan   Problem List Items Addressed This Visit    None  Visit Diagnoses         Codes    Pleurisy    -  Primary R09.1    Relevant Orders    Referral to Pulmonology    Calculus of gallbladder and bile duct without cholecystitis or obstruction     K80.70    Encounter for consultation     Z71.9    Umbilical hernia without obstruction and without gangrene     K42.9    Right upper quadrant abdominal pain     R10.11    Calculus of gallbladder without cholecystitis without obstruction     K80.20          If pulmonology cannot explain tightness with breathing, would recommend HIDA for gallbladder. GB issues to be related with pulmonary concern.       Henrique Helm MD PhD 01/28/25 10:54 AM

## 2025-01-30 LAB
ATRIAL RATE: 0 BPM
PR INTERVAL: 160 MS
Q ONSET: 255 MS
QRS COUNT: 11 BEATS
QRS DURATION: 91 MS
QT INTERVAL: 435 MS
QTC CALCULATION(BAZETT): 466 MS
QTC FREDERICIA: 456 MS
R AXIS: 17 DEGREES
T AXIS: 38 DEGREES
T OFFSET: 472 MS
VENTRICULAR RATE: 69 BPM

## 2025-01-30 NOTE — PROGRESS NOTES
Shreya Acuña is a 89 y.o. female with past medical history of HTN, CHF, Afib on xarelto, emphysema, chronic kidney disease, and osteoarthritis who is referred by Dr. Rohan Muñoz for RUQ abdominal pain. She reports a 2 month history of RUQ pain. Was also having some chest tightness. Went to ER 1/21. CT showed cholelithiasis, umbilical hernia containing fat. RUQ US with prominent pancreatic duct. Cholelithiasis. No gallbladder wall  thickening or biliary dilatation.    Saw Surgery. Recommended HIDA scan. Follow up with Pulmonary.    She tells me she has constant RUQ discomfort. Feels tight. Occurs daily but is not worse with eating. She is eating well without nausea, heartburn, indigestion. No bowel issues. Tends towards constipation. Takes stool softener for this.    Family history: Denies family history of gallbladder disease, colon cancer, or other GI disorders or malignancy.     Past Surgical History:   Procedure Laterality Date    OTHER SURGICAL HISTORY  12/02/2020    Cardioversion     Current Outpatient Medications   Medication Sig Dispense Refill    atorvastatin (Lipitor) 20 mg tablet Take 1 tablet (20 mg) by mouth once daily.      benzonatate (Tessalon) 100 mg capsule TAKE ONE CAPSULE BY MOUTH THREE TIMES A DAY AS NEEDED FOR COUGH FOR UP TO 7 DAYS.      carbidopa-levodopa (Sinemet)  mg tablet Take 3 tablets by mouth 3 times a day. 270 tablet 11    cephalexin (Keflex) 500 mg capsule       DILT- mg 24 hr capsule Take 1 capsule (120 mg) by mouth once daily. 90 capsule 3    DULoxetine (Cymbalta) 30 mg DR capsule Take 1 capsule (30 mg) by mouth 2 times a day. 180 capsule 2    losartan (Cozaar) 100 mg tablet Take 1 tablet (100 mg) by mouth once daily. 90 tablet 3    metoprolol succinate XL (Toprol-XL) 50 mg 24 hr tablet Take 1 tablet (50 mg) by mouth once daily.      mirabegron (Myrbetriq) 25 mg tablet extended release 24 hr 24 hr tablet Take 1 tablet (25 mg) by mouth once daily. 30 tablet 11     mv-min-FA-vit K-lutein-zeaxant (PreserVision AREDS 2 Plus MV) 200 mcg-15 mcg- 5 mg-1 mg capsule Take by mouth once daily.      spironolactone (Aldactone) 25 mg tablet Take 1 tablet (25 mg) by mouth once daily. 90 tablet 3    torsemide (Demadex) 20 mg tablet Take 1 tablet (20 mg) by mouth once daily. 90 tablet 3    albuterol 90 mcg/actuation inhaler Inhale 2 puffs every 4 hours if needed. (Patient not taking: Reported on 2/7/2025)      albuterol 90 mcg/actuation inhaler Inhale 2 puffs every 4 hours if needed for wheezing. 18 g 0    pregabalin (Lyrica) 100 mg capsule Take 1 capsule (100 mg) by mouth 2 times a day. 180 capsule 2    rivaroxaban (Xarelto) 10 mg tablet Take 1 tablet (10 mg) by mouth once daily. 90 tablet 3     No current facility-administered medications for this visit.       Review of Systems  Review of Systems negative except as noted in HPI.    Objective     /63   Pulse 63   Temp 36.5 °C (97.7 °F)   Wt 104 kg (230 lb)   BMI 40.74 kg/m²      Physical Exam  Constitutional:  No acute distress. Elderly female. Chronically ill-appearing.  HENT:  Head normocephalic and atraumatic. Conjunctivae normal.  Cardiovascular:  Normal rate. Regular rhythm.  Pulmonary:  Pulmonary effort normal. No respiratory distress. Breath sounds clear.  Abdominal:  Abdomen is soft. There is no distension. RUQ tenderness with palpation.  Skin: Dry.  Neurological:  Alert and oriented.  Psychiatric:  Mood and affect normal.    Assessment/Plan     89 y.o. female with history of HTN, CHF, Afib on xarelto, emphysema, chronic kidney disease, and osteoarthritis  who presents today for clinic visit for 2 month history of RUQ pain. Imaging shows cholelithiasis. Recommend HIDA scan, follow up with Surgery. Reiterated that she should also see Pulmonary, may need clearance prior to any surgical intervention. Contact information provided.    Follow up as needed.    Electronically signed by: Shanda Mcneill CNP on 2/7/2025 at 1:02 PM

## 2025-02-07 ENCOUNTER — OFFICE VISIT (OUTPATIENT)
Dept: GASTROENTEROLOGY | Facility: CLINIC | Age: 89
End: 2025-02-07
Payer: MEDICARE

## 2025-02-07 ENCOUNTER — TELEPHONE (OUTPATIENT)
Dept: SURGERY | Facility: CLINIC | Age: 89
End: 2025-02-07

## 2025-02-07 VITALS
SYSTOLIC BLOOD PRESSURE: 109 MMHG | DIASTOLIC BLOOD PRESSURE: 63 MMHG | TEMPERATURE: 97.7 F | BODY MASS INDEX: 40.74 KG/M2 | HEART RATE: 63 BPM | WEIGHT: 230 LBS

## 2025-02-07 DIAGNOSIS — R10.11 RIGHT UPPER QUADRANT ABDOMINAL PAIN: Primary | ICD-10-CM

## 2025-02-07 DIAGNOSIS — K80.20 CALCULUS OF GALLBLADDER WITHOUT CHOLECYSTITIS WITHOUT OBSTRUCTION: ICD-10-CM

## 2025-02-07 PROCEDURE — 1159F MED LIST DOCD IN RCRD: CPT | Performed by: NURSE PRACTITIONER

## 2025-02-07 PROCEDURE — 99214 OFFICE O/P EST MOD 30 MIN: CPT | Performed by: NURSE PRACTITIONER

## 2025-02-07 PROCEDURE — 1126F AMNT PAIN NOTED NONE PRSNT: CPT | Performed by: NURSE PRACTITIONER

## 2025-02-07 PROCEDURE — 3074F SYST BP LT 130 MM HG: CPT | Performed by: NURSE PRACTITIONER

## 2025-02-07 PROCEDURE — 99204 OFFICE O/P NEW MOD 45 MIN: CPT | Performed by: NURSE PRACTITIONER

## 2025-02-07 PROCEDURE — 3078F DIAST BP <80 MM HG: CPT | Performed by: NURSE PRACTITIONER

## 2025-02-07 ASSESSMENT — PAIN SCALES - GENERAL: PAINLEVEL_OUTOF10: 0-NO PAIN

## 2025-02-07 NOTE — PATIENT INSTRUCTIONS
Recommendations  Obtain HIDA scan. You can call Radiology/Imaging at [phone] to schedule. Then follow up with Surgeon Dr. Helm.   Follow up with Pulmonary in [place] ([phone]) or [place] ([phone]).

## 2025-02-07 NOTE — TELEPHONE ENCOUNTER
Patient's  called and asked if the upcoming appointment with Dr. Helm (3/11/2025) would be considered a pre-op appointment. Patient has multiple tests and doctor's appointment beforehand, so the patient's  wanted to know what overall plan was for his wife's care. Patient's  also wanted to know what Dr. Helm's schedule looked like, and when the surgery could be scheduled. Please review. Thank you.   Skin normal color for race, warm, dry and intact. No evidence of rash.

## 2025-02-10 NOTE — TELEPHONE ENCOUNTER
Spoke with patient's  Ender, and informed him that I try really hard to keep those appointments scheduled as preoperative as that.  But I can't make any guarantees.  All questions were addressed and answered.

## 2025-02-14 LAB
ATRIAL RATE: 57 BPM
P AXIS: 61 DEGREES
P OFFSET: 163 MS
P ONSET: 126 MS
PR INTERVAL: 188 MS
Q ONSET: 220 MS
QRS COUNT: 10 BEATS
QRS DURATION: 84 MS
QT INTERVAL: 450 MS
QTC CALCULATION(BAZETT): 438 MS
QTC FREDERICIA: 442 MS
R AXIS: -3 DEGREES
T AXIS: 25 DEGREES
T OFFSET: 445 MS
VENTRICULAR RATE: 57 BPM

## 2025-02-18 ENCOUNTER — APPOINTMENT (OUTPATIENT)
Dept: NEUROLOGY | Facility: CLINIC | Age: 89
End: 2025-02-18
Payer: MEDICARE

## 2025-02-18 VITALS
RESPIRATION RATE: 16 BRPM | DIASTOLIC BLOOD PRESSURE: 67 MMHG | SYSTOLIC BLOOD PRESSURE: 107 MMHG | HEART RATE: 61 BPM | WEIGHT: 230 LBS | BODY MASS INDEX: 40.74 KG/M2

## 2025-02-18 DIAGNOSIS — G20.C PARKINSONISM, UNSPECIFIED PARKINSONISM TYPE (MULTI): Primary | ICD-10-CM

## 2025-02-18 DIAGNOSIS — R26.89 BALANCE PROBLEM: ICD-10-CM

## 2025-02-18 DIAGNOSIS — G62.9 NEUROPATHY: ICD-10-CM

## 2025-02-18 PROCEDURE — 99214 OFFICE O/P EST MOD 30 MIN: CPT | Performed by: NURSE PRACTITIONER

## 2025-02-18 PROCEDURE — 1159F MED LIST DOCD IN RCRD: CPT | Performed by: NURSE PRACTITIONER

## 2025-02-18 PROCEDURE — 3074F SYST BP LT 130 MM HG: CPT | Performed by: NURSE PRACTITIONER

## 2025-02-18 PROCEDURE — 1036F TOBACCO NON-USER: CPT | Performed by: NURSE PRACTITIONER

## 2025-02-18 PROCEDURE — 1160F RVW MEDS BY RX/DR IN RCRD: CPT | Performed by: NURSE PRACTITIONER

## 2025-02-18 PROCEDURE — 3078F DIAST BP <80 MM HG: CPT | Performed by: NURSE PRACTITIONER

## 2025-02-18 ASSESSMENT — UNIFIED PARKINSONS DISEASE RATING SCALE (UPDRS)
CLINICAL_STATE: ON
AMPLITUDE_LUE: 1
KINETIC_TREMOR_LEFTHAND: 0
FACIAL_EXPRESSION: 1
SPEECH: 0
RIGIDITY_RUE: 2
TOETAPPING_RIGHT: 2
KINETIC_TREMOR_RIGHTHAND: 1
GAIT: 4
PRONATION_SUPINATION_RIGHT: 3
AMPLITUDE_RLE: 0
FINGER_TAPPING_RIGHT: 1
RIGIDITY_NECK: 0
SPONTANEITY_OF_MOVEMENT: 2
LEG_AGILITY_RIGHT: 3
MINUTES_SINCE_LEVODOPA: 5.5HRS
RIGIDITY_LUE: 0
CHAIR_RISING_SCALE: 4
POSTURAL_STABILITY: 0
AMPLITUDE_LIP_JAW: 0
LEG_AGILITY_LEFT: 3
RIGIDITY_RLE: 0
PRONATION_SUPINATION_LEFT: 3
TOTAL_SCORE: 42
AMPLITUDE_RUE: 1
POSTURAL_TREMOR_RIGHTHAND: 0
TOETAPPING_LEFT: 2
FREEZING_GAIT: 0
LEVODOPA: YES
POSTURAL_TREMOR_LEFTHAND: 0
FINGER_TAPPING_LEFT: 3
RIGIDITY_LLE: 0
PARKINSONS_MEDS: YES
DYSKINESIAS_PRESENT: NO
HANDMOVEMENTS_RIGHT: 2
CONSTANCY_TREMOR_ATREST: 1
AMPLITUDE_LLE: 0
POSTURE: 0

## 2025-02-18 ASSESSMENT — ENCOUNTER SYMPTOMS
OCCASIONAL FEELINGS OF UNSTEADINESS: 1
DEPRESSION: 0
LOSS OF SENSATION IN FEET: 1

## 2025-02-18 ASSESSMENT — PATIENT HEALTH QUESTIONNAIRE - PHQ9: 2. FEELING DOWN, DEPRESSED OR HOPELESS: NOT AT ALL

## 2025-02-18 NOTE — PATIENT INSTRUCTIONS
#For neuropathy:    -Labs (do not have to be fasting)  -EMG--Please call [phone] ([phone]) to schedule an apt.     #Continue Carbidopa-levodopa 25/100mg 2 tabs 3 times a day, but take 4-5hrs apart     #Follow up in 5M with Dr. Spencer and see me sooner if needed       Davon Cowart NP-C  Adult/Gerontological Nurse Practitioner   [facility], Department of Neurology  [facility] [place]  [facility]  [id number] [place] [address]  Phone: [phone]  Fax: [phone]

## 2025-02-18 NOTE — PROGRESS NOTES
Subjective     Shreya Acuña is a 89 y.o. year old female who presents with Gait Problem, here for follow up visit.    HPI  Last visit 11/13/24 with w/ Dr. Spencer:  Move carbidopa/levodopa 25/100 2 3x/day to 10am, 2pm, 6pm  --1 week later, raise to 2.5 tabs 3x/day  --1 week later, raise to 3 tabs 3x/day  --1 week later, raise to 3 tabs 4x/day  Continue Vitamin B12  Consider skin punch biopsy  MRI thoracic and lumbar spine  She will follow-up with Davon (or with me) in about 4 months.    Comes w/ her .    At 3 tabs TID it worsened her mood, made her lethargic. Decreased to 2 tabs TID--this has helped the side effect.   says tremors improved, not as frequent or severe.   She is WB, no falls transferring.   Has stiffness and balance issues.     MRI T spine does not explain sx per Dr. Spencer's review, L spine MRI not completed because insurance denied it.    Her main complaint is numbness in her fingers, sometimes worse and better, pain from her elbow down in BUE. This is bothersome.   Never dx with neuropathy, denies hx of DM   No numbness or paresthesias in feet.     Ablation in lower back q3M with pain management and inj in knees q4M.       MOTOR SYMPTOMS      +/-                            Comments  Motor sx overall  Stable   Tremor +    Rigidity +    Bradykinesia -    Balance/gait + WB   Falls -    PT -    Exercise -      NON MOTOR SYMPTOMS       +/-                               Comments  Orthostatic lightheadedness  -    Cognitive -    Insomnia -    RBD -    Depression -    Anxiety -    Fatigue -    Sialorrhea -    Hypophonia -    Dysphagia -    Constipation -    Urinary dysfunction + Seeing uro  Nerve stim in leg, purwick at hs        Social  Lives with:    Help with ADLs: dependent         [facility] Meds  Med Dose Time   Carbidopa-levodopa 25/100mg  2 tabs 3 times a day [id number]             Latency     Wearing off     Side effects     Dyskinesia None   "  Hallucinations None    Other None      Prior medications:    Pertinent testing:  Labs for cognitive decline 7/30/25:  Folate-wnl  MMA-0.82 (H)  TSH with reflex to Free T4-wnl  Vitamin B1- 136    MRI thoracic spine 12/18/24:----Reviewed by Dr. Spencer \"Please let her know there is a slipped disc at T67 with mild indentation of the spinal cord, but it does not clearly explain her symptoms and signs; reviewed films, mild indentation but no signal change and there remains ample dorsal CSF space\"  IMPRESSION:  Mild spondylosis of the thoracic spine, most pronounced at T6-7 where  a disc protrusion results in ventral indentation of the spinal cord  without cord signal abnormality. No high-grade canal or foraminal  stenosis at any level.    MRI brain and c-spine 7/30/24  MRI brain:--------MRI brain reviewed by Dr. 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  This may correlate with her reported head trauma months ago without seeking medical care.  No significant findings on the spine MRI to explain balance issues.\"      1. There is hemosiderin deposition located along the linings of the  posterior left parasagittal parieto-occipital lobes and few scattered  punctate foci of hemosiderin located within the bilateral posterior  cerebral hemispheres.      Hemosiderin deposition along the linings of the brain parenchyma is  most consistent with sequela of prior subarachnoid hemorrhage.  Punctate foci of hemosiderin within the bilateral cerebral  hemispheres may be within sulci or within the gyri. Of note, some of  the foci of susceptibility artifact could reflect coursing vessels,  noting that evaluation is degraded due to patient motion. There is  mild volume loss involving the posterior left parietal lobe.      2. Findings of probable chronic small vessel ischemic changes.      3. Ventricles, sulci, and basilar cisterns are overall normal in  size, shape, and " configuration for patient's age.      MRI cervical spine:  1. Multilevel degenerative changes of the cervical spine without  striking spinal canal stenosis at any level. The cervical spinal cord  is normal in signal and caliber, noting that evaluation is degraded  due to patient motion.      2. Variable degree of neural foraminal narrowing at multiple levels  as detailed above.      This study was interpreted at [facility].                     Current Outpatient Medications:     albuterol 90 mcg/actuation inhaler, Inhale 2 puffs every 4 hours if needed. (Patient not taking: Reported on 2/7/2025), Disp: , Rfl:     albuterol 90 mcg/actuation inhaler, Inhale 2 puffs every 4 hours if needed for wheezing., Disp: 18 g, Rfl: 0    atorvastatin (Lipitor) 20 mg tablet, Take 1 tablet (20 mg) by mouth once daily., Disp: , Rfl:     benzonatate (Tessalon) 100 mg capsule, TAKE ONE CAPSULE BY MOUTH THREE TIMES A DAY AS NEEDED FOR COUGH FOR UP TO 7 DAYS., Disp: , Rfl:     carbidopa-levodopa (Sinemet)  mg tablet, Take 3 tablets by mouth 3 times a day., Disp: 270 tablet, Rfl: 11    cephalexin (Keflex) 500 mg capsule, , Disp: , Rfl:     DILT- mg 24 hr capsule, Take 1 capsule (120 mg) by mouth once daily., Disp: 90 capsule, Rfl: 3    DULoxetine (Cymbalta) 30 mg DR capsule, Take 1 capsule (30 mg) by mouth 2 times a day., Disp: 180 capsule, Rfl: 2    losartan (Cozaar) 100 mg tablet, Take 1 tablet (100 mg) by mouth once daily., Disp: 90 tablet, Rfl: 3    metoprolol succinate XL (Toprol-XL) 50 mg 24 hr tablet, Take 1 tablet (50 mg) by mouth once daily., Disp: , Rfl:     mirabegron (Myrbetriq) 25 mg tablet extended release 24 hr 24 hr tablet, Take 1 tablet (25 mg) by mouth once daily., Disp: 30 tablet, Rfl: 11    mv-min-FA-vit K-lutein-zeaxant (PreserVision AREDS 2 Plus MV) 200 mcg-15 mcg- 5 mg-1 mg capsule, Take by mouth once daily., Disp: , Rfl:     pregabalin (Lyrica) 100 mg capsule, Take 1  capsule (100 mg) by mouth 2 times a day., Disp: 180 capsule, Rfl: 2    rivaroxaban (Xarelto) 10 mg tablet, Take 1 tablet (10 mg) by mouth once daily., Disp: 90 tablet, Rfl: 3    spironolactone (Aldactone) 25 mg tablet, Take 1 tablet (25 mg) by mouth once daily., Disp: 90 tablet, Rfl: 3    torsemide (Demadex) 20 mg tablet, Take 1 tablet (20 mg) by mouth once daily., Disp: 90 tablet, Rfl: 3       Objective   Vitals:    02/18/25 1544 02/18/25 1546   BP: 107/67 Comment: wheelchair   BP Location: Left arm    Patient Position: Sitting    BP Cuff Size: Large adult    Pulse: 61    Resp: 16    Weight: 104 kg (230 lb)                    Physical Exam    Sensory:  Decreased sensation to pin prick in bilateral fingers R>L, intact vibration, light touch, pin prick and finger proprioception.   BLE absent sensation to vibration knee down, intact temp, pin prick and light touch and great toe proprioception.    Moderate BLE edema.     MDS UPDRS 1st Score: Motor Examination  Is the patient on medication for treating the symptoms of Parkinson's Disease?: Yes  Patients receiving medication for treating the symptoms of Parkinson's Disease, efrain the patient's clinical state.: On  Is the patient on Levodopa?: Yes  Minutes since last Levodopa dose: 5.5hrs  Speech: 0  Facial Expression: 1  Rigidty Neck: 0  Rigidty RUE: 2  Rigidity - LUE: 0  Rigidity RLE: 0  Rigidity LLE: 0  Finger Tapping Right Hand: 1  Finger Tapping Left Hand: 3  Hand Movements- Right Hand: 2  Hand Movements- Left Hand: 3  Pronatiaon-Supination Movments - Right Hand: 3  Pronatiaon-Supination Movments Left Hand: 3  Toe Tapping Right Foot: 2  Toe Tapping - Left Foot: 2  Leg Agility - Right Leg: 3  Leg Agility - Left leg: 3  Arising from Chair: 4  Gait: 4  Freezing of Gait: 0  Postural Stability: 0  Posture: 0  Global Spontanteity of Movment ( Body Bradykinesia): 2  Postural Tremor - Right Hand: 0  Postural Tremor - Left hand: 0  Kinetic Tremor - Right hand: 1  Kinetic  Tremor - Left hand: 0  Rest Tremor Amplitude - RUE: 1  Rest Tremor Amplitude - LUE: 1  Rest Tremor Amplitude - RLE: 0  Rest Tremor Amplitude - LLE: 0  Rest Tremor Amplitude - Lip/Jaw: 0  Constancy of Rest Tremor: 1  MDS UPDRS Total Score: 42  Were dyskinesias (chorea or dystonia) present during examination?: No        Assessment/Plan   Ms. Shreya Acuña is a 89 y.o. F who presented at initial visit with Dr. Spencer earlier this month with gradually worsening bilateral upper extremity tremor as well as being wheelchair-bound for the past few months prior to apt 7/2024 and after progressive difficulty with gait. 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 She had hyperreflexia, memory deficits and bradykinesia LUE>RUE and LE predominant parkinsonism. T spine imaging unrevealing, L-spine imaging denied by insurance. 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 Labs ordered for cognitive decline and elevated MMA, started on B12 (B1 was drawn). Last visit, started on Sinemet, had some improvement in tremor thought to be d/t myoclonus and does have a jerky quality to it. She and  would like to continue Sinemet 2 tabs TID, 3 tabs TID caused sedation, feels overall she is stable and it helps and is not ambulating nor bothered by bradykinesia or rigidity.     Neuropathy: C/o numbness and pain in BUE which is the main concern of today's visit, she and  would like an EMG and labs to work up the issue as this is very bothersome.   Decreased sensation to pin prick in bilateral fingers R>L, BLE absent sensation to vibration knee down, other other modalities intact. Will check labs (had some for cognitive decline previously), will check SPEP, UPEP, B12.     Diagnoses and all orders for this visit:  Parkinsonism, unspecified Parkinsonism type (Multi)  Neuropathy  -     EMG & nerve conduction; Future  -     Serum Protein Electrophoresis + Immunofixation  -     Urine Protein  Electrophoresis; Future  -     Vitamin B12  Balance problem      #For neuropathy:    -Labs (do not have to be fasting)  -EMG--Please call [phone] ([phone]) to schedule an apt.     #Continue Carbidopa-levodopa 25/100mg 2 tabs 3 times a day, but take 4-5hrs apart     #Follow up in 5M with Dr. Spencer and see me sooner if needed       I, SHENA Cowart, personally performed  a medically appropriate exam, discussion of care/treatment options taking a total time of 32 minutes for today's visit.      Davon Cowart NP-C  Adult/Gerontological Nurse Practitioner   [facility], Department of Neurology  [facility] [place]  [facility]  [id number] [place] [address]  Phone: [phone]  Fax: [phone]

## 2025-02-19 ENCOUNTER — HOSPITAL ENCOUNTER (OUTPATIENT)
Dept: RADIOLOGY | Facility: HOSPITAL | Age: 89
Discharge: HOME | End: 2025-02-19
Payer: MEDICARE

## 2025-02-19 DIAGNOSIS — R10.11 RIGHT UPPER QUADRANT ABDOMINAL PAIN: ICD-10-CM

## 2025-02-19 DIAGNOSIS — K80.20 CALCULUS OF GALLBLADDER WITHOUT CHOLECYSTITIS WITHOUT OBSTRUCTION: ICD-10-CM

## 2025-02-19 PROCEDURE — 78227 HEPATOBIL SYST IMAGE W/DRUG: CPT

## 2025-02-19 PROCEDURE — 3430000001 HC RX 343 DIAGNOSTIC RADIOPHARMACEUTICALS: Performed by: EMERGENCY MEDICINE

## 2025-02-19 PROCEDURE — A9537 TC99M MEBROFENIN: HCPCS | Performed by: EMERGENCY MEDICINE

## 2025-02-19 RX ORDER — KIT FOR THE PREPARATION OF TECHNETIUM TC 99M MEBROFENIN 45 MG/10ML
5.6 INJECTION, POWDER, LYOPHILIZED, FOR SOLUTION INTRAVENOUS
Status: COMPLETED | OUTPATIENT
Start: 2025-02-19 | End: 2025-02-19

## 2025-02-19 RX ORDER — SINCALIDE 5 UG/5ML
2.1 INJECTION, POWDER, LYOPHILIZED, FOR SOLUTION INTRAVENOUS ONCE
Status: CANCELLED | OUTPATIENT
Start: 2025-02-19 | End: 2025-02-19

## 2025-02-19 RX ADMIN — KIT FOR THE PREPARATION OF TECHNETIUM TC 99M MEBROFENIN 5.6 MILLICURIE: 45 INJECTION, POWDER, LYOPHILIZED, FOR SOLUTION INTRAVENOUS at 14:37

## 2025-02-20 ENCOUNTER — APPOINTMENT (OUTPATIENT)
Dept: RADIOLOGY | Facility: HOSPITAL | Age: 89
End: 2025-02-20
Payer: MEDICARE

## 2025-03-03 ENCOUNTER — LAB (OUTPATIENT)
Dept: LAB | Facility: HOSPITAL | Age: 89
End: 2025-03-03
Payer: MEDICARE

## 2025-03-03 DIAGNOSIS — G62.9 POLYNEUROPATHY, UNSPECIFIED: Primary | ICD-10-CM

## 2025-03-03 LAB — PROT UR-ACNC: 10 MG/DL (ref 5–25)

## 2025-03-03 PROCEDURE — 84166 PROTEIN E-PHORESIS/URINE/CSF: CPT

## 2025-03-03 PROCEDURE — 84156 ASSAY OF PROTEIN URINE: CPT

## 2025-03-04 PROBLEM — K80.20 CHOLELITHIASIS: Status: ACTIVE | Noted: 2025-01-22

## 2025-03-04 LAB
ALBUMIN MFR UR ELPH: 29.8 %
ALPHA1 GLOB MFR UR ELPH: 5.5 %
ALPHA2 GLOB MFR UR ELPH: 12.9 %
B-GLOBULIN MFR UR ELPH: 28.7 %
GAMMA GLOB MFR UR ELPH: 23.1 %
PATH REVIEW-URINE PROTEIN ELECTROPHORESIS: NORMAL
URINE ELECTROPHORESIS COMMENT: NORMAL
VIT B12 SERPL-MCNC: 653 PG/ML (ref 200–1100)

## 2025-03-06 ENCOUNTER — APPOINTMENT (OUTPATIENT)
Dept: UROLOGY | Facility: CLINIC | Age: 89
End: 2025-03-06
Payer: MEDICARE

## 2025-03-06 DIAGNOSIS — N39.41 URGE INCONTINENCE: Primary | ICD-10-CM

## 2025-03-06 DIAGNOSIS — R35.0 FREQUENCY OF MICTURITION: ICD-10-CM

## 2025-03-06 DIAGNOSIS — N39.41 URGENCY INCONTINENCE: ICD-10-CM

## 2025-03-06 PROCEDURE — 64566 NEUROELTRD STIM POST TIBIAL: CPT | Performed by: NURSE PRACTITIONER

## 2025-03-06 NOTE — PROGRESS NOTES
Patient ID: Shreya Acuña is a 89 y.o. female.    Percutaneous Tibial Nerve Stimulation    Date/Time: 3/6/2025 3:33 PM    Performed by: CHECO Siddiqi  Authorized by: CHECO Siddiqi    Procedure Details     Indications: urge incontinence, urinary frequency and urinary urgency      PTNS session #: monthly maintenance    Bladder symptoms: unchanged      Ankle used: right      Stimulator intensity (mA): 5        Follow up 4 weeks PTNS; Taking Gemtesa in combination with PTNS.  Bladder has good days, other times rough as on more diuretics now; has lost 13 lbs in fluid taken off per patient; Not interested in other 3rd line such as botox and implantable neuromodulation.  Purewick at night working; Patient not interested in other treatment options.

## 2025-03-11 ENCOUNTER — APPOINTMENT (OUTPATIENT)
Dept: SURGERY | Facility: CLINIC | Age: 89
End: 2025-03-11
Payer: MEDICARE

## 2025-03-12 PROBLEM — K82.8 BILIARY DYSKINESIA: Status: ACTIVE | Noted: 2025-03-12

## 2025-03-12 RX ORDER — VIBEGRON 75 MG/1
1 TABLET, FILM COATED ORAL
COMMUNITY
Start: 2025-03-01

## 2025-03-13 ENCOUNTER — OFFICE VISIT (OUTPATIENT)
Dept: SURGERY | Facility: CLINIC | Age: 89
End: 2025-03-13
Payer: MEDICARE

## 2025-03-13 ENCOUNTER — PREP FOR PROCEDURE (OUTPATIENT)
Dept: SURGERY | Facility: CLINIC | Age: 89
End: 2025-03-13

## 2025-03-13 VITALS
BODY MASS INDEX: 41.46 KG/M2 | OXYGEN SATURATION: 94 % | WEIGHT: 234 LBS | SYSTOLIC BLOOD PRESSURE: 122 MMHG | TEMPERATURE: 97.6 F | RESPIRATION RATE: 16 BRPM | DIASTOLIC BLOOD PRESSURE: 72 MMHG | HEIGHT: 63 IN | HEART RATE: 62 BPM

## 2025-03-13 DIAGNOSIS — K82.8 BILIARY DYSKINESIA: Primary | ICD-10-CM

## 2025-03-13 DIAGNOSIS — Z71.9 ENCOUNTER FOR CONSULTATION: ICD-10-CM

## 2025-03-13 PROCEDURE — 99214 OFFICE O/P EST MOD 30 MIN: CPT | Performed by: SURGERY

## 2025-03-13 PROCEDURE — 1036F TOBACCO NON-USER: CPT | Performed by: SURGERY

## 2025-03-13 PROCEDURE — 1126F AMNT PAIN NOTED NONE PRSNT: CPT | Performed by: SURGERY

## 2025-03-13 PROCEDURE — 3074F SYST BP LT 130 MM HG: CPT | Performed by: SURGERY

## 2025-03-13 PROCEDURE — 3078F DIAST BP <80 MM HG: CPT | Performed by: SURGERY

## 2025-03-13 PROCEDURE — 1159F MED LIST DOCD IN RCRD: CPT | Performed by: SURGERY

## 2025-03-13 RX ORDER — GABAPENTIN 300 MG/1
300 CAPSULE ORAL ONCE
OUTPATIENT
Start: 2025-03-13 | End: 2025-03-13

## 2025-03-13 RX ORDER — HEPARIN SODIUM 5000 [USP'U]/ML
5000 INJECTION, SOLUTION INTRAVENOUS; SUBCUTANEOUS ONCE
OUTPATIENT
Start: 2025-03-13 | End: 2025-03-13

## 2025-03-13 RX ORDER — ACETAMINOPHEN 325 MG/1
975 TABLET ORAL ONCE
OUTPATIENT
Start: 2025-03-13 | End: 2025-03-13

## 2025-03-13 RX ORDER — CEFAZOLIN SODIUM 2 G/100ML
2 INJECTION, SOLUTION INTRAVENOUS ONCE
OUTPATIENT
Start: 2025-03-13 | End: 2025-03-13

## 2025-03-13 RX ORDER — CELECOXIB 400 MG/1
400 CAPSULE ORAL ONCE
OUTPATIENT
Start: 2025-03-13 | End: 2025-03-13

## 2025-03-13 RX ORDER — SCOPOLAMINE 1 MG/3D
1 PATCH, EXTENDED RELEASE TRANSDERMAL
OUTPATIENT
Start: 2025-03-13 | End: 2025-03-16

## 2025-03-13 ASSESSMENT — ENCOUNTER SYMPTOMS
VOMITING: 0
ENDOCRINE NEGATIVE: 1
UNEXPECTED WEIGHT CHANGE: 1
SHORTNESS OF BREATH: 1
CHEST TIGHTNESS: 1
ABDOMINAL PAIN: 1
EYES NEGATIVE: 1
NAUSEA: 0

## 2025-03-13 ASSESSMENT — PAIN SCALES - GENERAL: PAINLEVEL_OUTOF10: 0-NO PAIN

## 2025-03-13 NOTE — H&P (VIEW-ONLY)
Subjective   Patient ID: Shreya Acuña is a 89 y.o. female who presents for No chief complaint on file..  HPI    88 YO F BMI 41 patient presented to the emergency department 01/21/2025 with a chief complaint of upper abdominal pain.  Stated that started 2 months ago, denies nausea, vomiting, diarrhea, constipation.  Reported chest tightness/coughing.  Denies fever, chills, body aches, weakness, numbness, dysuria, urgency or frequency of urination.  Stated that the discomfort in the upper abdomen is 4 out of 10 in severity, is not related to eating. She states she has a band like epigastric discomfort with deep breaths. Labs normal in ED.    CT ABD PEL 01/21/2025. High attenuation material seen within the gallbladder  consistent with cholelithiasis. There is an umbilical hernia containing fat.     RUQ US 01/21/2025. Prominent pancreatic duct. Cholelithiasis. No gallbladder wall  thickening or biliary dilatation is appreciated.    Previously followed with pulmonology. Has not seen.    HIDA EF 02/19/2025:   1. Patency of cystic duct and common bile duct without evidence of  acute cholecystitis.  2. Nearly diminished gallbladder ejection fraction, estimated at 4%  (normal above 38%), findings suggestive of biliary dyskinesia and  chronic cholecystitis, correlate clinically.    Returned to office 03/13/2025. Constant RUQ discomfort. Feels tight. Occurs daily but is not worse with eating. She is eating well without nausea, heartburn, indigestion. No bowel issues. Tends towards constipation. Takes stool softener for this. Cardiology scheduled for 03/24/2025. On xarelto.    Review of Systems   Constitutional:  Positive for unexpected weight change.   HENT: Negative.     Eyes: Negative.    Respiratory:  Positive for chest tightness and shortness of breath.    Gastrointestinal:  Positive for abdominal pain. Negative for nausea and vomiting.   Endocrine: Negative.    Genitourinary: Negative.    Musculoskeletal:  Positive for  gait problem.       Objective   Physical Exam  Constitutional:       Appearance: She is obese.      Comments: In wheelchair.   HENT:      Head: Atraumatic.      Nose: Nose normal.      Mouth/Throat:      Mouth: Mucous membranes are moist.   Cardiovascular:      Rate and Rhythm: Normal rate and regular rhythm.   Pulmonary:      Effort: Pulmonary effort is normal.      Breath sounds: Normal breath sounds.   Abdominal:      General: There is no distension.      Palpations: Abdomen is soft.      Tenderness: There is abdominal tenderness. There is no guarding or rebound.      Comments: No mass or hernia felt. No pain with palpation.   Skin:     General: Skin is warm.   Neurological:      Mental Status: She is alert. Mental status is at baseline.   Psychiatric:         Mood and Affect: Mood normal.         Thought Content: Thought content normal.         Judgment: Judgment normal.         Assessment/Plan   Problem List Items Addressed This Visit             ICD-10-CM       Gastrointestinal and Abdominal    Biliary dyskinesia K82.8     Other Visit Diagnoses         Codes    Encounter for consultation     Z71.9          Will need cardiology approval and to hold xarelto for two days.  No PAT if cardiology approves.         Henrique Helm MD PhD 03/13/25 12:33 PM

## 2025-03-14 ENCOUNTER — TELEPHONE (OUTPATIENT)
Dept: SURGERY | Facility: CLINIC | Age: 89
End: 2025-03-14
Payer: MEDICARE

## 2025-03-14 NOTE — TELEPHONE ENCOUNTER
Received clearance from Dr. Barnhart's office that patient is cleared to hold her Xarelto for two days prior to her surgical procedure on April 10 with Dr. Helm.  Called and spoke with patient's  who verbalized understanding and stated that Dr. Barnhart's nurse already called but he appreciated the follow up.  Reviewed where patient will need to go morning of surgery, and when they will be notified of time of arrival.  All questions were addressed and answered.

## 2025-03-21 ENCOUNTER — HOSPITAL ENCOUNTER (OUTPATIENT)
Dept: RADIOLOGY | Facility: CLINIC | Age: 89
Discharge: HOME | End: 2025-03-21
Payer: MEDICARE

## 2025-03-21 DIAGNOSIS — R91.8 OTHER NONSPECIFIC ABNORMAL FINDING OF LUNG FIELD: ICD-10-CM

## 2025-03-21 PROCEDURE — 71250 CT THORAX DX C-: CPT

## 2025-03-24 ENCOUNTER — APPOINTMENT (OUTPATIENT)
Dept: CARDIOLOGY | Facility: CLINIC | Age: 89
End: 2025-03-24
Payer: MEDICARE

## 2025-03-24 VITALS
SYSTOLIC BLOOD PRESSURE: 112 MMHG | BODY MASS INDEX: 42.69 KG/M2 | DIASTOLIC BLOOD PRESSURE: 70 MMHG | HEART RATE: 56 BPM | WEIGHT: 241 LBS

## 2025-03-24 DIAGNOSIS — Z01.810 PREOPERATIVE CARDIOVASCULAR EXAMINATION: ICD-10-CM

## 2025-03-24 DIAGNOSIS — I25.10 CORONARY ARTERY DISEASE INVOLVING NATIVE CORONARY ARTERY OF NATIVE HEART WITHOUT ANGINA PECTORIS: ICD-10-CM

## 2025-03-24 DIAGNOSIS — I10 PRIMARY HYPERTENSION: ICD-10-CM

## 2025-03-24 DIAGNOSIS — E78.49 OTHER HYPERLIPIDEMIA: ICD-10-CM

## 2025-03-24 DIAGNOSIS — I48.21 PERMANENT ATRIAL FIBRILLATION (MULTI): ICD-10-CM

## 2025-03-24 DIAGNOSIS — I50.32 CHRONIC DIASTOLIC HEART FAILURE: ICD-10-CM

## 2025-03-24 DIAGNOSIS — I87.2 VENOUS INSUFFICIENCY: Primary | ICD-10-CM

## 2025-03-24 PROCEDURE — 1159F MED LIST DOCD IN RCRD: CPT | Performed by: INTERNAL MEDICINE

## 2025-03-24 PROCEDURE — 3078F DIAST BP <80 MM HG: CPT | Performed by: INTERNAL MEDICINE

## 2025-03-24 PROCEDURE — 3074F SYST BP LT 130 MM HG: CPT | Performed by: INTERNAL MEDICINE

## 2025-03-24 PROCEDURE — 99214 OFFICE O/P EST MOD 30 MIN: CPT | Performed by: INTERNAL MEDICINE

## 2025-03-24 NOTE — PATIENT INSTRUCTIONS
Stop xarelto 3 days before the procedure.    Stop diltiazem  mg daily.    We will give the ok for your surgery.

## 2025-03-24 NOTE — PROGRESS NOTES
Subjective   Shreya Acuña is a 89 y.o. female.    Chief Complaint:  Follow-up coronary artery disease, paroxysmal atrial fibrillation.  Hospital follow-up for abdominal pain.    HPI    She was hospitalized in 2025 with abdominal pain.  Ultimately she was found to have gallstones.  She is scheduled for surgery.  From a cardiac standpoint she has been doing well.  She has had no anginal symptoms.  Denies palpitations or tachycardia.  Tolerating her medications well.     We initially saw her for an abnormal EKG. It turned out that she had atrial fibrillation. We were able to control this on medical management and with the use of flecainide therapy.     Her past cardiac history is significant for a history of hypertension. There is no history of diabetes. There is a past smoking history. There is no family history of premature coronary artery disease. She is being treated for hyperlipidemia.     Diagnosis of coronary artery disease is based on findings of calcifications in the distribution of the coronary arteries seen on the CT scan of the chest done in 2022.     Allergies  Medication    · No Known Drug Allergies     Family History  Mother    · Family history of   Father    · Family history of      Social History   · Former smoker (V15.82) (Z87.891)   · Occasional alcohol use     Review of Systems   Constitutional: Positive for malaise/fatigue.   Cardiovascular:  Positive for dyspnea on exertion and leg swelling.   Musculoskeletal:  Positive for arthritis and joint pain.   Neurological:  Positive for weakness.   Abdomen: Abdominal pain.      Current Outpatient Medications   Medication Sig Dispense Refill    atorvastatin (Lipitor) 20 mg tablet Take 1 tablet (20 mg) by mouth once daily.      benzonatate (Tessalon) 100 mg capsule TAKE ONE CAPSULE BY MOUTH THREE TIMES A DAY AS NEEDED FOR COUGH FOR UP TO 7 DAYS.      carbidopa-levodopa (Sinemet)  mg tablet Take 3 tablets by mouth 3  times a day. 270 tablet 11    cephalexin (Keflex) 500 mg capsule       DILT- mg 24 hr capsule Take 1 capsule (120 mg) by mouth once daily. 90 capsule 3    DULoxetine (Cymbalta) 30 mg DR capsule Take 1 capsule (30 mg) by mouth 2 times a day. 180 capsule 2    Gemtesa 75 mg tablet Take 1 tablet (75 mg) by mouth early in the morning..      losartan (Cozaar) 100 mg tablet Take 1 tablet (100 mg) by mouth once daily. 90 tablet 3    metoprolol succinate XL (Toprol-XL) 50 mg 24 hr tablet Take 1 tablet (50 mg) by mouth once daily.      mirabegron (Myrbetriq) 25 mg tablet extended release 24 hr 24 hr tablet Take 1 tablet (25 mg) by mouth once daily. 30 tablet 11    mv-min-FA-vit K-lutein-zeaxant (PreserVision AREDS 2 Plus MV) 200 mcg-15 mcg- 5 mg-1 mg capsule Take by mouth once daily.      pregabalin (Lyrica) 100 mg capsule Take 1 capsule (100 mg) by mouth 2 times a day. 180 capsule 2    rivaroxaban (Xarelto) 10 mg tablet Take 1 tablet (10 mg) by mouth once daily. 90 tablet 3    spironolactone (Aldactone) 25 mg tablet Take 1 tablet (25 mg) by mouth once daily. 90 tablet 3    torsemide (Demadex) 20 mg tablet Take 1 tablet (20 mg) by mouth once daily. 90 tablet 3    albuterol 90 mcg/actuation inhaler Inhale 2 puffs every 4 hours if needed. (Patient not taking: Reported on 3/13/2025)      albuterol 90 mcg/actuation inhaler Inhale 2 puffs every 4 hours if needed for wheezing. 18 g 0     No current facility-administered medications for this visit.        Visit Vitals  /70 (BP Location: Left arm, Patient Position: Sitting)   Pulse 56   Wt 109 kg (241 lb)   BMI 42.69 kg/m²   Smoking Status Former   BSA 2.2 m²        Objective     Constitutional:       Appearance: Not in distress.   Neck:      Vascular: JVD normal.   Pulmonary:      Breath sounds: Normal breath sounds.   Cardiovascular:      Normal rate. Regular rhythm. Normal S1. Normal S2.       Murmurs: There is no murmur.      No gallop.    Pulses:     Intact distal  pulses.   Edema:     Peripheral edema absent.   Abdominal:      General: There is no distension.      Palpations: Abdomen is soft.   Neurological:      Mental Status: Alert.         Lab Review:   Lab Results   Component Value Date     01/21/2025    K 4.0 01/21/2025    CL 99 01/21/2025    CO2 32 01/21/2025    BUN 38 (H) 01/21/2025    CREATININE 1.62 (H) 01/21/2025    GLUCOSE 109 (H) 01/21/2025    CALCIUM 9.5 01/21/2025     Lab Results   Component Value Date    CHOL 102 11/12/2024    TRIG 139 11/12/2024    HDL 38.8 11/12/2024       Assessment:    1.  Coronary artery disease.  No anginal symptoms.  Continue medical management.  We reviewed her latest CT of the chest done in March 2025.  This shows moderate atherosclerosis in the distribution of the left anterior descending coronary artery.  No significant atherosclerosis in the circumflex and right coronary arteries.  This is consistent with single-vessel coronary disease.    2.  Permanent atrial fibrillation.  In atrial fibrillation with a controlled ventricular response.  She is on anticoagulation therapy.    3.  Hyperlipidemia.  Cholesterol 102, HDL 39, LDL 35.    4.  Hypertension.  Blood pressures are low.  Recommend to discontinue diltiazem as I do not think it is contributing very much to her treatment and her heart rates are relatively low.    5.  Preoperative evaluation prior to cholecystectomy.  She is cleared for an operative procedure from a cardiac standpoint.  Can stop Xarelto 48 hours prior to the procedure.

## 2025-04-03 ENCOUNTER — APPOINTMENT (OUTPATIENT)
Dept: UROLOGY | Facility: CLINIC | Age: 89
End: 2025-04-03
Payer: MEDICARE

## 2025-04-03 DIAGNOSIS — R35.0 FREQUENCY OF MICTURITION: ICD-10-CM

## 2025-04-03 DIAGNOSIS — N39.41 URGE INCONTINENCE: Primary | ICD-10-CM

## 2025-04-03 DIAGNOSIS — R39.15 URGENCY OF URINATION: ICD-10-CM

## 2025-04-03 PROCEDURE — 64566 NEUROELTRD STIM POST TIBIAL: CPT | Performed by: NURSE PRACTITIONER

## 2025-04-03 NOTE — PROGRESS NOTES
Patient ID: Shreya Acuña is a 89 y.o. female.    Percutaneous Tibial Nerve Stimulation    Date/Time: 4/3/2025 4:05 PM    Performed by: CHECO Siddiqi  Authorized by: CHECO Siddiqi    Procedure Details     Indications: urge incontinence, urinary frequency and urinary urgency      PTNS session #: monthly maintenance    Bladder symptoms: unchanged      Ankle used: right      Stimulator intensity (mA): 2        Follow up 4 weeks PTNS; Taking Gemtesa in combination with PTNS.  Bladder rough month with CHF trying to take off fluid and now having issues with gall bladder, scheduled for cholecystectomy; no UTI symptoms; Not interested in other 3rd line such as botox and implantable neuromodulation.  [employer] at night working; Patient not interested in other treatment options.

## 2025-04-07 RX ORDER — LANOLIN ALCOHOL/MO/W.PET/CERES
1 CREAM (GRAM) TOPICAL DAILY
COMMUNITY

## 2025-04-07 RX ORDER — VIT C/E/ZN/COPPR/LUTEIN/ZEAXAN 250MG-90MG
1 CAPSULE ORAL 2 TIMES DAILY
COMMUNITY

## 2025-04-07 RX ORDER — ACETAMINOPHEN 500 MG
2 TABLET ORAL 3 TIMES DAILY
COMMUNITY

## 2025-04-07 NOTE — PREPROCEDURE INSTRUCTIONS
Current Medications   Medication Instructions    acetaminophen (Tylenol) 500 mg tablet Take morning of surgery with sip of water    atorvastatin (Lipitor) 20 mg tablet Continue until night before surgery    carbidopa-levodopa (Sinemet)  mg tablet Take morning of surgery with sip of water    cholecalciferol (Vitamin D-3) 25 MCG (1000 UT) capsule Stop 3 days prior to surgery     cyanocobalamin (Vitamin B-12) 1,000 mcg tablet Stop 3 days prior to surgery    DILT- mg 24 hr capsule Continue until night before surgery    DULoxetine (Cymbalta) 30 mg DR capsule Take morning of surgery with sip of water    Gemtesa 75 mg tablet Take morning of surgery with sip of water    metoprolol succinate XL (Toprol-XL) 50 mg 24 hr tablet Take morning of surgery with sip of water    mv-min-FA-vit K-lutein-zeaxant (PreserVision AREDS 2 Plus MV) 200 mcg-15 mcg- 5 mg-1 mg capsule Stop 3 days prior to surgery    pregabalin (Lyrica) 100 mg capsule Take morning of surgery with sip of water    rivaroxaban (Xarelto) 10 mg tablet Last dose 4/6/25. Hold 2 days prior to surgery    spironolactone (Aldactone) 25 mg tablet Take morning of surgery with sip of water    torsemide (Demadex) 20 mg tablet Take morning of surgery with sip of water          NPO Instructions:    Do not eat any food after midnight the night before your surgery/procedure.  You may have 13 ounces of clear liquids until TWO hours before surgery/procedure. This includes water, black tea/coffee, (no milk or cream) apple juice and electrolyte drinks (Gatorade).  You may chew gum up to TWO hours before your surgery/procedure.    Additional Instructions:     The day before surgery, you'll be receiving a phone call from surgery scheduling with your times for surgery. If you don't receive a call by 2pm, call [phone].    Enter through the main entrance of [facility], located at [address] Proceed to registration, located on the right hand side of the  staircase. You will need your ID and insurance card for registration. Please ensure you have a responsible adult to drive you home. A responsible adult DOES NOT include rideshare service drivers (Uber, Lyft, etc), cab drivers, or public transportation drivers, but “provide a ride” is acceptable.    Take a shower before your procedure. After your shower avoid lotions, powders, deodorants or anything applied to the skin. If you wear contacts or glasses, wear the glasses. If you do not have glasses, please bring a case for your contacts. You may wear hearing aids and dentures, bring a case for them or we will provide one. Make sure you wear something loose and comfortable. Keep in mind your surgical procedure and wear something that will accommodate incisions or bandages. Please remove all jewelry and piercing's.     For further questions Beth CASTILLO can be contacted at [phone] between 7AM-3PM.

## 2025-04-10 ENCOUNTER — ANESTHESIA (OUTPATIENT)
Dept: OPERATING ROOM | Facility: HOSPITAL | Age: 89
End: 2025-04-10
Payer: MEDICARE

## 2025-04-10 ENCOUNTER — HOSPITAL ENCOUNTER (OUTPATIENT)
Facility: HOSPITAL | Age: 89
Setting detail: OUTPATIENT SURGERY
Discharge: HOME | DRG: 659 | End: 2025-04-10
Attending: SURGERY | Admitting: SURGERY
Payer: MEDICARE

## 2025-04-10 ENCOUNTER — ANESTHESIA EVENT (OUTPATIENT)
Dept: OPERATING ROOM | Facility: HOSPITAL | Age: 89
End: 2025-04-10
Payer: MEDICARE

## 2025-04-10 VITALS
HEART RATE: 68 BPM | BODY MASS INDEX: 42.58 KG/M2 | HEIGHT: 63 IN | WEIGHT: 240.3 LBS | TEMPERATURE: 97 F | DIASTOLIC BLOOD PRESSURE: 71 MMHG | RESPIRATION RATE: 16 BRPM | OXYGEN SATURATION: 100 % | SYSTOLIC BLOOD PRESSURE: 140 MMHG

## 2025-04-10 DIAGNOSIS — K82.8 BILIARY DYSKINESIA: Primary | ICD-10-CM

## 2025-04-10 DIAGNOSIS — G89.18 PAIN ASSOCIATED WITH SURGICAL PROCEDURE: ICD-10-CM

## 2025-04-10 DIAGNOSIS — I48.21 PERMANENT ATRIAL FIBRILLATION (MULTI): ICD-10-CM

## 2025-04-10 PROCEDURE — 2720000007 HC OR 272 NO HCPCS: Performed by: SURGERY

## 2025-04-10 PROCEDURE — 0FT44ZZ RESECTION OF GALLBLADDER, PERCUTANEOUS ENDOSCOPIC APPROACH: ICD-10-PCS | Performed by: SURGERY

## 2025-04-10 PROCEDURE — 88304 TISSUE EXAM BY PATHOLOGIST: CPT | Mod: TC,PARLAB | Performed by: SURGERY

## 2025-04-10 PROCEDURE — 3600000004 HC OR TIME - INITIAL BASE CHARGE - PROCEDURE LEVEL FOUR: Performed by: SURGERY

## 2025-04-10 PROCEDURE — 49593 RPR AA HRN 1ST 3-10 RDC: CPT | Performed by: SURGERY

## 2025-04-10 PROCEDURE — 0WQF4ZZ REPAIR ABDOMINAL WALL, PERCUTANEOUS ENDOSCOPIC APPROACH: ICD-10-PCS | Performed by: SURGERY

## 2025-04-10 PROCEDURE — 2780000003 HC OR 278 NO HCPCS: Performed by: SURGERY

## 2025-04-10 PROCEDURE — 2500000001 HC RX 250 WO HCPCS SELF ADMINISTERED DRUGS (ALT 637 FOR MEDICARE OP): Performed by: SURGERY

## 2025-04-10 PROCEDURE — 2500000005 HC RX 250 GENERAL PHARMACY W/O HCPCS: Performed by: SURGERY

## 2025-04-10 PROCEDURE — 99100 ANES PT EXTEME AGE<1 YR&>70: CPT | Performed by: ANESTHESIOLOGY

## 2025-04-10 PROCEDURE — 2500000004 HC RX 250 GENERAL PHARMACY W/ HCPCS (ALT 636 FOR OP/ED): Performed by: SURGERY

## 2025-04-10 PROCEDURE — 96372 THER/PROPH/DIAG INJ SC/IM: CPT | Performed by: SURGERY

## 2025-04-10 PROCEDURE — 7100000009 HC PHASE TWO TIME - INITIAL BASE CHARGE: Performed by: SURGERY

## 2025-04-10 PROCEDURE — 7100000002 HC RECOVERY ROOM TIME - EACH INCREMENTAL 1 MINUTE: Performed by: SURGERY

## 2025-04-10 PROCEDURE — 3600000009 HC OR TIME - EACH INCREMENTAL 1 MINUTE - PROCEDURE LEVEL FOUR: Performed by: SURGERY

## 2025-04-10 PROCEDURE — 2500000002 HC RX 250 W HCPCS SELF ADMINISTERED DRUGS (ALT 637 FOR MEDICARE OP, ALT 636 FOR OP/ED): Performed by: ANESTHESIOLOGY

## 2025-04-10 PROCEDURE — 3700000002 HC GENERAL ANESTHESIA TIME - EACH INCREMENTAL 1 MINUTE: Performed by: SURGERY

## 2025-04-10 PROCEDURE — A47562 PR LAP,CHOLECYSTECTOMY: Performed by: ANESTHESIOLOGIST ASSISTANT

## 2025-04-10 PROCEDURE — 88304 TISSUE EXAM BY PATHOLOGIST: CPT | Performed by: STUDENT IN AN ORGANIZED HEALTH CARE EDUCATION/TRAINING PROGRAM

## 2025-04-10 PROCEDURE — 7100000010 HC PHASE TWO TIME - EACH INCREMENTAL 1 MINUTE: Performed by: SURGERY

## 2025-04-10 PROCEDURE — 3700000001 HC GENERAL ANESTHESIA TIME - INITIAL BASE CHARGE: Performed by: SURGERY

## 2025-04-10 PROCEDURE — A47562 PR LAP,CHOLECYSTECTOMY: Performed by: ANESTHESIOLOGY

## 2025-04-10 PROCEDURE — 7100000001 HC RECOVERY ROOM TIME - INITIAL BASE CHARGE: Performed by: SURGERY

## 2025-04-10 PROCEDURE — 2500000004 HC RX 250 GENERAL PHARMACY W/ HCPCS (ALT 636 FOR OP/ED): Performed by: ANESTHESIOLOGY

## 2025-04-10 PROCEDURE — 2500000004 HC RX 250 GENERAL PHARMACY W/ HCPCS (ALT 636 FOR OP/ED): Performed by: ANESTHESIOLOGIST ASSISTANT

## 2025-04-10 PROCEDURE — 47562 LAPAROSCOPIC CHOLECYSTECTOMY: CPT | Performed by: SURGERY

## 2025-04-10 RX ORDER — CEFAZOLIN SODIUM 2 G/100ML
2 INJECTION, SOLUTION INTRAVENOUS ONCE
Status: COMPLETED | OUTPATIENT
Start: 2025-04-10 | End: 2025-04-10

## 2025-04-10 RX ORDER — SODIUM CHLORIDE 0.9 G/100ML
INJECTION, SOLUTION IRRIGATION AS NEEDED
Status: DISCONTINUED | OUTPATIENT
Start: 2025-04-10 | End: 2025-04-10 | Stop reason: HOSPADM

## 2025-04-10 RX ORDER — SODIUM CHLORIDE, SODIUM LACTATE, POTASSIUM CHLORIDE, CALCIUM CHLORIDE 600; 310; 30; 20 MG/100ML; MG/100ML; MG/100ML; MG/100ML
100 INJECTION, SOLUTION INTRAVENOUS CONTINUOUS
Status: ACTIVE | OUTPATIENT
Start: 2025-04-10 | End: 2025-04-10

## 2025-04-10 RX ORDER — HEPARIN SODIUM 5000 [USP'U]/ML
5000 INJECTION, SOLUTION INTRAVENOUS; SUBCUTANEOUS ONCE
Status: COMPLETED | OUTPATIENT
Start: 2025-04-10 | End: 2025-04-10

## 2025-04-10 RX ORDER — NORETHINDRONE AND ETHINYL ESTRADIOL 0.5-0.035
KIT ORAL CONTINUOUS PRN
Status: DISCONTINUED | OUTPATIENT
Start: 2025-04-10 | End: 2025-04-10

## 2025-04-10 RX ORDER — LIDOCAINE HYDROCHLORIDE 10 MG/ML
0.1 INJECTION, SOLUTION INFILTRATION; PERINEURAL ONCE
Status: DISCONTINUED | OUTPATIENT
Start: 2025-04-10 | End: 2025-04-10 | Stop reason: HOSPADM

## 2025-04-10 RX ORDER — SCOPOLAMINE 1 MG/3D
1 PATCH, EXTENDED RELEASE TRANSDERMAL
Status: DISCONTINUED | OUTPATIENT
Start: 2025-04-10 | End: 2025-04-10 | Stop reason: HOSPADM

## 2025-04-10 RX ORDER — BUPIVACAINE HYDROCHLORIDE 5 MG/ML
INJECTION, SOLUTION EPIDURAL; INTRACAUDAL; PERINEURAL AS NEEDED
Status: DISCONTINUED | OUTPATIENT
Start: 2025-04-10 | End: 2025-04-10 | Stop reason: HOSPADM

## 2025-04-10 RX ORDER — PROPOFOL 10 MG/ML
INJECTION, EMULSION INTRAVENOUS AS NEEDED
Status: DISCONTINUED | OUTPATIENT
Start: 2025-04-10 | End: 2025-04-10

## 2025-04-10 RX ORDER — PHENYLEPHRINE HCL IN 0.9% NACL 1 MG/10 ML
SYRINGE (ML) INTRAVENOUS AS NEEDED
Status: DISCONTINUED | OUTPATIENT
Start: 2025-04-10 | End: 2025-04-10

## 2025-04-10 RX ORDER — FENTANYL CITRATE 50 UG/ML
INJECTION, SOLUTION INTRAMUSCULAR; INTRAVENOUS AS NEEDED
Status: DISCONTINUED | OUTPATIENT
Start: 2025-04-10 | End: 2025-04-10

## 2025-04-10 RX ORDER — GABAPENTIN 300 MG/1
300 CAPSULE ORAL ONCE
Status: COMPLETED | OUTPATIENT
Start: 2025-04-10 | End: 2025-04-10

## 2025-04-10 RX ORDER — CELECOXIB 100 MG/1
400 CAPSULE ORAL ONCE
Status: COMPLETED | OUTPATIENT
Start: 2025-04-10 | End: 2025-04-10

## 2025-04-10 RX ORDER — ONDANSETRON HYDROCHLORIDE 2 MG/ML
4 INJECTION, SOLUTION INTRAVENOUS ONCE AS NEEDED
Status: DISCONTINUED | OUTPATIENT
Start: 2025-04-10 | End: 2025-04-10 | Stop reason: HOSPADM

## 2025-04-10 RX ORDER — PROCHLORPERAZINE EDISYLATE 5 MG/ML
5 INJECTION INTRAMUSCULAR; INTRAVENOUS ONCE AS NEEDED
Status: DISCONTINUED | OUTPATIENT
Start: 2025-04-10 | End: 2025-04-10 | Stop reason: HOSPADM

## 2025-04-10 RX ORDER — IPRATROPIUM BROMIDE 0.5 MG/2.5ML
500 SOLUTION RESPIRATORY (INHALATION) ONCE
Status: DISCONTINUED | OUTPATIENT
Start: 2025-04-10 | End: 2025-04-10 | Stop reason: HOSPADM

## 2025-04-10 RX ORDER — ALBUTEROL SULFATE 0.83 MG/ML
2.5 SOLUTION RESPIRATORY (INHALATION) ONCE AS NEEDED
Status: COMPLETED | OUTPATIENT
Start: 2025-04-10 | End: 2025-04-10

## 2025-04-10 RX ORDER — ACETAMINOPHEN 325 MG/1
650 TABLET ORAL EVERY 4 HOURS PRN
Status: DISCONTINUED | OUTPATIENT
Start: 2025-04-10 | End: 2025-04-10 | Stop reason: HOSPADM

## 2025-04-10 RX ORDER — OXYCODONE HYDROCHLORIDE 5 MG/1
5 TABLET ORAL EVERY 6 HOURS PRN
Qty: 10 TABLET | Refills: 0 | Status: ON HOLD | OUTPATIENT
Start: 2025-04-10 | End: 2025-04-21

## 2025-04-10 RX ORDER — LIDOCAINE HCL/PF 100 MG/5ML
SYRINGE (ML) INTRAVENOUS AS NEEDED
Status: DISCONTINUED | OUTPATIENT
Start: 2025-04-10 | End: 2025-04-10

## 2025-04-10 RX ORDER — HYDROMORPHONE HYDROCHLORIDE 1 MG/ML
INJECTION, SOLUTION INTRAMUSCULAR; INTRAVENOUS; SUBCUTANEOUS AS NEEDED
Status: DISCONTINUED | OUTPATIENT
Start: 2025-04-10 | End: 2025-04-10

## 2025-04-10 RX ORDER — ROCURONIUM BROMIDE 10 MG/ML
INJECTION, SOLUTION INTRAVENOUS AS NEEDED
Status: DISCONTINUED | OUTPATIENT
Start: 2025-04-10 | End: 2025-04-10

## 2025-04-10 RX ORDER — ONDANSETRON HYDROCHLORIDE 2 MG/ML
INJECTION, SOLUTION INTRAVENOUS AS NEEDED
Status: DISCONTINUED | OUTPATIENT
Start: 2025-04-10 | End: 2025-04-10

## 2025-04-10 RX ORDER — ACETAMINOPHEN 325 MG/1
975 TABLET ORAL ONCE
Status: COMPLETED | OUTPATIENT
Start: 2025-04-10 | End: 2025-04-10

## 2025-04-10 RX ORDER — GLYCOPYRROLATE 0.2 MG/ML
INJECTION INTRAMUSCULAR; INTRAVENOUS AS NEEDED
Status: DISCONTINUED | OUTPATIENT
Start: 2025-04-10 | End: 2025-04-10

## 2025-04-10 RX ORDER — MEPERIDINE HYDROCHLORIDE 50 MG/ML
12.5 INJECTION INTRAMUSCULAR; INTRAVENOUS; SUBCUTANEOUS EVERY 10 MIN PRN
Status: DISCONTINUED | OUTPATIENT
Start: 2025-04-10 | End: 2025-04-10 | Stop reason: HOSPADM

## 2025-04-10 RX ADMIN — PROPOFOL 120 MG: 10 INJECTION, EMULSION INTRAVENOUS at 10:45

## 2025-04-10 RX ADMIN — GLYCOPYRROLATE 0.2 MG: 0.2 INJECTION, SOLUTION INTRAMUSCULAR; INTRAVENOUS at 10:54

## 2025-04-10 RX ADMIN — LIDOCAINE HYDROCHLORIDE 50 MG: 20 INJECTION INTRAVENOUS at 10:45

## 2025-04-10 RX ADMIN — Medication 100 MCG: at 10:56

## 2025-04-10 RX ADMIN — CELECOXIB 400 MG: 100 CAPSULE ORAL at 09:14

## 2025-04-10 RX ADMIN — HYDROMORPHONE HYDROCHLORIDE 0.5 MG: 1 INJECTION, SOLUTION INTRAMUSCULAR; INTRAVENOUS; SUBCUTANEOUS at 11:10

## 2025-04-10 RX ADMIN — ALBUTEROL SULFATE 2.5 MG: 2.5 SOLUTION RESPIRATORY (INHALATION) at 15:05

## 2025-04-10 RX ADMIN — FENTANYL CITRATE 100 MCG: 50 INJECTION, SOLUTION INTRAMUSCULAR; INTRAVENOUS at 10:45

## 2025-04-10 RX ADMIN — PROPOFOL 10 MG: 10 INJECTION, EMULSION INTRAVENOUS at 11:56

## 2025-04-10 RX ADMIN — PROPOFOL 10 MG: 10 INJECTION, EMULSION INTRAVENOUS at 12:00

## 2025-04-10 RX ADMIN — ROCURONIUM BROMIDE 50 MG: 10 INJECTION INTRAVENOUS at 10:45

## 2025-04-10 RX ADMIN — ONDANSETRON 4 MG: 2 INJECTION INTRAMUSCULAR; INTRAVENOUS at 11:50

## 2025-04-10 RX ADMIN — GLYCOPYRROLATE 0.2 MG: 0.2 INJECTION, SOLUTION INTRAMUSCULAR; INTRAVENOUS at 10:57

## 2025-04-10 RX ADMIN — HYDROMORPHONE HYDROCHLORIDE 0.2 MG: 1 INJECTION, SOLUTION INTRAMUSCULAR; INTRAVENOUS; SUBCUTANEOUS at 12:52

## 2025-04-10 RX ADMIN — Medication 200 MCG: at 11:04

## 2025-04-10 RX ADMIN — DEXAMETHASONE SODIUM PHOSPHATE 4 MG: 4 INJECTION, SOLUTION INTRAMUSCULAR; INTRAVENOUS at 10:55

## 2025-04-10 RX ADMIN — ACETAMINOPHEN 975 MG: 325 TABLET, FILM COATED ORAL at 09:14

## 2025-04-10 RX ADMIN — GABAPENTIN 300 MG: 300 CAPSULE ORAL at 09:15

## 2025-04-10 RX ADMIN — SCOPOLAMINE 1 PATCH: 1.5 PATCH, EXTENDED RELEASE TRANSDERMAL at 09:15

## 2025-04-10 RX ADMIN — HEPARIN SODIUM 5000 UNITS: 5000 INJECTION, SOLUTION INTRAVENOUS; SUBCUTANEOUS at 09:15

## 2025-04-10 RX ADMIN — SODIUM CHLORIDE, POTASSIUM CHLORIDE, SODIUM LACTATE AND CALCIUM CHLORIDE: 600; 310; 30; 20 INJECTION, SOLUTION INTRAVENOUS at 10:36

## 2025-04-10 RX ADMIN — PROPOFOL 20 MG: 10 INJECTION, EMULSION INTRAVENOUS at 11:52

## 2025-04-10 RX ADMIN — LIDOCAINE HYDROCHLORIDE 50 MG: 20 INJECTION INTRAVENOUS at 11:46

## 2025-04-10 RX ADMIN — CEFAZOLIN SODIUM 2 G: 2 INJECTION, SOLUTION INTRAVENOUS at 10:51

## 2025-04-10 RX ADMIN — SUGAMMADEX 400 MG: 100 INJECTION, SOLUTION INTRAVENOUS at 11:55

## 2025-04-10 RX ADMIN — Medication 100 MCG: at 10:57

## 2025-04-10 RX ADMIN — HYDROMORPHONE HYDROCHLORIDE 0.2 MG: 1 INJECTION, SOLUTION INTRAMUSCULAR; INTRAVENOUS; SUBCUTANEOUS at 13:06

## 2025-04-10 SDOH — HEALTH STABILITY: MENTAL HEALTH: CURRENT SMOKER: 0

## 2025-04-10 ASSESSMENT — PAIN SCALES - GENERAL
PAINLEVEL_OUTOF10: 3
PAINLEVEL_OUTOF10: 0 - NO PAIN
PAINLEVEL_OUTOF10: 3
PAINLEVEL_OUTOF10: 6
PAINLEVEL_OUTOF10: 0 - NO PAIN
PAINLEVEL_OUTOF10: 3
PAINLEVEL_OUTOF10: 0 - NO PAIN
PAINLEVEL_OUTOF10: 3
PAIN_LEVEL: 0
PAINLEVEL_OUTOF10: 3
PAINLEVEL_OUTOF10: 0 - NO PAIN
PAINLEVEL_OUTOF10: 3

## 2025-04-10 ASSESSMENT — PAIN - FUNCTIONAL ASSESSMENT
PAIN_FUNCTIONAL_ASSESSMENT: 0-10

## 2025-04-10 ASSESSMENT — PAIN DESCRIPTION - DESCRIPTORS: DESCRIPTORS: ACHING

## 2025-04-10 NOTE — OP NOTE
LAPAROSCOPIC CHOLECYSTECTOMY and UMBILICAL HERNIA REPAIR Operative Note     Date: 4/10/2025  OR Location: [facility]    Name: Shreya Acuña, : 1935, Age: 89 y.o., MRN: [id number], Sex: female    Diagnosis  Pre-op Diagnosis      * Biliary dyskinesia [K82.8] Post-op Diagnosis     * Biliary dyskinesia [K82.8]     Procedures  LAPAROSCOPIC CHOLECYSTECTOMY  [id number] - [place] LAPAROSCOPY SURG CHOLECYSTECTOMY    REPAIR, HERNIA, UMBILICAL  [id number] - CA RPR AA HERNIA 1ST 3-10 CM REDUCIBLE      Surgeons      * Henrique Helm - Primary    Resident/Fellow/Other Assistant:  Surgeons and Role:  * No surgeons found with a matching role *    Staff:   Circulator: Clara  Scrub Person: Clara  Surgical Assistant: Jessica  Surgical Assistant: Pepper  Surgical Assistant: Mazin Urias Circulator: Licha    Anesthesia Staff: Anesthesiologist: Quan Nicholas MD  C-AA: LAWANDA Hernandez  TIMUR: Jeff Richmond    Procedure Summary  Anesthesia: General  ASA: III  Estimated Blood Loss: 20 mL  Intra-op Medications:   Administrations occurring from 0940 to 1130 on 04/10/25:   Medication Name Total Dose   ceFAZolin (Ancef) 2 g in dextrose (iso)  mL 2 g   dexAMETHasone (Decadron) injection 4 mg/mL 4 mg   fentaNYL (Sublimaze) injection 50 mcg/mL 100 mcg   glycopyrrolate (Robinul) injection 0.4 mg   HYDROmorphone (Dilaudid) injection 1 mg/mL 0.5 mg   LR bolus Cannot be calculated   lidocaine (cardiac) injection 2% prefilled syringe 50 mg   phenylephrine 100 mcg/mL syringe 10 mL (prefilled) 400 mcg   propofol (Diprivan) injection 10 mg/mL 120 mg   rocuronium (ZeMuron) 50 mg/5 mL injection 50 mg              Anesthesia Record               Intraprocedure I/O Totals          Intake    LR bolus 600.00 mL    ceFAZolin (Ancef) 2 g in dextrose (iso)  mL 100.00 mL    Total Intake 700 mL          Specimen:   ID Type Source Tests Collected by Time   1 : GALLBLADDER Tissue GALLBLADDER CHOLECYSTECTOMY SURGICAL PATHOLOGY EXAM Henrique GAITAN  MD Alicja PhD 4/10/2025 1141                 Drains and/or Catheters: * None in log *    Tourniquet Times:         Implants:     Findings:     Massive floppy gallbladder with omental and duodenal attachments. Obvious cystic duct and artery. Small accessory branch to duct also clipped. Large pulsatile secondary blood supply to gallbladder from cystic plate, midway up.    >3 cm umbilical hernia, reducible, repaired below site of 12 mm port access.              Indications: Shreya Acuña is an 89 y.o. female who is having surgery for Biliary dyskinesia [K82.8].     The patient was seen in the preoperative area. The risks, benefits, complications, treatment options, non-operative alternatives, expected recovery and outcomes were discussed with the patient. The possibilities of reaction to medication, pulmonary aspiration, injury to surrounding structures, bleeding, recurrent infection, the need for additional procedures, failure to diagnose a condition, and creating a complication requiring transfusion or operation were discussed with the patient. The patient concurred with the proposed plan, giving informed consent.  The site of surgery was properly noted/marked if necessary per policy. The patient has been actively warmed in preoperative area. Preoperative antibiotics have been ordered and given within 1 hours of incision. Venous thrombosis prophylaxis have been ordered including bilateral sequential compression devices and chemical prophylaxis    Procedure Details: During the preoperative huddle the patient's identifiers, consent, operation details, and equipment was verified. The patient was taken to the operating room and then placed in the supine position with both arms abducted and pressure points were padded. Sequential compression stockings were placed and general endotracheal anesthesia was administered. The patient was then prepped and draped in the usual sterile fashion. Prophylactic antibiotics were  administered. A surgical timeout was then performed confirming the correct patient, procedure, position, equipment, and any necessary operative implants.     Access to the abdomen was gained through a right midclavicular incision and utilized a 0° 5 mm laparoscope with an Optiview trocar. The abdomen was insufflated to a pressure of 15 mmHg after peritoneal access was obtained and there was no injury to bowel or surrounding structures visualized. A 12 mm port was then placed above a large palpated umbilical hernia with a fascial bridge between them. An additional 5 mm port was placed in subxiphoid region, and an additional 5 mm trocar was placed at the right anterior axillary line. All ports were placed under direct visualization.    The gallbladder was truly massive. It was adhered throughout the dome and body to the omentum, which was peeled off with cautery. The infundibulum was adhered to the duodenum, which was taken off far from the serosa. The gallbladder was then grasped and retracted cephalad by the dome and inferiorly and laterally at the infundibulum to open up the [place]. Gross fatty attachments were taken off the gallbladder with electrocautery, avoiding contact with underlying structures. Next, the peritoneal attachments on both the medial and lateral surfaces as well as the anterior surface of the gallbladder were taken with electrocautery. A small accessory branch to the duct from the artery was clipped twice. The [place] was then dissected out and freed of any fatty attachments. Carrying on this dissection further revealed 2 and only 2 structures entering the gallbladder. The cystic plate was then dissected up about 30% of the gallbladder. The critical view of safety was then obtained. The cystic artery was taken with two clips proximally and one distally. The cystic duct was taken close the infundibulum with three clips distally and one proximally. One lumen was visualized for  each of these structures.     The gallbladder was then dissected off of the cystic plate using hook electrocautery. An additional pulsatile structure into the gallbladder was controlled with cautery. A small vein at the edge of the liver was entered as it was superficial and was cauterized closed. Both were hemostatic. The liver bed was hemostatic and there was no bile spillage. Surgicel powder was applied after irrigation which was clear.    The gallbladder was placed into an Endo Catch bag after being decompressed some so it would fit, and removed through the 12 mm port. The 12 mm port site merged with the umbilical hernia and both were closed with running 0 PDS suture. An 0 vicryl was applied in the middle for reinforcement and the wound was irrigated. The abdomen was then re-insufflated and the suture was found to be adequate without any abdominal contents stuck the suture. In addition the liver bed was re-assessed and found to be hemostatic without any bile. Irrigation of the cystic plate was completed, which was clear.     The abdomen was desufflated under direct visualization. All ports were removed. All counts were correct. The port sites were closed with a 4-0 Monocryl in the subcuticular plane. Dermabond was applied.    The patient tolerated the procedure well. The patient was transported to the PACU in stable condition. I was present and scrubbed for all critical portions of the case.    Complications:  None; patient tolerated the procedure well.    Disposition: PACU - hemodynamically stable.  Condition: stable         Task Performed by RNFA or Surgical Assistant:  Camera, Exposure, Closure.          Additional Details: None    Attending Attestation: I was present and scrubbed for the entire procedure.    Henrique Helm  Phone Number: [phone]

## 2025-04-10 NOTE — ANESTHESIA PREPROCEDURE EVALUATION
Patient: Shreya Acuña    Procedure Information       Date/Time: 04/10/25 0940    Procedure: LAPAROSCOPIC CHOLECYSTECTOMY    Location: [facility] 09 / [facility]    Surgeons: Henrique Helm MD PhD            Relevant Problems   Cardiac   (+) Atypical chest pain   (+) Coronary artery disease   (+) Hyperlipidemia   (+) Hypertension   (+) Permanent atrial fibrillation (Multi)      Neuro   (+) Lumbosacral neuritis      Liver   (+) Cholelithiasis      Endocrine   (+) Class 3 severe obesity with body mass index (BMI) of 40.0 to 44.9 in adult      Musculoskeletal   (+) Chronic low back pain   (+) Lumbar disc herniation   (+) Lumbosacral stenosis   (+) Osteoarthritis of knees, bilateral       Clinical information reviewed:    Allergies  Meds  Problems              NPO Detail:  No data recorded     Physical Exam    Airway  Mallampati: II  TM distance: >3 FB  Neck ROM: full     Cardiovascular - normal exam  Rhythm: regular  Rate: normal     Dental - normal exam  (+) upper dentures, lower dentures     Pulmonary - normal exam     Abdominal            Anesthesia Plan    History of general anesthesia?: no  History of complications of general anesthesia?: unknown/emergency    ASA 3     general   (Possible A-line placement)  The patient is not a current smoker.  Education provided regarding risk of obstructive sleep apnea.  intravenous induction   Postoperative administration of opioids is intended.  Trial extubation is planned.  Anesthetic plan and risks discussed with patient.  Use of blood products discussed with patient who.    Plan discussed with CAA.

## 2025-04-10 NOTE — BRIEF OP NOTE
Date: 4/10/2025  OR Location: [facility] OR    Name: Shreya Acuña, : 1935, Age: 89 y.o., MRN: [id number], Sex: female    Diagnosis  Pre-op Diagnosis      * Biliary dyskinesia [K82.8] Post-op Diagnosis     * Biliary dyskinesia [K82.8]     Procedures  LAPAROSCOPIC CHOLECYSTECTOMY  13278 - CO LAPAROSCOPY SURG CHOLECYSTECTOMY      Surgeons      * Henrique Helm - Primary    Resident/Fellow/Other Assistant:  Surgeons and Role:  * No surgeons found with a matching role *    Staff:   Circulator: Clara  Scrub Person: Clara  Surgical Assistant: Jessica  Surgical Assistant: Pepper  Surgical Assistant: Mazin Urias Circulator: Licha    Anesthesia Staff: Anesthesiologist: Quan Nicholas MD  C-AA: LAWANDA Hernandez  TIMUR: Jeff Richmond    Procedure Summary  Anesthesia: General  ASA: III  Estimated Blood Loss: 10mL  Intra-op Medications:   Administrations occurring from 0940 to 1130 on 04/10/25:   Medication Name Total Dose   dexAMETHasone (Decadron) injection 4 mg/mL 4 mg   fentaNYL (Sublimaze) injection 50 mcg/mL 100 mcg   glycopyrrolate (Robinul) injection 0.4 mg   HYDROmorphone (Dilaudid) injection 1 mg/mL 0.5 mg   LR bolus Cannot be calculated   lidocaine (cardiac) injection 2% prefilled syringe 50 mg   phenylephrine 100 mcg/mL syringe 10 mL (prefilled) 400 mcg   propofol (Diprivan) injection 10 mg/mL 120 mg   rocuronium (ZeMuron) 50 mg/5 mL injection 50 mg   ceFAZolin (Ancef) 2 g in dextrose (iso)  mL 2 g              Anesthesia Record               Intraprocedure I/O Totals          Intake    LR bolus 500.00 mL    ceFAZolin (Ancef) 2 g in dextrose (iso)  mL 100.00 mL    Total Intake 600 mL          Specimen:   ID Type Source Tests Collected by Time   1 : GALLBLADDER Tissue GALLBLADDER CHOLECYSTECTOMY SURGICAL PATHOLOGY EXAM Henrique Helm MD PhD 4/10/2025 1141                  Findings: Very large, distended gallbladder     Complications:  None; patient tolerated the procedure well.      Disposition: PACU - hemodynamically stable.  Condition: stable  Specimens Collected:   ID Type Source Tests Collected by Time   1 : GALLBLADDER Tissue GALLBLADDER CHOLECYSTECTOMY SURGICAL PATHOLOGY EXAM Henrique Helm MD PhD 4/10/2025 1141     Attending Attestation:     Henrique Helm  Phone Number: [phone]

## 2025-04-10 NOTE — ANESTHESIA PROCEDURE NOTES
Airway  Date/Time: 4/10/2025 10:49 AM  Urgency: elective    Airway not difficult    Staffing  Performed: TIMUR   Authorized by: Quan Nicholas MD    Performed by: LAWANDA Hernandez  Patient location during procedure: OR    Indications and Patient Condition  Indications for airway management: anesthesia  Spontaneous Ventilation: absent  Sedation level: deep  Preoxygenated: yes  Patient position: sniffing  MILS maintained throughout  Mask difficulty assessment: 1 - vent by mask  Planned trial extubation    Final Airway Details  Final airway type: endotracheal airway         Successful intubation technique: video laryngoscopy  Facilitating devices/methods: intubating stylet  Endotracheal tube insertion site: oral  Blade: Ting  Blade size: #3  Cormack-Lehane Classification: grade I - full view of glottis  Placement verified by: chest auscultation and capnometry   Measured from: lips  ETT to lips (cm): 20  Number of attempts at approach: 1  Number of other approaches attempted: 0    Additional Comments  Calabrese mac3

## 2025-04-10 NOTE — ANESTHESIA POSTPROCEDURE EVALUATION
Patient: Shreya Acuña    Procedure Summary       Date: 04/10/25 Room / Location: [facility] OR 09 / Virtual [facility] OR    Anesthesia Start: 1036 Anesthesia Stop: 1224    Procedures:       LAPAROSCOPIC CHOLECYSTECTOMY      REPAIR, HERNIA, UMBILICAL Diagnosis:       Biliary dyskinesia      (Biliary dyskinesia [K82.8])    Surgeons: Henrique Helm MD PhD Responsible Provider: Quan Nicholas MD    Anesthesia Type: general ASA Status: 3            Anesthesia Type: general    Vitals Value Taken Time   /93 04/10/25 1230   Temp 36.1 °C (97 °F) 04/10/25 1224   Pulse 61 04/10/25 1237   Resp 16 04/10/25 1230   SpO2 95 % 04/10/25 1237   Vitals shown include unfiled device data.    Anesthesia Post Evaluation    Patient location during evaluation: PACU  Patient participation: waiting for patient participation  Level of consciousness: sleepy but conscious  Pain score: 0  Pain management: adequate  Airway patency: patent  Cardiovascular status: acceptable and hemodynamically stable  Respiratory status: face mask  Hydration status: acceptable  Postoperative Nausea and Vomiting: none        There were no known notable events for this encounter.

## 2025-04-11 ENCOUNTER — TELEPHONE (OUTPATIENT)
Dept: SURGERY | Facility: CLINIC | Age: 89
End: 2025-04-11
Payer: MEDICARE

## 2025-04-11 ENCOUNTER — LAB REQUISITION (OUTPATIENT)
Dept: LAB | Facility: HOSPITAL | Age: 89
End: 2025-04-11
Payer: MEDICARE

## 2025-04-11 DIAGNOSIS — R09.1 PLEURISY: Primary | ICD-10-CM

## 2025-04-11 DIAGNOSIS — Z90.49 S/P LAPAROSCOPIC CHOLECYSTECTOMY: ICD-10-CM

## 2025-04-11 DIAGNOSIS — Z90.49 ACQUIRED ABSENCE OF OTHER SPECIFIED PARTS OF DIGESTIVE TRACT: ICD-10-CM

## 2025-04-11 DIAGNOSIS — K82.8 BILIARY DYSKINESIA: ICD-10-CM

## 2025-04-11 LAB
ALBUMIN SERPL BCP-MCNC: 4.4 G/DL (ref 3.4–5)
ALP SERPL-CCNC: 111 U/L (ref 33–136)
ALT SERPL W P-5'-P-CCNC: 8 U/L (ref 7–45)
ANION GAP SERPL CALC-SCNC: 14 MMOL/L (ref 10–20)
AST SERPL W P-5'-P-CCNC: 32 U/L (ref 9–39)
BILIRUB DIRECT SERPL-MCNC: 0.1 MG/DL (ref 0–0.3)
BILIRUB SERPL-MCNC: 0.6 MG/DL (ref 0–1.2)
BUN SERPL-MCNC: 35 MG/DL (ref 6–23)
CALCIUM SERPL-MCNC: 9.7 MG/DL (ref 8.6–10.3)
CHLORIDE SERPL-SCNC: 97 MMOL/L (ref 98–107)
CO2 SERPL-SCNC: 31 MMOL/L (ref 21–32)
CREAT SERPL-MCNC: 2.29 MG/DL (ref 0.5–1.05)
EGFRCR SERPLBLD CKD-EPI 2021: 20 ML/MIN/1.73M*2
ERYTHROCYTE [DISTWIDTH] IN BLOOD BY AUTOMATED COUNT: 13.4 % (ref 11.5–14.5)
GLUCOSE SERPL-MCNC: 109 MG/DL (ref 74–99)
HCT VFR BLD AUTO: 37.5 % (ref 36–46)
HGB BLD-MCNC: 11.8 G/DL (ref 12–16)
MCH RBC QN AUTO: 33.1 PG (ref 26–34)
MCHC RBC AUTO-ENTMCNC: 31.5 G/DL (ref 32–36)
MCV RBC AUTO: 105 FL (ref 80–100)
NRBC BLD-RTO: 0 /100 WBCS (ref 0–0)
PLATELET # BLD AUTO: 163 X10*3/UL (ref 150–450)
POTASSIUM SERPL-SCNC: 5.3 MMOL/L (ref 3.5–5.3)
PROT SERPL-MCNC: 6.7 G/DL (ref 6.4–8.2)
RBC # BLD AUTO: 3.56 X10*6/UL (ref 4–5.2)
SODIUM SERPL-SCNC: 137 MMOL/L (ref 136–145)
WBC # BLD AUTO: 9.3 X10*3/UL (ref 4.4–11.3)

## 2025-04-11 PROCEDURE — 85027 COMPLETE CBC AUTOMATED: CPT

## 2025-04-11 PROCEDURE — 36415 COLL VENOUS BLD VENIPUNCTURE: CPT

## 2025-04-11 PROCEDURE — 80053 COMPREHEN METABOLIC PANEL: CPT

## 2025-04-11 PROCEDURE — 99285 EMERGENCY DEPT VISIT HI MDM: CPT | Performed by: EMERGENCY MEDICINE

## 2025-04-11 PROCEDURE — 82248 BILIRUBIN DIRECT: CPT

## 2025-04-11 ASSESSMENT — PAIN SCALES - GENERAL: PAINLEVEL_OUTOF10: 2

## 2025-04-11 NOTE — PROGRESS NOTES
Called [phone] twice to discuss acute on chronic kidney injury was only finding from labs. No one picked up.

## 2025-04-11 NOTE — TELEPHONE ENCOUNTER
Returned call from patient's  regarding some questions. Provided my direct phone number for return call.

## 2025-04-11 NOTE — TELEPHONE ENCOUNTER
" called back along with her home nurse. Patient's pulse ox is \"all over the place,\"  registering primarily at 88%, didn't urinate throughout the night with just some small droplets since nurse has been there. I made them aware that I would be calling them right back after I hear from Dr. Helm.  I secure message was sent to Dr. Helm who wanted the following: \"CMP. CBC. Direct bili. All stat. \" Ordered.  Patient's  was notified that the orders were placed, and that per Dr. Helm they wanted her to get to a lab to get these orders done as soon as she is able.   verbalized understanding.   "

## 2025-04-12 ENCOUNTER — APPOINTMENT (OUTPATIENT)
Dept: RADIOLOGY | Facility: HOSPITAL | Age: 89
DRG: 659 | End: 2025-04-12
Payer: MEDICARE

## 2025-04-12 ENCOUNTER — HOSPITAL ENCOUNTER (INPATIENT)
Facility: HOSPITAL | Age: 89
DRG: 659 | End: 2025-04-12
Attending: EMERGENCY MEDICINE | Admitting: INTERNAL MEDICINE
Payer: MEDICARE

## 2025-04-12 DIAGNOSIS — M54.17 LUMBOSACRAL NEURITIS: ICD-10-CM

## 2025-04-12 DIAGNOSIS — N17.9 AKI (ACUTE KIDNEY INJURY): Primary | ICD-10-CM

## 2025-04-12 DIAGNOSIS — R06.02 SHORTNESS OF BREATH AT REST: ICD-10-CM

## 2025-04-12 DIAGNOSIS — G89.18 PAIN ASSOCIATED WITH SURGICAL PROCEDURE: ICD-10-CM

## 2025-04-12 DIAGNOSIS — R41.0 DELIRIUM: ICD-10-CM

## 2025-04-12 DIAGNOSIS — M51.26 LUMBAR DISC HERNIATION: ICD-10-CM

## 2025-04-12 DIAGNOSIS — G20.A1 PARKINSON'S DISEASE, UNSPECIFIED WHETHER DYSKINESIA PRESENT, UNSPECIFIED WHETHER MANIFESTATIONS FLUCTUATE: ICD-10-CM

## 2025-04-12 DIAGNOSIS — R33.9 URINARY RETENTION: ICD-10-CM

## 2025-04-12 DIAGNOSIS — N18.4 CHRONIC KIDNEY DISEASE, STAGE 4 (SEVERE) (MULTI): ICD-10-CM

## 2025-04-12 DIAGNOSIS — G93.41 METABOLIC ENCEPHALOPATHY: ICD-10-CM

## 2025-04-12 DIAGNOSIS — I10 PRIMARY HYPERTENSION: ICD-10-CM

## 2025-04-12 PROBLEM — R14.0 ABDOMINAL DISTENTION: Status: ACTIVE | Noted: 2025-04-12

## 2025-04-12 PROBLEM — N18.9 ACUTE KIDNEY INJURY SUPERIMPOSED ON CKD: Status: ACTIVE | Noted: 2025-04-12

## 2025-04-12 LAB
ALBUMIN SERPL BCP-MCNC: 4.3 G/DL (ref 3.4–5)
ALP SERPL-CCNC: 116 U/L (ref 33–136)
ALT SERPL W P-5'-P-CCNC: 7 U/L (ref 7–45)
ANION GAP SERPL CALC-SCNC: 12 MMOL/L (ref 10–20)
ANION GAP SERPL CALC-SCNC: 13 MMOL/L (ref 10–20)
AST SERPL W P-5'-P-CCNC: 36 U/L (ref 9–39)
BASOPHILS # BLD AUTO: 0.02 X10*3/UL (ref 0–0.1)
BASOPHILS NFR BLD AUTO: 0.3 %
BILIRUB SERPL-MCNC: 0.5 MG/DL (ref 0–1.2)
BUN SERPL-MCNC: 40 MG/DL (ref 6–23)
BUN SERPL-MCNC: 40 MG/DL (ref 6–23)
CALCIUM SERPL-MCNC: 8.7 MG/DL (ref 8.6–10.3)
CALCIUM SERPL-MCNC: 9 MG/DL (ref 8.6–10.3)
CHLORIDE SERPL-SCNC: 95 MMOL/L (ref 98–107)
CHLORIDE SERPL-SCNC: 97 MMOL/L (ref 98–107)
CO2 SERPL-SCNC: 28 MMOL/L (ref 21–32)
CO2 SERPL-SCNC: 28 MMOL/L (ref 21–32)
CREAT SERPL-MCNC: 2.44 MG/DL (ref 0.5–1.05)
CREAT SERPL-MCNC: 2.59 MG/DL (ref 0.5–1.05)
CREAT UR-MCNC: 125.3 MG/DL (ref 20–320)
CREAT UR-MCNC: 125.3 MG/DL (ref 20–320)
EGFRCR SERPLBLD CKD-EPI 2021: 17 ML/MIN/1.73M*2
EGFRCR SERPLBLD CKD-EPI 2021: 19 ML/MIN/1.73M*2
EOSINOPHIL # BLD AUTO: 0.03 X10*3/UL (ref 0–0.4)
EOSINOPHIL NFR BLD AUTO: 0.4 %
ERYTHROCYTE [DISTWIDTH] IN BLOOD BY AUTOMATED COUNT: 13.6 % (ref 11.5–14.5)
GLUCOSE SERPL-MCNC: 93 MG/DL (ref 74–99)
GLUCOSE SERPL-MCNC: 95 MG/DL (ref 74–99)
HCT VFR BLD AUTO: 35.9 % (ref 36–46)
HGB BLD-MCNC: 11 G/DL (ref 12–16)
IMM GRANULOCYTES # BLD AUTO: 0.03 X10*3/UL (ref 0–0.5)
IMM GRANULOCYTES NFR BLD AUTO: 0.4 % (ref 0–0.9)
LYMPHOCYTES # BLD AUTO: 1.16 X10*3/UL (ref 0.8–3)
LYMPHOCYTES NFR BLD AUTO: 15.8 %
MCH RBC QN AUTO: 32.7 PG (ref 26–34)
MCHC RBC AUTO-ENTMCNC: 30.6 G/DL (ref 32–36)
MCV RBC AUTO: 107 FL (ref 80–100)
MONOCYTES # BLD AUTO: 0.89 X10*3/UL (ref 0.05–0.8)
MONOCYTES NFR BLD AUTO: 12.1 %
NEUTROPHILS # BLD AUTO: 5.23 X10*3/UL (ref 1.6–5.5)
NEUTROPHILS NFR BLD AUTO: 71 %
NRBC BLD-RTO: 0 /100 WBCS (ref 0–0)
PLATELET # BLD AUTO: 144 X10*3/UL (ref 150–450)
POTASSIUM SERPL-SCNC: 4.2 MMOL/L (ref 3.5–5.3)
POTASSIUM SERPL-SCNC: 4.4 MMOL/L (ref 3.5–5.3)
PROT SERPL-MCNC: 6.8 G/DL (ref 6.4–8.2)
RBC # BLD AUTO: 3.36 X10*6/UL (ref 4–5.2)
SODIUM SERPL-SCNC: 132 MMOL/L (ref 136–145)
SODIUM SERPL-SCNC: 133 MMOL/L (ref 136–145)
SODIUM UR-SCNC: 16 MMOL/L
SODIUM/CREAT UR-RTO: 13 MMOL/G CREAT
UREA/CREAT UR-SRTO: 2.5 G/G CREAT
UUN UR-MCNC: 311 MG/DL
WBC # BLD AUTO: 7.4 X10*3/UL (ref 4.4–11.3)

## 2025-04-12 PROCEDURE — 74176 CT ABD & PELVIS W/O CONTRAST: CPT

## 2025-04-12 PROCEDURE — 2500000004 HC RX 250 GENERAL PHARMACY W/ HCPCS (ALT 636 FOR OP/ED): Performed by: NURSE PRACTITIONER

## 2025-04-12 PROCEDURE — 80053 COMPREHEN METABOLIC PANEL: CPT | Performed by: NURSE PRACTITIONER

## 2025-04-12 PROCEDURE — 2500000001 HC RX 250 WO HCPCS SELF ADMINISTERED DRUGS (ALT 637 FOR MEDICARE OP): Performed by: REGISTERED NURSE

## 2025-04-12 PROCEDURE — 2500000001 HC RX 250 WO HCPCS SELF ADMINISTERED DRUGS (ALT 637 FOR MEDICARE OP): Performed by: INTERNAL MEDICINE

## 2025-04-12 PROCEDURE — 2500000004 HC RX 250 GENERAL PHARMACY W/ HCPCS (ALT 636 FOR OP/ED): Performed by: INTERNAL MEDICINE

## 2025-04-12 PROCEDURE — 99223 1ST HOSP IP/OBS HIGH 75: CPT | Performed by: INTERNAL MEDICINE

## 2025-04-12 PROCEDURE — 74176 CT ABD & PELVIS W/O CONTRAST: CPT | Performed by: RADIOLOGY

## 2025-04-12 PROCEDURE — 1200000002 HC GENERAL ROOM WITH TELEMETRY DAILY

## 2025-04-12 PROCEDURE — 36415 COLL VENOUS BLD VENIPUNCTURE: CPT | Performed by: NURSE PRACTITIONER

## 2025-04-12 PROCEDURE — 96360 HYDRATION IV INFUSION INIT: CPT

## 2025-04-12 PROCEDURE — 84540 ASSAY OF URINE/UREA-N: CPT | Performed by: REGISTERED NURSE

## 2025-04-12 PROCEDURE — 82374 ASSAY BLOOD CARBON DIOXIDE: CPT | Performed by: INTERNAL MEDICINE

## 2025-04-12 PROCEDURE — 85025 COMPLETE CBC W/AUTO DIFF WBC: CPT | Performed by: NURSE PRACTITIONER

## 2025-04-12 PROCEDURE — 36415 COLL VENOUS BLD VENIPUNCTURE: CPT | Performed by: INTERNAL MEDICINE

## 2025-04-12 PROCEDURE — 99222 1ST HOSP IP/OBS MODERATE 55: CPT | Performed by: REGISTERED NURSE

## 2025-04-12 PROCEDURE — 2500000002 HC RX 250 W HCPCS SELF ADMINISTERED DRUGS (ALT 637 FOR MEDICARE OP, ALT 636 FOR OP/ED): Performed by: STUDENT IN AN ORGANIZED HEALTH CARE EDUCATION/TRAINING PROGRAM

## 2025-04-12 PROCEDURE — 82570 ASSAY OF URINE CREATININE: CPT | Performed by: INTERNAL MEDICINE

## 2025-04-12 RX ORDER — SODIUM CHLORIDE 9 MG/ML
75 INJECTION, SOLUTION INTRAVENOUS CONTINUOUS
Status: DISCONTINUED | OUTPATIENT
Start: 2025-04-12 | End: 2025-04-13

## 2025-04-12 RX ORDER — CHOLECALCIFEROL (VITAMIN D3) 25 MCG
25 TABLET ORAL 2 TIMES DAILY
Status: DISCONTINUED | OUTPATIENT
Start: 2025-04-12 | End: 2025-04-22 | Stop reason: HOSPADM

## 2025-04-12 RX ORDER — CARBIDOPA AND LEVODOPA 25; 100 MG/1; MG/1
3 TABLET ORAL 3 TIMES DAILY
Status: DISCONTINUED | OUTPATIENT
Start: 2025-04-12 | End: 2025-04-13

## 2025-04-12 RX ORDER — ONDANSETRON 4 MG/1
4 TABLET, FILM COATED ORAL EVERY 8 HOURS PRN
Status: DISCONTINUED | OUTPATIENT
Start: 2025-04-12 | End: 2025-04-22 | Stop reason: HOSPADM

## 2025-04-12 RX ORDER — METOPROLOL SUCCINATE 50 MG/1
50 TABLET, EXTENDED RELEASE ORAL DAILY
Status: DISCONTINUED | OUTPATIENT
Start: 2025-04-12 | End: 2025-04-22 | Stop reason: HOSPADM

## 2025-04-12 RX ORDER — DILTIAZEM HYDROCHLORIDE 120 MG/1
120 CAPSULE, COATED, EXTENDED RELEASE ORAL DAILY
Status: DISCONTINUED | OUTPATIENT
Start: 2025-04-12 | End: 2025-04-13

## 2025-04-12 RX ORDER — HEPARIN SODIUM 5000 [USP'U]/ML
7500 INJECTION, SOLUTION INTRAVENOUS; SUBCUTANEOUS EVERY 8 HOURS SCHEDULED
Status: DISCONTINUED | OUTPATIENT
Start: 2025-04-12 | End: 2025-04-12

## 2025-04-12 RX ORDER — LANOLIN ALCOHOL/MO/W.PET/CERES
1000 CREAM (GRAM) TOPICAL DAILY
Status: DISCONTINUED | OUTPATIENT
Start: 2025-04-12 | End: 2025-04-22 | Stop reason: HOSPADM

## 2025-04-12 RX ORDER — PREGABALIN 50 MG/1
100 CAPSULE ORAL 2 TIMES DAILY
Status: DISCONTINUED | OUTPATIENT
Start: 2025-04-12 | End: 2025-04-17

## 2025-04-12 RX ORDER — DULOXETIN HYDROCHLORIDE 30 MG/1
30 CAPSULE, DELAYED RELEASE ORAL 2 TIMES DAILY
Status: DISCONTINUED | OUTPATIENT
Start: 2025-04-12 | End: 2025-04-22 | Stop reason: HOSPADM

## 2025-04-12 RX ORDER — OXYCODONE HYDROCHLORIDE 5 MG/1
5 TABLET ORAL EVERY 6 HOURS PRN
Status: DISCONTINUED | OUTPATIENT
Start: 2025-04-12 | End: 2025-04-13

## 2025-04-12 RX ORDER — POLYETHYLENE GLYCOL 3350 17 G/17G
17 POWDER, FOR SOLUTION ORAL DAILY
Status: DISCONTINUED | OUTPATIENT
Start: 2025-04-12 | End: 2025-04-22 | Stop reason: HOSPADM

## 2025-04-12 RX ORDER — ACETAMINOPHEN 325 MG/1
650 TABLET ORAL EVERY 4 HOURS PRN
Status: DISCONTINUED | OUTPATIENT
Start: 2025-04-12 | End: 2025-04-22 | Stop reason: HOSPADM

## 2025-04-12 RX ORDER — TORSEMIDE 20 MG/1
20 TABLET ORAL NIGHTLY
Status: DISCONTINUED | OUTPATIENT
Start: 2025-04-12 | End: 2025-04-14

## 2025-04-12 RX ORDER — ATORVASTATIN CALCIUM 20 MG/1
20 TABLET, FILM COATED ORAL DAILY
Status: DISCONTINUED | OUTPATIENT
Start: 2025-04-12 | End: 2025-04-22 | Stop reason: HOSPADM

## 2025-04-12 RX ORDER — BISACODYL 10 MG/1
10 SUPPOSITORY RECTAL ONCE
Status: COMPLETED | OUTPATIENT
Start: 2025-04-12 | End: 2025-04-12

## 2025-04-12 RX ORDER — ONDANSETRON HYDROCHLORIDE 2 MG/ML
4 INJECTION, SOLUTION INTRAVENOUS EVERY 8 HOURS PRN
Status: DISCONTINUED | OUTPATIENT
Start: 2025-04-12 | End: 2025-04-22 | Stop reason: HOSPADM

## 2025-04-12 RX ORDER — SPIRONOLACTONE 25 MG/1
25 TABLET ORAL DAILY
Status: DISCONTINUED | OUTPATIENT
Start: 2025-04-12 | End: 2025-04-20

## 2025-04-12 RX ORDER — OXYBUTYNIN CHLORIDE 5 MG/1
5 TABLET ORAL 3 TIMES DAILY
Status: DISCONTINUED | OUTPATIENT
Start: 2025-04-12 | End: 2025-04-13

## 2025-04-12 RX ORDER — AMOXICILLIN 250 MG
2 CAPSULE ORAL 2 TIMES DAILY
Status: DISCONTINUED | OUTPATIENT
Start: 2025-04-12 | End: 2025-04-22 | Stop reason: HOSPADM

## 2025-04-12 RX ADMIN — BISACODYL 10 MG: 10 SUPPOSITORY RECTAL at 09:52

## 2025-04-12 RX ADMIN — Medication 1000 MCG: at 09:39

## 2025-04-12 RX ADMIN — SENNOSIDES AND DOCUSATE SODIUM 2 TABLET: 50; 8.6 TABLET ORAL at 21:15

## 2025-04-12 RX ADMIN — SODIUM CHLORIDE 100 ML/HR: 9 INJECTION, SOLUTION INTRAVENOUS at 05:07

## 2025-04-12 RX ADMIN — HEPARIN SODIUM 7500 UNITS: 5000 INJECTION INTRAVENOUS; SUBCUTANEOUS at 06:10

## 2025-04-12 RX ADMIN — RIVAROXABAN 10 MG: 10 TABLET, FILM COATED ORAL at 14:03

## 2025-04-12 RX ADMIN — SODIUM CHLORIDE 1000 ML: 0.9 INJECTION, SOLUTION INTRAVENOUS at 01:31

## 2025-04-12 RX ADMIN — Medication 25 MCG: at 21:15

## 2025-04-12 RX ADMIN — POLYETHYLENE GLYCOL 3350 17 G: 17 POWDER, FOR SOLUTION ORAL at 09:39

## 2025-04-12 RX ADMIN — CARBIDOPA AND LEVODOPA 3 TABLET: 25; 100 TABLET ORAL at 14:03

## 2025-04-12 RX ADMIN — Medication 25 MCG: at 09:39

## 2025-04-12 RX ADMIN — CARBIDOPA AND LEVODOPA 3 TABLET: 25; 100 TABLET ORAL at 09:39

## 2025-04-12 RX ADMIN — CARBIDOPA AND LEVODOPA 3 TABLET: 25; 100 TABLET ORAL at 21:15

## 2025-04-12 RX ADMIN — SODIUM CHLORIDE 75 ML/HR: 9 INJECTION, SOLUTION INTRAVENOUS at 16:03

## 2025-04-12 RX ADMIN — ATORVASTATIN CALCIUM 20 MG: 20 TABLET, FILM COATED ORAL at 09:39

## 2025-04-12 RX ADMIN — SENNOSIDES AND DOCUSATE SODIUM 2 TABLET: 50; 8.6 TABLET ORAL at 09:39

## 2025-04-12 SDOH — SOCIAL STABILITY: SOCIAL INSECURITY: WITHIN THE LAST YEAR, HAVE YOU BEEN HUMILIATED OR EMOTIONALLY ABUSED IN OTHER WAYS BY YOUR PARTNER OR EX-PARTNER?: NO

## 2025-04-12 SDOH — HEALTH STABILITY: MENTAL HEALTH: HOW OFTEN DO YOU HAVE SIX OR MORE DRINKS ON ONE OCCASION?: NEVER

## 2025-04-12 SDOH — SOCIAL STABILITY: SOCIAL INSECURITY: WERE YOU ABLE TO COMPLETE ALL THE BEHAVIORAL HEALTH SCREENINGS?: YES

## 2025-04-12 SDOH — HEALTH STABILITY: MENTAL HEALTH: HOW OFTEN DO YOU HAVE A DRINK CONTAINING ALCOHOL?: NEVER

## 2025-04-12 SDOH — ECONOMIC STABILITY: INCOME INSECURITY: IN THE PAST 12 MONTHS HAS THE ELECTRIC, GAS, OIL, OR WATER COMPANY THREATENED TO SHUT OFF SERVICES IN YOUR HOME?: NO

## 2025-04-12 SDOH — SOCIAL STABILITY: SOCIAL INSECURITY: HAVE YOU HAD ANY THOUGHTS OF HARMING ANYONE ELSE?: NO

## 2025-04-12 SDOH — ECONOMIC STABILITY: HOUSING INSECURITY: IN THE LAST 12 MONTHS, WAS THERE A TIME WHEN YOU WERE NOT ABLE TO PAY THE MORTGAGE OR RENT ON TIME?: NO

## 2025-04-12 SDOH — SOCIAL STABILITY: SOCIAL INSECURITY: DOES ANYONE TRY TO KEEP YOU FROM HAVING/CONTACTING OTHER FRIENDS OR DOING THINGS OUTSIDE YOUR HOME?: NO

## 2025-04-12 SDOH — ECONOMIC STABILITY: FOOD INSECURITY: WITHIN THE PAST 12 MONTHS, YOU WORRIED THAT YOUR FOOD WOULD RUN OUT BEFORE YOU GOT THE MONEY TO BUY MORE.: NEVER TRUE

## 2025-04-12 SDOH — SOCIAL STABILITY: SOCIAL INSECURITY
WITHIN THE LAST YEAR, HAVE YOU BEEN RAPED OR FORCED TO HAVE ANY KIND OF SEXUAL ACTIVITY BY YOUR PARTNER OR EX-PARTNER?: NO

## 2025-04-12 SDOH — ECONOMIC STABILITY: FOOD INSECURITY: HOW HARD IS IT FOR YOU TO PAY FOR THE VERY BASICS LIKE FOOD, HOUSING, MEDICAL CARE, AND HEATING?: NOT HARD AT ALL

## 2025-04-12 SDOH — HEALTH STABILITY: MENTAL HEALTH: HOW MANY DRINKS CONTAINING ALCOHOL DO YOU HAVE ON A TYPICAL DAY WHEN YOU ARE DRINKING?: PATIENT DOES NOT DRINK

## 2025-04-12 SDOH — ECONOMIC STABILITY: TRANSPORTATION INSECURITY: IN THE PAST 12 MONTHS, HAS LACK OF TRANSPORTATION KEPT YOU FROM MEDICAL APPOINTMENTS OR FROM GETTING MEDICATIONS?: NO

## 2025-04-12 SDOH — SOCIAL STABILITY: SOCIAL INSECURITY: DO YOU FEEL ANYONE HAS EXPLOITED OR TAKEN ADVANTAGE OF YOU FINANCIALLY OR OF YOUR PERSONAL PROPERTY?: NO

## 2025-04-12 SDOH — SOCIAL STABILITY: SOCIAL INSECURITY
WITHIN THE LAST YEAR, HAVE YOU BEEN KICKED, HIT, SLAPPED, OR OTHERWISE PHYSICALLY HURT BY YOUR PARTNER OR EX-PARTNER?: NO

## 2025-04-12 SDOH — ECONOMIC STABILITY: HOUSING INSECURITY: AT ANY TIME IN THE PAST 12 MONTHS, WERE YOU HOMELESS OR LIVING IN A SHELTER (INCLUDING NOW)?: NO

## 2025-04-12 SDOH — SOCIAL STABILITY: SOCIAL INSECURITY: DO YOU FEEL UNSAFE GOING BACK TO THE PLACE WHERE YOU ARE LIVING?: NO

## 2025-04-12 SDOH — SOCIAL STABILITY: SOCIAL INSECURITY: WITHIN THE LAST YEAR, HAVE YOU BEEN AFRAID OF YOUR PARTNER OR EX-PARTNER?: NO

## 2025-04-12 SDOH — SOCIAL STABILITY: SOCIAL INSECURITY: HAS ANYONE EVER THREATENED TO HURT YOUR FAMILY OR YOUR PETS?: NO

## 2025-04-12 SDOH — ECONOMIC STABILITY: FOOD INSECURITY: WITHIN THE PAST 12 MONTHS, THE FOOD YOU BOUGHT JUST DIDN'T LAST AND YOU DIDN'T HAVE MONEY TO GET MORE.: NEVER TRUE

## 2025-04-12 SDOH — SOCIAL STABILITY: SOCIAL INSECURITY: ABUSE: ADULT

## 2025-04-12 SDOH — ECONOMIC STABILITY: HOUSING INSECURITY: IN THE PAST 12 MONTHS, HOW MANY TIMES HAVE YOU MOVED WHERE YOU WERE LIVING?: 1

## 2025-04-12 SDOH — SOCIAL STABILITY: SOCIAL INSECURITY: HAVE YOU HAD THOUGHTS OF HARMING ANYONE ELSE?: NO

## 2025-04-12 SDOH — SOCIAL STABILITY: SOCIAL INSECURITY: ARE THERE ANY APPARENT SIGNS OF INJURIES/BEHAVIORS THAT COULD BE RELATED TO ABUSE/NEGLECT?: NO

## 2025-04-12 SDOH — SOCIAL STABILITY: SOCIAL INSECURITY: ARE YOU OR HAVE YOU BEEN THREATENED OR ABUSED PHYSICALLY, EMOTIONALLY, OR SEXUALLY BY ANYONE?: NO

## 2025-04-12 ASSESSMENT — ACTIVITIES OF DAILY LIVING (ADL)
ADEQUATE_TO_COMPLETE_ADL: YES
HEARING - LEFT EAR: FUNCTIONAL
PATIENT'S MEMORY ADEQUATE TO SAFELY COMPLETE DAILY ACTIVITIES?: YES
WALKS IN HOME: NEEDS ASSISTANCE
BATHING: NEEDS ASSISTANCE
GROOMING: NEEDS ASSISTANCE
JUDGMENT_ADEQUATE_SAFELY_COMPLETE_DAILY_ACTIVITIES: YES
DRESSING YOURSELF: NEEDS ASSISTANCE
TOILETING: NEEDS ASSISTANCE
LACK_OF_TRANSPORTATION: NO
HEARING - RIGHT EAR: FUNCTIONAL
FEEDING YOURSELF: INDEPENDENT
ASSISTIVE_DEVICE: WALKER

## 2025-04-12 ASSESSMENT — ENCOUNTER SYMPTOMS
WHEEZING: 0
HEADACHES: 0
CONSTIPATION: 0
DYSURIA: 0
PALPITATIONS: 0
HALLUCINATIONS: 0
FEVER: 0
DIARRHEA: 0
CONFUSION: 1
ABDOMINAL PAIN: 0
VOMITING: 0
WOUND: 0
ABDOMINAL DISTENTION: 1
MYALGIAS: 0
NAUSEA: 0
HEMATURIA: 0
CHILLS: 0
SHORTNESS OF BREATH: 0
DIZZINESS: 0
COUGH: 0
DIFFICULTY URINATING: 1

## 2025-04-12 ASSESSMENT — COGNITIVE AND FUNCTIONAL STATUS - GENERAL
PATIENT BASELINE BEDBOUND: NO
PERSONAL GROOMING: A LITTLE
DRESSING REGULAR UPPER BODY CLOTHING: A LITTLE
HELP NEEDED FOR BATHING: A LITTLE
DRESSING REGULAR LOWER BODY CLOTHING: A LOT
STANDING UP FROM CHAIR USING ARMS: A LITTLE
MOVING TO AND FROM BED TO CHAIR: A LITTLE
CLIMB 3 TO 5 STEPS WITH RAILING: A LOT
MOVING FROM LYING ON BACK TO SITTING ON SIDE OF FLAT BED WITH BEDRAILS: A LITTLE
DAILY ACTIVITIY SCORE: 19
TURNING FROM BACK TO SIDE WHILE IN FLAT BAD: A LITTLE
WALKING IN HOSPITAL ROOM: A LOT
TURNING FROM BACK TO SIDE WHILE IN FLAT BAD: A LITTLE
DAILY ACTIVITIY SCORE: 19
TOILETING: A LITTLE
DRESSING REGULAR LOWER BODY CLOTHING: A LITTLE
STANDING UP FROM CHAIR USING ARMS: A LOT
TOILETING: A LITTLE
CLIMB 3 TO 5 STEPS WITH RAILING: TOTAL
HELP NEEDED FOR BATHING: A LITTLE
WALKING IN HOSPITAL ROOM: A LOT
DRESSING REGULAR UPPER BODY CLOTHING: A LITTLE
MOBILITY SCORE: 15
MOVING FROM LYING ON BACK TO SITTING ON SIDE OF FLAT BED WITH BEDRAILS: A LITTLE
MOBILITY SCORE: 14
MOVING TO AND FROM BED TO CHAIR: A LOT

## 2025-04-12 ASSESSMENT — LIFESTYLE VARIABLES
SKIP TO QUESTIONS 9-10: 1
HOW OFTEN DO YOU HAVE 6 OR MORE DRINKS ON ONE OCCASION: NEVER
AUDIT-C TOTAL SCORE: 0
HOW OFTEN DO YOU HAVE A DRINK CONTAINING ALCOHOL: NEVER
AUDIT-C TOTAL SCORE: 0
SKIP TO QUESTIONS 9-10: 1
AUDIT-C TOTAL SCORE: 0
HOW MANY STANDARD DRINKS CONTAINING ALCOHOL DO YOU HAVE ON A TYPICAL DAY: PATIENT DOES NOT DRINK

## 2025-04-12 ASSESSMENT — PAIN - FUNCTIONAL ASSESSMENT
PAIN_FUNCTIONAL_ASSESSMENT: 0-10
PAIN_FUNCTIONAL_ASSESSMENT: 0-10

## 2025-04-12 ASSESSMENT — PATIENT HEALTH QUESTIONNAIRE - PHQ9
1. LITTLE INTEREST OR PLEASURE IN DOING THINGS: NOT AT ALL
2. FEELING DOWN, DEPRESSED OR HOPELESS: NOT AT ALL
SUM OF ALL RESPONSES TO PHQ9 QUESTIONS 1 & 2: 0

## 2025-04-12 ASSESSMENT — PAIN SCALES - GENERAL
PAINLEVEL_OUTOF10: 0 - NO PAIN
PAINLEVEL_OUTOF10: 0 - NO PAIN

## 2025-04-12 NOTE — H&P
History Of Present Illness  Shreya Acuña is a 89 y.o. female PMHx HTN, HLD, PAF on lower dose of Xarelto, CAD, biliary dyskinesia s/p laparoscopic cholecystectomy 4/11/25 presented to [facility] ED by direction of her surgeon for abnormal labs.  Her creatinine is up to 2.59, baseline is 1.62.  Patient had surgery to remove gallbladder yesterday, was discharged home.  She is currently confused and  is present to provide history.  Since surgery she has been eating and drinking but has not had a BM or passed much gas.  Her abdomen looks more distended.  He also notices she has not urinated much.  Patient has been taking oxycodone every 6 hours for pain and then also tylenol in between.       Past Medical History  Past Medical History:   Diagnosis Date    Emphysema lung (Multi)     Hypertension     Other specified health status     No pertinent past medical history       Surgical History  Past Surgical History:   Procedure Laterality Date    OTHER SURGICAL HISTORY  12/02/2020    Cardioversion        Social History  She reports that she quit smoking about 42 years ago. Her smoking use included cigarettes. She has never used smokeless tobacco. She reports that she does not currently use alcohol. She reports that she does not use drugs.    Family History  No family history on file.     Allergies  Patient has no known allergies.    Review of Systems   Constitutional:  Negative for chills and fever.   HENT:  Negative for congestion.    Eyes:  Negative for visual disturbance.   Respiratory:  Negative for cough, shortness of breath and wheezing.    Cardiovascular:  Negative for chest pain, palpitations and leg swelling.   Gastrointestinal:  Positive for abdominal distention. Negative for abdominal pain, constipation, diarrhea, nausea and vomiting.   Genitourinary:  Positive for decreased urine volume and difficulty urinating. Negative for dysuria and hematuria.   Musculoskeletal:  Negative for myalgias.   Skin:  Negative  "for rash and wound.   Neurological:  Negative for dizziness, syncope and headaches.   Psychiatric/Behavioral:  Positive for confusion. Negative for hallucinations.    All other systems reviewed and are negative.       Physical Exam     GEN:  lethargic, required repeated stimulation to awaken patient, not able to answer questions  HEENT:  normocephalic, atraumatic, PERRL, EOM intact, nares patent  Cardio:  RRR, no murmurs  Resp:  CTAB, no wheezing  Abd:  diminished bowel sounds, abdomen distended, surgical incisions well approximated  :  deferred  Neuro:  A&Ox2, CN2-12 grossly intact, no focal deficits  Msk:  no gross deformities, no joint effusions  Skin:  warm, dry, intact  Psych: confused    Last Recorded Vitals  Blood pressure 121/55, pulse 64, temperature 36.2 °C (97.2 °F), temperature source Temporal, resp. rate 18, height 1.6 m (5' 3\"), weight 109 kg (241 lb), SpO2 95%.    Relevant Results      Results for orders placed or performed during the hospital encounter of 04/12/25 (from the past 24 hours)   CBC and Auto Differential   Result Value Ref Range    WBC 7.4 4.4 - 11.3 x10*3/uL    nRBC 0.0 0.0 - 0.0 /100 WBCs    RBC 3.36 (L) 4.00 - 5.20 x10*6/uL    Hemoglobin 11.0 (L) 12.0 - 16.0 g/dL    Hematocrit 35.9 (L) 36.0 - 46.0 %     (H) 80 - 100 fL    MCH 32.7 26.0 - 34.0 pg    MCHC 30.6 (L) 32.0 - 36.0 g/dL    RDW 13.6 11.5 - 14.5 %    Platelets 144 (L) 150 - 450 x10*3/uL    Neutrophils % 71.0 40.0 - 80.0 %    Immature Granulocytes %, Automated 0.4 0.0 - 0.9 %    Lymphocytes % 15.8 13.0 - 44.0 %    Monocytes % 12.1 2.0 - 10.0 %    Eosinophils % 0.4 0.0 - 6.0 %    Basophils % 0.3 0.0 - 2.0 %    Neutrophils Absolute 5.23 1.60 - 5.50 x10*3/uL    Immature Granulocytes Absolute, Automated 0.03 0.00 - 0.50 x10*3/uL    Lymphocytes Absolute 1.16 0.80 - 3.00 x10*3/uL    Monocytes Absolute 0.89 (H) 0.05 - 0.80 x10*3/uL    Eosinophils Absolute 0.03 0.00 - 0.40 x10*3/uL    Basophils Absolute 0.02 0.00 - 0.10 x10*3/uL "   Comprehensive Metabolic Panel   Result Value Ref Range    Glucose 95 74 - 99 mg/dL    Sodium 132 (L) 136 - 145 mmol/L    Potassium 4.4 3.5 - 5.3 mmol/L    Chloride 95 (L) 98 - 107 mmol/L    Bicarbonate 28 21 - 32 mmol/L    Anion Gap 13 10 - 20 mmol/L    Urea Nitrogen 40 (H) 6 - 23 mg/dL    Creatinine 2.59 (H) 0.50 - 1.05 mg/dL    eGFR 17 (L) >60 mL/min/1.73m*2    Calcium 9.0 8.6 - 10.3 mg/dL    Albumin 4.3 3.4 - 5.0 g/dL    Alkaline Phosphatase 116 33 - 136 U/L    Total Protein 6.8 6.4 - 8.2 g/dL    AST 36 9 - 39 U/L    Bilirubin, Total 0.5 0.0 - 1.2 mg/dL    ALT 7 7 - 45 U/L     *Note: Due to a large number of results and/or encounters for the requested time period, some results have not been displayed. A complete set of results can be found in Results Review.          Assessment/Plan   Assessment & Plan  Acute kidney injury superimposed on CKD    Abdominal distention    MISTY (acute kidney injury)    # MISTY on CKD  # Abdominal distention concerning for ileus  # Acute metabolic encephalopathy    HTN  HLD  PAF on Xarelto    Admit and treat for MISTY on CKD.  Given abdominal distention will obtain CT abd/pelv without contrast to evaluate for SBO/Ileus and to rule out obstructive uropathy.  Monitor I/Os, insert Peralta if retaining urine.  Administer IVFs overnight.  Check urine studies to calculate FENa.  Holding Torsemide, Aldactone due to MISTY.  Holding Duloxetine and Lyrica due to worsened kidney function.  Consulted nephrology for additional recommendations.  CLD for now.  Discussed with  to not give Oxycodone scheduled every 6 hours.  Patient is encephalopathic I suspect from opiates.  Holding all opiates.  Plan is to resume Xarelto 4/14/25.  Outpatient cardiology note reviewed and Dr. Barnhart has patient on lower dose of Xarelto.  Consulted general surgery Dr. Helm for abdominal distention postop.       I spent 75 minutes in the professional and overall care of this patient.    DO Mina Montague  Attending Physician  [facility]  Clinical Instructor

## 2025-04-12 NOTE — CONSULTS
Reason For Consult  Recent lap letha, abdominal distention and MISTY    History Of Present Illness  Shreya Acuña is a 89 y.o. female who had an elective laparoscopic cholecystectomy on 4/10 with Dr. Helm. He had ordered a postoperative CMP to be done as an outpatient. Patient had this bloodwork drawn yesterday. LFTs were completely normal; however, labs demonstrated MISTY on CKD. Additionally, patient had decreased UOP and low spO2 at home. She was called and advised to present to the ED for admission/hydration.    Patient admitted to hospitalist service for management of her MISTY. Surgery consulted given recent surgery as well as new abdominal distention.    I personally reviewed patient's CT images and do not note any acute findings. She has left renal cyst, but no s/s hydronephrosis. She is s/p cholecystectomy with small amount of residual postoperative fluid, but no abscess. There is no significant bowel dilation and she has a normal stool burden, most on right side of colon.     This morning, patient is very eager to consume her clear liquid breakfast tray. She denied any pain, nausea, or vomiting. She denied feeling bloated although her abdomen does look mildly distended. She was slightly dismissive of me during exam and seemingly not pleased at being interrupted from eating.    Medicine has placed Peralta catheter and ordered repeat blood work/urine studies. FeUrea indicative of pre-renal MISTY. Patient's serum creatinine is downtrending and her PO diuretics have been held.     Past Medical History  She has a past medical history of Emphysema lung (Multi), Hypertension, and Other specified health status.    Surgical History  She has a past surgical history that includes Other surgical history (12/02/2020).     Social History  She reports that she quit smoking about 42 years ago. Her smoking use included cigarettes. She has never used smokeless tobacco. She reports that she does not currently use alcohol. She  "reports that she does not use drugs.    Family History  No family history on file.     Allergies  Patient has no known allergies.    Review of Systems  Denied fever, chills, SOB, chest pain, abdominal pain. Denied passing gas or having BM. Denied nausea.     Physical Exam:  Constitutional:         Elderly female, but appears younger than stated age, eagerly eating clear liquid tray. Disgruntled at being interrupted   HENT:     slightly dry mucous membranes  Eyes:      sclera white  Cardiovascular:      Afib 60s on telemetry, irregularly irregular, palpable pulses  Pulmonary:      2L NC. Clear breath sounds. Able to pull 750mL on IS  Abdominal:      Abdomen obese, round, mildly distended, non tender. Upper abdomen laparoscopic incisions small, well healing with skin glue present- no skin separation. No ecchymosis. Umbilical incision is larger-   :     Peralta catheter in place draining clear yellow urine  Skin:      No pallor, no jaundice  Extremities:     No edema  Neurological:      A&O x3  Psychiatric:        Dismissive        Last Recorded Vitals  Blood pressure 120/56, pulse 58, temperature 35.6 °C (96.1 °F), resp. rate 16, height 1.6 m (5' 3\"), weight 109 kg (241 lb), SpO2 97%.    Relevant Results  Results for orders placed or performed during the hospital encounter of 04/12/25 (from the past 24 hours)   CBC and Auto Differential   Result Value Ref Range    WBC 7.4 4.4 - 11.3 x10*3/uL    nRBC 0.0 0.0 - 0.0 /100 WBCs    RBC 3.36 (L) 4.00 - 5.20 x10*6/uL    Hemoglobin 11.0 (L) 12.0 - 16.0 g/dL    Hematocrit 35.9 (L) 36.0 - 46.0 %     (H) 80 - 100 fL    MCH 32.7 26.0 - 34.0 pg    MCHC 30.6 (L) 32.0 - 36.0 g/dL    RDW 13.6 11.5 - 14.5 %    Platelets 144 (L) 150 - 450 x10*3/uL    Neutrophils % 71.0 40.0 - 80.0 %    Immature Granulocytes %, Automated 0.4 0.0 - 0.9 %    Lymphocytes % 15.8 13.0 - 44.0 %    Monocytes % 12.1 2.0 - 10.0 %    Eosinophils % 0.4 0.0 - 6.0 %    Basophils % 0.3 0.0 - 2.0 %    " Neutrophils Absolute 5.23 1.60 - 5.50 x10*3/uL    Immature Granulocytes Absolute, Automated 0.03 0.00 - 0.50 x10*3/uL    Lymphocytes Absolute 1.16 0.80 - 3.00 x10*3/uL    Monocytes Absolute 0.89 (H) 0.05 - 0.80 x10*3/uL    Eosinophils Absolute 0.03 0.00 - 0.40 x10*3/uL    Basophils Absolute 0.02 0.00 - 0.10 x10*3/uL   Comprehensive Metabolic Panel   Result Value Ref Range    Glucose 95 74 - 99 mg/dL    Sodium 132 (L) 136 - 145 mmol/L    Potassium 4.4 3.5 - 5.3 mmol/L    Chloride 95 (L) 98 - 107 mmol/L    Bicarbonate 28 21 - 32 mmol/L    Anion Gap 13 10 - 20 mmol/L    Urea Nitrogen 40 (H) 6 - 23 mg/dL    Creatinine 2.59 (H) 0.50 - 1.05 mg/dL    eGFR 17 (L) >60 mL/min/1.73m*2    Calcium 9.0 8.6 - 10.3 mg/dL    Albumin 4.3 3.4 - 5.0 g/dL    Alkaline Phosphatase 116 33 - 136 U/L    Total Protein 6.8 6.4 - 8.2 g/dL    AST 36 9 - 39 U/L    Bilirubin, Total 0.5 0.0 - 1.2 mg/dL    ALT 7 7 - 45 U/L   Basic metabolic panel   Result Value Ref Range    Glucose 93 74 - 99 mg/dL    Sodium 133 (L) 136 - 145 mmol/L    Potassium 4.2 3.5 - 5.3 mmol/L    Chloride 97 (L) 98 - 107 mmol/L    Bicarbonate 28 21 - 32 mmol/L    Anion Gap 12 10 - 20 mmol/L    Urea Nitrogen 40 (H) 6 - 23 mg/dL    Creatinine 2.44 (H) 0.50 - 1.05 mg/dL    eGFR 19 (L) >60 mL/min/1.73m*2    Calcium 8.7 8.6 - 10.3 mg/dL   Sodium, Urine Random   Result Value Ref Range    Sodium, Urine Random 16 mmol/L    Creatinine, Urine Random 125.3 20.0 - 320.0 mg/dL    Sodium/Creatinine Ratio 13 Not established. mmol/g Creat   Urea Nitrogen, Urine Random   Result Value Ref Range    Urea Nitrogen, Urine Random 311 mg/dL    Creatinine, Urine Random 125.3 20.0 - 320.0 mg/dL    Urea Nitrogen/Creatinine Ratio 2.5 Not established. g/g creat     *Note: Due to a large number of results and/or encounters for the requested time period, some results have not been displayed. A complete set of results can be found in Results Review.          Assessment/Plan     89 year old female with a  history significant for CKD stage IV, permanent Afib on Xarelto, HFpEF, HTN, HLD, and biliary dyskinesia s/p lap letha 4/10/25 (Dr. Helm), presented to [facility] ED on 4/11 with s/s MISTY on CKD (based on outpatient labs). Admitted to medicine. Surgery on consult.    Impression:  MISTY on CKD - pre-renal  Mild constipation (? Opioid induced constipation, slow transit given sedentary lifestyle) without postop ileus   > no nausea, vomiting; however, uncertain if she is passing gas. No BM  S/p lap letha - no postop issues. Incisions healing    Recommendations:  - no surgical intervention; stable from postop surgery standpoint  - management of MISTY per medicine team; agree with IVF and holding diuretics for now  - repeat BMP in AM  - okay to resume home Xarelto  - advance diet as tolerated  - increase bowel regimen; added senna-colace and will give suppository x1 today  - would consult PT/OT to increase mobility as able  - IS every 1 hour    The patient was seen and discussed with the attending surgeon on-call Dr. Leos       I spent 20 minutes in the professional and overall care of this patient.      Juliet Perez, APRN-CNP

## 2025-04-12 NOTE — CONSULTS
"Nephrology Consult Note    Reason For Consult  Elevated creatinine    History Of Present Illness  Shryea Acuña is a 89 y.o. female with PMH of CKD stage 4, Afib on AC, CHF, HL, HTN, empyema, smoking and biliary dyskinasia recent elective laparoscopic cholecystectomy on 4/10 presenting with abnormal labs as OP, found to have MISTY and hyponatremia     Noted reduced UOP and increased abd distension before admission, no diarrhea  Peralta placed on admission, she remains nonoliguric  Asking for food, has been trying to push fluids po    Family at bedside assisting with history     Past Medical History  She has a past medical history of Emphysema lung (Multi), Hypertension, and Other specified health status.    Surgical History  She has a past surgical history that includes Other surgical history (12/02/2020).     Social History  She reports that she quit smoking about 42 years ago. Her smoking use included cigarettes. She has never used smokeless tobacco. She reports that she does not currently use alcohol. She reports that she does not use drugs.    Family History  No family history on file.     Allergies  Patient has no known allergies.    Review of Systems  Per HPI     Physical Exam    Vitals 24HR  Heart Rate:  [54-81]   Temp:  [35.6 °C (96.1 °F)-36.2 °C (97.2 °F)]   Resp:  [16-18]   BP: (115-140)/(55-90)   Height:  [160 cm (5' 3\")]   Weight:  [109 kg (240 lb 4.8 oz)-109 kg (241 lb)]   SpO2:  [84 %-97 %]        AAOx3, on RA< no distress  Decreased AEBL  RRR  Abd soft  Chronic skin changes, no edema  Peralta+           I&O 24HR    Intake/Output Summary (Last 24 hours) at 4/12/2025 1433  Last data filed at 4/12/2025 1242  Gross per 24 hour   Intake 1244.33 ml   Output 570 ml   Net 674.33 ml       Scheduled medications  atorvastatin, 20 mg, oral, Daily  carbidopa-levodopa, 3 tablet, oral, TID  cholecalciferol, 25 mcg, oral, BID  cyanocobalamin, 1,000 mcg, oral, Daily  [Held by provider] dilTIAZem CD, 120 mg, oral, " Daily  [Held by provider] DULoxetine, 30 mg, oral, BID  [Held by provider] metoprolol succinate XL, 50 mg, oral, Daily  [Held by provider] oxybutynin, 5 mg, oral, TID  polyethylene glycol, 17 g, oral, Daily  [Held by provider] pregabalin, 100 mg, oral, BID  rivaroxaban, 10 mg, oral, Daily  sennosides-docusate sodium, 2 tablet, oral, BID  [Held by provider] spironolactone, 25 mg, oral, Daily  [Held by provider] torsemide, 20 mg, oral, Nightly      Continuous medications  sodium chloride 0.9%, 100 mL/hr, Last Rate: 100 mL/hr (04/12/25 1242)      PRN medications  PRN medications: acetaminophen, ondansetron **OR** ondansetron, [Held by provider] oxyCODONE    Relevant Results      Admission on 04/12/2025   Component Date Value Ref Range Status    WBC 04/12/2025 7.4  4.4 - 11.3 x10*3/uL Final    nRBC 04/12/2025 0.0  0.0 - 0.0 /100 WBCs Final    RBC 04/12/2025 3.36 (L)  4.00 - 5.20 x10*6/uL Final    Hemoglobin 04/12/2025 11.0 (L)  12.0 - 16.0 g/dL Final    Hematocrit 04/12/2025 35.9 (L)  36.0 - 46.0 % Final    MCV 04/12/2025 107 (H)  80 - 100 fL Final    MCH 04/12/2025 32.7  26.0 - 34.0 pg Final    MCHC 04/12/2025 30.6 (L)  32.0 - 36.0 g/dL Final    RDW 04/12/2025 13.6  11.5 - 14.5 % Final    Platelets 04/12/2025 144 (L)  150 - 450 x10*3/uL Final    Neutrophils % 04/12/2025 71.0  40.0 - 80.0 % Final    Immature Granulocytes %, Automated 04/12/2025 0.4  0.0 - 0.9 % Final    Immature Granulocyte Count (IG) includes promyelocytes, myelocytes and metamyelocytes but does not include bands. Percent differential counts (%) should be interpreted in the context of the absolute cell counts (cells/UL).    Lymphocytes % 04/12/2025 15.8  13.0 - 44.0 % Final    Monocytes % 04/12/2025 12.1  2.0 - 10.0 % Final    Eosinophils % 04/12/2025 0.4  0.0 - 6.0 % Final    Basophils % 04/12/2025 0.3  0.0 - 2.0 % Final    Neutrophils Absolute 04/12/2025 5.23  1.60 - 5.50 x10*3/uL Final    Percent differential counts (%) should be interpreted in the  context of the absolute cell counts (cells/uL).    Immature Granulocytes Absolute, Au* 04/12/2025 0.03  0.00 - 0.50 x10*3/uL Final    Lymphocytes Absolute 04/12/2025 1.16  0.80 - 3.00 x10*3/uL Final    Monocytes Absolute 04/12/2025 0.89 (H)  0.05 - 0.80 x10*3/uL Final    Eosinophils Absolute 04/12/2025 0.03  0.00 - 0.40 x10*3/uL Final    Basophils Absolute 04/12/2025 0.02  0.00 - 0.10 x10*3/uL Final    Glucose 04/12/2025 95  74 - 99 mg/dL Final    Sodium 04/12/2025 132 (L)  136 - 145 mmol/L Final    Potassium 04/12/2025 4.4  3.5 - 5.3 mmol/L Final    Chloride 04/12/2025 95 (L)  98 - 107 mmol/L Final    Bicarbonate 04/12/2025 28  21 - 32 mmol/L Final    Anion Gap 04/12/2025 13  10 - 20 mmol/L Final    Urea Nitrogen 04/12/2025 40 (H)  6 - 23 mg/dL Final    Creatinine 04/12/2025 2.59 (H)  0.50 - 1.05 mg/dL Final    eGFR 04/12/2025 17 (L)  >60 mL/min/1.73m*2 Final    Calculations of estimated GFR are performed using the 2021 CKD-EPI Study Refit equation without the race variable for the IDMS-Traceable creatinine methods.  https://jasn.asnjournals.org/content/early/2021/09/22/ASN.8425089873    Calcium 04/12/2025 9.0  8.6 - 10.3 mg/dL Final    Albumin 04/12/2025 4.3  3.4 - 5.0 g/dL Final    Alkaline Phosphatase 04/12/2025 116  33 - 136 U/L Final    Total Protein 04/12/2025 6.8  6.4 - 8.2 g/dL Final    AST 04/12/2025 36  9 - 39 U/L Final    Bilirubin, Total 04/12/2025 0.5  0.0 - 1.2 mg/dL Final    ALT 04/12/2025 7  7 - 45 U/L Final    Patients treated with Sulfasalazine may generate falsely decreased results for ALT.    Glucose 04/12/2025 93  74 - 99 mg/dL Final    Sodium 04/12/2025 133 (L)  136 - 145 mmol/L Final    Potassium 04/12/2025 4.2  3.5 - 5.3 mmol/L Final    Chloride 04/12/2025 97 (L)  98 - 107 mmol/L Final    Bicarbonate 04/12/2025 28  21 - 32 mmol/L Final    Anion Gap 04/12/2025 12  10 - 20 mmol/L Final    Urea Nitrogen 04/12/2025 40 (H)  6 - 23 mg/dL Final    Creatinine 04/12/2025 2.44 (H)  0.50 - 1.05 mg/dL  Final    eGFR 04/12/2025 19 (L)  >60 mL/min/1.73m*2 Final    Calculations of estimated GFR are performed using the 2021 CKD-EPI Study Refit equation without the race variable for the IDMS-Traceable creatinine methods.  https://jasn.asnjournals.org/content/early/2021/09/22/ASN.4438490474    Calcium 04/12/2025 8.7  8.6 - 10.3 mg/dL Final    Sodium, Urine Random 04/12/2025 16  mmol/L Final    Creatinine, Urine Random 04/12/2025 125.3  20.0 - 320.0 mg/dL Final    Sodium/Creatinine Ratio 04/12/2025 13  Not established. mmol/g Creat Final    Urea Nitrogen, Urine Random 04/12/2025 311  mg/dL Final    Creatinine, Urine Random 04/12/2025 125.3  20.0 - 320.0 mg/dL Final    Urea Nitrogen/Creatinine Ratio 04/12/2025 2.5  Not established. g/g creat Final   Lab Requisition on 04/11/2025   Component Date Value Ref Range Status    Bilirubin, Direct 04/11/2025 0.1  0.0 - 0.3 mg/dL Final    Glucose 04/11/2025 109 (H)  74 - 99 mg/dL Final    Sodium 04/11/2025 137  136 - 145 mmol/L Final    Potassium 04/11/2025 5.3  3.5 - 5.3 mmol/L Final    Chloride 04/11/2025 97 (L)  98 - 107 mmol/L Final    Bicarbonate 04/11/2025 31  21 - 32 mmol/L Final    Anion Gap 04/11/2025 14  10 - 20 mmol/L Final    Urea Nitrogen 04/11/2025 35 (H)  6 - 23 mg/dL Final    Creatinine 04/11/2025 2.29 (H)  0.50 - 1.05 mg/dL Final    eGFR 04/11/2025 20 (L)  >60 mL/min/1.73m*2 Final    Calculations of estimated GFR are performed using the 2021 CKD-EPI Study Refit equation without the race variable for the IDMS-Traceable creatinine methods.  https://jasn.asnjournals.org/content/early/2021/09/22/ASN.3884946789    Calcium 04/11/2025 9.7  8.6 - 10.3 mg/dL Final    Albumin 04/11/2025 4.4  3.4 - 5.0 g/dL Final    Alkaline Phosphatase 04/11/2025 111  33 - 136 U/L Final    Total Protein 04/11/2025 6.7  6.4 - 8.2 g/dL Final    AST 04/11/2025 32  9 - 39 U/L Final    Bilirubin, Total 04/11/2025 0.6  0.0 - 1.2 mg/dL Final    ALT 04/11/2025 8  7 - 45 U/L Final    Patients treated  with Sulfasalazine may generate falsely decreased results for ALT.    WBC 04/11/2025 9.3  4.4 - 11.3 x10*3/uL Final    nRBC 04/11/2025 0.0  0.0 - 0.0 /100 WBCs Final    RBC 04/11/2025 3.56 (L)  4.00 - 5.20 x10*6/uL Final    Hemoglobin 04/11/2025 11.8 (L)  12.0 - 16.0 g/dL Final    Hematocrit 04/11/2025 37.5  36.0 - 46.0 % Final    MCV 04/11/2025 105 (H)  80 - 100 fL Final    MCH 04/11/2025 33.1  26.0 - 34.0 pg Final    MCHC 04/11/2025 31.5 (L)  32.0 - 36.0 g/dL Final    RDW 04/11/2025 13.4  11.5 - 14.5 % Final    Platelets 04/11/2025 163  150 - 450 x10*3/uL Final      Imaging  CT abdomen pelvis wo IV contrast    Result Date: 4/12/2025  1. Post laparoscopic cholecystectomy with slight postsurgical changes but no suspicious abnormalities. 2. Tiny fat containing umbilical hernia similar to the prior exam 3. Left renal cortical 5.3 cm hypodensity most likely a renal cyst. This is similar to the prior exam. Ultrasound may confirm this impression. 4. Marked degenerative changes of the lumbar spine with lumbar canal stenosis most severe L4-5     Signed by: Savannah García 4/12/2025 7:16 AM Dictation workstation:   [id number]     Cardiology, Vascular, and Other Imaging  No other imaging results found for the past 7 days      Medications  Medications Prior to Admission   Medication Sig Dispense Refill Last Dose/Taking    acetaminophen (Tylenol) 500 mg tablet Take 2 tablets (1,000 mg) by mouth 3 times a day.       albuterol 90 mcg/actuation inhaler Inhale 2 puffs every 4 hours if needed for wheezing. (Patient not taking: Reported on 4/7/2025) 18 g 0     atorvastatin (Lipitor) 20 mg tablet Take 1 tablet (20 mg) by mouth once daily.       carbidopa-levodopa (Sinemet)  mg tablet Take 3 tablets by mouth 3 times a day. 270 tablet 11     cholecalciferol (Vitamin D-3) 25 MCG (1000 UT) capsule Take 1 capsule (25 mcg) by mouth 2 times a day.       cyanocobalamin (Vitamin B-12) 1,000 mcg tablet Take 1 tablet (1,000 mcg) by mouth  once daily.       DILT- mg 24 hr capsule Take 1 capsule (120 mg) by mouth once daily. 90 capsule 3     DULoxetine (Cymbalta) 30 mg DR capsule Take 1 capsule (30 mg) by mouth 2 times a day. 180 capsule 2     Gemtesa 75 mg tablet Take 1 tablet (75 mg) by mouth early in the morning..       metoprolol succinate XL (Toprol-XL) 50 mg 24 hr tablet Take 1 tablet (50 mg) by mouth once daily.       mv-min-FA-vit K-lutein-zeaxant (PreserVision AREDS 2 Plus MV) 200 mcg-15 mcg- 5 mg-1 mg capsule Take 1 capsule by mouth 2 times a day.       oxyCODONE (Roxicodone) 5 mg immediate release tablet Take 1 tablet (5 mg) by mouth every 6 hours if needed for severe pain (7 - 10) for up to 5 days. 10 tablet 0     pregabalin (Lyrica) 100 mg capsule Take 1 capsule (100 mg) by mouth 2 times a day. 180 capsule 2     rivaroxaban (Xarelto) 10 mg tablet Take 1 tablet (10 mg) by mouth once daily. Resume taking on 4/14       spironolactone (Aldactone) 25 mg tablet Take 1 tablet (25 mg) by mouth once daily. (Patient taking differently: Take 1 tablet (25 mg) by mouth 3 times a day.) 90 tablet 3     torsemide (Demadex) 20 mg tablet Take 1 tablet (20 mg) by mouth once daily. (Patient taking differently: Take 1 tablet (20 mg) by mouth once daily at bedtime.) 90 tablet 3       Scheduled medications  atorvastatin, 20 mg, oral, Daily  carbidopa-levodopa, 3 tablet, oral, TID  cholecalciferol, 25 mcg, oral, BID  cyanocobalamin, 1,000 mcg, oral, Daily  [Held by provider] dilTIAZem CD, 120 mg, oral, Daily  [Held by provider] DULoxetine, 30 mg, oral, BID  [Held by provider] metoprolol succinate XL, 50 mg, oral, Daily  [Held by provider] oxybutynin, 5 mg, oral, TID  polyethylene glycol, 17 g, oral, Daily  [Held by provider] pregabalin, 100 mg, oral, BID  rivaroxaban, 10 mg, oral, Daily  sennosides-docusate sodium, 2 tablet, oral, BID  [Held by provider] spironolactone, 25 mg, oral, Daily  [Held by provider] torsemide, 20 mg, oral, Nightly      Continuous  medications  sodium chloride 0.9%, 100 mL/hr, Last Rate: 100 mL/hr (04/12/25 1242)      PRN medications  PRN medications: acetaminophen, ondansetron **OR** ondansetron, [Held by provider] oxyCODONE      ECHO 11/24  1. Left ventricular ejection fraction is normal, by visual estimate at 60-65%.   2. There is normal right ventricular global systolic function.   3. Moderate to severe tricuspid regurgitation visualized.   4. Right ventricular systolic pressure is within normal limits.   5. Mild left ventricular hypertrophy.   6. Technically difficult study.      Assessment/Plan     MISTY on CKD, baseline creatinine of 0.9 until 2023, in the 1.6-1.7 since, was 2.59 on admission associated with high BUN, improving  UA pending  Urine Na 16 on admission  Prerenal, was on torsemide 20 mg every day and aldactone 25 mg every day before admission    Hyponatremia in setting of MISTY/impaired free water clearance    HTN, severe TR as above, on diuretics, cardizem and toprol before admission    Volume status- no s/o fluid overload    Anemia, mild, chronic    L renal cyst      Plan  - continue ivf NS gtt but decrease to 75 ml/hr,  hold diuretics, creatinine and Na improving with ivf  - check UA for completion, please record IOs  - daily lytes and replete for K>4 and mag>2  - ECHO as above with severe TR but RVSP wnl  - avoid hypotension and nephrotoxins  - BP stable, hold BP meds  - dc and do not resume ditropan on discharge    MARS reviewed, dose for GFR<30      Thank you for the opportunity to assist in the care of this patient, please call with questions  Danielle Mcintosh MD PhD

## 2025-04-12 NOTE — CARE PLAN
The patient's goals for the shift include      Problem: Safety - Adult  Goal: Free from fall injury  Outcome: Progressing  Flowsheets (Taken 4/12/2025 1241)  Free from fall injury: Instruct family/caregiver on patient safety     Problem: Fall/Injury  Goal: Not fall by end of shift  Outcome: Progressing     Problem: Skin  Goal: Prevent/minimize sheer/friction injuries  Outcome: Progressing  Flowsheets (Taken 4/12/2025 1241)  Prevent/minimize sheer/friction injuries:   HOB 30 degrees or less   Turn/reposition every 2 hours/use positioning/transfer devices     Problem: Skin  Goal: Prevent/minimize sheer/friction injuries  Outcome: Progressing  Flowsheets (Taken 4/12/2025 1241)  Prevent/minimize sheer/friction injuries:   HOB 30 degrees or less   Turn/reposition every 2 hours/use positioning/transfer devices     The clinical goals for the shift include pt will remain hemodynamiically stable throughout shift

## 2025-04-12 NOTE — ED PROVIDER NOTES
HPI   Chief Complaint   Patient presents with    Abnormal Labs     Had gall bladder surgery yesterday. Had labs done today, MD said to come in.        Patient is a healthy nontoxic-appearing 89-year-old female with past medical history of chronic diastolic heart failure, chronic low back pain, coronary artery disease, hyperlipidemia, hypertension, hypoxia, lumbar disc herniation, obesity, obstructive sleep apnea, permanent atrial fibrillation, stage IV chronic kidney disease, venous insufficiency, metabolic encephalopathy, Parkinson's disease, cholelithiasis, presents emergency room today for complaint of abnormal lab work.  Patient states they received a phone call from surgeon informing them that they had abnormal lab work and to go to the emergency room for evaluation.  Patient had laparoscopic cholecystectomy performed yesterday.  Patient states she has been urinating however producing smaller amount.  Patient states she has been taking pain medication and is more sleepy.  Patient denies any current abdominal pain, chest pain shortness of breath difficulty breathing, nausea, vomiting, diarrhea or constipation, fever, shaking, or chills.              Patient History   Past Medical History:   Diagnosis Date    Emphysema lung (Multi)     Hypertension     Other specified health status     No pertinent past medical history     Past Surgical History:   Procedure Laterality Date    OTHER SURGICAL HISTORY  2020    Cardioversion     No family history on file.  Social History     Tobacco Use    Smoking status: Former     Current packs/day: 0.00     Types: Cigarettes     Quit date: 1983     Years since quittin.2    Smokeless tobacco: Never   Substance Use Topics    Alcohol use: Not Currently    Drug use: Never       Physical Exam   ED Triage Vitals   Temperature Heart Rate Respirations BP   25 2203 25 2203 25 2203 25 2203   36.2 °C (97.2 °F) 65 18 118/58      Pulse Ox Temp Source  Heart Rate Source Patient Position   04/11/25 2203 04/11/25 2203 04/12/25 0331 04/11/25 2203   (!) 91 % Temporal Monitor Sitting      BP Location FiO2 (%)     04/11/25 2203 --     Left arm        Physical Exam  Vitals and nursing note reviewed. Exam conducted with a chaperone present.   Constitutional:       General: She is not in acute distress.     Appearance: Normal appearance. She is not ill-appearing, toxic-appearing or diaphoretic.   HENT:      Head: Normocephalic.      Nose: Nose normal.      Mouth/Throat:      Mouth: Mucous membranes are moist.   Eyes:      Extraocular Movements: Extraocular movements intact.      Pupils: Pupils are equal, round, and reactive to light.   Cardiovascular:      Rate and Rhythm: Normal rate and regular rhythm.      Pulses: Normal pulses.      Heart sounds: Normal heart sounds. No murmur heard.     No friction rub. No gallop.   Pulmonary:      Effort: Pulmonary effort is normal. No respiratory distress.      Breath sounds: Normal breath sounds. No stridor. No wheezing, rhonchi or rales.   Chest:      Chest wall: No tenderness.   Abdominal:      General: Abdomen is flat. There is no distension.      Palpations: Abdomen is soft. There is no mass.      Tenderness: There is no abdominal tenderness. There is no right CVA tenderness, left CVA tenderness, guarding or rebound.      Hernia: No hernia is present.   Musculoskeletal:         General: Normal range of motion.      Cervical back: Normal range of motion and neck supple.   Skin:     General: Skin is warm and dry.      Capillary Refill: Capillary refill takes less than 2 seconds.      Coloration: Skin is not jaundiced or pale.      Findings: No bruising, erythema, lesion or rash.   Neurological:      Mental Status: She is alert.           ED Course & MDM   ED Course as of 04/12/25 0337   Sat Apr 12, 2025   0200 Blood Urea Nitrogen(!): 40 [EC]   0200 Creatinine(!): 2.59 [EC]   0200 EGFR(!): 17 [EC]      ED Course User Index  [EC]  CHECO Guerrero         Diagnoses as of 04/12/25 0337   MISTY (acute kidney injury)                 No data recorded     [place] Coma Scale Score: 15 (04/12/25 0117 : Izabel Jj RN)                           Medical Decision Making  Given patient's complaint presentation a thorough exam was performed.  Patient remains hemodynamically stable during emergency evaluation, is afebrile, no adventitious lung sounds auscultated, speaking complete sentences no respiratory distress, cardiac sounds auscultated are regular, no reproduce abdominal tenderness on exam, lab work was ordered including IV fluid.  Lab work does reveal several irregularities including BUN of 40, creatinine 2.59 and GFR 17 which is slightly worse than yesterday's lab work and consistent with acute kidney injury.  I consulted hospitalist regards to admission for MISTY.  Dr. Hooks has agreed admit patient to his service and will manage inpatient care.    CHECO Guerrero     Portions of this note were generated using digital voice recognition software, and may contain grammatical errors      Procedure  Procedures     CHECO Guerrero  04/12/25 0337

## 2025-04-13 ENCOUNTER — APPOINTMENT (OUTPATIENT)
Dept: RADIOLOGY | Facility: HOSPITAL | Age: 89
DRG: 659 | End: 2025-04-13
Payer: MEDICARE

## 2025-04-13 ENCOUNTER — APPOINTMENT (OUTPATIENT)
Dept: CARDIOLOGY | Facility: HOSPITAL | Age: 89
DRG: 659 | End: 2025-04-13
Payer: MEDICARE

## 2025-04-13 VITALS
HEIGHT: 63 IN | BODY MASS INDEX: 42.7 KG/M2 | TEMPERATURE: 96.8 F | SYSTOLIC BLOOD PRESSURE: 162 MMHG | OXYGEN SATURATION: 95 % | DIASTOLIC BLOOD PRESSURE: 95 MMHG | RESPIRATION RATE: 15 BRPM | WEIGHT: 241 LBS | HEART RATE: 77 BPM

## 2025-04-13 LAB
ANION GAP SERPL CALC-SCNC: 10 MMOL/L (ref 10–20)
ANION GAP SERPL CALC-SCNC: 10 MMOL/L (ref 10–20)
BUN SERPL-MCNC: 25 MG/DL (ref 6–23)
BUN SERPL-MCNC: 29 MG/DL (ref 6–23)
CALCIUM SERPL-MCNC: 8.8 MG/DL (ref 8.6–10.3)
CALCIUM SERPL-MCNC: 9.7 MG/DL (ref 8.6–10.3)
CHLORIDE SERPL-SCNC: 104 MMOL/L (ref 98–107)
CHLORIDE SERPL-SCNC: 106 MMOL/L (ref 98–107)
CO2 SERPL-SCNC: 27 MMOL/L (ref 21–32)
CO2 SERPL-SCNC: 29 MMOL/L (ref 21–32)
CREAT SERPL-MCNC: 1.03 MG/DL (ref 0.5–1.05)
CREAT SERPL-MCNC: 1.2 MG/DL (ref 0.5–1.05)
EGFRCR SERPLBLD CKD-EPI 2021: 44 ML/MIN/1.73M*2
EGFRCR SERPLBLD CKD-EPI 2021: 53 ML/MIN/1.73M*2
GLUCOSE BLD MANUAL STRIP-MCNC: 109 MG/DL (ref 74–99)
GLUCOSE SERPL-MCNC: 113 MG/DL (ref 74–99)
GLUCOSE SERPL-MCNC: 83 MG/DL (ref 74–99)
HOLD SPECIMEN: NORMAL
MAGNESIUM SERPL-MCNC: 2.12 MG/DL (ref 1.6–2.4)
POTASSIUM SERPL-SCNC: 4.2 MMOL/L (ref 3.5–5.3)
POTASSIUM SERPL-SCNC: 4.2 MMOL/L (ref 3.5–5.3)
SODIUM SERPL-SCNC: 139 MMOL/L (ref 136–145)
SODIUM SERPL-SCNC: 139 MMOL/L (ref 136–145)

## 2025-04-13 PROCEDURE — 1200000002 HC GENERAL ROOM WITH TELEMETRY DAILY

## 2025-04-13 PROCEDURE — 82947 ASSAY GLUCOSE BLOOD QUANT: CPT

## 2025-04-13 PROCEDURE — 2500000001 HC RX 250 WO HCPCS SELF ADMINISTERED DRUGS (ALT 637 FOR MEDICARE OP): Performed by: REGISTERED NURSE

## 2025-04-13 PROCEDURE — 2500000001 HC RX 250 WO HCPCS SELF ADMINISTERED DRUGS (ALT 637 FOR MEDICARE OP): Performed by: INTERNAL MEDICINE

## 2025-04-13 PROCEDURE — 2500000004 HC RX 250 GENERAL PHARMACY W/ HCPCS (ALT 636 FOR OP/ED): Performed by: STUDENT IN AN ORGANIZED HEALTH CARE EDUCATION/TRAINING PROGRAM

## 2025-04-13 PROCEDURE — 71045 X-RAY EXAM CHEST 1 VIEW: CPT

## 2025-04-13 PROCEDURE — 80048 BASIC METABOLIC PNL TOTAL CA: CPT | Performed by: STUDENT IN AN ORGANIZED HEALTH CARE EDUCATION/TRAINING PROGRAM

## 2025-04-13 PROCEDURE — 93005 ELECTROCARDIOGRAM TRACING: CPT

## 2025-04-13 PROCEDURE — 80048 BASIC METABOLIC PNL TOTAL CA: CPT

## 2025-04-13 PROCEDURE — 2500000001 HC RX 250 WO HCPCS SELF ADMINISTERED DRUGS (ALT 637 FOR MEDICARE OP): Performed by: STUDENT IN AN ORGANIZED HEALTH CARE EDUCATION/TRAINING PROGRAM

## 2025-04-13 PROCEDURE — 94640 AIRWAY INHALATION TREATMENT: CPT

## 2025-04-13 PROCEDURE — 36415 COLL VENOUS BLD VENIPUNCTURE: CPT | Performed by: STUDENT IN AN ORGANIZED HEALTH CARE EDUCATION/TRAINING PROGRAM

## 2025-04-13 PROCEDURE — 83735 ASSAY OF MAGNESIUM: CPT | Performed by: STUDENT IN AN ORGANIZED HEALTH CARE EDUCATION/TRAINING PROGRAM

## 2025-04-13 PROCEDURE — 36415 COLL VENOUS BLD VENIPUNCTURE: CPT

## 2025-04-13 PROCEDURE — 99231 SBSQ HOSP IP/OBS SF/LOW 25: CPT | Performed by: REGISTERED NURSE

## 2025-04-13 PROCEDURE — 2500000004 HC RX 250 GENERAL PHARMACY W/ HCPCS (ALT 636 FOR OP/ED): Performed by: INTERNAL MEDICINE

## 2025-04-13 PROCEDURE — 2500000002 HC RX 250 W HCPCS SELF ADMINISTERED DRUGS (ALT 637 FOR MEDICARE OP, ALT 636 FOR OP/ED): Performed by: STUDENT IN AN ORGANIZED HEALTH CARE EDUCATION/TRAINING PROGRAM

## 2025-04-13 PROCEDURE — 71045 X-RAY EXAM CHEST 1 VIEW: CPT | Performed by: RADIOLOGY

## 2025-04-13 PROCEDURE — 99232 SBSQ HOSP IP/OBS MODERATE 35: CPT | Performed by: STUDENT IN AN ORGANIZED HEALTH CARE EDUCATION/TRAINING PROGRAM

## 2025-04-13 PROCEDURE — 2500000002 HC RX 250 W HCPCS SELF ADMINISTERED DRUGS (ALT 637 FOR MEDICARE OP, ALT 636 FOR OP/ED): Performed by: REGISTERED NURSE

## 2025-04-13 RX ORDER — IPRATROPIUM BROMIDE AND ALBUTEROL SULFATE 2.5; .5 MG/3ML; MG/3ML
3 SOLUTION RESPIRATORY (INHALATION) EVERY 2 HOUR PRN
Status: DISCONTINUED | OUTPATIENT
Start: 2025-04-13 | End: 2025-04-22 | Stop reason: HOSPADM

## 2025-04-13 RX ORDER — FUROSEMIDE 10 MG/ML
20 INJECTION INTRAMUSCULAR; INTRAVENOUS ONCE
Status: COMPLETED | OUTPATIENT
Start: 2025-04-13 | End: 2025-04-13

## 2025-04-13 RX ORDER — LORAZEPAM 2 MG/ML
1 INJECTION INTRAMUSCULAR EVERY 8 HOURS PRN
Status: DISCONTINUED | OUTPATIENT
Start: 2025-04-13 | End: 2025-04-14

## 2025-04-13 RX ORDER — HYDROXYZINE HYDROCHLORIDE 25 MG/1
25 TABLET, FILM COATED ORAL EVERY 6 HOURS PRN
Status: DISCONTINUED | OUTPATIENT
Start: 2025-04-13 | End: 2025-04-22 | Stop reason: HOSPADM

## 2025-04-13 RX ORDER — LACTULOSE 10 G/15ML
20 SOLUTION ORAL ONCE
Status: COMPLETED | OUTPATIENT
Start: 2025-04-13 | End: 2025-04-13

## 2025-04-13 RX ORDER — BISACODYL 10 MG/1
10 SUPPOSITORY RECTAL DAILY PRN
Status: DISCONTINUED | OUTPATIENT
Start: 2025-04-13 | End: 2025-04-22 | Stop reason: HOSPADM

## 2025-04-13 RX ORDER — CARBIDOPA AND LEVODOPA 25; 100 MG/1; MG/1
2 TABLET ORAL 3 TIMES DAILY
Status: DISCONTINUED | OUTPATIENT
Start: 2025-04-13 | End: 2025-04-17

## 2025-04-13 RX ORDER — IPRATROPIUM BROMIDE AND ALBUTEROL SULFATE 2.5; .5 MG/3ML; MG/3ML
3 SOLUTION RESPIRATORY (INHALATION)
Status: DISCONTINUED | OUTPATIENT
Start: 2025-04-13 | End: 2025-04-13

## 2025-04-13 RX ORDER — ALBUTEROL SULFATE 0.83 MG/ML
2.5 SOLUTION RESPIRATORY (INHALATION) EVERY 6 HOURS PRN
Status: DISCONTINUED | OUTPATIENT
Start: 2025-04-13 | End: 2025-04-16

## 2025-04-13 RX ADMIN — RIVAROXABAN 10 MG: 10 TABLET, FILM COATED ORAL at 09:31

## 2025-04-13 RX ADMIN — POLYETHYLENE GLYCOL 3350 17 G: 17 POWDER, FOR SOLUTION ORAL at 09:29

## 2025-04-13 RX ADMIN — Medication 1000 MCG: at 09:31

## 2025-04-13 RX ADMIN — LORAZEPAM 1 MG: 2 INJECTION INTRAMUSCULAR; INTRAVENOUS at 22:31

## 2025-04-13 RX ADMIN — DULOXETINE HYDROCHLORIDE 30 MG: 30 CAPSULE, DELAYED RELEASE ORAL at 20:13

## 2025-04-13 RX ADMIN — Medication 25 MCG: at 20:13

## 2025-04-13 RX ADMIN — CARBIDOPA AND LEVODOPA 3 TABLET: 25; 100 TABLET ORAL at 09:30

## 2025-04-13 RX ADMIN — DULOXETINE HYDROCHLORIDE 30 MG: 30 CAPSULE, DELAYED RELEASE ORAL at 09:38

## 2025-04-13 RX ADMIN — Medication 25 MCG: at 09:38

## 2025-04-13 RX ADMIN — LACTULOSE 20 G: 20 SOLUTION ORAL at 12:59

## 2025-04-13 RX ADMIN — METOPROLOL SUCCINATE 50 MG: 50 TABLET, EXTENDED RELEASE ORAL at 09:38

## 2025-04-13 RX ADMIN — ATORVASTATIN CALCIUM 20 MG: 20 TABLET, FILM COATED ORAL at 09:30

## 2025-04-13 RX ADMIN — FUROSEMIDE 20 MG: 10 INJECTION, SOLUTION INTRAVENOUS at 13:52

## 2025-04-13 RX ADMIN — HYDROXYZINE HYDROCHLORIDE 25 MG: 25 TABLET, FILM COATED ORAL at 21:31

## 2025-04-13 RX ADMIN — CARBIDOPA AND LEVODOPA 3 TABLET: 25; 100 TABLET ORAL at 14:39

## 2025-04-13 RX ADMIN — SENNOSIDES AND DOCUSATE SODIUM 2 TABLET: 50; 8.6 TABLET ORAL at 09:30

## 2025-04-13 RX ADMIN — IPRATROPIUM BROMIDE AND ALBUTEROL SULFATE 3 ML: .5; 3 SOLUTION RESPIRATORY (INHALATION) at 18:58

## 2025-04-13 ASSESSMENT — PAIN SCALES - GENERAL
PAINLEVEL_OUTOF10: 0 - NO PAIN

## 2025-04-13 ASSESSMENT — PAIN - FUNCTIONAL ASSESSMENT
PAIN_FUNCTIONAL_ASSESSMENT: 0-10
PAIN_FUNCTIONAL_ASSESSMENT: 0-10

## 2025-04-13 NOTE — PROGRESS NOTES
Nephrology Consult Progress Note    Shreya Acuña is a 89 y.o. female on day 1 of admission presenting with Acute kidney injury superimposed on CKD.      Subjective   No acute events overnight, poor po intake, wants to go home, family at bedside, remains on NC O       Objective          Physical Exam    Vitals 24HR  Heart Rate:  [54-82]   Temp:  [36.1 °C (97 °F)-36.5 °C (97.7 °F)]   Resp:  [20]   BP: (119-145)/(58-68)   SpO2:  [90 %-96 %]           AAOx3, on NC O2, no distress  Decreased AEBL  RRR  Abd soft  Chronic skin changes, no edema  Peralta+              I&O 24HR    Intake/Output Summary (Last 24 hours) at 4/13/2025 1158  Last data filed at 4/13/2025 0838  Gross per 24 hour   Intake 1339.66 ml   Output 4150 ml   Net -2810.34 ml         Medications  Medications Prior to Admission   Medication Sig Dispense Refill Last Dose/Taking    acetaminophen (Tylenol) 500 mg tablet Take 2 tablets (1,000 mg) by mouth 3 times a day.       albuterol 90 mcg/actuation inhaler Inhale 2 puffs every 4 hours if needed for wheezing. (Patient not taking: Reported on 4/7/2025) 18 g 0     atorvastatin (Lipitor) 20 mg tablet Take 1 tablet (20 mg) by mouth once daily.       carbidopa-levodopa (Sinemet)  mg tablet Take 3 tablets by mouth 3 times a day. 270 tablet 11     cholecalciferol (Vitamin D-3) 25 MCG (1000 UT) capsule Take 1 capsule (25 mcg) by mouth 2 times a day.       cyanocobalamin (Vitamin B-12) 1,000 mcg tablet Take 1 tablet (1,000 mcg) by mouth once daily.       DILT- mg 24 hr capsule Take 1 capsule (120 mg) by mouth once daily. 90 capsule 3     DULoxetine (Cymbalta) 30 mg DR capsule Take 1 capsule (30 mg) by mouth 2 times a day. 180 capsule 2     Gemtesa 75 mg tablet Take 1 tablet (75 mg) by mouth early in the morning..       metoprolol succinate XL (Toprol-XL) 50 mg 24 hr tablet Take 1 tablet (50 mg) by mouth once daily.       mv-min-FA-vit K-lutein-zeaxant (PreserVision AREDS 2 Plus MV) 200 mcg-15 mcg- 5 mg-1  mg capsule Take 1 capsule by mouth 2 times a day.       oxyCODONE (Roxicodone) 5 mg immediate release tablet Take 1 tablet (5 mg) by mouth every 6 hours if needed for severe pain (7 - 10) for up to 5 days. 10 tablet 0     pregabalin (Lyrica) 100 mg capsule Take 1 capsule (100 mg) by mouth 2 times a day. 180 capsule 2     rivaroxaban (Xarelto) 10 mg tablet Take 1 tablet (10 mg) by mouth once daily. Resume taking on 4/14       spironolactone (Aldactone) 25 mg tablet Take 1 tablet (25 mg) by mouth once daily. (Patient taking differently: Take 1 tablet (25 mg) by mouth 3 times a day.) 90 tablet 3     torsemide (Demadex) 20 mg tablet Take 1 tablet (20 mg) by mouth once daily. (Patient taking differently: Take 1 tablet (20 mg) by mouth once daily at bedtime.) 90 tablet 3       Scheduled medications  atorvastatin, 20 mg, oral, Daily  carbidopa-levodopa, 3 tablet, oral, TID  cholecalciferol, 25 mcg, oral, BID  cyanocobalamin, 1,000 mcg, oral, Daily  [Held by provider] dilTIAZem CD, 120 mg, oral, Daily  DULoxetine, 30 mg, oral, BID  ipratropium-albuteroL, 3 mL, nebulization, q6h  metoprolol succinate XL, 50 mg, oral, Daily  polyethylene glycol, 17 g, oral, Daily  [Held by provider] pregabalin, 100 mg, oral, BID  rivaroxaban, 10 mg, oral, Daily  sennosides-docusate sodium, 2 tablet, oral, BID  [Held by provider] spironolactone, 25 mg, oral, Daily  [Held by provider] torsemide, 20 mg, oral, Nightly      Continuous medications     PRN medications  PRN medications: acetaminophen, albuterol, bisacodyl, ondansetron **OR** ondansetron, [Held by provider] oxyCODONE    Relevant Results      Admission on 04/12/2025   Component Date Value Ref Range Status    WBC 04/12/2025 7.4  4.4 - 11.3 x10*3/uL Final    nRBC 04/12/2025 0.0  0.0 - 0.0 /100 WBCs Final    RBC 04/12/2025 3.36 (L)  4.00 - 5.20 x10*6/uL Final    Hemoglobin 04/12/2025 11.0 (L)  12.0 - 16.0 g/dL Final    Hematocrit 04/12/2025 35.9 (L)  36.0 - 46.0 % Final    MCV 04/12/2025  107 (H)  80 - 100 fL Final    MCH 04/12/2025 32.7  26.0 - 34.0 pg Final    MCHC 04/12/2025 30.6 (L)  32.0 - 36.0 g/dL Final    RDW 04/12/2025 13.6  11.5 - 14.5 % Final    Platelets 04/12/2025 144 (L)  150 - 450 x10*3/uL Final    Neutrophils % 04/12/2025 71.0  40.0 - 80.0 % Final    Immature Granulocytes %, Automated 04/12/2025 0.4  0.0 - 0.9 % Final    Immature Granulocyte Count (IG) includes promyelocytes, myelocytes and metamyelocytes but does not include bands. Percent differential counts (%) should be interpreted in the context of the absolute cell counts (cells/UL).    Lymphocytes % 04/12/2025 15.8  13.0 - 44.0 % Final    Monocytes % 04/12/2025 12.1  2.0 - 10.0 % Final    Eosinophils % 04/12/2025 0.4  0.0 - 6.0 % Final    Basophils % 04/12/2025 0.3  0.0 - 2.0 % Final    Neutrophils Absolute 04/12/2025 5.23  1.60 - 5.50 x10*3/uL Final    Percent differential counts (%) should be interpreted in the context of the absolute cell counts (cells/uL).    Immature Granulocytes Absolute, Au* 04/12/2025 0.03  0.00 - 0.50 x10*3/uL Final    Lymphocytes Absolute 04/12/2025 1.16  0.80 - 3.00 x10*3/uL Final    Monocytes Absolute 04/12/2025 0.89 (H)  0.05 - 0.80 x10*3/uL Final    Eosinophils Absolute 04/12/2025 0.03  0.00 - 0.40 x10*3/uL Final    Basophils Absolute 04/12/2025 0.02  0.00 - 0.10 x10*3/uL Final    Glucose 04/12/2025 95  74 - 99 mg/dL Final    Sodium 04/12/2025 132 (L)  136 - 145 mmol/L Final    Potassium 04/12/2025 4.4  3.5 - 5.3 mmol/L Final    Chloride 04/12/2025 95 (L)  98 - 107 mmol/L Final    Bicarbonate 04/12/2025 28  21 - 32 mmol/L Final    Anion Gap 04/12/2025 13  10 - 20 mmol/L Final    Urea Nitrogen 04/12/2025 40 (H)  6 - 23 mg/dL Final    Creatinine 04/12/2025 2.59 (H)  0.50 - 1.05 mg/dL Final    eGFR 04/12/2025 17 (L)  >60 mL/min/1.73m*2 Final    Calculations of estimated GFR are performed using the 2021 CKD-EPI Study Refit equation without the race variable for the IDMS-Traceable creatinine  methods.  https://jasn.asnjournals.org/content/early/2021/09/22/ASN.9026994064    Calcium 04/12/2025 9.0  8.6 - 10.3 mg/dL Final    Albumin 04/12/2025 4.3  3.4 - 5.0 g/dL Final    Alkaline Phosphatase 04/12/2025 116  33 - 136 U/L Final    Total Protein 04/12/2025 6.8  6.4 - 8.2 g/dL Final    AST 04/12/2025 36  9 - 39 U/L Final    Bilirubin, Total 04/12/2025 0.5  0.0 - 1.2 mg/dL Final    ALT 04/12/2025 7  7 - 45 U/L Final    Patients treated with Sulfasalazine may generate falsely decreased results for ALT.    Glucose 04/12/2025 93  74 - 99 mg/dL Final    Sodium 04/12/2025 133 (L)  136 - 145 mmol/L Final    Potassium 04/12/2025 4.2  3.5 - 5.3 mmol/L Final    Chloride 04/12/2025 97 (L)  98 - 107 mmol/L Final    Bicarbonate 04/12/2025 28  21 - 32 mmol/L Final    Anion Gap 04/12/2025 12  10 - 20 mmol/L Final    Urea Nitrogen 04/12/2025 40 (H)  6 - 23 mg/dL Final    Creatinine 04/12/2025 2.44 (H)  0.50 - 1.05 mg/dL Final    eGFR 04/12/2025 19 (L)  >60 mL/min/1.73m*2 Final    Calculations of estimated GFR are performed using the 2021 CKD-EPI Study Refit equation without the race variable for the IDMS-Traceable creatinine methods.  https://jasn.asnjournals.org/content/early/2021/09/22/ASN.6048145618    Calcium 04/12/2025 8.7  8.6 - 10.3 mg/dL Final    Sodium, Urine Random 04/12/2025 16  mmol/L Final    Creatinine, Urine Random 04/12/2025 125.3  20.0 - 320.0 mg/dL Final    Sodium/Creatinine Ratio 04/12/2025 13  Not established. mmol/g Creat Final    Urea Nitrogen, Urine Random 04/12/2025 311  mg/dL Final    Creatinine, Urine Random 04/12/2025 125.3  20.0 - 320.0 mg/dL Final    Urea Nitrogen/Creatinine Ratio 04/12/2025 2.5  Not established. g/g creat Final    Glucose 04/13/2025 83  74 - 99 mg/dL Final    Sodium 04/13/2025 139  136 - 145 mmol/L Final    Potassium 04/13/2025 4.2  3.5 - 5.3 mmol/L Final    Chloride 04/13/2025 106  98 - 107 mmol/L Final    Bicarbonate 04/13/2025 27  21 - 32 mmol/L Final    Anion Gap 04/13/2025  10  10 - 20 mmol/L Final    Urea Nitrogen 04/13/2025 29 (H)  6 - 23 mg/dL Final    Creatinine 04/13/2025 1.20 (H)  0.50 - 1.05 mg/dL Final    eGFR 04/13/2025 44 (L)  >60 mL/min/1.73m*2 Final    Calculations of estimated GFR are performed using the 2021 CKD-EPI Study Refit equation without the race variable for the IDMS-Traceable creatinine methods.  https://jasn.asnjournals.org/content/early/2021/09/22/ASN.8533743156    Calcium 04/13/2025 8.8  8.6 - 10.3 mg/dL Final    Extra Tube 04/13/2025 Hold for add-ons.   Final    Auto resulted.   Lab Requisition on 04/11/2025   Component Date Value Ref Range Status    Bilirubin, Direct 04/11/2025 0.1  0.0 - 0.3 mg/dL Final    Glucose 04/11/2025 109 (H)  74 - 99 mg/dL Final    Sodium 04/11/2025 137  136 - 145 mmol/L Final    Potassium 04/11/2025 5.3  3.5 - 5.3 mmol/L Final    Chloride 04/11/2025 97 (L)  98 - 107 mmol/L Final    Bicarbonate 04/11/2025 31  21 - 32 mmol/L Final    Anion Gap 04/11/2025 14  10 - 20 mmol/L Final    Urea Nitrogen 04/11/2025 35 (H)  6 - 23 mg/dL Final    Creatinine 04/11/2025 2.29 (H)  0.50 - 1.05 mg/dL Final    eGFR 04/11/2025 20 (L)  >60 mL/min/1.73m*2 Final    Calculations of estimated GFR are performed using the 2021 CKD-EPI Study Refit equation without the race variable for the IDMS-Traceable creatinine methods.  https://jasn.asnjournals.org/content/early/2021/09/22/ASN.1909957705    Calcium 04/11/2025 9.7  8.6 - 10.3 mg/dL Final    Albumin 04/11/2025 4.4  3.4 - 5.0 g/dL Final    Alkaline Phosphatase 04/11/2025 111  33 - 136 U/L Final    Total Protein 04/11/2025 6.7  6.4 - 8.2 g/dL Final    AST 04/11/2025 32  9 - 39 U/L Final    Bilirubin, Total 04/11/2025 0.6  0.0 - 1.2 mg/dL Final    ALT 04/11/2025 8  7 - 45 U/L Final    Patients treated with Sulfasalazine may generate falsely decreased results for ALT.    WBC 04/11/2025 9.3  4.4 - 11.3 x10*3/uL Final    nRBC 04/11/2025 0.0  0.0 - 0.0 /100 WBCs Final    RBC 04/11/2025 3.56 (L)  4.00 - 5.20  x10*6/uL Final    Hemoglobin 04/11/2025 11.8 (L)  12.0 - 16.0 g/dL Final    Hematocrit 04/11/2025 37.5  36.0 - 46.0 % Final    MCV 04/11/2025 105 (H)  80 - 100 fL Final    MCH 04/11/2025 33.1  26.0 - 34.0 pg Final    MCHC 04/11/2025 31.5 (L)  32.0 - 36.0 g/dL Final    RDW 04/11/2025 13.4  11.5 - 14.5 % Final    Platelets 04/11/2025 163  150 - 450 x10*3/uL Final      Imaging  CT abdomen pelvis wo IV contrast    Result Date: 4/12/2025  1. Post laparoscopic cholecystectomy with slight postsurgical changes but no suspicious abnormalities. 2. Tiny fat containing umbilical hernia similar to the prior exam 3. Left renal cortical 5.3 cm hypodensity most likely a renal cyst. This is similar to the prior exam. Ultrasound may confirm this impression. 4. Marked degenerative changes of the lumbar spine with lumbar canal stenosis most severe L4-5     Signed by: Savannah García 4/12/2025 7:16 AM Dictation workstation:   [id number]     Cardiology, Vascular, and Other Imaging  No other imaging results found for the past 7 days        ASSESSMENT AND PLAN    89 y.o. female with PMH of CKD stage 4, Afib on AC, CHF, HL, HTN, empyema, smoking and biliary dyskinasia recent elective laparoscopic cholecystectomy on 4/10 presenting with abnormal labs as OP, found to have MISTY and hyponatremia     MISTY on CKD, baseline creatinine of 0.9 until 2023, in the 1.6-1.7 since, was 2.59 on admission associated with high BUN, improving  UA pending  Urine Na 16 on admission  Prerenal, was on torsemide 20 mg every day and aldactone 25 mg every day before admission     Hyponatremia in setting of MISTY/impaired free water clearance     HTN, severe TR as above, on diuretics, cardizem and toprol before admission     Volume status- improved     Anemia, mild, chronic     L renal cyst        Plan  - prompt improvement in creatinine and Na with ivf, diuretics PRN, CXR pending, UOP ~ 4L/past 24 hrs?  - check UA for completion, please record IOs  - daily lytes and  replete for K>4 and mag>2  - ECHO as above with severe TR but RVSP wnl  - avoid hypotension and nephrotoxins  - BP stable, hold BP meds  - dc and do not resume ditropan on discharge     MARS reviewed, dose for GFR<40        Thank you for the opportunity to assist in the care of this patient, please call with questions  Danielle Mcintosh MD PhD

## 2025-04-13 NOTE — PROGRESS NOTES
"Shreya Acuña is a 89 y.o. female on day 1 of admission presenting with Acute kidney injury superimposed on CKD.    Subjective   Patient denies any nausea, vomiting, or abdominal pain. Denies SOB, but does state she feels wheezy. Says she gets this way at homes sometimes and that \"my daughter is always giving me a hard time about it\".    Passing gas. Still has not had a BM.    Ate her entire breakfast this AM, but when asked how she did with breakfast she said \"Yuck\".    Despite being A&O x3, stated \"I'm supposed to go out with the girls this morning, around 9, but I'm probably not going to make it now\".    MISTY resolved       Objective     Physical Exam  Vitals reviewed.   Constitutional:       General: She is not in acute distress.     Appearance: She is obese.   Eyes:      General: No scleral icterus.  Cardiovascular:      Rate and Rhythm: Normal rate. Rhythm irregular.      Pulses: Normal pulses.      Comments: Afib 80  Pulmonary:      Effort: Prolonged expiration present.      Breath sounds: Wheezing present.      Comments: Now on room air, but with audible expiratory wheezing/end expiratory wheezing. Pursed lip breathing at times.  Using IS, but only fair effort. Only able to pull 500mL  Abdominal:      General: Bowel sounds are normal. There is distension (Very mild only).      Palpations: Abdomen is soft.      Tenderness: There is no abdominal tenderness.      Comments: Laparoscopic incisions BRYN- closed with skin glue. No ecchymosis. No drainage.    Genitourinary:     Comments: Peralta with completely clear urine  Musculoskeletal:      Right lower leg: Edema present.      Left lower leg: Edema present.      Comments: Trace ankle edema now today   Skin:     General: Skin is warm.      Coloration: Skin is not jaundiced or pale.   Neurological:      Mental Status: She is alert and oriented to person, place, and time.   Psychiatric:         Behavior: Behavior normal.      Comments: Not fully comprehending " "rationale for hospital admission/need for ongoing hospital care         Last Recorded Vitals  Blood pressure 145/68, pulse 82, temperature 36.2 °C (97.2 °F), resp. rate 20, height 1.6 m (5' 3\"), weight 109 kg (241 lb), SpO2 94%.  Intake/Output last 3 Shifts:  I/O last 3 completed shifts:  In: 1707.7 (15.6 mL/kg) [P.O.:486; I.V.:1221.7 (11.2 mL/kg)]  Out: 1970 (18 mL/kg) [Urine:1970 (0.5 mL/kg/hr)]  Weight: 109.3 kg     Relevant Results  Results for orders placed or performed during the hospital encounter of 04/12/25 (from the past 24 hours)   Basic metabolic panel   Result Value Ref Range    Glucose 83 74 - 99 mg/dL    Sodium 139 136 - 145 mmol/L    Potassium 4.2 3.5 - 5.3 mmol/L    Chloride 106 98 - 107 mmol/L    Bicarbonate 27 21 - 32 mmol/L    Anion Gap 10 10 - 20 mmol/L    Urea Nitrogen 29 (H) 6 - 23 mg/dL    Creatinine 1.20 (H) 0.50 - 1.05 mg/dL    eGFR 44 (L) >60 mL/min/1.73m*2    Calcium 8.8 8.6 - 10.3 mg/dL   Lavender Top   Result Value Ref Range    Extra Tube Hold for add-ons.      *Note: Due to a large number of results and/or encounters for the requested time period, some results have not been displayed. A complete set of results can be found in Results Review.         Assessment/Plan   Assessment & Plan  Acute kidney injury superimposed on CKD    Abdominal distention    MISTY (acute kidney injury)    89 year old female with a history significant for CKD stage IV, permanent Afib on Xarelto, HFpEF, HTN, HLD, and biliary dyskinesia s/p lap letha 4/10/25 (Dr. Helm), presented to [facility] ED on 4/11 with s/s MISTY on CKD (based on outpatient labs). Admitted to medicine. Surgery on consult.     Impression:  MISTY on CKD - pre-renal - resolved   Mild constipation (? Opioid induced constipation, slow transit given sedentary lifestyle) without postop ileus   > no nausea, vomiting. Passing gas but no BM. Received suppository yesterday   S/p lap letha - no postop issues. Incisions healing     Recommendations:  - no " surgical intervention; stable from postop surgery standpoint  - okay to continue home Xarelto  - defer resumption of diuretics to medicine team  - recommend starting PRN albuterol for wheezing  - suppository PRN; will ask RN to give another today  - diet as tolerated  - continue bowel regimen: senna-colace and Miralax   - PT/OT, increase mobility as able  - IS every 1 hour     The patient will be seen and discussed with the attending surgeon on-call Dr. Leos      I spent 15 minutes in the professional and overall care of this patient.      Juliet Perez, APRN-CNP

## 2025-04-13 NOTE — PROGRESS NOTES
Subjective   Tolerating regular diet without issue. Constipated, no BM in 2 days. Denies any abdominal pain, nausea, or vomiting.    Objective   Vitals:    04/13/25 1619   BP: 151/69   Pulse: 79   Resp: 18   Temp: 36.2 °C (97.2 °F)   SpO2: 93%       Physical Exam:   Constitutional: awake, alert, no acute distress  ENMT: mucous membranes moist, conjunctivae clear  Head/Neck: normocephalic, atraumatic; supple, trachea midline  Respiratory/Thorax: patent airways, CTAB; no wheezes, rales, or rhonchi  Cardiovascular: irregularly irregular rate and rhythm, no murmur appreciated  Gastrointestinal: soft, nondistended, non-tender, bowel sounds appreciated  : Peralta in place draining clear yellow urine  Extremities: palpable peripheral pulses, no edema  Neurological: AO x3, no focal deficits  Psychological: appropriate mood and behavior  Skin: warm and dry    Scheduled Medications:   atorvastatin, 20 mg, oral, Daily  carbidopa-levodopa, 3 tablet, oral, TID  cholecalciferol, 25 mcg, oral, BID  cyanocobalamin, 1,000 mcg, oral, Daily  DULoxetine, 30 mg, oral, BID  ipratropium-albuteroL, 3 mL, nebulization, q6h  metoprolol succinate XL, 50 mg, oral, Daily  polyethylene glycol, 17 g, oral, Daily  [Held by provider] pregabalin, 100 mg, oral, BID  rivaroxaban, 10 mg, oral, Daily  sennosides-docusate sodium, 2 tablet, oral, BID  [Held by provider] spironolactone, 25 mg, oral, Daily  [Held by provider] torsemide, 20 mg, oral, Nightly       Continuous Medications:         PRN Medications:   PRN medications: acetaminophen, albuterol, bisacodyl, ondansetron **OR** ondansetron    Assessment/Plan   Shreya Acuña is a 89 y.o. female     Acute hypoxia  MISTY on CKD, improving  Urinary retention s/p Peralta  Acute metabolic vs toxic encephalopathy, resolved  Abdominal distention, improved  Biliary dyskinesia s/p laparoscopic cholecystectomy on 4/10/25     Permanent Afib, on Xarelto  HTN, HLD  TAMMY, noncompliant on CPAP    On 2L NC this AM. CXR  shows some pulmonary edema. Will give Lasix 20mg IVP today. Wean O2 as tolerated. Suspect some component of her hypoxia is also from TAMMY for which she is noncompliant on CPAP.  Nephrology following. Monitor electrolytes given increased UOP over last 24hr.  Maintain Peralta for now. Will attempt voiding trial in AM. If unsuccessful, will need to replace and have her follow up with urology for outpatient voiding trial in 1 week. Discussed stopping home Oxybutynin upon DC.  Seen by general surgery for abdominal distension with recent lap letha. CT A/P shows postop changes without complications. Tolerating regular diet without issue.  Discontinue Cardizem upon DC given relatively low HRs. Pt in permanent afib with controlled rate. Had actually been advised by Dr. Barnhart to discontinue this in 3/2025.    DVT PPX: Xarelto  Code status: FULL    Stephy Perez [facility] Medicine

## 2025-04-13 NOTE — PROGRESS NOTES
Subjective   Awake, alert this AM. Finished clear liquid tray. Denies any pain.    Objective   Vitals:    04/12/25 2030   BP: 119/58   Pulse: 65   Resp: 20   Temp: 36.5 °C (97.7 °F)   SpO2: 96%       Physical Exam:   Constitutional: awake, alert, no acute distress  ENMT: mucous membranes moist, conjunctivae clear  Head/Neck: normocephalic, atraumatic; supple, trachea midline  Respiratory/Thorax: patent airways, CTAB; no wheezes, rales, or rhonchi  Cardiovascular: irregularly irregular rate and rhythm, no murmur appreciated  Gastrointestinal: soft, nondistended, non-tender, bowel sounds appreciated  : Peralta in place draining clear yellow urine  Extremities: palpable peripheral pulses, no edema  Neurological: AO x3, no focal deficits  Psychological: appropriate mood and behavior  Skin: warm and dry    Scheduled Medications:   atorvastatin, 20 mg, oral, Daily  carbidopa-levodopa, 3 tablet, oral, TID  cholecalciferol, 25 mcg, oral, BID  cyanocobalamin, 1,000 mcg, oral, Daily  [Held by provider] dilTIAZem CD, 120 mg, oral, Daily  [Held by provider] DULoxetine, 30 mg, oral, BID  [Held by provider] metoprolol succinate XL, 50 mg, oral, Daily  [Held by provider] oxybutynin, 5 mg, oral, TID  polyethylene glycol, 17 g, oral, Daily  [Held by provider] pregabalin, 100 mg, oral, BID  rivaroxaban, 10 mg, oral, Daily  sennosides-docusate sodium, 2 tablet, oral, BID  [Held by provider] spironolactone, 25 mg, oral, Daily  [Held by provider] torsemide, 20 mg, oral, Nightly       Continuous Medications:   sodium chloride 0.9%, 75 mL/hr, Last Rate: 75 mL/hr (04/12/25 1759)       PRN Medications:   PRN medications: acetaminophen, ondansetron **OR** ondansetron, [Held by provider] oxyCODONE    Assessment/Plan   Shreya Acuña is a 89 y.o. female     MISTY on CKD  Permanent Afib with SVR, on Xarelto  Urinary retention s/p Peralta  Acute metabolic vs toxic encephalopathy, resolved  Abdominal distention, improved  Biliary dyskinesia s/p  laparoscopic cholecystectomy on 4/10/25     HTN, HLD    Nephrology following. Continue IVF. Hold Torsemide, Aldactone given MISTY. Hold home Metoprolol, Cardizem given afib with SVR and MISTY on CKD. Lyrica and Cymbalta had been held on admission given AMS, which has resolved. Will consider restarting over the next day or so pending repeat renal function.  Peralta placed overnight for urinary retention. Stop Oxybutynin. Urology consulted.  Seen by general surgery for abdominal distension with recent lap letha. CT A/P shows postop changes without complications. Tolerating clear liquids without issues. Will advance diet this afternoon. Continue bowel regimen.    DVT PPX: Xarelto  Code status: FULL    Stephy Perez DO  [facility]

## 2025-04-13 NOTE — CONSULTS
Consults    Reason For Consult  Urological consultation is obtained patient had Peralta catheter placed urine is clear    History Of Present Illness  Shreya Acuña is a 89 y.o. female presenting with urinary retention Peralta catheter placed patient previously on oxybutynin and this has been stopped and should be withheld     Past Medical History  She has a past medical history of Emphysema lung (Multi), Hypertension, and Other specified health status.    Surgical History  She has a past surgical history that includes Other surgical history (12/02/2020).     Social History  She reports that she quit smoking about 42 years ago. Her smoking use included cigarettes. She has never used smokeless tobacco. She reports that she does not currently use alcohol. She reports that she does not use drugs.    Family History  No family history on file.     Allergies  Patient has no known allergies.    Review of Systems    REVIEW OF SYSTEMS  GENERAL:  Negative for malaise, significant weight loss, fever  HEENT:  No changes in hearing or vision, no nose bleeds or other nasal problems and Negative for frequent or significant headaches  NECK:  Negative for lumps, goiter, pain and significant neck swelling  RESPIRATORY:  Negative for cough, wheezing and shortness of breath  CARDIOVASCULAR:  Negative for chest pain, leg swelling and palpitations  GI:  Negative for abdominal discomfort, blood in stools or black stools and change in bowel habits  :  Negative for dysuria, frequency and incontinence  MUSCULOSKELETAL:  Negative for joint pain or swelling, back pain, and muscle pain.  SKIN:  Negative for lesions, rash, and itching  PSYCH:  Negative for sleep disturbance, mood disorder and recent psychosocial stressors  HEMATOLOGY/LYMPHOLOGY:  Negative for prolonged bleeding, bruising easily, and swollen nodes.  ENDOCRINE:  Negative for cold or heat intolerance, polyuria, polydipsia and goiter  NEURO: Negative, denies any burning, tingling or  "numbness     Objective:   Vasc: DP and PT pulses are palpable bilateral.  CFT is less than 3 seconds bilateral.  Skin temperature is warm to cool proximal to distal bilateral.      Neuro:  Light touch is intact to the foot bilateral.  Protective sensation is intact to the foot when tested with the 5.07 SWM bilateral.  There is no clonus noted.  The hallux is downgoing bilateral.      Derm: Nails 1-5 bilateral are intact.  Skin is supple with normal texture and turgor noted.  Webspaces are clean, dry and intact bilateral.  There are no hyperkeratoses, ulcerations, verruca or other lesions noted.      Ortho: Muscle strength is 5/5 for all pedal groups tested.  Ankle joint, subtalar joint, 1st MPJ and lesser MPJ ROM is full and without pain or crepitus.  The foot type is rectus bilateral off weight bearing.  There are no structural deformities noted.       Physical Exam     Last Recorded Vitals  Blood pressure 145/68, pulse 82, temperature 36.2 °C (97.2 °F), resp. rate 20, height 1.6 m (5' 3\"), weight 109 kg (241 lb), SpO2 94%.    Relevant Results  Current Outpatient Medications   Medication Instructions    acetaminophen (TYLENOL) 1,000 mg, 3 times daily    albuterol 90 mcg/actuation inhaler 2 puffs, inhalation, Every 4 hours PRN    atorvastatin (LIPITOR) 20 mg, Daily RT    carbidopa-levodopa (Sinemet)  mg tablet 3 tablets, oral, 3 times daily    cholecalciferol (VITAMIN D-3) 25 mcg, 2 times daily    cyanocobalamin (VITAMIN B-12) 1,000 mcg, Daily    DILT- mg, oral, Daily    DULoxetine (CYMBALTA) 30 mg, oral, 2 times daily    Gemtesa 75 mg tablet 1 tablet, Daily (0630)    metoprolol succinate XL (Toprol-XL) 50 mg 24 hr tablet 1 tablet, Daily RT    mv-min-FA-vit K-lutein-zeaxant (PreserVision AREDS 2 Plus MV) 200 mcg-15 mcg- 5 mg-1 mg capsule 1 capsule, 2 times daily    oxyCODONE (ROXICODONE) 5 mg, oral, Every 6 hours PRN    pregabalin (LYRICA) 100 mg, oral, 2 times daily    rivaroxaban (XARELTO) 10 mg, oral, " Daily, Resume taking on 4/14    spironolactone (ALDACTONE) 25 mg, oral, Daily    torsemide (DEMADEX) 20 mg, oral, Daily      Results for orders placed or performed during the hospital encounter of 04/12/25 (from the past 24 hours)   Basic metabolic panel   Result Value Ref Range    Glucose 83 74 - 99 mg/dL    Sodium 139 136 - 145 mmol/L    Potassium 4.2 3.5 - 5.3 mmol/L    Chloride 106 98 - 107 mmol/L    Bicarbonate 27 21 - 32 mmol/L    Anion Gap 10 10 - 20 mmol/L    Urea Nitrogen 29 (H) 6 - 23 mg/dL    Creatinine 1.20 (H) 0.50 - 1.05 mg/dL    eGFR 44 (L) >60 mL/min/1.73m*2    Calcium 8.8 8.6 - 10.3 mg/dL     *Note: Due to a large number of results and/or encounters for the requested time period, some results have not been displayed. A complete set of results can be found in Results Review.         Assessment/Plan     Patient can have voiding trial when she is medically improved or if discharge is imminent can be discharged with Peralta catheter indwelling follow-up with urology in approximately 1 week    I spent 20 minutes in the professional and overall care of this patient.

## 2025-04-14 LAB
ANION GAP SERPL CALC-SCNC: 11 MMOL/L (ref 10–20)
BUN SERPL-MCNC: 18 MG/DL (ref 6–23)
CALCIUM SERPL-MCNC: 9.7 MG/DL (ref 8.6–10.3)
CHLORIDE SERPL-SCNC: 103 MMOL/L (ref 98–107)
CO2 SERPL-SCNC: 28 MMOL/L (ref 21–32)
CREAT SERPL-MCNC: 0.93 MG/DL (ref 0.5–1.05)
EGFRCR SERPLBLD CKD-EPI 2021: 60 ML/MIN/1.73M*2
ERYTHROCYTE [DISTWIDTH] IN BLOOD BY AUTOMATED COUNT: 13.4 % (ref 11.5–14.5)
GLUCOSE SERPL-MCNC: 92 MG/DL (ref 74–99)
HCT VFR BLD AUTO: 36 % (ref 36–46)
HGB BLD-MCNC: 11.3 G/DL (ref 12–16)
MAGNESIUM SERPL-MCNC: 2.04 MG/DL (ref 1.6–2.4)
MCH RBC QN AUTO: 32.8 PG (ref 26–34)
MCHC RBC AUTO-ENTMCNC: 31.4 G/DL (ref 32–36)
MCV RBC AUTO: 105 FL (ref 80–100)
NRBC BLD-RTO: 0 /100 WBCS (ref 0–0)
PLATELET # BLD AUTO: 138 X10*3/UL (ref 150–450)
POTASSIUM SERPL-SCNC: 3.8 MMOL/L (ref 3.5–5.3)
RBC # BLD AUTO: 3.44 X10*6/UL (ref 4–5.2)
SODIUM SERPL-SCNC: 138 MMOL/L (ref 136–145)
WBC # BLD AUTO: 6.8 X10*3/UL (ref 4.4–11.3)

## 2025-04-14 PROCEDURE — 99233 SBSQ HOSP IP/OBS HIGH 50: CPT | Performed by: STUDENT IN AN ORGANIZED HEALTH CARE EDUCATION/TRAINING PROGRAM

## 2025-04-14 PROCEDURE — 2500000004 HC RX 250 GENERAL PHARMACY W/ HCPCS (ALT 636 FOR OP/ED): Performed by: INTERNAL MEDICINE

## 2025-04-14 PROCEDURE — 85027 COMPLETE CBC AUTOMATED: CPT | Performed by: STUDENT IN AN ORGANIZED HEALTH CARE EDUCATION/TRAINING PROGRAM

## 2025-04-14 PROCEDURE — 2500000001 HC RX 250 WO HCPCS SELF ADMINISTERED DRUGS (ALT 637 FOR MEDICARE OP): Performed by: INTERNAL MEDICINE

## 2025-04-14 PROCEDURE — 2500000001 HC RX 250 WO HCPCS SELF ADMINISTERED DRUGS (ALT 637 FOR MEDICARE OP): Performed by: REGISTERED NURSE

## 2025-04-14 PROCEDURE — 1200000002 HC GENERAL ROOM WITH TELEMETRY DAILY

## 2025-04-14 PROCEDURE — 83735 ASSAY OF MAGNESIUM: CPT | Performed by: STUDENT IN AN ORGANIZED HEALTH CARE EDUCATION/TRAINING PROGRAM

## 2025-04-14 PROCEDURE — 97161 PT EVAL LOW COMPLEX 20 MIN: CPT | Mod: GP

## 2025-04-14 PROCEDURE — 2500000002 HC RX 250 W HCPCS SELF ADMINISTERED DRUGS (ALT 637 FOR MEDICARE OP, ALT 636 FOR OP/ED): Performed by: STUDENT IN AN ORGANIZED HEALTH CARE EDUCATION/TRAINING PROGRAM

## 2025-04-14 PROCEDURE — 36415 COLL VENOUS BLD VENIPUNCTURE: CPT | Performed by: STUDENT IN AN ORGANIZED HEALTH CARE EDUCATION/TRAINING PROGRAM

## 2025-04-14 PROCEDURE — 97165 OT EVAL LOW COMPLEX 30 MIN: CPT | Mod: GO

## 2025-04-14 PROCEDURE — 80048 BASIC METABOLIC PNL TOTAL CA: CPT | Performed by: STUDENT IN AN ORGANIZED HEALTH CARE EDUCATION/TRAINING PROGRAM

## 2025-04-14 PROCEDURE — 2500000001 HC RX 250 WO HCPCS SELF ADMINISTERED DRUGS (ALT 637 FOR MEDICARE OP): Performed by: STUDENT IN AN ORGANIZED HEALTH CARE EDUCATION/TRAINING PROGRAM

## 2025-04-14 RX ORDER — POTASSIUM CHLORIDE 20 MEQ/1
20 TABLET, EXTENDED RELEASE ORAL DAILY
Status: DISCONTINUED | OUTPATIENT
Start: 2025-04-15 | End: 2025-04-15

## 2025-04-14 RX ORDER — TORSEMIDE 20 MG/1
20 TABLET ORAL DAILY
Status: DISCONTINUED | OUTPATIENT
Start: 2025-04-15 | End: 2025-04-16

## 2025-04-14 RX ORDER — ACETAMINOPHEN 500 MG
5 TABLET ORAL NIGHTLY PRN
Status: DISCONTINUED | OUTPATIENT
Start: 2025-04-14 | End: 2025-04-17

## 2025-04-14 RX ORDER — LORAZEPAM 2 MG/ML
1 INJECTION INTRAMUSCULAR ONCE AS NEEDED
Status: COMPLETED | OUTPATIENT
Start: 2025-04-14 | End: 2025-04-14

## 2025-04-14 RX ADMIN — CARBIDOPA AND LEVODOPA 2 TABLET: 25; 100 TABLET ORAL at 08:57

## 2025-04-14 RX ADMIN — RIVAROXABAN 10 MG: 10 TABLET, FILM COATED ORAL at 08:57

## 2025-04-14 RX ADMIN — DULOXETINE HYDROCHLORIDE 30 MG: 30 CAPSULE, DELAYED RELEASE ORAL at 08:57

## 2025-04-14 RX ADMIN — METOPROLOL SUCCINATE 50 MG: 50 TABLET, EXTENDED RELEASE ORAL at 08:57

## 2025-04-14 RX ADMIN — Medication 25 MCG: at 20:05

## 2025-04-14 RX ADMIN — Medication 1000 MCG: at 08:57

## 2025-04-14 RX ADMIN — Medication 25 MCG: at 08:57

## 2025-04-14 RX ADMIN — ATORVASTATIN CALCIUM 20 MG: 20 TABLET, FILM COATED ORAL at 08:56

## 2025-04-14 RX ADMIN — LORAZEPAM 1 MG: 2 INJECTION INTRAMUSCULAR; INTRAVENOUS at 23:49

## 2025-04-14 RX ADMIN — DULOXETINE HYDROCHLORIDE 30 MG: 30 CAPSULE, DELAYED RELEASE ORAL at 20:05

## 2025-04-14 RX ADMIN — SENNOSIDES AND DOCUSATE SODIUM 2 TABLET: 50; 8.6 TABLET ORAL at 08:56

## 2025-04-14 RX ADMIN — HYDROXYZINE HYDROCHLORIDE 25 MG: 25 TABLET, FILM COATED ORAL at 20:05

## 2025-04-14 ASSESSMENT — COGNITIVE AND FUNCTIONAL STATUS - GENERAL
TOILETING: TOTAL
TURNING FROM BACK TO SIDE WHILE IN FLAT BAD: A LITTLE
MOVING TO AND FROM BED TO CHAIR: A LOT
STANDING UP FROM CHAIR USING ARMS: A LOT
DRESSING REGULAR LOWER BODY CLOTHING: TOTAL
MOBILITY SCORE: 13
DAILY ACTIVITIY SCORE: 13
WALKING IN HOSPITAL ROOM: A LOT
MOVING FROM LYING ON BACK TO SITTING ON SIDE OF FLAT BED WITH BEDRAILS: A LITTLE
HELP NEEDED FOR BATHING: A LOT
DRESSING REGULAR UPPER BODY CLOTHING: A LOT
PERSONAL GROOMING: A LITTLE
CLIMB 3 TO 5 STEPS WITH RAILING: TOTAL

## 2025-04-14 ASSESSMENT — PAIN SCALES - GENERAL
PAINLEVEL_OUTOF10: 0 - NO PAIN

## 2025-04-14 ASSESSMENT — PAIN - FUNCTIONAL ASSESSMENT
PAIN_FUNCTIONAL_ASSESSMENT: 0-10
PAIN_FUNCTIONAL_ASSESSMENT: 0-10
PAIN_FUNCTIONAL_ASSESSMENT: WONG-BAKER FACES

## 2025-04-14 NOTE — PROGRESS NOTES
Occupational Therapy    Evaluation    Patient Name: Shreya Acuña  MRN: [id number]  Department: [facility]  Room: [address] [place] [address]  Today's Date: 4/14/2025  Time Calculation  Start Time: 1012  Stop Time: 1026  Time Calculation (min): 14 min        Assessment:  OT Assessment: patient grossly MAX A x 2 upon  eval this date  Prognosis: Fair  Barriers to Discharge Home: Physical needs  End of Session Communication: Bedside nurse, PCT/NA/CTA  End of Session Patient Position: Up in chair, Alarm on (call light in reach, all needs met)  OT Assessment Results: Decreased ADL status, Decreased upper extremity strength, Decreased safe judgment during ADL, Decreased cognition, Decreased endurance, Decreased functional mobility  Prognosis: Fair  Strengths: Living arrangement secure  Barriers to Participation: Comorbidities  Plan:  Treatment Interventions: ADL retraining, Functional transfer training, UE strengthening/ROM, Endurance training, Equipment evaluation/education, Fine motor coordination activities  OT Frequency: 3 times per week  OT Discharge Recommendations: Moderate intensity level of continued care  OT - OK to Discharge: Yes (once medically appropriate to next level of care)  Treatment Interventions: ADL retraining, Functional transfer training, UE strengthening/ROM, Endurance training, Equipment evaluation/education, Fine motor coordination activities    Subjective   Current Problem:  1. MISTY (acute kidney injury)          General:  General  Reason for Referral: OT eval and tx; ADLs. dx; 1. Acute hypoxia  2. MISTY on CKD, improving  3. Urinary retention s/p Peralta  4. Acute metabolic vs toxic encephalopathy, resolved  5. Abdominal distention, improved  6. Biliary dyskinesia s/p laparoscopic cholecystectomy on 4/10/25. general surgery consult: no surgical intervention from post op surgery standpoint. CT A&P: left renal cortical 5.3 cm hypodensity likely renal cyst. Chest x-ray: findings suggestive of a degree of pulmonary  edema/CHF  Referred By: Chris  Past Medical History Relevant to Rehab: 89-year-old female with past medical history of chronic diastolic heart failure, chronic low back pain, coronary artery disease, hyperlipidemia, hypertension, hypoxia, lumbar disc herniation, obesity, obstructive sleep apnea, permanent atrial fibrillation, stage IV chronic kidney disease, venous insufficiency, metabolic encephalopathy, Parkinson's disease, cholelithiasis, presents emergency room today for complaint of abnormal lab work.  Patient states they received a phone call from surgeon informing them that they had abnormal lab work and to go to the emergency room for evaluation.  Patient had laparoscopic cholecystectomy performed yesterday.  Patient states she has been urinating however producing smaller amount.  Patient states she has been taking pain medication and is more sleepy.  Patient denies any current abdominal pain, chest pain shortness of breath difficulty breathing, nausea, vomiting, diarrhea or constipation, fever, shaking, or chills.  Co-Treatment: PT  Co-Treatment Reason: for safety and to maximize therapeutic performance  Prior to Session Communication: Bedside nurse  Patient Position Received: Bed, 4 rail up, Alarm on  General Comment: patient with confusion, poor command follow, poor safety awareness noted. agreeable to OT eval  Precautions:  Medical Precautions: Fall precautions (alarms, tele, 02 via NC, no restrictions per medical chart, kota)            Pain:  Pain Assessment  Pain Assessment:  (did not report pain)    Objective   Cognition:  Overall Cognitive Status: Impaired           Home Living:  Type of Home:  (patient reports lives at home with her , use of WW. independent with ADLs/IADLs. spouse drives. patient unable to report further PLOF info due confusion noted this date)    ADL:  ADL Comments: anticipate MAX A x 2 for ADLs based on performance with transfers/mobility this date  Activity  Tolerance:  Endurance: Decreased tolerance for upright activites  Bed Mobility/Transfers: Bed Mobility  Bed Mobility:  (completed supine to sit with MOD A x 1 with HOB raised and use of bed rail)    Transfers  Transfer:  (STS from EOB with MAX A x 2 with WW and stand step transfer from EOB to recliner chair with MAX A x 2 with WW)      Functional Mobility:  Functional Mobility  Functional Mobility Performed:  (completed side steps next to bed with MAX A x 2 with WW)     Sensation:  Light Touch: No apparent deficits  Strength:  Strength Comments: BUE ROM/MMT WFL for patient age as seen through transfers/mobility this date      Outcome Measures:[facility] Daily Activity  Putting on and taking off regular lower body clothing: Total  Bathing (including washing, rinsing, drying): A lot  Putting on and taking off regular upper body clothing: A lot  Toileting, which includes using toilet, bedpan or urinal: Total  Taking care of personal grooming such as brushing teeth: A little  Eating Meals: None  Daily Activity - Total Score: 13        Education Documentation  Body Mechanics, taught by Jesenia Sims OT at 4/14/2025 12:31 PM.  Learner: Patient  Readiness: Acceptance  Method: Explanation  Response: Verbalizes Understanding, Needs Reinforcement  Comment: ADL/transfer techniques    ADL Training, taught by Jesenia Sims OT at 4/14/2025 12:31 PM.  Learner: Patient  Readiness: Acceptance  Method: Explanation  Response: Verbalizes Understanding, Needs Reinforcement  Comment: ADL/transfer techniques    Education Comments  No comments found.        OP EDUCATION:       Goals:  Encounter Problems       Encounter Problems (Active)       OT Goals       STG- patient will complete LB dressing at MIN A with use of ae/ad/dme prn  (Progressing)       Start:  04/14/25    Expected End:  04/28/25            STG- patient will complete toileting with MIN A with use of ae/ad/dme prn (Progressing)       Start:  04/14/25    Expected End:  04/28/25             STG- patient will complete transfers to/from bed, chair, commode with MIN A with use of ae/ad/dme prn (Progressing)       Start:  04/14/25    Expected End:  04/28/25            STG- patient will complete simple mobility with MIN A with use of ae/ad/dme prn (Progressing)       Start:  04/14/25    Expected End:  04/28/25            STG- patient will complete grooming with supervision with use of ae/ad/dme prn (Progressing)       Start:  04/14/25    Expected End:  04/28/25

## 2025-04-14 NOTE — CARE PLAN
The patient's goals for the shift include      The clinical goals for the shift include pt will remain hemodynamiically stable throughout shift    Over the shift, the patient did not make progress toward the following goals. Barriers to progression include confusion. Recommendations to address these barriers include medicate per order.

## 2025-04-14 NOTE — PROGRESS NOTES
04/14/25 1652   Discharge Planning   Living Arrangements Spouse/significant other   Support Systems Spouse/significant other;Children;Family members;Home care staff   Type of Residence Private residence   Number of Stairs to Enter Residence 0   Expected Discharge Disposition Home H   Does the patient need discharge transport arranged? No     I met with patient and 2 daughters at the bedside.  Patient lives with her  and uses a wheelchair for mobility.  She last fell 11/2024 and suffered minor injuries.  Patient is A&Ox4 at baseline but is currently oriented x1-2.  Patient does not use home O2, is currently on 2L/min.  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  Monday through Friday from [id number] home care cooks breakfast, showers patient, prepares lunch and does therapies with patient.  I called, spoke with patient's  Ender and he wants patient to have home health care through her insurance as well for strength and endurance training.  Internal home care orders needed for [facility].  Care Coordination team following for assistance with discharge planning.  Mohan PARRA TCC

## 2025-04-14 NOTE — PROGRESS NOTES
Nephrology Consult Progress Note    Shreya Acuña is a 89 y.o. female on day 2 of admission presenting with Acute kidney injury superimposed on CKD.      Subjective   Confused and agitated overnight, poor po intake, wants to go home, family at bedside, remains on NC O2       Objective          Physical Exam    Vitals 24HR  Heart Rate:  [77-89]   Temp:  [36 °C (96.8 °F)-36.6 °C (97.9 °F)]   Resp:  [15-18]   BP: (146-185)/(65-95)   SpO2:  [93 %-96 %]           AAOx3, on NC O2, no distress but slightly confused  Decreased AEBL  RRR  Abd soft  Chronic skin changes, no edema  Peralta+              I&O 24HR    Intake/Output Summary (Last 24 hours) at 4/14/2025 1407  Last data filed at 4/14/2025 0500  Gross per 24 hour   Intake 220 ml   Output 2150 ml   Net -1930 ml         Medications  Medications Prior to Admission   Medication Sig Dispense Refill Last Dose/Taking    acetaminophen (Tylenol) 500 mg tablet Take 2 tablets (1,000 mg) by mouth 3 times a day.       albuterol 90 mcg/actuation inhaler Inhale 2 puffs every 4 hours if needed for wheezing. (Patient not taking: Reported on 4/7/2025) 18 g 0     atorvastatin (Lipitor) 20 mg tablet Take 1 tablet (20 mg) by mouth once daily.       carbidopa-levodopa (Sinemet)  mg tablet Take 3 tablets by mouth 3 times a day. (Patient taking differently: Take 2 tablets by mouth 3 times a day.) 270 tablet 11     cholecalciferol (Vitamin D-3) 25 MCG (1000 UT) capsule Take 1 capsule (25 mcg) by mouth 2 times a day.       cyanocobalamin (Vitamin B-12) 1,000 mcg tablet Take 1 tablet (1,000 mcg) by mouth once daily.       DILT- mg 24 hr capsule Take 1 capsule (120 mg) by mouth once daily. 90 capsule 3     DULoxetine (Cymbalta) 30 mg DR capsule Take 1 capsule (30 mg) by mouth 2 times a day. 180 capsule 2     Gemtesa 75 mg tablet Take 1 tablet (75 mg) by mouth early in the morning..       metoprolol succinate XL (Toprol-XL) 50 mg 24 hr tablet Take 1 tablet (50 mg) by mouth once  daily.       mv-min-FA-vit K-lutein-zeaxant (PreserVision AREDS 2 Plus MV) 200 mcg-15 mcg- 5 mg-1 mg capsule Take 1 capsule by mouth 2 times a day.       oxyCODONE (Roxicodone) 5 mg immediate release tablet Take 1 tablet (5 mg) by mouth every 6 hours if needed for severe pain (7 - 10) for up to 5 days. 10 tablet 0     pregabalin (Lyrica) 100 mg capsule Take 1 capsule (100 mg) by mouth 2 times a day. 180 capsule 2     rivaroxaban (Xarelto) 10 mg tablet Take 1 tablet (10 mg) by mouth once daily. Resume taking on 4/14       spironolactone (Aldactone) 25 mg tablet Take 1 tablet (25 mg) by mouth once daily. (Patient taking differently: Take 1 tablet (25 mg) by mouth 3 times a day.) 90 tablet 3     torsemide (Demadex) 20 mg tablet Take 1 tablet (20 mg) by mouth once daily. (Patient taking differently: Take 1 tablet (20 mg) by mouth once daily at bedtime.) 90 tablet 3       Scheduled medications  atorvastatin, 20 mg, oral, Daily  carbidopa-levodopa, 2 tablet, oral, TID  cholecalciferol, 25 mcg, oral, BID  cyanocobalamin, 1,000 mcg, oral, Daily  DULoxetine, 30 mg, oral, BID  metoprolol succinate XL, 50 mg, oral, Daily  polyethylene glycol, 17 g, oral, Daily  [Held by provider] pregabalin, 100 mg, oral, BID  rivaroxaban, 10 mg, oral, Daily  sennosides-docusate sodium, 2 tablet, oral, BID  [Held by provider] spironolactone, 25 mg, oral, Daily  [Held by provider] torsemide, 20 mg, oral, Nightly      Continuous medications     PRN medications  PRN medications: acetaminophen, albuterol, bisacodyl, hydrOXYzine HCL, ipratropium-albuteroL, LORazepam, ondansetron **OR** ondansetron    Relevant Results      Admission on 04/12/2025   Component Date Value Ref Range Status    WBC 04/12/2025 7.4  4.4 - 11.3 x10*3/uL Final    nRBC 04/12/2025 0.0  0.0 - 0.0 /100 WBCs Final    RBC 04/12/2025 3.36 (L)  4.00 - 5.20 x10*6/uL Final    Hemoglobin 04/12/2025 11.0 (L)  12.0 - 16.0 g/dL Final    Hematocrit 04/12/2025 35.9 (L)  36.0 - 46.0 % Final     MCV 04/12/2025 107 (H)  80 - 100 fL Final    MCH 04/12/2025 32.7  26.0 - 34.0 pg Final    MCHC 04/12/2025 30.6 (L)  32.0 - 36.0 g/dL Final    RDW 04/12/2025 13.6  11.5 - 14.5 % Final    Platelets 04/12/2025 144 (L)  150 - 450 x10*3/uL Final    Neutrophils % 04/12/2025 71.0  40.0 - 80.0 % Final    Immature Granulocytes %, Automated 04/12/2025 0.4  0.0 - 0.9 % Final    Immature Granulocyte Count (IG) includes promyelocytes, myelocytes and metamyelocytes but does not include bands. Percent differential counts (%) should be interpreted in the context of the absolute cell counts (cells/UL).    Lymphocytes % 04/12/2025 15.8  13.0 - 44.0 % Final    Monocytes % 04/12/2025 12.1  2.0 - 10.0 % Final    Eosinophils % 04/12/2025 0.4  0.0 - 6.0 % Final    Basophils % 04/12/2025 0.3  0.0 - 2.0 % Final    Neutrophils Absolute 04/12/2025 5.23  1.60 - 5.50 x10*3/uL Final    Percent differential counts (%) should be interpreted in the context of the absolute cell counts (cells/uL).    Immature Granulocytes Absolute, Au* 04/12/2025 0.03  0.00 - 0.50 x10*3/uL Final    Lymphocytes Absolute 04/12/2025 1.16  0.80 - 3.00 x10*3/uL Final    Monocytes Absolute 04/12/2025 0.89 (H)  0.05 - 0.80 x10*3/uL Final    Eosinophils Absolute 04/12/2025 0.03  0.00 - 0.40 x10*3/uL Final    Basophils Absolute 04/12/2025 0.02  0.00 - 0.10 x10*3/uL Final    Glucose 04/12/2025 95  74 - 99 mg/dL Final    Sodium 04/12/2025 132 (L)  136 - 145 mmol/L Final    Potassium 04/12/2025 4.4  3.5 - 5.3 mmol/L Final    Chloride 04/12/2025 95 (L)  98 - 107 mmol/L Final    Bicarbonate 04/12/2025 28  21 - 32 mmol/L Final    Anion Gap 04/12/2025 13  10 - 20 mmol/L Final    Urea Nitrogen 04/12/2025 40 (H)  6 - 23 mg/dL Final    Creatinine 04/12/2025 2.59 (H)  0.50 - 1.05 mg/dL Final    eGFR 04/12/2025 17 (L)  >60 mL/min/1.73m*2 Final    Calculations of estimated GFR are performed using the 2021 CKD-EPI Study Refit equation without the race variable for the IDMS-Traceable  creatinine methods.  https://jasn.asnjournals.org/content/early/2021/09/22/ASN.6642994433    Calcium 04/12/2025 9.0  8.6 - 10.3 mg/dL Final    Albumin 04/12/2025 4.3  3.4 - 5.0 g/dL Final    Alkaline Phosphatase 04/12/2025 116  33 - 136 U/L Final    Total Protein 04/12/2025 6.8  6.4 - 8.2 g/dL Final    AST 04/12/2025 36  9 - 39 U/L Final    Bilirubin, Total 04/12/2025 0.5  0.0 - 1.2 mg/dL Final    ALT 04/12/2025 7  7 - 45 U/L Final    Patients treated with Sulfasalazine may generate falsely decreased results for ALT.    Glucose 04/12/2025 93  74 - 99 mg/dL Final    Sodium 04/12/2025 133 (L)  136 - 145 mmol/L Final    Potassium 04/12/2025 4.2  3.5 - 5.3 mmol/L Final    Chloride 04/12/2025 97 (L)  98 - 107 mmol/L Final    Bicarbonate 04/12/2025 28  21 - 32 mmol/L Final    Anion Gap 04/12/2025 12  10 - 20 mmol/L Final    Urea Nitrogen 04/12/2025 40 (H)  6 - 23 mg/dL Final    Creatinine 04/12/2025 2.44 (H)  0.50 - 1.05 mg/dL Final    eGFR 04/12/2025 19 (L)  >60 mL/min/1.73m*2 Final    Calculations of estimated GFR are performed using the 2021 CKD-EPI Study Refit equation without the race variable for the IDMS-Traceable creatinine methods.  https://jasn.asnjournals.org/content/early/2021/09/22/ASN.5105540893    Calcium 04/12/2025 8.7  8.6 - 10.3 mg/dL Final    Sodium, Urine Random 04/12/2025 16  mmol/L Final    Creatinine, Urine Random 04/12/2025 125.3  20.0 - 320.0 mg/dL Final    Sodium/Creatinine Ratio 04/12/2025 13  Not established. mmol/g Creat Final    Urea Nitrogen, Urine Random 04/12/2025 311  mg/dL Final    Creatinine, Urine Random 04/12/2025 125.3  20.0 - 320.0 mg/dL Final    Urea Nitrogen/Creatinine Ratio 04/12/2025 2.5  Not established. g/g creat Final    Glucose 04/13/2025 83  74 - 99 mg/dL Final    Sodium 04/13/2025 139  136 - 145 mmol/L Final    Potassium 04/13/2025 4.2  3.5 - 5.3 mmol/L Final    Chloride 04/13/2025 106  98 - 107 mmol/L Final    Bicarbonate 04/13/2025 27  21 - 32 mmol/L Final    Anion Gap  04/13/2025 10  10 - 20 mmol/L Final    Urea Nitrogen 04/13/2025 29 (H)  6 - 23 mg/dL Final    Creatinine 04/13/2025 1.20 (H)  0.50 - 1.05 mg/dL Final    eGFR 04/13/2025 44 (L)  >60 mL/min/1.73m*2 Final    Calculations of estimated GFR are performed using the 2021 CKD-EPI Study Refit equation without the race variable for the IDMS-Traceable creatinine methods.  https://jasn.asnjournals.org/content/early/2021/09/22/ASN.4016475691    Calcium 04/13/2025 8.8  8.6 - 10.3 mg/dL Final    Extra Tube 04/13/2025 Hold for add-ons.   Final    Auto resulted.    POCT Glucose 04/13/2025 109 (H)  74 - 99 mg/dL Final    Glucose 04/13/2025 113 (H)  74 - 99 mg/dL Final    Sodium 04/13/2025 139  136 - 145 mmol/L Final    Potassium 04/13/2025 4.2  3.5 - 5.3 mmol/L Final    Chloride 04/13/2025 104  98 - 107 mmol/L Final    Bicarbonate 04/13/2025 29  21 - 32 mmol/L Final    Anion Gap 04/13/2025 10  10 - 20 mmol/L Final    Urea Nitrogen 04/13/2025 25 (H)  6 - 23 mg/dL Final    Creatinine 04/13/2025 1.03  0.50 - 1.05 mg/dL Final    eGFR 04/13/2025 53 (L)  >60 mL/min/1.73m*2 Final    Calculations of estimated GFR are performed using the 2021 CKD-EPI Study Refit equation without the race variable for the IDMS-Traceable creatinine methods.  https://jasn.asnjournals.org/content/early/2021/09/22/ASN.8247894036    Calcium 04/13/2025 9.7  8.6 - 10.3 mg/dL Final    Magnesium 04/13/2025 2.12  1.60 - 2.40 mg/dL Final    Extra Tube 04/13/2025 Hold for add-ons.   Final    Auto resulted.    Extra Tube 04/13/2025 Hold for add-ons.   Final    Auto resulted.    WBC 04/14/2025 6.8  4.4 - 11.3 x10*3/uL Final    nRBC 04/14/2025 0.0  0.0 - 0.0 /100 WBCs Final    RBC 04/14/2025 3.44 (L)  4.00 - 5.20 x10*6/uL Final    Hemoglobin 04/14/2025 11.3 (L)  12.0 - 16.0 g/dL Final    Hematocrit 04/14/2025 36.0  36.0 - 46.0 % Final    MCV 04/14/2025 105 (H)  80 - 100 fL Final    MCH 04/14/2025 32.8  26.0 - 34.0 pg Final    MCHC 04/14/2025 31.4 (L)  32.0 - 36.0 g/dL Final     RDW 04/14/2025 13.4  11.5 - 14.5 % Final    Platelets 04/14/2025 138 (L)  150 - 450 x10*3/uL Final    Glucose 04/14/2025 92  74 - 99 mg/dL Final    Sodium 04/14/2025 138  136 - 145 mmol/L Final    Potassium 04/14/2025 3.8  3.5 - 5.3 mmol/L Final    Chloride 04/14/2025 103  98 - 107 mmol/L Final    Bicarbonate 04/14/2025 28  21 - 32 mmol/L Final    Anion Gap 04/14/2025 11  10 - 20 mmol/L Final    Urea Nitrogen 04/14/2025 18  6 - 23 mg/dL Final    Creatinine 04/14/2025 0.93  0.50 - 1.05 mg/dL Final    eGFR 04/14/2025 60 (L)  >60 mL/min/1.73m*2 Final    Calculations of estimated GFR are performed using the 2021 CKD-EPI Study Refit equation without the race variable for the IDMS-Traceable creatinine methods.  https://jasn.asnjournals.org/content/early/2021/09/22/ASN.0720681873    Calcium 04/14/2025 9.7  8.6 - 10.3 mg/dL Final    Magnesium 04/14/2025 2.04  1.60 - 2.40 mg/dL Final      Imaging  XR chest 1 view    Result Date: 4/13/2025  Findings suggestive of a degree of pulmonary edema/CHF.   MACRO: None   Signed by: Peña Wilkins 4/13/2025 1:14 PM Dictation workstation:   [id number]    CT abdomen pelvis wo IV contrast    Result Date: 4/12/2025  1. Post laparoscopic cholecystectomy with slight postsurgical changes but no suspicious abnormalities. 2. Tiny fat containing umbilical hernia similar to the prior exam 3. Left renal cortical 5.3 cm hypodensity most likely a renal cyst. This is similar to the prior exam. Ultrasound may confirm this impression. 4. Marked degenerative changes of the lumbar spine with lumbar canal stenosis most severe L4-5     Signed by: Savannah García 4/12/2025 7:16 AM Dictation workstation:   [id number]     Cardiology, Vascular, and Other Imaging  No other imaging results found for the past 7 days        ASSESSMENT AND PLAN    89 y.o. female with PMH of CKD stage 4, Afib on AC, CHF, HL, HTN, empyema, smoking and biliary dyskinasia recent elective laparoscopic cholecystectomy on 4/10  presenting with abnormal labs as OP, found to have MISTY and hyponatremia     MISTY on CKD, baseline creatinine of 0.9 until 2023, in the 1.6-1.7 since, was 2.59 on admission associated with high BUN, resolved  UA pending  Urine Na 16 on admission  Prerenal, was on torsemide 20 mg every day and aldactone 25 mg every day before admission     Hyponatremia in setting of MISTY/impaired free water clearance     HTN, severe TR as above, on diuretics, cardizem and toprol before admission     Volume status- improved     Anemia, mild, chronic     L renal cyst        Plan  - prompt improvement in creatinine and Na with ivf, resolved MISTY  - received lasix yesterday and UOP~ 6L?, CXR w/ vascular congestion  - restart small dose daily torsemide  - please record IOs  - daily lytes and replete for K>4 and mag>2  - ECHO as above with severe TR but RVSP wnl  - avoid hypotension and nephrotoxins  - BP stable, hold BP meds  - dc and do not resume ditropan on discharge     MARS reviewed, dose for GFR<60, family questioning sinemet dose as has been agitated and confused        Thank you for the opportunity to assist in the care of this patient, please call with questions  Danielle Mcintosh MD PhD

## 2025-04-14 NOTE — PROGRESS NOTES
Physical Therapy    Physical Therapy Evaluation    Patient Name: Shreya Acuña  MRN: [id number]  Today's Date: 4/14/2025   Time Calculation  Start Time: 1013  Stop Time: 1026  Time Calculation (min): 13 min  620/620-A    Assessment/Plan   PT Assessment  PT Assessment Results: Decreased strength, Impaired balance, Decreased mobility  Rehab Prognosis: Fair  Evaluation/Treatment Tolerance: Patient tolerated treatment well  Medical Staff Made Aware: Yes  Strengths: Attitude of self  Barriers to Participation: Comorbidities  End of Session Communication: Bedside nurse  Assessment Comment: pt tolerated session. pt required assist during functional mobility to maintain safety. pt presented with decreased functional mobility, strength, and balance. pt would benefit from mod int therapy in order to return to PLOF.  End of Session Patient Position: Up in chair, Alarm on (call light in reach)  IP OR SWING BED PT PLAN  Inpatient or Swing Bed: Inpatient  PT Plan  Treatment/Interventions: Bed mobility, Transfer training, Gait training, Balance training, Strengthening, Therapeutic exercise, Therapeutic activity  PT Plan: Ongoing PT  PT Frequency: 3 times per week  PT Discharge Recommendations: Moderate intensity level of continued care  PT Recommended Transfer Status: Assist x2  PT - OK to Discharge: Yes (once medically cleared)    Subjective     Current Problem:  1. MISTY (acute kidney injury)          Patient Active Problem List   Diagnosis    Chronic diastolic heart failure    Chronic low back pain    Coronary artery disease    Frequent urination    Hyperlipidemia    Hypertension    Hypoxia    Leg edema    Lumbar disc herniation    Lumbosacral neuritis    Lumbosacral stenosis    Nocturia    Class 3 severe obesity with body mass index (BMI) of 40.0 to 44.9 in adult    Obstructive sleep apnea, adult    Osteoarthritis of knees, bilateral    Permanent atrial fibrillation (Multi)    Shortness of breath at rest    Spondylolisthesis,  lumbar region    Chronic kidney disease, stage 4 (severe) (Multi)    Stress incontinence, female    Urinary urgency    Venous insufficiency    Chronic left shoulder pain    Altered mental status    Atypical chest pain    Fall in home    Metabolic encephalopathy    Neck pain    Parkinson's disease    Cholelithiasis    Biliary dyskinesia    Preoperative cardiovascular examination    Acute kidney injury superimposed on CKD    Abdominal distention    MISTY (acute kidney injury)       General Visit Information:  General  Reason for Referral: PT shoaib (admitted 4/12 with MISTY superimposed on CKD, acute hypoxia, urinary retention s/p escudero, biliary dyskinesia s/p laproscopic letha on 4/10)  Referred By: Chris  Past Medical History Relevant to Rehab: 89-year-old female with past medical history of chronic diastolic heart failure, chronic low back pain, coronary artery disease, hyperlipidemia, hypertension, hypoxia, lumbar disc herniation, obesity, obstructive sleep apnea, permanent atrial fibrillation, stage IV chronic kidney disease, venous insufficiency, metabolic encephalopathy, Parkinson's disease, cholelithiasis, presents emergency room today for complaint of abnormal lab work.  Patient states they received a phone call from surgeon informing them that they had abnormal lab work and to go to the emergency room for evaluation.  Patient had laparoscopic cholecystectomy performed yesterday.  Patient states she has been urinating however producing smaller amount.  Patient states she has been taking pain medication and is more sleepy.  Patient denies any current abdominal pain, chest pain shortness of breath difficulty breathing, nausea, vomiting, diarrhea or constipation, fever, shaking, or chills.  Co-Treatment: OT  Co-Treatment Reason: for safety and to maximize therapeutic performance  Prior to Session Communication: Bedside nurse  Patient Position Received: Bed, 4 rail up, Alarm on  General Comment: patient with confusion,  poor command follow, poor safety awareness noted. agreeable to PT eval    Home Living:  Home Living  Home Living Comments: patient reports lives at home with her , use of WW. independent with ADLs/IADLs. spouse drives. patient unable to report further PLOF info due confusion noted this date    Prior Level of Function:  Prior Function Per Pt/Caregiver Report  Level of Curry: Independent with ADLs and functional transfers, Independent with homemaking with ambulation    Precautions:  Precautions  Medical Precautions: Fall precautions (alarms, tele, 02 via NC, no restrictions per medical chart, kota)       Objective     Pain:  Pain Assessment  Pain Assessment: 0-10  0-10 (Numeric) Pain Score: 0 - No pain    Cognition:  Cognition  Overall Cognitive Status: Impaired    General Assessments:      Activity Tolerance  Endurance: Decreased tolerance for upright activites  Sensation  Light Touch: No apparent deficits  Strength  Strength Comments: BLE strength 3-/5 grossly. BLE ROM WFL for pts age                   Functional Assessments:     Bed Mobility  Bed Mobility: Yes  Bed Mobility 1  Bed Mobility 1: Supine to sitting, Sitting to supine  Bed Mobility Comments 1: MOD A x 1 with HOB raised and use of bed rail  Transfers  Transfer: Yes  Transfer 1  Transfer From 1: Bed to, Sit to, Stand to  Transfer Device 1: Walker  Trials/Comments 1: MAX A x 2  Ambulation/Gait Training  Ambulation/Gait Training Performed: Yes  Ambulation/Gait Training 1  Surface 1: Level tile  Device 1: Rolling walker  Comments/Distance (ft) 1: completed side steps to HOB with MAX A x2; stand step transfer from EOB to recliner chair with MAX A x 2      Outcome Measures:     [facility] Basic Mobility  Turning from your back to your side while in a flat bed without using bedrails: A little  Moving from lying on your back to sitting on the side of a flat bed without using bedrails: A little  Moving to and from bed to chair (including a wheelchair): A  lot  Standing up from a chair using your arms (e.g. wheelchair or bedside chair): A lot  To walk in hospital room: A lot  Climbing 3-5 steps with railing: Total  Basic Mobility - Total Score: 13                   Goals:  Encounter Problems       Encounter Problems (Active)       PT Problem       STG - Pt will perform a B LE ther ex program of 2-3 sets of 10  (Progressing)       Start:  04/14/25    Expected End:  04/28/25            STG - Pt will transition supine <> sitting with minAx1 (Progressing)       Start:  04/14/25    Expected End:  04/28/25            STG - Pt will transfer STS with minAx1 (Progressing)       Start:  04/14/25    Expected End:  04/28/25            STG - Pt will amb 50' using w/w with minAx1  (Progressing)       Start:  04/14/25    Expected End:  04/28/25               Pain - Adult            Education Documentation  Mobility Training, taught by Tosin Tuttle PT at 4/14/2025  2:38 PM.  Learner: Patient  Readiness: Acceptance  Method: Explanation  Response: Verbalizes Understanding

## 2025-04-15 ENCOUNTER — APPOINTMENT (OUTPATIENT)
Dept: RADIOLOGY | Facility: HOSPITAL | Age: 89
DRG: 659 | End: 2025-04-15
Payer: MEDICARE

## 2025-04-15 LAB
ANION GAP BLDA CALCULATED.4IONS-SCNC: 5 MMO/L (ref 10–25)
ANION GAP SERPL CALC-SCNC: 11 MMOL/L (ref 10–20)
APPARATUS: ABNORMAL
ARTERIAL PATENCY WRIST A: POSITIVE
ATRIAL RATE: 288 BPM
BASE EXCESS BLDA CALC-SCNC: 6.1 MMOL/L (ref -2–3)
BODY TEMPERATURE: 37 DEGREES CELSIUS
BUN SERPL-MCNC: 13 MG/DL (ref 6–23)
CA-I BLDA-SCNC: 1.3 MMOL/L (ref 1.1–1.33)
CALCIUM SERPL-MCNC: 9.3 MG/DL (ref 8.6–10.3)
CHLORIDE BLDA-SCNC: 105 MMOL/L (ref 98–107)
CHLORIDE SERPL-SCNC: 103 MMOL/L (ref 98–107)
CO2 SERPL-SCNC: 28 MMOL/L (ref 21–32)
CREAT SERPL-MCNC: 0.89 MG/DL (ref 0.5–1.05)
EGFRCR SERPLBLD CKD-EPI 2021: 63 ML/MIN/1.73M*2
ERYTHROCYTE [DISTWIDTH] IN BLOOD BY AUTOMATED COUNT: 13.3 % (ref 11.5–14.5)
GLUCOSE BLDA-MCNC: 126 MG/DL (ref 74–99)
GLUCOSE SERPL-MCNC: 113 MG/DL (ref 74–99)
HCO3 BLDA-SCNC: 29.6 MMOL/L (ref 22–26)
HCT VFR BLD AUTO: 33.9 % (ref 36–46)
HCT VFR BLD EST: 36 % (ref 36–46)
HGB BLD-MCNC: 11.1 G/DL (ref 12–16)
HGB BLDA-MCNC: 11.9 G/DL (ref 12–16)
INHALED O2 CONCENTRATION: 26 %
LACTATE BLDA-SCNC: 0.7 MMOL/L (ref 0.4–2)
MAGNESIUM SERPL-MCNC: 1.8 MG/DL (ref 1.6–2.4)
MCH RBC QN AUTO: 32.9 PG (ref 26–34)
MCHC RBC AUTO-ENTMCNC: 32.7 G/DL (ref 32–36)
MCV RBC AUTO: 101 FL (ref 80–100)
NRBC BLD-RTO: 0 /100 WBCS (ref 0–0)
OXYHGB MFR BLDA: 91.7 % (ref 94–98)
PCO2 BLDA: 38 MM HG (ref 38–42)
PH BLDA: 7.5 PH (ref 7.38–7.42)
PLATELET # BLD AUTO: 145 X10*3/UL (ref 150–450)
PO2 BLDA: 64 MM HG (ref 85–95)
POTASSIUM BLDA-SCNC: 4 MMOL/L (ref 3.5–5.3)
POTASSIUM SERPL-SCNC: 3.6 MMOL/L (ref 3.5–5.3)
Q ONSET: 221 MS
QRS COUNT: 12 BEATS
QRS DURATION: 84 MS
QT INTERVAL: 378 MS
QTC CALCULATION(BAZETT): 422 MS
QTC FREDERICIA: 406 MS
R AXIS: -1 DEGREES
RBC # BLD AUTO: 3.37 X10*6/UL (ref 4–5.2)
SAO2 % BLDA: 94 % (ref 94–100)
SODIUM BLDA-SCNC: 136 MMOL/L (ref 136–145)
SODIUM SERPL-SCNC: 138 MMOL/L (ref 136–145)
SPECIMEN DRAWN FROM PATIENT: ABNORMAL
T AXIS: 12 DEGREES
T OFFSET: 410 MS
VENTRICULAR RATE: 75 BPM
WBC # BLD AUTO: 8 X10*3/UL (ref 4.4–11.3)

## 2025-04-15 PROCEDURE — 2500000002 HC RX 250 W HCPCS SELF ADMINISTERED DRUGS (ALT 637 FOR MEDICARE OP, ALT 636 FOR OP/ED): Performed by: STUDENT IN AN ORGANIZED HEALTH CARE EDUCATION/TRAINING PROGRAM

## 2025-04-15 PROCEDURE — 85027 COMPLETE CBC AUTOMATED: CPT | Performed by: STUDENT IN AN ORGANIZED HEALTH CARE EDUCATION/TRAINING PROGRAM

## 2025-04-15 PROCEDURE — 1200000002 HC GENERAL ROOM WITH TELEMETRY DAILY

## 2025-04-15 PROCEDURE — 82374 ASSAY BLOOD CARBON DIOXIDE: CPT | Performed by: STUDENT IN AN ORGANIZED HEALTH CARE EDUCATION/TRAINING PROGRAM

## 2025-04-15 PROCEDURE — 2500000001 HC RX 250 WO HCPCS SELF ADMINISTERED DRUGS (ALT 637 FOR MEDICARE OP): Performed by: INTERNAL MEDICINE

## 2025-04-15 PROCEDURE — 2500000001 HC RX 250 WO HCPCS SELF ADMINISTERED DRUGS (ALT 637 FOR MEDICARE OP): Performed by: REGISTERED NURSE

## 2025-04-15 PROCEDURE — 36600 WITHDRAWAL OF ARTERIAL BLOOD: CPT

## 2025-04-15 PROCEDURE — 71045 X-RAY EXAM CHEST 1 VIEW: CPT

## 2025-04-15 PROCEDURE — 2500000004 HC RX 250 GENERAL PHARMACY W/ HCPCS (ALT 636 FOR OP/ED): Performed by: INTERNAL MEDICINE

## 2025-04-15 PROCEDURE — 99233 SBSQ HOSP IP/OBS HIGH 50: CPT | Performed by: INTERNAL MEDICINE

## 2025-04-15 PROCEDURE — 83735 ASSAY OF MAGNESIUM: CPT | Performed by: STUDENT IN AN ORGANIZED HEALTH CARE EDUCATION/TRAINING PROGRAM

## 2025-04-15 PROCEDURE — 2500000001 HC RX 250 WO HCPCS SELF ADMINISTERED DRUGS (ALT 637 FOR MEDICARE OP): Performed by: STUDENT IN AN ORGANIZED HEALTH CARE EDUCATION/TRAINING PROGRAM

## 2025-04-15 PROCEDURE — 82435 ASSAY OF BLOOD CHLORIDE: CPT | Performed by: INTERNAL MEDICINE

## 2025-04-15 PROCEDURE — 36415 COLL VENOUS BLD VENIPUNCTURE: CPT | Performed by: STUDENT IN AN ORGANIZED HEALTH CARE EDUCATION/TRAINING PROGRAM

## 2025-04-15 PROCEDURE — 70450 CT HEAD/BRAIN W/O DYE: CPT | Performed by: RADIOLOGY

## 2025-04-15 PROCEDURE — 71045 X-RAY EXAM CHEST 1 VIEW: CPT | Performed by: RADIOLOGY

## 2025-04-15 PROCEDURE — 70450 CT HEAD/BRAIN W/O DYE: CPT

## 2025-04-15 PROCEDURE — 2500000002 HC RX 250 W HCPCS SELF ADMINISTERED DRUGS (ALT 637 FOR MEDICARE OP, ALT 636 FOR OP/ED): Performed by: INTERNAL MEDICINE

## 2025-04-15 RX ORDER — DEXTROSE 50 % IN WATER (D50W) INTRAVENOUS SYRINGE
12.5
Status: DISCONTINUED | OUTPATIENT
Start: 2025-04-15 | End: 2025-04-22 | Stop reason: HOSPADM

## 2025-04-15 RX ORDER — DEXTROSE 50 % IN WATER (D50W) INTRAVENOUS SYRINGE
25
Status: DISCONTINUED | OUTPATIENT
Start: 2025-04-15 | End: 2025-04-22 | Stop reason: HOSPADM

## 2025-04-15 RX ORDER — AMLODIPINE BESYLATE 5 MG/1
5 TABLET ORAL DAILY
Status: DISCONTINUED | OUTPATIENT
Start: 2025-04-15 | End: 2025-04-22 | Stop reason: HOSPADM

## 2025-04-15 RX ORDER — POTASSIUM CHLORIDE 20 MEQ/1
40 TABLET, EXTENDED RELEASE ORAL DAILY
Status: DISCONTINUED | OUTPATIENT
Start: 2025-04-16 | End: 2025-04-20

## 2025-04-15 RX ORDER — FUROSEMIDE 10 MG/ML
20 INJECTION INTRAMUSCULAR; INTRAVENOUS ONCE
Status: COMPLETED | OUTPATIENT
Start: 2025-04-15 | End: 2025-04-15

## 2025-04-15 RX ADMIN — FUROSEMIDE 20 MG: 10 INJECTION, SOLUTION INTRAMUSCULAR; INTRAVENOUS at 16:49

## 2025-04-15 ASSESSMENT — PAIN - FUNCTIONAL ASSESSMENT: PAIN_FUNCTIONAL_ASSESSMENT: 0-10

## 2025-04-15 ASSESSMENT — PAIN SCALES - GENERAL
PAINLEVEL_OUTOF10: 0 - NO PAIN
PAINLEVEL_OUTOF10: 0 - NO PAIN

## 2025-04-15 NOTE — PROGRESS NOTES
Subjective   Confused, agitated, she refused her medications today she was accompanied by her , she received ativan overnight per he RN. She was sleeping and hard to arouse. She is on 2 L NC.     Objective   Vitals:    04/15/25 1146   BP: 147/81   Pulse: 91   Resp: 18   Temp: 36.8 °C (98.2 °F)   SpO2: 95%       Physical Exam:   Constitutional: sleepy, hard to arouse today,  no acute distress  ENMT: mucous membranes moist, conjunctivae clear  Head/Neck: normocephalic, atraumatic; supple, trachea midline  Respiratory/Thorax: patent airways, CTAB; no wheezes, rales, or rhonchi  Cardiovascular: irregularly irregular rate and rhythm, no murmur appreciated  Gastrointestinal: soft, nondistended, non-tender, bowel sounds appreciated  : Peralta in place draining clear yellow urine  Extremities: palpable peripheral pulses, no edema  Neurological: AO x1, no focal deficits  Psychological:  more sleepy and confused today  Skin: warm and dry    Scheduled Medications:   amLODIPine, 5 mg, oral, Daily  atorvastatin, 20 mg, oral, Daily  [Held by provider] carbidopa-levodopa, 2 tablet, oral, TID  cholecalciferol, 25 mcg, oral, BID  cyanocobalamin, 1,000 mcg, oral, Daily  DULoxetine, 30 mg, oral, BID  metoprolol succinate XL, 50 mg, oral, Daily  polyethylene glycol, 17 g, oral, Daily  [START ON 4/16/2025] potassium chloride CR, 40 mEq, oral, Daily  [Held by provider] pregabalin, 100 mg, oral, BID  rivaroxaban, 10 mg, oral, Daily  sennosides-docusate sodium, 2 tablet, oral, BID  [Held by provider] spironolactone, 25 mg, oral, Daily  torsemide, 20 mg, oral, Daily       Continuous Medications:         PRN Medications:   PRN medications: acetaminophen, albuterol, bisacodyl, hydrOXYzine HCL, ipratropium-albuteroL, melatonin, ondansetron **OR** ondansetron    Assessment/Plan   Shreya Acuña is a 89 y.o. female     Acute encephalopathy, ?metabolic vs hospital delirium  Acute hypoxic respiratory failure  MISTY on CKD, resolved  Urinary  retention s/p Peralta  Abdominal distention, improved  Biliary dyskinesia s/p laparoscopic cholecystectomy on 4/10/25     Permanent Afib, on Xarelto  HTN, HLD  TAMMY, noncompliant on CPAP  Parkinsonism, on Sinemet  Tricuspid regurgitation, moderate to severe    Suspect pt's AMS is secondary to delirium due to disrupted circadian rhythm. Home Sinemet (hx Parkinsonism) temporarily held per family's request. Hold Lyrica so as to avoid oversedating. PRN melatonin tonight. Initially planned for voiding trial but deferred given AMS.   CT head was obtained,no abnormality detected,  ABG with slight alkalosis and decreased PO2, Chest xray with congestion, will give one dose of lasix.  Concern that the patient today took ativan overnight and that affected her mentation.  Less concern now for infectious process, will re-assess tomorrow for starting antibiotics.  On 2L NC this AM. CXR from 4/13 shows some pulmonary edema. S/p Lasix 20mg IVP, a second dose today, restarted home Torsemide. Wean O2 as tolerated.  Nephrology following for MISTY on CKD, which has resolved with IVF and Peralta placement.  Seen by general surgery for abdominal distension with recent lap letha. CT A/P shows postop changes without complications. Tolerating regular diet without issue.    - Discontinue Cardizem upon DC given relatively low HRs. Pt in permanent afib with controlled rate. Had actually been advised by Dr. Barnhart to discontinue this in 3/2025.  - Discussed stopping home Oxybutynin upon DC given urinary retention.    DVT PPX: Xarelto  Code status: FULL    Gibran Gauthier, [facility] Medicine

## 2025-04-15 NOTE — PROGRESS NOTES
Nephrology Consult Progress Note    Shreya Acuña is a 89 y.o. female on day 3 of admission presenting with Acute kidney injury superimposed on CKD.      Subjective   Confused and agitated overnight, poor po intake, family at bedside, remains on NC O2       Objective          Physical Exam    Vitals 24HR  Heart Rate:  [91-98]   Temp:  [36.2 °C (97.2 °F)-37.6 °C (99.7 °F)]   Resp:  [18]   BP: (147-188)/(81-92)   SpO2:  [94 %-95 %]           AAOx3, on NC O2, sleeping but arousable  Decreased AEBL  RRR  Abd soft  Chronic skin changes, no edema  Peralta+              I&O 24HR    Intake/Output Summary (Last 24 hours) at 4/15/2025 1235  Last data filed at 4/15/2025 0447  Gross per 24 hour   Intake --   Output 1400 ml   Net -1400 ml         Medications  Medications Prior to Admission   Medication Sig Dispense Refill Last Dose/Taking    acetaminophen (Tylenol) 500 mg tablet Take 2 tablets (1,000 mg) by mouth 3 times a day.       albuterol 90 mcg/actuation inhaler Inhale 2 puffs every 4 hours if needed for wheezing. (Patient not taking: Reported on 4/7/2025) 18 g 0     atorvastatin (Lipitor) 20 mg tablet Take 1 tablet (20 mg) by mouth once daily.       carbidopa-levodopa (Sinemet)  mg tablet Take 3 tablets by mouth 3 times a day. (Patient taking differently: Take 2 tablets by mouth 3 times a day.) 270 tablet 11     cholecalciferol (Vitamin D-3) 25 MCG (1000 UT) capsule Take 1 capsule (25 mcg) by mouth 2 times a day.       cyanocobalamin (Vitamin B-12) 1,000 mcg tablet Take 1 tablet (1,000 mcg) by mouth once daily.       DILT- mg 24 hr capsule Take 1 capsule (120 mg) by mouth once daily. 90 capsule 3     DULoxetine (Cymbalta) 30 mg DR capsule Take 1 capsule (30 mg) by mouth 2 times a day. 180 capsule 2     Gemtesa 75 mg tablet Take 1 tablet (75 mg) by mouth early in the morning..       metoprolol succinate XL (Toprol-XL) 50 mg 24 hr tablet Take 1 tablet (50 mg) by mouth once daily.       mv-min-FA-vit  K-lutein-zeaxant (PreserVision AREDS 2 Plus MV) 200 mcg-15 mcg- 5 mg-1 mg capsule Take 1 capsule by mouth 2 times a day.       oxyCODONE (Roxicodone) 5 mg immediate release tablet Take 1 tablet (5 mg) by mouth every 6 hours if needed for severe pain (7 - 10) for up to 5 days. 10 tablet 0     pregabalin (Lyrica) 100 mg capsule Take 1 capsule (100 mg) by mouth 2 times a day. 180 capsule 2     rivaroxaban (Xarelto) 10 mg tablet Take 1 tablet (10 mg) by mouth once daily. Resume taking on 4/14       spironolactone (Aldactone) 25 mg tablet Take 1 tablet (25 mg) by mouth once daily. (Patient taking differently: Take 1 tablet (25 mg) by mouth 3 times a day.) 90 tablet 3     torsemide (Demadex) 20 mg tablet Take 1 tablet (20 mg) by mouth once daily. (Patient taking differently: Take 1 tablet (20 mg) by mouth once daily at bedtime.) 90 tablet 3       Scheduled medications  amLODIPine, 5 mg, oral, Daily  atorvastatin, 20 mg, oral, Daily  [Held by provider] carbidopa-levodopa, 2 tablet, oral, TID  cholecalciferol, 25 mcg, oral, BID  cyanocobalamin, 1,000 mcg, oral, Daily  DULoxetine, 30 mg, oral, BID  metoprolol succinate XL, 50 mg, oral, Daily  polyethylene glycol, 17 g, oral, Daily  potassium chloride CR, 20 mEq, oral, Daily  [Held by provider] pregabalin, 100 mg, oral, BID  rivaroxaban, 10 mg, oral, Daily  sennosides-docusate sodium, 2 tablet, oral, BID  [Held by provider] spironolactone, 25 mg, oral, Daily  torsemide, 20 mg, oral, Daily      Continuous medications     PRN medications  PRN medications: acetaminophen, albuterol, bisacodyl, hydrOXYzine HCL, ipratropium-albuteroL, melatonin, ondansetron **OR** ondansetron    Relevant Results      Admission on 04/12/2025   Component Date Value Ref Range Status    WBC 04/12/2025 7.4  4.4 - 11.3 x10*3/uL Final    nRBC 04/12/2025 0.0  0.0 - 0.0 /100 WBCs Final    RBC 04/12/2025 3.36 (L)  4.00 - 5.20 x10*6/uL Final    Hemoglobin 04/12/2025 11.0 (L)  12.0 - 16.0 g/dL Final     Hematocrit 04/12/2025 35.9 (L)  36.0 - 46.0 % Final    MCV 04/12/2025 107 (H)  80 - 100 fL Final    MCH 04/12/2025 32.7  26.0 - 34.0 pg Final    MCHC 04/12/2025 30.6 (L)  32.0 - 36.0 g/dL Final    RDW 04/12/2025 13.6  11.5 - 14.5 % Final    Platelets 04/12/2025 144 (L)  150 - 450 x10*3/uL Final    Neutrophils % 04/12/2025 71.0  40.0 - 80.0 % Final    Immature Granulocytes %, Automated 04/12/2025 0.4  0.0 - 0.9 % Final    Immature Granulocyte Count (IG) includes promyelocytes, myelocytes and metamyelocytes but does not include bands. Percent differential counts (%) should be interpreted in the context of the absolute cell counts (cells/UL).    Lymphocytes % 04/12/2025 15.8  13.0 - 44.0 % Final    Monocytes % 04/12/2025 12.1  2.0 - 10.0 % Final    Eosinophils % 04/12/2025 0.4  0.0 - 6.0 % Final    Basophils % 04/12/2025 0.3  0.0 - 2.0 % Final    Neutrophils Absolute 04/12/2025 5.23  1.60 - 5.50 x10*3/uL Final    Percent differential counts (%) should be interpreted in the context of the absolute cell counts (cells/uL).    Immature Granulocytes Absolute, Au* 04/12/2025 0.03  0.00 - 0.50 x10*3/uL Final    Lymphocytes Absolute 04/12/2025 1.16  0.80 - 3.00 x10*3/uL Final    Monocytes Absolute 04/12/2025 0.89 (H)  0.05 - 0.80 x10*3/uL Final    Eosinophils Absolute 04/12/2025 0.03  0.00 - 0.40 x10*3/uL Final    Basophils Absolute 04/12/2025 0.02  0.00 - 0.10 x10*3/uL Final    Glucose 04/12/2025 95  74 - 99 mg/dL Final    Sodium 04/12/2025 132 (L)  136 - 145 mmol/L Final    Potassium 04/12/2025 4.4  3.5 - 5.3 mmol/L Final    Chloride 04/12/2025 95 (L)  98 - 107 mmol/L Final    Bicarbonate 04/12/2025 28  21 - 32 mmol/L Final    Anion Gap 04/12/2025 13  10 - 20 mmol/L Final    Urea Nitrogen 04/12/2025 40 (H)  6 - 23 mg/dL Final    Creatinine 04/12/2025 2.59 (H)  0.50 - 1.05 mg/dL Final    eGFR 04/12/2025 17 (L)  >60 mL/min/1.73m*2 Final    Calculations of estimated GFR are performed using the 2021 CKD-EPI Study Refit equation  without the race variable for the IDMS-Traceable creatinine methods.  https://jasn.asnjournals.org/content/early/2021/09/22/ASN.1558847943    Calcium 04/12/2025 9.0  8.6 - 10.3 mg/dL Final    Albumin 04/12/2025 4.3  3.4 - 5.0 g/dL Final    Alkaline Phosphatase 04/12/2025 116  33 - 136 U/L Final    Total Protein 04/12/2025 6.8  6.4 - 8.2 g/dL Final    AST 04/12/2025 36  9 - 39 U/L Final    Bilirubin, Total 04/12/2025 0.5  0.0 - 1.2 mg/dL Final    ALT 04/12/2025 7  7 - 45 U/L Final    Patients treated with Sulfasalazine may generate falsely decreased results for ALT.    Glucose 04/12/2025 93  74 - 99 mg/dL Final    Sodium 04/12/2025 133 (L)  136 - 145 mmol/L Final    Potassium 04/12/2025 4.2  3.5 - 5.3 mmol/L Final    Chloride 04/12/2025 97 (L)  98 - 107 mmol/L Final    Bicarbonate 04/12/2025 28  21 - 32 mmol/L Final    Anion Gap 04/12/2025 12  10 - 20 mmol/L Final    Urea Nitrogen 04/12/2025 40 (H)  6 - 23 mg/dL Final    Creatinine 04/12/2025 2.44 (H)  0.50 - 1.05 mg/dL Final    eGFR 04/12/2025 19 (L)  >60 mL/min/1.73m*2 Final    Calculations of estimated GFR are performed using the 2021 CKD-EPI Study Refit equation without the race variable for the IDMS-Traceable creatinine methods.  https://jasn.asnjournals.org/content/early/2021/09/22/ASN.6501147092    Calcium 04/12/2025 8.7  8.6 - 10.3 mg/dL Final    Sodium, Urine Random 04/12/2025 16  mmol/L Final    Creatinine, Urine Random 04/12/2025 125.3  20.0 - 320.0 mg/dL Final    Sodium/Creatinine Ratio 04/12/2025 13  Not established. mmol/g Creat Final    Urea Nitrogen, Urine Random 04/12/2025 311  mg/dL Final    Creatinine, Urine Random 04/12/2025 125.3  20.0 - 320.0 mg/dL Final    Urea Nitrogen/Creatinine Ratio 04/12/2025 2.5  Not established. g/g creat Final    Glucose 04/13/2025 83  74 - 99 mg/dL Final    Sodium 04/13/2025 139  136 - 145 mmol/L Final    Potassium 04/13/2025 4.2  3.5 - 5.3 mmol/L Final    Chloride 04/13/2025 106  98 - 107 mmol/L Final    Bicarbonate  04/13/2025 27  21 - 32 mmol/L Final    Anion Gap 04/13/2025 10  10 - 20 mmol/L Final    Urea Nitrogen 04/13/2025 29 (H)  6 - 23 mg/dL Final    Creatinine 04/13/2025 1.20 (H)  0.50 - 1.05 mg/dL Final    eGFR 04/13/2025 44 (L)  >60 mL/min/1.73m*2 Final    Calculations of estimated GFR are performed using the 2021 CKD-EPI Study Refit equation without the race variable for the IDMS-Traceable creatinine methods.  https://jasn.asnjournals.org/content/early/2021/09/22/ASN.2220612647    Calcium 04/13/2025 8.8  8.6 - 10.3 mg/dL Final    Extra Tube 04/13/2025 Hold for add-ons.   Final    Auto resulted.    POCT Glucose 04/13/2025 109 (H)  74 - 99 mg/dL Final    Glucose 04/13/2025 113 (H)  74 - 99 mg/dL Final    Sodium 04/13/2025 139  136 - 145 mmol/L Final    Potassium 04/13/2025 4.2  3.5 - 5.3 mmol/L Final    Chloride 04/13/2025 104  98 - 107 mmol/L Final    Bicarbonate 04/13/2025 29  21 - 32 mmol/L Final    Anion Gap 04/13/2025 10  10 - 20 mmol/L Final    Urea Nitrogen 04/13/2025 25 (H)  6 - 23 mg/dL Final    Creatinine 04/13/2025 1.03  0.50 - 1.05 mg/dL Final    eGFR 04/13/2025 53 (L)  >60 mL/min/1.73m*2 Final    Calculations of estimated GFR are performed using the 2021 CKD-EPI Study Refit equation without the race variable for the IDMS-Traceable creatinine methods.  https://jasn.asnjournals.org/content/early/2021/09/22/ASN.0824694229    Calcium 04/13/2025 9.7  8.6 - 10.3 mg/dL Final    Magnesium 04/13/2025 2.12  1.60 - 2.40 mg/dL Final    Extra Tube 04/13/2025 Hold for add-ons.   Final    Auto resulted.    Extra Tube 04/13/2025 Hold for add-ons.   Final    Auto resulted.    WBC 04/14/2025 6.8  4.4 - 11.3 x10*3/uL Final    nRBC 04/14/2025 0.0  0.0 - 0.0 /100 WBCs Final    RBC 04/14/2025 3.44 (L)  4.00 - 5.20 x10*6/uL Final    Hemoglobin 04/14/2025 11.3 (L)  12.0 - 16.0 g/dL Final    Hematocrit 04/14/2025 36.0  36.0 - 46.0 % Final    MCV 04/14/2025 105 (H)  80 - 100 fL Final    MCH 04/14/2025 32.8  26.0 - 34.0 pg Final     MCHC 04/14/2025 31.4 (L)  32.0 - 36.0 g/dL Final    RDW 04/14/2025 13.4  11.5 - 14.5 % Final    Platelets 04/14/2025 138 (L)  150 - 450 x10*3/uL Final    Glucose 04/14/2025 92  74 - 99 mg/dL Final    Sodium 04/14/2025 138  136 - 145 mmol/L Final    Potassium 04/14/2025 3.8  3.5 - 5.3 mmol/L Final    Chloride 04/14/2025 103  98 - 107 mmol/L Final    Bicarbonate 04/14/2025 28  21 - 32 mmol/L Final    Anion Gap 04/14/2025 11  10 - 20 mmol/L Final    Urea Nitrogen 04/14/2025 18  6 - 23 mg/dL Final    Creatinine 04/14/2025 0.93  0.50 - 1.05 mg/dL Final    eGFR 04/14/2025 60 (L)  >60 mL/min/1.73m*2 Final    Calculations of estimated GFR are performed using the 2021 CKD-EPI Study Refit equation without the race variable for the IDMS-Traceable creatinine methods.  https://jasn.asnjournals.org/content/early/2021/09/22/ASN.6861779591    Calcium 04/14/2025 9.7  8.6 - 10.3 mg/dL Final    Magnesium 04/14/2025 2.04  1.60 - 2.40 mg/dL Final    WBC 04/15/2025 8.0  4.4 - 11.3 x10*3/uL Final    nRBC 04/15/2025 0.0  0.0 - 0.0 /100 WBCs Final    RBC 04/15/2025 3.37 (L)  4.00 - 5.20 x10*6/uL Final    Hemoglobin 04/15/2025 11.1 (L)  12.0 - 16.0 g/dL Final    Hematocrit 04/15/2025 33.9 (L)  36.0 - 46.0 % Final    MCV 04/15/2025 101 (H)  80 - 100 fL Final    MCH 04/15/2025 32.9  26.0 - 34.0 pg Final    MCHC 04/15/2025 32.7  32.0 - 36.0 g/dL Final    RDW 04/15/2025 13.3  11.5 - 14.5 % Final    Platelets 04/15/2025 145 (L)  150 - 450 x10*3/uL Final    Glucose 04/15/2025 113 (H)  74 - 99 mg/dL Final    Sodium 04/15/2025 138  136 - 145 mmol/L Final    Potassium 04/15/2025 3.6  3.5 - 5.3 mmol/L Final    Chloride 04/15/2025 103  98 - 107 mmol/L Final    Bicarbonate 04/15/2025 28  21 - 32 mmol/L Final    Anion Gap 04/15/2025 11  10 - 20 mmol/L Final    Urea Nitrogen 04/15/2025 13  6 - 23 mg/dL Final    Creatinine 04/15/2025 0.89  0.50 - 1.05 mg/dL Final    eGFR 04/15/2025 63  >60 mL/min/1.73m*2 Final    Calculations of estimated GFR are performed  using the 2021 CKD-EPI Study Refit equation without the race variable for the IDMS-Traceable creatinine methods.  https://jasn.asnjournals.org/content/early/2021/09/22/ASN.9569242855    Calcium 04/15/2025 9.3  8.6 - 10.3 mg/dL Final    Magnesium 04/15/2025 1.80  1.60 - 2.40 mg/dL Final    POCT pH, Arterial 04/15/2025 7.50 (H)  7.38 - 7.42 pH Final    POCT pCO2, Arterial 04/15/2025 38  38 - 42 mm Hg Final    POCT pO2, Arterial 04/15/2025 64 (L)  85 - 95 mm Hg Final    POCT SO2, Arterial 04/15/2025 94  94 - 100 % Final    POCT Oxy Hemoglobin, Arterial 04/15/2025 91.7 (L)  94.0 - 98.0 % Final    POCT Hematocrit Calculated, Arteri* 04/15/2025 36.0  36.0 - 46.0 % Final    POCT Sodium, Arterial 04/15/2025 136  136 - 145 mmol/L Final    POCT Potassium, Arterial 04/15/2025 4.0  3.5 - 5.3 mmol/L Final    POCT Chloride, Arterial 04/15/2025 105  98 - 107 mmol/L Final    POCT Ionized Calcium, Arterial 04/15/2025 1.30  1.10 - 1.33 mmol/L Final    POCT Glucose, Arterial 04/15/2025 126 (H)  74 - 99 mg/dL Final    POCT Lactate, Arterial 04/15/2025 0.7  0.4 - 2.0 mmol/L Final    POCT Base Excess, Arterial 04/15/2025 6.1 (H)  -2.0 - 3.0 mmol/L Final    POCT HCO3 Calculated, Arterial 04/15/2025 29.6 (H)  22.0 - 26.0 mmol/L Final    POCT Hemoglobin, Arterial 04/15/2025 11.9 (L)  12.0 - 16.0 g/dL Final    POCT Anion Gap, Arterial 04/15/2025 5 (L)  10 - 25 mmo/L Final    Patient Temperature 04/15/2025 37.0  degrees Celsius Final    FiO2 04/15/2025 26  % Final    Apparatus 04/15/2025 CANNULA   Final    Site of Arterial Puncture 04/15/2025 Radial Left   Final    Ajit's Test 04/15/2025 Positive   Final      Imaging  XR chest 1 view    Result Date: 4/13/2025  Findings suggestive of a degree of pulmonary edema/CHF.   MACRO: None   Signed by: Peña Wilkins 4/13/2025 1:14 PM Dictation workstation:   [id number]    CT abdomen pelvis wo IV contrast    Result Date: 4/12/2025  1. Post laparoscopic cholecystectomy with slight postsurgical changes  but no suspicious abnormalities. 2. Tiny fat containing umbilical hernia similar to the prior exam 3. Left renal cortical 5.3 cm hypodensity most likely a renal cyst. This is similar to the prior exam. Ultrasound may confirm this impression. 4. Marked degenerative changes of the lumbar spine with lumbar canal stenosis most severe L4-5     Signed by: Savannah García 4/12/2025 7:16 AM Dictation workstation:   [id number]     Cardiology, Vascular, and Other Imaging  No other imaging results found for the past 7 days        ASSESSMENT AND PLAN    89 y.o. female with PMH of CKD stage 4, Afib on AC, CHF, HL, HTN, empyema, smoking and biliary dyskinasia recent elective laparoscopic cholecystectomy on 4/10 presenting with abnormal labs as OP, found to have MISTY and hyponatremia     MISTY on CKD, baseline creatinine of 0.9 until 2023, in the 1.6-1.7 since, was 2.59 on admission associated with high BUN, resolved  UA pending  Urine Na 16 on admission  Prerenal, was on torsemide 20 mg every day and aldactone 25 mg every day before admission     Hyponatremia in setting of MISTY/impaired free water clearance     HTN, severe TR as above, on diuretics, cardizem and toprol before admission     Volume status- improved     Anemia, mild, chronic     L renal cyst        Plan  - prompt improvement in creatinine and Na with ivf, resolved MISTY  - CXR w/ vascular congestion, restarted on daily small dose daily torsemide  - please record IOs  - daily lytes and replete for K>4 and mag>2  - ECHO as above with severe TR but RVSP wnl  - avoid hypotension and nephrotoxins  - BP stable, hold BP meds  - dc and do not resume ditropan on discharge     MARS reviewed, dose for GFR<60, family questioning sinemet dose as has been agitated and confused, now off        Thank you for the opportunity to assist in the care of this patient, please call with questions  Danielle Mcintosh MD PhD

## 2025-04-15 NOTE — PROGRESS NOTES
Subjective   Confused, agitated last night s/p Atarax f/b Ativan. Sitting up in chair, confused and AOx2 this AM.    Objective   Vitals:    04/14/25 2220   BP: (!) 188/82   Pulse:    Resp:    Temp:    SpO2:        Physical Exam:   Constitutional: awake, alert, no acute distress  ENMT: mucous membranes moist, conjunctivae clear  Head/Neck: normocephalic, atraumatic; supple, trachea midline  Respiratory/Thorax: patent airways, CTAB; no wheezes, rales, or rhonchi  Cardiovascular: irregularly irregular rate and rhythm, no murmur appreciated  Gastrointestinal: soft, nondistended, non-tender, bowel sounds appreciated  : Peralta in place draining clear yellow urine  Extremities: palpable peripheral pulses, no edema  Neurological: AO x2, no focal deficits  Psychological: appropriate mood and behavior  Skin: warm and dry    Scheduled Medications:   atorvastatin, 20 mg, oral, Daily  [Held by provider] carbidopa-levodopa, 2 tablet, oral, TID  cholecalciferol, 25 mcg, oral, BID  cyanocobalamin, 1,000 mcg, oral, Daily  DULoxetine, 30 mg, oral, BID  metoprolol succinate XL, 50 mg, oral, Daily  polyethylene glycol, 17 g, oral, Daily  [START ON 4/15/2025] potassium chloride CR, 20 mEq, oral, Daily  [Held by provider] pregabalin, 100 mg, oral, BID  rivaroxaban, 10 mg, oral, Daily  sennosides-docusate sodium, 2 tablet, oral, BID  [Held by provider] spironolactone, 25 mg, oral, Daily  [START ON 4/15/2025] torsemide, 20 mg, oral, Daily       Continuous Medications:         PRN Medications:   PRN medications: acetaminophen, albuterol, bisacodyl, hydrOXYzine HCL, ipratropium-albuteroL, ondansetron **OR** ondansetron    Assessment/Plan   Shreya Acuña is a 89 y.o. female     Acute encephalopathy, ?metabolic vs hospital delirium  Acute hypoxic respiratory failure  MISTY on CKD, resolved  Urinary retention s/p Peralta  Abdominal distention, improved  Biliary dyskinesia s/p laparoscopic cholecystectomy on 4/10/25     Permanent Afib, on  Xarelto  HTN, HLD  TAMMY, noncompliant on CPAP  Parkinsonism, on Sinemet  Tricuspid regurgitation, moderate to severe    Suspect pt's AMS is secondary to delirium due to disrupted circadian rhythm. Home Sinemet (hx Parkinsonism) temporarily held per family's request. Hold Lyrica so as to avoid oversedating. PRN melatonin tonight. Initially planned for voiding trial this AM but deferred given AMS. Could consider voiding trial in AM and check UA then if AMS persists.  On 2L NC this AM. CXR from 4/13 shows some pulmonary edema. S/p Lasix 20mg IVP, restarted home Torsemide. Wean O2 as tolerated.  Nephrology following for MISTY on CKD, which has resolved with IVF and Peralta placement.  Seen by general surgery for abdominal distension with recent lap letha. CT A/P shows postop changes without complications. Tolerating regular diet without issue.    - Discontinue Cardizem upon DC given relatively low HRs. Pt in permanent afib with controlled rate. Had actually been advised by Dr. Barnhart to discontinue this in 3/2025.  - Discussed stopping home Oxybutynin upon DC given urinary retention.    DVT PPX: Xarelto  Code status: FULL    Stephy PerezMenife[facility]

## 2025-04-15 NOTE — DOCUMENTATION CLARIFICATION NOTE
"    PATIENT:               VIRI WATSON  ACCT #:                  [id number]  MRN:                       [id number]  :                       1935  ADMIT DATE:       2025 12:26 AM  DISCH DATE:  RESPONDING PROVIDER #:        [id number]          PROVIDER RESPONSE TEXT:    Multifactorial Acute Metabolic Encephalopathy and Delirium    CDI QUERY TEXT:    Clarification      Instruction:    Based on your assessment of the patient and the clinical information, please provide the requested documentation by clicking on the appropriate radio button and enter any additional information if prompted.    Question: Based on your medical judgment, can you please clarify which of these conditions is the most clinically supported    When answering this query, please exercise your independent professional judgment. The fact that a question is being asked, does not imply that any particular answer is desired or expected.    The patient's clinical indicators include:  Clinical Information: There is conflicting documentation in the medical record which requires clarification.    -The diagnosis of Metabolic Encephalopathy was documented on  by Dr. Kevin Hooks in the H&P    -The diagnosis of AMS is secondary to delirium due to disrupted circadian rhythm was documented on  by Dr. Stephy Perez    Clinical Indicators:  Labs:  Na 132   @0047 SPO2 84%, 0053 noted oxygen at 2 L/NC     H&P: \"She is currently confused and  is present to provide history.  lethargic, required repeated stimulation to awaken patient, not able to answer questions  Assessment/Plan:  metabolic encephalopathy\"     Internal Med Note: \"Confused, agitated last night s/p Atarax f/b Ativan. Sitting up in chair, confused and AOx2 this AM.  Suspect pt's AMS is secondary to delirium due to disrupted circadian rhythm\"    Treatment: Atarax 25mg PO q 6 hours PRN anxiety, Ativan 1mg IV x1 for agitation  @ 2349, 1000ml NS bolus x1, Oxygen    Risk " Factors: recent surgery, age, narcotics, low sodium, hypoxia  Options provided:  -- Acute Metabolic Encephalopathy  -- Multifactorial Acute Metabolic Encephalopathy and Delirium  -- Delirium only  -- Other - I will add my own diagnosis  -- Refer to Clinical Documentation Reviewer    Query created by: Minnie Das on 4/15/2025 2:48 PM      Electronically signed by:  MALLORY CARREON DO 4/15/2025 7:44 PM

## 2025-04-15 NOTE — CARE PLAN
The patient's goals for the shift include      The clinical goals for the shift include decrease confusion    Over the shift, the patient did not make progress toward the following goals. Barriers to progression include confusion. Recommendations to address these barriers include following poc.

## 2025-04-16 ENCOUNTER — APPOINTMENT (OUTPATIENT)
Dept: PAIN MEDICINE | Facility: CLINIC | Age: 89
End: 2025-04-16
Payer: MEDICARE

## 2025-04-16 LAB
ANION GAP SERPL CALC-SCNC: 14 MMOL/L (ref 10–20)
BNP SERPL-MCNC: 334 PG/ML (ref 0–99)
BUN SERPL-MCNC: 14 MG/DL (ref 6–23)
CALCIUM SERPL-MCNC: 9.4 MG/DL (ref 8.6–10.3)
CHLORIDE SERPL-SCNC: 102 MMOL/L (ref 98–107)
CO2 SERPL-SCNC: 27 MMOL/L (ref 21–32)
CREAT SERPL-MCNC: 0.91 MG/DL (ref 0.5–1.05)
EGFRCR SERPLBLD CKD-EPI 2021: 61 ML/MIN/1.73M*2
ERYTHROCYTE [DISTWIDTH] IN BLOOD BY AUTOMATED COUNT: 13.2 % (ref 11.5–14.5)
GLUCOSE BLD MANUAL STRIP-MCNC: 128 MG/DL (ref 74–99)
GLUCOSE SERPL-MCNC: 118 MG/DL (ref 74–99)
HCT VFR BLD AUTO: 36.8 % (ref 36–46)
HGB BLD-MCNC: 12.1 G/DL (ref 12–16)
MAGNESIUM SERPL-MCNC: 1.85 MG/DL (ref 1.6–2.4)
MCH RBC QN AUTO: 32.9 PG (ref 26–34)
MCHC RBC AUTO-ENTMCNC: 32.9 G/DL (ref 32–36)
MCV RBC AUTO: 100 FL (ref 80–100)
NRBC BLD-RTO: 0 /100 WBCS (ref 0–0)
PLATELET # BLD AUTO: 152 X10*3/UL (ref 150–450)
POTASSIUM SERPL-SCNC: 3.6 MMOL/L (ref 3.5–5.3)
RBC # BLD AUTO: 3.68 X10*6/UL (ref 4–5.2)
SODIUM SERPL-SCNC: 139 MMOL/L (ref 136–145)
WBC # BLD AUTO: 8.8 X10*3/UL (ref 4.4–11.3)

## 2025-04-16 PROCEDURE — 36415 COLL VENOUS BLD VENIPUNCTURE: CPT | Performed by: STUDENT IN AN ORGANIZED HEALTH CARE EDUCATION/TRAINING PROGRAM

## 2025-04-16 PROCEDURE — 2500000001 HC RX 250 WO HCPCS SELF ADMINISTERED DRUGS (ALT 637 FOR MEDICARE OP): Performed by: INTERNAL MEDICINE

## 2025-04-16 PROCEDURE — 2500000001 HC RX 250 WO HCPCS SELF ADMINISTERED DRUGS (ALT 637 FOR MEDICARE OP): Performed by: STUDENT IN AN ORGANIZED HEALTH CARE EDUCATION/TRAINING PROGRAM

## 2025-04-16 PROCEDURE — 2500000004 HC RX 250 GENERAL PHARMACY W/ HCPCS (ALT 636 FOR OP/ED): Performed by: INTERNAL MEDICINE

## 2025-04-16 PROCEDURE — 83735 ASSAY OF MAGNESIUM: CPT | Performed by: STUDENT IN AN ORGANIZED HEALTH CARE EDUCATION/TRAINING PROGRAM

## 2025-04-16 PROCEDURE — 2500000002 HC RX 250 W HCPCS SELF ADMINISTERED DRUGS (ALT 637 FOR MEDICARE OP, ALT 636 FOR OP/ED): Performed by: STUDENT IN AN ORGANIZED HEALTH CARE EDUCATION/TRAINING PROGRAM

## 2025-04-16 PROCEDURE — 1200000002 HC GENERAL ROOM WITH TELEMETRY DAILY

## 2025-04-16 PROCEDURE — 2500000002 HC RX 250 W HCPCS SELF ADMINISTERED DRUGS (ALT 637 FOR MEDICARE OP, ALT 636 FOR OP/ED): Performed by: INTERNAL MEDICINE

## 2025-04-16 PROCEDURE — 99233 SBSQ HOSP IP/OBS HIGH 50: CPT | Performed by: INTERNAL MEDICINE

## 2025-04-16 PROCEDURE — 82947 ASSAY GLUCOSE BLOOD QUANT: CPT

## 2025-04-16 PROCEDURE — 83880 ASSAY OF NATRIURETIC PEPTIDE: CPT | Performed by: INTERNAL MEDICINE

## 2025-04-16 PROCEDURE — 2500000004 HC RX 250 GENERAL PHARMACY W/ HCPCS (ALT 636 FOR OP/ED): Mod: JZ | Performed by: INTERNAL MEDICINE

## 2025-04-16 PROCEDURE — 2500000001 HC RX 250 WO HCPCS SELF ADMINISTERED DRUGS (ALT 637 FOR MEDICARE OP): Performed by: REGISTERED NURSE

## 2025-04-16 PROCEDURE — 85027 COMPLETE CBC AUTOMATED: CPT | Performed by: STUDENT IN AN ORGANIZED HEALTH CARE EDUCATION/TRAINING PROGRAM

## 2025-04-16 PROCEDURE — 80048 BASIC METABOLIC PNL TOTAL CA: CPT | Performed by: STUDENT IN AN ORGANIZED HEALTH CARE EDUCATION/TRAINING PROGRAM

## 2025-04-16 RX ORDER — KETOROLAC TROMETHAMINE 30 MG/ML
15 INJECTION, SOLUTION INTRAMUSCULAR; INTRAVENOUS ONCE
Status: DISCONTINUED | OUTPATIENT
Start: 2025-04-16 | End: 2025-04-16 | Stop reason: SDUPTHER

## 2025-04-16 RX ORDER — CEFTRIAXONE 1 G/50ML
1 INJECTION, SOLUTION INTRAVENOUS EVERY 24 HOURS
Status: DISCONTINUED | OUTPATIENT
Start: 2025-04-16 | End: 2025-04-22 | Stop reason: HOSPADM

## 2025-04-16 RX ORDER — LABETALOL HYDROCHLORIDE 5 MG/ML
20 INJECTION, SOLUTION INTRAVENOUS EVERY 4 HOURS PRN
Status: DISCONTINUED | OUTPATIENT
Start: 2025-04-16 | End: 2025-04-22 | Stop reason: HOSPADM

## 2025-04-16 RX ORDER — NYSTATIN 100000 [USP'U]/ML
4 SUSPENSION ORAL 3 TIMES DAILY
Status: DISCONTINUED | OUTPATIENT
Start: 2025-04-16 | End: 2025-04-22 | Stop reason: HOSPADM

## 2025-04-16 RX ORDER — FUROSEMIDE 10 MG/ML
40 INJECTION INTRAMUSCULAR; INTRAVENOUS ONCE
Status: COMPLETED | OUTPATIENT
Start: 2025-04-16 | End: 2025-04-16

## 2025-04-16 RX ORDER — KETOROLAC TROMETHAMINE 30 MG/ML
15 INJECTION, SOLUTION INTRAMUSCULAR; INTRAVENOUS ONCE
Status: COMPLETED | OUTPATIENT
Start: 2025-04-16 | End: 2025-04-16

## 2025-04-16 RX ORDER — TORSEMIDE 20 MG/1
40 TABLET ORAL DAILY
Status: DISCONTINUED | OUTPATIENT
Start: 2025-04-17 | End: 2025-04-20

## 2025-04-16 RX ADMIN — FUROSEMIDE 40 MG: 10 INJECTION, SOLUTION INTRAVENOUS at 17:48

## 2025-04-16 RX ADMIN — CEFTRIAXONE SODIUM 1 G: 1 INJECTION, SOLUTION INTRAVENOUS at 12:57

## 2025-04-16 RX ADMIN — Medication 1 SPRAY: at 12:00

## 2025-04-16 RX ADMIN — Medication 1 SPRAY: at 19:35

## 2025-04-16 RX ADMIN — LABETALOL HYDROCHLORIDE 20 MG: 5 INJECTION INTRAVENOUS at 12:57

## 2025-04-16 RX ADMIN — KETOROLAC TROMETHAMINE 15 MG: 30 INJECTION, SOLUTION INTRAMUSCULAR at 14:34

## 2025-04-16 RX ADMIN — Medication 1 SPRAY: at 16:56

## 2025-04-16 ASSESSMENT — COGNITIVE AND FUNCTIONAL STATUS - GENERAL
DAILY ACTIVITIY SCORE: 19
TURNING FROM BACK TO SIDE WHILE IN FLAT BAD: A LITTLE
HELP NEEDED FOR BATHING: A LITTLE
MOVING FROM LYING ON BACK TO SITTING ON SIDE OF FLAT BED WITH BEDRAILS: A LITTLE
MOVING TO AND FROM BED TO CHAIR: A LITTLE
PERSONAL GROOMING: A LITTLE
DRESSING REGULAR UPPER BODY CLOTHING: A LITTLE
CLIMB 3 TO 5 STEPS WITH RAILING: A LOT
TOILETING: A LITTLE
WALKING IN HOSPITAL ROOM: A LOT
MOBILITY SCORE: 15
STANDING UP FROM CHAIR USING ARMS: A LOT
DRESSING REGULAR LOWER BODY CLOTHING: A LITTLE

## 2025-04-16 ASSESSMENT — PAIN SCALES - GENERAL
PAINLEVEL_OUTOF10: 0 - NO PAIN
PAINLEVEL_OUTOF10: 0 - NO PAIN

## 2025-04-16 ASSESSMENT — PAIN - FUNCTIONAL ASSESSMENT
PAIN_FUNCTIONAL_ASSESSMENT: FLACC (FACE, LEGS, ACTIVITY, CRY, CONSOLABILITY)
PAIN_FUNCTIONAL_ASSESSMENT: 0-10

## 2025-04-16 NOTE — PROGRESS NOTES
Subjective   Patient remains confused, but more alert, and less agitated, still refusing her oral medications, she was accompanied by her , he stated that she has sore throat,  She remains on 2 L NC.     Objective   Vitals:    04/16/25 1605   BP: 178/86   Pulse: 82   Resp: 20   Temp:    SpO2:        Physical Exam:   Constitutional: awake, remains confused,  no acute distress  ENMT: mucous membranes moist, conjunctivae clear, throat slightly hyperemic and inflamed.   Head/Neck: normocephalic, atraumatic; supple, trachea midline  Respiratory/Thorax: patent airways, CTAB; no wheezes, rales, or rhonchi  Cardiovascular: irregularly irregular rate and rhythm, no murmur appreciated  Gastrointestinal: soft, nondistended, non-tender, bowel sounds appreciated  : Peralta in place draining clear yellow urine  Extremities: palpable peripheral pulses, no edema  Neurological: AO x1, no focal deficits  Psychological:  more awake and but remains confused.   Skin: warm and dry    Scheduled Medications:   amLODIPine, 5 mg, oral, Daily  atorvastatin, 20 mg, oral, Daily  [Held by provider] carbidopa-levodopa, 2 tablet, oral, TID  cefTRIAXone, 1 g, intravenous, q24h  cholecalciferol, 25 mcg, oral, BID  cyanocobalamin, 1,000 mcg, oral, Daily  DULoxetine, 30 mg, oral, BID  metoprolol succinate XL, 50 mg, oral, Daily  nystatin, 4 mL, Swish & Swallow, TID  polyethylene glycol, 17 g, oral, Daily  potassium chloride CR, 40 mEq, oral, Daily  [Held by provider] pregabalin, 100 mg, oral, BID  rivaroxaban, 10 mg, oral, Daily  sennosides-docusate sodium, 2 tablet, oral, BID  [Held by provider] spironolactone, 25 mg, oral, Daily  [START ON 4/17/2025] torsemide, 40 mg, oral, Daily       Continuous Medications:         PRN Medications:   PRN medications: acetaminophen, bisacodyl, dextrose, dextrose, glucagon, glucagon, hydrOXYzine HCL, ipratropium-albuteroL, labetaloL, melatonin, ondansetron **OR** ondansetron, phenoL    Assessment/Plan   Shreya  BIN Acuña is a 89 y.o. female     Acute encephalopathy, ?metabolic vs hospital delirium  Acute hypoxic respiratory failure  MISTY on CKD, resolved  Urinary retention s/p Peralta  Abdominal distention, improved  Biliary dyskinesia s/p laparoscopic cholecystectomy on 4/10/25     Permanent Afib, on Xarelto  HTN, HLD  TAMMY, noncompliant on CPAP  Parkinsonism, on Sinemet  Tricuspid regurgitation, moderate to severe    Suspect pt's AMS is secondary to delirium due to disrupted circadian rhythm. Home Sinemet (hx Parkinsonism) temporarily held per family's request. Hold Lyrica so as to avoid oversedating. PRN melatonin,  Initially planned for voiding trial but deferred given AMS.   CT head was obtained,no abnormality detected,  ABG with slight alkalosis and decreased PO2, Chest xray with congestion, will give a second dose of lasix as the patient is not taking PO.  Patient is more awake today.  She was refusing PO meds due to throat pain that started after her surgery, on examination, it was hyperemic and swollen, started on ceftriaxone, Chloraseptic and nystatin swish and swallow, changed diet to CLD and will advance as tolerated.     Remains on 2L NC this AM. restarted home Torsemide. Wean O2 as tolerated.  Nephrology following for MISTY on CKD, which has resolved with IVF and Peralta placement.  Seen by general surgery for abdominal distension with recent lap letha. CT A/P shows postop changes without complications. Tolerating regular diet without issue, but switch for her sore throat.    - Discontinue Cardizem upon DC given relatively low HRs. Pt in permanent afib with controlled rate. Had actually been advised by Dr. Barnhart to discontinue this in 3/2025.  - Discussed stopping home Oxybutynin upon DC given urinary retention.    DVT PPX: Xarelto  Code status: FULL    Gibran Gauthier, [facility]

## 2025-04-16 NOTE — PROGRESS NOTES
Nephrology Consult Progress Note    Shreya Acuña is a 89 y.o. female on day 4 of admission presenting with Acute kidney injury superimposed on CKD.      Subjective   Confused and agitated overnight, unable to take her pills, c/o sore throat, family at bedside, remains on NC O2       Objective          Physical Exam    Vitals 24HR  Heart Rate:  []   Temp:  [36.6 °C (97.9 °F)-37.7 °C (99.9 °F)]   Resp:  [18]   BP: (183-187)/(74-93)   SpO2:  [94 %-99 %]           AAOx3, on NC O2, sleeping but arousable  Decreased AEBL  RRR  Abd soft  Chronic skin changes, no edema  Peralta+              I&O 24HR    Intake/Output Summary (Last 24 hours) at 4/16/2025 1509  Last data filed at 4/16/2025 1327  Gross per 24 hour   Intake 130 ml   Output 750 ml   Net -620 ml         Medications  Medications Prior to Admission   Medication Sig Dispense Refill Last Dose/Taking    acetaminophen (Tylenol) 500 mg tablet Take 2 tablets (1,000 mg) by mouth 3 times a day.       albuterol 90 mcg/actuation inhaler Inhale 2 puffs every 4 hours if needed for wheezing. (Patient not taking: Reported on 4/7/2025) 18 g 0     atorvastatin (Lipitor) 20 mg tablet Take 1 tablet (20 mg) by mouth once daily.       carbidopa-levodopa (Sinemet)  mg tablet Take 3 tablets by mouth 3 times a day. (Patient taking differently: Take 2 tablets by mouth 3 times a day.) 270 tablet 11     cholecalciferol (Vitamin D-3) 25 MCG (1000 UT) capsule Take 1 capsule (25 mcg) by mouth 2 times a day.       cyanocobalamin (Vitamin B-12) 1,000 mcg tablet Take 1 tablet (1,000 mcg) by mouth once daily.       DILT- mg 24 hr capsule Take 1 capsule (120 mg) by mouth once daily. 90 capsule 3     DULoxetine (Cymbalta) 30 mg DR capsule Take 1 capsule (30 mg) by mouth 2 times a day. 180 capsule 2     Gemtesa 75 mg tablet Take 1 tablet (75 mg) by mouth early in the morning..       metoprolol succinate XL (Toprol-XL) 50 mg 24 hr tablet Take 1 tablet (50 mg) by mouth once daily.        mv-min-FA-vit K-lutein-zeaxant (PreserVision AREDS 2 Plus MV) 200 mcg-15 mcg- 5 mg-1 mg capsule Take 1 capsule by mouth 2 times a day.       [] oxyCODONE (Roxicodone) 5 mg immediate release tablet Take 1 tablet (5 mg) by mouth every 6 hours if needed for severe pain (7 - 10) for up to 5 days. 10 tablet 0     pregabalin (Lyrica) 100 mg capsule Take 1 capsule (100 mg) by mouth 2 times a day. 180 capsule 2     rivaroxaban (Xarelto) 10 mg tablet Take 1 tablet (10 mg) by mouth once daily. Resume taking on        spironolactone (Aldactone) 25 mg tablet Take 1 tablet (25 mg) by mouth once daily. (Patient taking differently: Take 1 tablet (25 mg) by mouth 3 times a day.) 90 tablet 3     torsemide (Demadex) 20 mg tablet Take 1 tablet (20 mg) by mouth once daily. (Patient taking differently: Take 1 tablet (20 mg) by mouth once daily at bedtime.) 90 tablet 3       Scheduled medications  amLODIPine, 5 mg, oral, Daily  atorvastatin, 20 mg, oral, Daily  [Held by provider] carbidopa-levodopa, 2 tablet, oral, TID  cefTRIAXone, 1 g, intravenous, q24h  cholecalciferol, 25 mcg, oral, BID  cyanocobalamin, 1,000 mcg, oral, Daily  DULoxetine, 30 mg, oral, BID  metoprolol succinate XL, 50 mg, oral, Daily  nystatin, 4 mL, Swish & Swallow, TID  polyethylene glycol, 17 g, oral, Daily  potassium chloride CR, 40 mEq, oral, Daily  [Held by provider] pregabalin, 100 mg, oral, BID  rivaroxaban, 10 mg, oral, Daily  sennosides-docusate sodium, 2 tablet, oral, BID  [Held by provider] spironolactone, 25 mg, oral, Daily  torsemide, 20 mg, oral, Daily      Continuous medications     PRN medications  PRN medications: acetaminophen, albuterol, bisacodyl, dextrose, dextrose, glucagon, glucagon, hydrOXYzine HCL, ipratropium-albuteroL, labetaloL, melatonin, ondansetron **OR** ondansetron, phenoL    Relevant Results      Admission on 2025   Component Date Value Ref Range Status    WBC 2025 7.4  4.4 - 11.3 x10*3/uL Final    nRBC  04/12/2025 0.0  0.0 - 0.0 /100 WBCs Final    RBC 04/12/2025 3.36 (L)  4.00 - 5.20 x10*6/uL Final    Hemoglobin 04/12/2025 11.0 (L)  12.0 - 16.0 g/dL Final    Hematocrit 04/12/2025 35.9 (L)  36.0 - 46.0 % Final    MCV 04/12/2025 107 (H)  80 - 100 fL Final    MCH 04/12/2025 32.7  26.0 - 34.0 pg Final    MCHC 04/12/2025 30.6 (L)  32.0 - 36.0 g/dL Final    RDW 04/12/2025 13.6  11.5 - 14.5 % Final    Platelets 04/12/2025 144 (L)  150 - 450 x10*3/uL Final    Neutrophils % 04/12/2025 71.0  40.0 - 80.0 % Final    Immature Granulocytes %, Automated 04/12/2025 0.4  0.0 - 0.9 % Final    Immature Granulocyte Count (IG) includes promyelocytes, myelocytes and metamyelocytes but does not include bands. Percent differential counts (%) should be interpreted in the context of the absolute cell counts (cells/UL).    Lymphocytes % 04/12/2025 15.8  13.0 - 44.0 % Final    Monocytes % 04/12/2025 12.1  2.0 - 10.0 % Final    Eosinophils % 04/12/2025 0.4  0.0 - 6.0 % Final    Basophils % 04/12/2025 0.3  0.0 - 2.0 % Final    Neutrophils Absolute 04/12/2025 5.23  1.60 - 5.50 x10*3/uL Final    Percent differential counts (%) should be interpreted in the context of the absolute cell counts (cells/uL).    Immature Granulocytes Absolute, Au* 04/12/2025 0.03  0.00 - 0.50 x10*3/uL Final    Lymphocytes Absolute 04/12/2025 1.16  0.80 - 3.00 x10*3/uL Final    Monocytes Absolute 04/12/2025 0.89 (H)  0.05 - 0.80 x10*3/uL Final    Eosinophils Absolute 04/12/2025 0.03  0.00 - 0.40 x10*3/uL Final    Basophils Absolute 04/12/2025 0.02  0.00 - 0.10 x10*3/uL Final    Glucose 04/12/2025 95  74 - 99 mg/dL Final    Sodium 04/12/2025 132 (L)  136 - 145 mmol/L Final    Potassium 04/12/2025 4.4  3.5 - 5.3 mmol/L Final    Chloride 04/12/2025 95 (L)  98 - 107 mmol/L Final    Bicarbonate 04/12/2025 28  21 - 32 mmol/L Final    Anion Gap 04/12/2025 13  10 - 20 mmol/L Final    Urea Nitrogen 04/12/2025 40 (H)  6 - 23 mg/dL Final    Creatinine 04/12/2025 2.59 (H)  0.50 -  1.05 mg/dL Final    eGFR 04/12/2025 17 (L)  >60 mL/min/1.73m*2 Final    Calculations of estimated GFR are performed using the 2021 CKD-EPI Study Refit equation without the race variable for the IDMS-Traceable creatinine methods.  https://jasn.asnjournals.org/content/early/2021/09/22/ASN.3403923667    Calcium 04/12/2025 9.0  8.6 - 10.3 mg/dL Final    Albumin 04/12/2025 4.3  3.4 - 5.0 g/dL Final    Alkaline Phosphatase 04/12/2025 116  33 - 136 U/L Final    Total Protein 04/12/2025 6.8  6.4 - 8.2 g/dL Final    AST 04/12/2025 36  9 - 39 U/L Final    Bilirubin, Total 04/12/2025 0.5  0.0 - 1.2 mg/dL Final    ALT 04/12/2025 7  7 - 45 U/L Final    Patients treated with Sulfasalazine may generate falsely decreased results for ALT.    Glucose 04/12/2025 93  74 - 99 mg/dL Final    Sodium 04/12/2025 133 (L)  136 - 145 mmol/L Final    Potassium 04/12/2025 4.2  3.5 - 5.3 mmol/L Final    Chloride 04/12/2025 97 (L)  98 - 107 mmol/L Final    Bicarbonate 04/12/2025 28  21 - 32 mmol/L Final    Anion Gap 04/12/2025 12  10 - 20 mmol/L Final    Urea Nitrogen 04/12/2025 40 (H)  6 - 23 mg/dL Final    Creatinine 04/12/2025 2.44 (H)  0.50 - 1.05 mg/dL Final    eGFR 04/12/2025 19 (L)  >60 mL/min/1.73m*2 Final    Calculations of estimated GFR are performed using the 2021 CKD-EPI Study Refit equation without the race variable for the IDMS-Traceable creatinine methods.  https://jasn.asnjournals.org/content/early/2021/09/22/ASN.7738308080    Calcium 04/12/2025 8.7  8.6 - 10.3 mg/dL Final    Sodium, Urine Random 04/12/2025 16  mmol/L Final    Creatinine, Urine Random 04/12/2025 125.3  20.0 - 320.0 mg/dL Final    Sodium/Creatinine Ratio 04/12/2025 13  Not established. mmol/g Creat Final    Urea Nitrogen, Urine Random 04/12/2025 311  mg/dL Final    Creatinine, Urine Random 04/12/2025 125.3  20.0 - 320.0 mg/dL Final    Urea Nitrogen/Creatinine Ratio 04/12/2025 2.5  Not established. g/g creat Final    Glucose 04/13/2025 83  74 - 99 mg/dL Final     Sodium 04/13/2025 139  136 - 145 mmol/L Final    Potassium 04/13/2025 4.2  3.5 - 5.3 mmol/L Final    Chloride 04/13/2025 106  98 - 107 mmol/L Final    Bicarbonate 04/13/2025 27  21 - 32 mmol/L Final    Anion Gap 04/13/2025 10  10 - 20 mmol/L Final    Urea Nitrogen 04/13/2025 29 (H)  6 - 23 mg/dL Final    Creatinine 04/13/2025 1.20 (H)  0.50 - 1.05 mg/dL Final    eGFR 04/13/2025 44 (L)  >60 mL/min/1.73m*2 Final    Calculations of estimated GFR are performed using the 2021 CKD-EPI Study Refit equation without the race variable for the IDMS-Traceable creatinine methods.  https://jasn.asnjournals.org/content/early/2021/09/22/ASN.9289152260    Calcium 04/13/2025 8.8  8.6 - 10.3 mg/dL Final    Extra Tube 04/13/2025 Hold for add-ons.   Final    Auto resulted.    POCT Glucose 04/13/2025 109 (H)  74 - 99 mg/dL Final    Glucose 04/13/2025 113 (H)  74 - 99 mg/dL Final    Sodium 04/13/2025 139  136 - 145 mmol/L Final    Potassium 04/13/2025 4.2  3.5 - 5.3 mmol/L Final    Chloride 04/13/2025 104  98 - 107 mmol/L Final    Bicarbonate 04/13/2025 29  21 - 32 mmol/L Final    Anion Gap 04/13/2025 10  10 - 20 mmol/L Final    Urea Nitrogen 04/13/2025 25 (H)  6 - 23 mg/dL Final    Creatinine 04/13/2025 1.03  0.50 - 1.05 mg/dL Final    eGFR 04/13/2025 53 (L)  >60 mL/min/1.73m*2 Final    Calculations of estimated GFR are performed using the 2021 CKD-EPI Study Refit equation without the race variable for the IDMS-Traceable creatinine methods.  https://jasn.asnjournals.org/content/early/2021/09/22/ASN.6988067277    Calcium 04/13/2025 9.7  8.6 - 10.3 mg/dL Final    Magnesium 04/13/2025 2.12  1.60 - 2.40 mg/dL Final    Extra Tube 04/13/2025 Hold for add-ons.   Final    Auto resulted.    Extra Tube 04/13/2025 Hold for add-ons.   Final    Auto resulted.    WBC 04/14/2025 6.8  4.4 - 11.3 x10*3/uL Final    nRBC 04/14/2025 0.0  0.0 - 0.0 /100 WBCs Final    RBC 04/14/2025 3.44 (L)  4.00 - 5.20 x10*6/uL Final    Hemoglobin 04/14/2025 11.3 (L)  12.0  - 16.0 g/dL Final    Hematocrit 04/14/2025 36.0  36.0 - 46.0 % Final    MCV 04/14/2025 105 (H)  80 - 100 fL Final    MCH 04/14/2025 32.8  26.0 - 34.0 pg Final    MCHC 04/14/2025 31.4 (L)  32.0 - 36.0 g/dL Final    RDW 04/14/2025 13.4  11.5 - 14.5 % Final    Platelets 04/14/2025 138 (L)  150 - 450 x10*3/uL Final    Glucose 04/14/2025 92  74 - 99 mg/dL Final    Sodium 04/14/2025 138  136 - 145 mmol/L Final    Potassium 04/14/2025 3.8  3.5 - 5.3 mmol/L Final    Chloride 04/14/2025 103  98 - 107 mmol/L Final    Bicarbonate 04/14/2025 28  21 - 32 mmol/L Final    Anion Gap 04/14/2025 11  10 - 20 mmol/L Final    Urea Nitrogen 04/14/2025 18  6 - 23 mg/dL Final    Creatinine 04/14/2025 0.93  0.50 - 1.05 mg/dL Final    eGFR 04/14/2025 60 (L)  >60 mL/min/1.73m*2 Final    Calculations of estimated GFR are performed using the 2021 CKD-EPI Study Refit equation without the race variable for the IDMS-Traceable creatinine methods.  https://jasn.asnjournals.org/content/early/2021/09/22/ASN.8420775967    Calcium 04/14/2025 9.7  8.6 - 10.3 mg/dL Final    Magnesium 04/14/2025 2.04  1.60 - 2.40 mg/dL Final    Ventricular Rate 04/13/2025 75  BPM Preliminary    Atrial Rate 04/13/2025 288  BPM Preliminary    QRS Duration 04/13/2025 84  ms Preliminary    QT Interval 04/13/2025 378  ms Preliminary    QTC Calculation(Bazett) 04/13/2025 422  ms Preliminary    R Axis 04/13/2025 -1  degrees Preliminary    T Axis 04/13/2025 12  degrees Preliminary    QRS Count 04/13/2025 12  beats Preliminary    Q Onset 04/13/2025 221  ms Preliminary    T Offset 04/13/2025 410  ms Preliminary    QTC Fredericia 04/13/2025 406  ms Preliminary    WBC 04/15/2025 8.0  4.4 - 11.3 x10*3/uL Final    nRBC 04/15/2025 0.0  0.0 - 0.0 /100 WBCs Final    RBC 04/15/2025 3.37 (L)  4.00 - 5.20 x10*6/uL Final    Hemoglobin 04/15/2025 11.1 (L)  12.0 - 16.0 g/dL Final    Hematocrit 04/15/2025 33.9 (L)  36.0 - 46.0 % Final    MCV 04/15/2025 101 (H)  80 - 100 fL Final    MCH 04/15/2025  32.9  26.0 - 34.0 pg Final    MCHC 04/15/2025 32.7  32.0 - 36.0 g/dL Final    RDW 04/15/2025 13.3  11.5 - 14.5 % Final    Platelets 04/15/2025 145 (L)  150 - 450 x10*3/uL Final    Glucose 04/15/2025 113 (H)  74 - 99 mg/dL Final    Sodium 04/15/2025 138  136 - 145 mmol/L Final    Potassium 04/15/2025 3.6  3.5 - 5.3 mmol/L Final    Chloride 04/15/2025 103  98 - 107 mmol/L Final    Bicarbonate 04/15/2025 28  21 - 32 mmol/L Final    Anion Gap 04/15/2025 11  10 - 20 mmol/L Final    Urea Nitrogen 04/15/2025 13  6 - 23 mg/dL Final    Creatinine 04/15/2025 0.89  0.50 - 1.05 mg/dL Final    eGFR 04/15/2025 63  >60 mL/min/1.73m*2 Final    Calculations of estimated GFR are performed using the 2021 CKD-EPI Study Refit equation without the race variable for the IDMS-Traceable creatinine methods.  https://jasn.asnjournals.org/content/early/2021/09/22/ASN.5669205738    Calcium 04/15/2025 9.3  8.6 - 10.3 mg/dL Final    Magnesium 04/15/2025 1.80  1.60 - 2.40 mg/dL Final    POCT pH, Arterial 04/15/2025 7.50 (H)  7.38 - 7.42 pH Final    POCT pCO2, Arterial 04/15/2025 38  38 - 42 mm Hg Final    POCT pO2, Arterial 04/15/2025 64 (L)  85 - 95 mm Hg Final    POCT SO2, Arterial 04/15/2025 94  94 - 100 % Final    POCT Oxy Hemoglobin, Arterial 04/15/2025 91.7 (L)  94.0 - 98.0 % Final    POCT Hematocrit Calculated, Arteri* 04/15/2025 36.0  36.0 - 46.0 % Final    POCT Sodium, Arterial 04/15/2025 136  136 - 145 mmol/L Final    POCT Potassium, Arterial 04/15/2025 4.0  3.5 - 5.3 mmol/L Final    POCT Chloride, Arterial 04/15/2025 105  98 - 107 mmol/L Final    POCT Ionized Calcium, Arterial 04/15/2025 1.30  1.10 - 1.33 mmol/L Final    POCT Glucose, Arterial 04/15/2025 126 (H)  74 - 99 mg/dL Final    POCT Lactate, Arterial 04/15/2025 0.7  0.4 - 2.0 mmol/L Final    POCT Base Excess, Arterial 04/15/2025 6.1 (H)  -2.0 - 3.0 mmol/L Final    POCT HCO3 Calculated, Arterial 04/15/2025 29.6 (H)  22.0 - 26.0 mmol/L Final    POCT Hemoglobin, Arterial 04/15/2025  11.9 (L)  12.0 - 16.0 g/dL Final    POCT Anion Gap, Arterial 04/15/2025 5 (L)  10 - 25 mmo/L Final    Patient Temperature 04/15/2025 37.0  degrees Celsius Final    FiO2 04/15/2025 26  % Final    Apparatus 04/15/2025 CANNULA   Final    Site of Arterial Puncture 04/15/2025 Radial Left   Final    Ajit's Test 04/15/2025 Positive   Final    WBC 04/16/2025 8.8  4.4 - 11.3 x10*3/uL Final    nRBC 04/16/2025 0.0  0.0 - 0.0 /100 WBCs Final    RBC 04/16/2025 3.68 (L)  4.00 - 5.20 x10*6/uL Final    Hemoglobin 04/16/2025 12.1  12.0 - 16.0 g/dL Final    Hematocrit 04/16/2025 36.8  36.0 - 46.0 % Final    MCV 04/16/2025 100  80 - 100 fL Final    MCH 04/16/2025 32.9  26.0 - 34.0 pg Final    MCHC 04/16/2025 32.9  32.0 - 36.0 g/dL Final    RDW 04/16/2025 13.2  11.5 - 14.5 % Final    Platelets 04/16/2025 152  150 - 450 x10*3/uL Final    Glucose 04/16/2025 118 (H)  74 - 99 mg/dL Final    Sodium 04/16/2025 139  136 - 145 mmol/L Final    Potassium 04/16/2025 3.6  3.5 - 5.3 mmol/L Final    Chloride 04/16/2025 102  98 - 107 mmol/L Final    Bicarbonate 04/16/2025 27  21 - 32 mmol/L Final    Anion Gap 04/16/2025 14  10 - 20 mmol/L Final    Urea Nitrogen 04/16/2025 14  6 - 23 mg/dL Final    Creatinine 04/16/2025 0.91  0.50 - 1.05 mg/dL Final    eGFR 04/16/2025 61  >60 mL/min/1.73m*2 Final    Calculations of estimated GFR are performed using the 2021 CKD-EPI Study Refit equation without the race variable for the IDMS-Traceable creatinine methods.  https://jasn.asnjournals.org/content/early/2021/09/22/ASN.4288477504    Calcium 04/16/2025 9.4  8.6 - 10.3 mg/dL Final    Magnesium 04/16/2025 1.85  1.60 - 2.40 mg/dL Final    BNP 04/16/2025 334 (H)  0 - 99 pg/mL Final      Imaging  CT head wo IV contrast  Result Date: 4/15/2025  NO ACUTE INTRACRANIAL PROCESS   MACRO: None   Signed by: Mingo Barrios 4/15/2025 3:38 PM Dictation workstation:   [id number]    XR chest 1 view  Result Date: 4/15/2025  Diffuse interstitial infiltrates bilaterally, as above.  Clinical correlation and continued follow-up until clearing is recommended.   MACRO: None.   Signed by: Geremias Cardenas 4/15/2025 1:17 PM Dictation workstation:   [id number]    XR chest 1 view  Result Date: 4/13/2025  Findings suggestive of a degree of pulmonary edema/CHF.   MACRO: None   Signed by: Peña Wilkins 4/13/2025 1:14 PM Dictation workstation:   [id number]    CT abdomen pelvis wo IV contrast  Result Date: 4/12/2025  1. Post laparoscopic cholecystectomy with slight postsurgical changes but no suspicious abnormalities. 2. Tiny fat containing umbilical hernia similar to the prior exam 3. Left renal cortical 5.3 cm hypodensity most likely a renal cyst. This is similar to the prior exam. Ultrasound may confirm this impression. 4. Marked degenerative changes of the lumbar spine with lumbar canal stenosis most severe L4-5     Signed by: Savannah García 4/12/2025 7:16 AM Dictation workstation:   [id number]      Cardiology, Vascular, and Other Imaging  ECG 12 lead  Result Date: 4/15/2025  Atrial fibrillation Nonspecific ST and T wave abnormality Abnormal ECG When compared with ECG of 21-JAN-2025 22:53, PREVIOUS ECG IS PRESENT          ASSESSMENT AND PLAN    89 y.o. female with PMH of CKD stage 4, Afib on AC, CHF, HL, HTN, empyema, smoking and biliary dyskinasia recent elective laparoscopic cholecystectomy on 4/10 presenting with abnormal labs as OP, found to have MISTY and hyponatremia     MISTY on CKD, baseline creatinine of 0.9 until 2023, in the 1.6-1.7 since, was 2.59 on admission associated with high BUN, resolved  UA pending  Urine Na 16 on admission  Prerenal, was on torsemide 20 mg every day and aldactone 25 mg every day before admission     Hyponatremia in setting of MISTY/impaired free water clearance     HTN, severe TR as above, on diuretics, cardizem and toprol before admission     Volume status- improved     Anemia, mild, chronic     L renal cyst        Plan  - resolved MISTY  - CXR w/ vascular congestion,   received iv lasix today as unable to take po, repeat tomorrow iv otherwise increase torsemide to 40 mg every day  - please record IOs  - daily lytes and replete for K>4 and mag>2, daily kdur  - ECHO as above with severe TR but RVSP wnl  - avoid hypotension and nephrotoxins  - BP up, restarted on toprol and norvasc and iv PRN (unable to take po)  - do not resume ditropan on discharge     MARS reviewed, dose for GFR<60, family questioning sinemet dose as has been agitated and confused, now off        Thank you for the opportunity to assist in the care of this patient, please call with questions  Danielle Mcintosh MD PhD

## 2025-04-16 NOTE — PROGRESS NOTES
Physical Therapy                 Therapy Communication Note    Patient Name: Shreya Acuña  MRN: [id number]  Department: [facility]  Room: [address] [place] [address]  Today's Date: 4/16/2025     Discipline: Physical Therapy    PT Missed Visit: Yes     Missed Visit Reason: Missed Visit Reason: Parent/caregiver refused (Pt's spouse politely declined; pt not feeling well/resting.  Nursing notified.)    Missed Time: Attempt    Comment:

## 2025-04-16 NOTE — CARE PLAN
The patient's goals for the shift include      The clinical goals for the shift include safety and comfort    Over the shift, the patient did not make progress toward the following goals. Barriers to progression include drowsiness. Recommendations to address these barriers include reorient pt.

## 2025-04-17 ENCOUNTER — APPOINTMENT (OUTPATIENT)
Dept: RADIOLOGY | Facility: HOSPITAL | Age: 89
DRG: 659 | End: 2025-04-17
Payer: MEDICARE

## 2025-04-17 LAB
ALBUMIN SERPL BCP-MCNC: 3.8 G/DL (ref 3.4–5)
ANION GAP SERPL CALC-SCNC: 13 MMOL/L (ref 10–20)
BUN SERPL-MCNC: 22 MG/DL (ref 6–23)
CALCIUM SERPL-MCNC: 9.3 MG/DL (ref 8.6–10.3)
CHLORIDE SERPL-SCNC: 103 MMOL/L (ref 98–107)
CO2 SERPL-SCNC: 31 MMOL/L (ref 21–32)
CREAT SERPL-MCNC: 1.18 MG/DL (ref 0.5–1.05)
EGFRCR SERPLBLD CKD-EPI 2021: 45 ML/MIN/1.73M*2
GLUCOSE SERPL-MCNC: 131 MG/DL (ref 74–99)
HOLD SPECIMEN: NORMAL
PHOSPHATE SERPL-MCNC: 3.8 MG/DL (ref 2.5–4.9)
POTASSIUM SERPL-SCNC: 3.7 MMOL/L (ref 3.5–5.3)
SODIUM SERPL-SCNC: 143 MMOL/L (ref 136–145)

## 2025-04-17 PROCEDURE — 2500000001 HC RX 250 WO HCPCS SELF ADMINISTERED DRUGS (ALT 637 FOR MEDICARE OP): Performed by: INTERNAL MEDICINE

## 2025-04-17 PROCEDURE — 2500000004 HC RX 250 GENERAL PHARMACY W/ HCPCS (ALT 636 FOR OP/ED): Mod: JZ | Performed by: INTERNAL MEDICINE

## 2025-04-17 PROCEDURE — 2500000001 HC RX 250 WO HCPCS SELF ADMINISTERED DRUGS (ALT 637 FOR MEDICARE OP)

## 2025-04-17 PROCEDURE — 70551 MRI BRAIN STEM W/O DYE: CPT

## 2025-04-17 PROCEDURE — 1200000002 HC GENERAL ROOM WITH TELEMETRY DAILY

## 2025-04-17 PROCEDURE — 70551 MRI BRAIN STEM W/O DYE: CPT | Performed by: RADIOLOGY

## 2025-04-17 PROCEDURE — 80069 RENAL FUNCTION PANEL: CPT | Performed by: INTERNAL MEDICINE

## 2025-04-17 PROCEDURE — 2500000005 HC RX 250 GENERAL PHARMACY W/O HCPCS: Performed by: INTERNAL MEDICINE

## 2025-04-17 PROCEDURE — 2500000001 HC RX 250 WO HCPCS SELF ADMINISTERED DRUGS (ALT 637 FOR MEDICARE OP): Performed by: STUDENT IN AN ORGANIZED HEALTH CARE EDUCATION/TRAINING PROGRAM

## 2025-04-17 PROCEDURE — 99223 1ST HOSP IP/OBS HIGH 75: CPT

## 2025-04-17 PROCEDURE — 36415 COLL VENOUS BLD VENIPUNCTURE: CPT | Performed by: INTERNAL MEDICINE

## 2025-04-17 PROCEDURE — 2500000002 HC RX 250 W HCPCS SELF ADMINISTERED DRUGS (ALT 637 FOR MEDICARE OP, ALT 636 FOR OP/ED): Performed by: INTERNAL MEDICINE

## 2025-04-17 PROCEDURE — 2500000002 HC RX 250 W HCPCS SELF ADMINISTERED DRUGS (ALT 637 FOR MEDICARE OP, ALT 636 FOR OP/ED): Performed by: STUDENT IN AN ORGANIZED HEALTH CARE EDUCATION/TRAINING PROGRAM

## 2025-04-17 PROCEDURE — 99233 SBSQ HOSP IP/OBS HIGH 50: CPT | Performed by: INTERNAL MEDICINE

## 2025-04-17 PROCEDURE — 2500000001 HC RX 250 WO HCPCS SELF ADMINISTERED DRUGS (ALT 637 FOR MEDICARE OP): Performed by: REGISTERED NURSE

## 2025-04-17 RX ORDER — TALC
3 POWDER (GRAM) TOPICAL NIGHTLY
Status: DISCONTINUED | OUTPATIENT
Start: 2025-04-17 | End: 2025-04-22 | Stop reason: HOSPADM

## 2025-04-17 RX ORDER — CARBIDOPA AND LEVODOPA 25; 100 MG/1; MG/1
2 TABLET ORAL 2 TIMES DAILY
Status: DISCONTINUED | OUTPATIENT
Start: 2025-04-17 | End: 2025-04-17

## 2025-04-17 RX ORDER — HYDROMORPHONE HYDROCHLORIDE 0.2 MG/ML
0.2 INJECTION INTRAMUSCULAR; INTRAVENOUS; SUBCUTANEOUS EVERY 6 HOURS PRN
Status: DISCONTINUED | OUTPATIENT
Start: 2025-04-17 | End: 2025-04-22 | Stop reason: HOSPADM

## 2025-04-17 RX ORDER — CARBIDOPA AND LEVODOPA 25; 100 MG/1; MG/1
1 TABLET ORAL 2 TIMES DAILY
Status: DISCONTINUED | OUTPATIENT
Start: 2025-04-17 | End: 2025-04-22 | Stop reason: HOSPADM

## 2025-04-17 RX ORDER — PREGABALIN 25 MG/1
25 CAPSULE ORAL 2 TIMES DAILY
Status: DISCONTINUED | OUTPATIENT
Start: 2025-04-17 | End: 2025-04-18

## 2025-04-17 RX ADMIN — HYDROMORPHONE HYDROCHLORIDE 0.2 MG: 0.2 INJECTION, SOLUTION INTRAMUSCULAR; INTRAVENOUS; SUBCUTANEOUS at 13:26

## 2025-04-17 RX ADMIN — LABETALOL HYDROCHLORIDE 20 MG: 5 INJECTION INTRAVENOUS at 10:56

## 2025-04-17 RX ADMIN — LABETALOL HYDROCHLORIDE 20 MG: 5 INJECTION INTRAVENOUS at 18:52

## 2025-04-17 RX ADMIN — DOXYCYCLINE 100 MG: 100 INJECTION, POWDER, LYOPHILIZED, FOR SOLUTION INTRAVENOUS at 12:37

## 2025-04-17 RX ADMIN — LABETALOL HYDROCHLORIDE 20 MG: 5 INJECTION INTRAVENOUS at 05:01

## 2025-04-17 RX ADMIN — CEFTRIAXONE SODIUM 1 G: 1 INJECTION, SOLUTION INTRAVENOUS at 16:27

## 2025-04-17 ASSESSMENT — PAIN - FUNCTIONAL ASSESSMENT: PAIN_FUNCTIONAL_ASSESSMENT: FLACC (FACE, LEGS, ACTIVITY, CRY, CONSOLABILITY)

## 2025-04-17 ASSESSMENT — PAIN SCALES - GENERAL: PAINLEVEL_OUTOF10: 0 - NO PAIN

## 2025-04-17 ASSESSMENT — PAIN SCALES - WONG BAKER: WONGBAKER_NUMERICALRESPONSE: NO HURT

## 2025-04-17 NOTE — PROGRESS NOTES
04/17/25 1707   Discharge Planning   Living Arrangements Spouse/significant other   Support Systems Spouse/significant other;Children;Family members;Home care staff   Expected Discharge Disposition Home H     Patient is still confused, encephalopathic, not medically cleared for discharge.  Current plan remains for patient to return home, rresume private duty home health care ans start [facility] for SN, PT/OT.  Care Coordination team following for assistance with discharge planning.  Mohan PARRA TCC

## 2025-04-17 NOTE — CARE PLAN
The patient's goals for the shift include      The clinical goals for the shift include safety and comfort    Over the shift, the patient did not make progress toward the following goals. Barriers to progression include weakness. Recommendations to address these barriers include follow poc.

## 2025-04-17 NOTE — CONSULTS
"Inpatient consult to Neurology  Consult performed by: James Montelongo DO  Consult ordered by: Gibran Gauthier DO  Reason for consult: Unclear etiology altered mental status    History Of Present Illness  Shreya Acuña is a 89 y.o. female with PMH significant for HTN, HLD, permanent A-fib (low-dose Xarelto), CAD, TAMMY (noncompliant with CPAP), PVD (Sinemet), who presented on 4/12 after her surgeon noticed a new MISTY on her lab work.  Of note, she underwent lap letha the day prior.  On arrival she was reported to be confused and with abdominal distention.  There was concerns about possible postsurgical complications and CTAP was obtained.  Surgery team was consulted and interpreted that there was no complications seen on imaging and routine postop changes.  Patient had received a dose of Ativan at the beginning of her stay which had made her lethargic over the next 24 hours.  Over the next few days following staff had noticed that she had then become more agitated.  Family had noted to staff that this had happened before in the past, (in the past Sinemet was decreased with resolution of symptoms), therefore Sinemet was stopped on Monday 4/14.  Patient has been off Sinemet the following 3 days with no improvement in mentation.    On evaluation, patient's  is at bedside and is able to say that she normally is ANO x 4, independent, able to ambulate and \"sharp\".  My examination revealed that patient perseverates, has good motor tone, but is unable to follow commands.  She is pleasantly confused but easily becomes agitated with progression of exam.   additionally says that in the past when she got confused they had decreased her Sinemet from 9 pills down to 6/day with symptom resolution.  He also noted that on admission she was started on her old prescription of 9 pills which he states corresponded to her initial episode of agitation.    Past Medical History  Medical History[1]  Surgical History  Surgical " "History[2]  Social History  Social History[3]  Allergies  Patient has no known allergies.  Prescriptions Prior to Admission[4]    Review of Systems   Reason unable to perform ROS: Patient not oriented to comply with answers.     Neurological Exam  Physical Exam  GENERAL APPEARANCE: Confused, gets agitated with continued evaluation and perseverates  CARDIOVASCULAR: RRR, without murmur. Pulses +2 and equal in all extremities. No edema, or tenderness to palpation.  MENTAL STATE: Alert and oriented x 0.  Unable to assess memory, attention, concentration, language.  She does track with eye movements but perseverates answers and unable to follow commands.  CRANIAL NERVES:  Cranial nerves were normal.      CN 2- unable to assess     CN 3, 4, 6- PERRLA. No ptosis. EOMs normal alignment, full range of movement, no nystagmus     CN 5-unable to assess     CN 7- Nasolabial folds symmetric.     CN 8- unable to assess     CN 9- unable to assess     CN 11- unable to assess     CN 12- unable to assess  MOTOR:  Muscle bulk and tone were normal in UE/LE. Muscle strength 5/5 in all 4 extremities. No fasciculations, tremor or other abnormal movements present.   SENSORY: unable to assess  COORDINATION: unable to assess    Last Recorded Vitals  Blood pressure (!) 194/89, pulse 74, temperature 36.6 °C (97.9 °F), resp. rate 20, height 1.6 m (5' 3\"), weight 109 kg (241 lb), SpO2 92%.    I have personally reviewed the following imaging results Imaging  CT head wo IV contrast  Result Date: 4/15/2025  NO ACUTE INTRACRANIAL PROCESS   MACRO: None   Signed by: Mingo Barrios 4/15/2025 3:38 PM Dictation workstation:   [id number]    XR chest 1 view  Result Date: 4/15/2025  Diffuse interstitial infiltrates bilaterally, as above. Clinical correlation and continued follow-up until clearing is recommended.   MACRO: None.   Signed by: Geremias Cardenas 4/15/2025 1:17 PM Dictation workstation:   [id number]    XR chest 1 view  Result Date: 4/13/2025  Findings " suggestive of a degree of pulmonary edema/CHF.   MACRO: None   Signed by: Peña Wilkins 4/13/2025 1:14 PM Dictation workstation:   [id number]    CT abdomen pelvis wo IV contrast  Result Date: 4/12/2025  1. Post laparoscopic cholecystectomy with slight postsurgical changes but no suspicious abnormalities. 2. Tiny fat containing umbilical hernia similar to the prior exam 3. Left renal cortical 5.3 cm hypodensity most likely a renal cyst. This is similar to the prior exam. Ultrasound may confirm this impression. 4. Marked degenerative changes of the lumbar spine with lumbar canal stenosis most severe L4-5     Signed by: Savannah García 4/12/2025 7:16 AM Dictation workstation:   [id number]      Cardiology, Vascular, and Other Imaging  ECG 12 lead  Result Date: 4/15/2025  Atrial fibrillation Nonspecific ST and T wave abnormality Abnormal ECG When compared with ECG of 21-JAN-2025 22:53, PREVIOUS ECG IS PRESENT    Assessment/Plan     Acute metabolic encephalopathy, suspect hospital delirium  C/F Sinemet/Lyrica withdrawal  PLAN:  - Recommend restarting home Sinemet at 1 tablet twice daily  - Recommend starting Lyrica back at 25 mg 3 times daily  - Will need to re-regulate patient's sleep/wake cycle.  Encouraged melatonin at night.  Keep room bright and blinds open during the day.  Encourage staying awake during daylight hours.  Encouraged reorientation with family at bedside.  Practice good sleep habits with a dark quiet setting at night with minimal interruptions.  - Nursing staff notified to monitor hours of sleep obtained overnight and to relay that info to morning team.    James Montelongo,   PGY-2, Internal Medicine  Please SecureChat for any further questions  This is a preliminary note, please await attending attestation for final A/P         [1]   Past Medical History:  Diagnosis Date    Emphysema lung (Multi)     Hypertension     Other specified health status     No pertinent past medical history   [2]   Past Surgical  History:  Procedure Laterality Date    OTHER SURGICAL HISTORY  2020    Cardioversion   [3]   Social History  Tobacco Use    Smoking status: Former     Current packs/day: 0.00     Types: Cigarettes     Quit date: 1983     Years since quittin.2    Smokeless tobacco: Never   Substance Use Topics    Alcohol use: Not Currently    Drug use: Never   [4]   Medications Prior to Admission   Medication Sig Dispense Refill Last Dose/Taking    acetaminophen (Tylenol) 500 mg tablet Take 2 tablets (1,000 mg) by mouth 3 times a day.       albuterol 90 mcg/actuation inhaler Inhale 2 puffs every 4 hours if needed for wheezing. (Patient not taking: Reported on 2025) 18 g 0     atorvastatin (Lipitor) 20 mg tablet Take 1 tablet (20 mg) by mouth once daily.       carbidopa-levodopa (Sinemet)  mg tablet Take 3 tablets by mouth 3 times a day. (Patient taking differently: Take 2 tablets by mouth 3 times a day.) 270 tablet 11     cholecalciferol (Vitamin D-3) 25 MCG (1000 UT) capsule Take 1 capsule (25 mcg) by mouth 2 times a day.       cyanocobalamin (Vitamin B-12) 1,000 mcg tablet Take 1 tablet (1,000 mcg) by mouth once daily.       DILT- mg 24 hr capsule Take 1 capsule (120 mg) by mouth once daily. 90 capsule 3     DULoxetine (Cymbalta) 30 mg DR capsule Take 1 capsule (30 mg) by mouth 2 times a day. 180 capsule 2     Gemtesa 75 mg tablet Take 1 tablet (75 mg) by mouth early in the morning..       metoprolol succinate XL (Toprol-XL) 50 mg 24 hr tablet Take 1 tablet (50 mg) by mouth once daily.       mv-min-FA-vit K-lutein-zeaxant (PreserVision AREDS 2 Plus MV) 200 mcg-15 mcg- 5 mg-1 mg capsule Take 1 capsule by mouth 2 times a day.       [] oxyCODONE (Roxicodone) 5 mg immediate release tablet Take 1 tablet (5 mg) by mouth every 6 hours if needed for severe pain (7 - 10) for up to 5 days. 10 tablet 0     pregabalin (Lyrica) 100 mg capsule Take 1 capsule (100 mg) by mouth 2 times a day. 180 capsule 2      rivaroxaban (Xarelto) 10 mg tablet Take 1 tablet (10 mg) by mouth once daily. Resume taking on 4/14       spironolactone (Aldactone) 25 mg tablet Take 1 tablet (25 mg) by mouth once daily. (Patient taking differently: Take 1 tablet (25 mg) by mouth 3 times a day.) 90 tablet 3     torsemide (Demadex) 20 mg tablet Take 1 tablet (20 mg) by mouth once daily. (Patient taking differently: Take 1 tablet (20 mg) by mouth once daily at bedtime.) 90 tablet 3

## 2025-04-17 NOTE — PROGRESS NOTES
Subjective   Patient remains confused, but more alert, and less agitated, still refusing her oral medications, she was accompanied by her , she was responding with few words today but not following commands.     Objective   Vitals:    04/17/25 1159   BP: (!) 194/89   Pulse: 74   Resp:    Temp: 36.6 °C (97.9 °F)   SpO2: 92%       Physical Exam:   Constitutional: awake, remains confused,  no acute distress  ENMT: mucous membranes moist, conjunctivae clear, throat slightly hyperemic and inflamed.   Head/Neck: normocephalic, atraumatic; supple, trachea midline  Respiratory/Thorax: patent airways, CTAB; no wheezes, rales, or rhonchi  Cardiovascular: irregularly irregular rate and rhythm, no murmur appreciated  Gastrointestinal: soft, nondistended, non-tender, bowel sounds appreciated  : Peralta in place draining clear yellow urine  Extremities: palpable peripheral pulses, no edema  Neurological: AO x1, no focal deficits  Psychological:  more awake and but remains confused.   Skin: warm and dry    Scheduled Medications:   amLODIPine, 5 mg, oral, Daily  atorvastatin, 20 mg, oral, Daily  carbidopa-levodopa, 2 tablet, oral, BID  cefTRIAXone, 1 g, intravenous, q24h  cholecalciferol, 25 mcg, oral, BID  cyanocobalamin, 1,000 mcg, oral, Daily  doxycycline, 100 mg, intravenous, q12h  DULoxetine, 30 mg, oral, BID  metoprolol succinate XL, 50 mg, oral, Daily  nystatin, 4 mL, Swish & Swallow, TID  polyethylene glycol, 17 g, oral, Daily  potassium chloride CR, 40 mEq, oral, Daily  [Held by provider] pregabalin, 100 mg, oral, BID  rivaroxaban, 10 mg, oral, Daily  sennosides-docusate sodium, 2 tablet, oral, BID  [Held by provider] spironolactone, 25 mg, oral, Daily  torsemide, 40 mg, oral, Daily       Continuous Medications:         PRN Medications:   PRN medications: acetaminophen, bisacodyl, dextrose, dextrose, glucagon, glucagon, HYDROmorphone, hydrOXYzine HCL, ipratropium-albuteroL, labetaloL, melatonin, ondansetron **OR**  ondansetron, phenoL    Assessment/Plan   Shreya Acuña is a 89 y.o. female     Acute encephalopathy, ?metabolic vs hospital delirium  Acute hypoxic respiratory failure  HTN urgency/emergency   MISTY on CKD, resolved  Urinary retention s/p Peralta  Abdominal distention, improved  Biliary dyskinesia s/p laparoscopic cholecystectomy on 4/10/25     Permanent Afib, on Xarelto  HTN, HLD  TAMMY, noncompliant on CPAP  Parkinsonism, on Sinemet  Tricuspid regurgitation, moderate to severe    Patient is still encephalopathic, she is more awake and started talking but still starring and not following commands.  Neurology consulted; appreciate recs.  Concern for possible Sinemet withdrawal, will resume today BID, may have to do it through [facility] or [facility].   CT head was obtained,no abnormality detected,  ABG with slight alkalosis and decreased PO2, Chest xray with congestion, will give lasix as needed the patient is not taking PO.  Concern for PRESS; MRI ordered.   She was refusing PO meds due to throat pain that started after her surgery, on examination, it was hyperemic and swollen, started on ceftriaxone, Chloraseptic and nystatin swish and swallow, changed diet to CLD and will advance as tolerated.     Added doxycycline for coverage of possible PNA.  Remains on 2L NC this AM. Wean O2 as tolerated.  Nephrology following for MISTY on CKD, which has resolved with IVF and Peralta placement.  Seen by general surgery for abdominal distension with recent lap letha. CT A/P shows postop changes without complications. Tolerating regular diet without issue, but switch for her sore throat.    - Discontinue Cardizem upon DC given relatively low HRs. Pt in permanent afib with controlled rate. Had actually been advised by Dr. Barnhart to discontinue this in 3/2025.  - Discussed stopping home Oxybutynin upon DC given urinary retention.    DVT PPX: Xarelto  Code status: FULL    Gibran Gauthier, [facility] Medicine

## 2025-04-18 ENCOUNTER — APPOINTMENT (OUTPATIENT)
Dept: CARDIOLOGY | Facility: HOSPITAL | Age: 89
DRG: 659 | End: 2025-04-18
Payer: MEDICARE

## 2025-04-18 PROBLEM — R41.0 DELIRIUM: Status: ACTIVE | Noted: 2025-04-18

## 2025-04-18 LAB
ALBUMIN SERPL BCP-MCNC: 3.6 G/DL (ref 3.4–5)
AMMONIA PLAS-SCNC: 33 UMOL/L (ref 16–53)
ANION GAP SERPL CALC-SCNC: 11 MMOL/L (ref 10–20)
AORTIC VALVE MEAN GRADIENT: 2 MMHG
AORTIC VALVE PEAK VELOCITY: 1.01 M/S
AV PEAK GRADIENT: 4 MMHG
AVA (PEAK VEL): 2.71 CM2
AVA (VTI): 3.18 CM2
BUN SERPL-MCNC: 22 MG/DL (ref 6–23)
CALCIUM SERPL-MCNC: 9.1 MG/DL (ref 8.6–10.3)
CHLORIDE SERPL-SCNC: 107 MMOL/L (ref 98–107)
CO2 SERPL-SCNC: 30 MMOL/L (ref 21–32)
CREAT SERPL-MCNC: 1.02 MG/DL (ref 0.5–1.05)
EGFRCR SERPLBLD CKD-EPI 2021: 53 ML/MIN/1.73M*2
EJECTION FRACTION APICAL 4 CHAMBER: 56.1
EJECTION FRACTION: 53 %
ERYTHROCYTE [DISTWIDTH] IN BLOOD BY AUTOMATED COUNT: 13.5 % (ref 11.5–14.5)
GLUCOSE SERPL-MCNC: 119 MG/DL (ref 74–99)
HCT VFR BLD AUTO: 35.4 % (ref 36–46)
HGB BLD-MCNC: 11.2 G/DL (ref 12–16)
LEFT VENTRICLE INTERNAL DIMENSION DIASTOLE: 4.2 CM (ref 3.5–6)
LEFT VENTRICULAR OUTFLOW TRACT DIAMETER: 2.1 CM
MAGNESIUM SERPL-MCNC: 2.2 MG/DL (ref 1.6–2.4)
MCH RBC QN AUTO: 32.4 PG (ref 26–34)
MCHC RBC AUTO-ENTMCNC: 31.6 G/DL (ref 32–36)
MCV RBC AUTO: 102 FL (ref 80–100)
NRBC BLD-RTO: 0 /100 WBCS (ref 0–0)
PHOSPHATE SERPL-MCNC: 3.3 MG/DL (ref 2.5–4.9)
PLATELET # BLD AUTO: 165 X10*3/UL (ref 150–450)
POTASSIUM SERPL-SCNC: 3.6 MMOL/L (ref 3.5–5.3)
RBC # BLD AUTO: 3.46 X10*6/UL (ref 4–5.2)
RIGHT VENTRICLE PEAK SYSTOLIC PRESSURE: 29 MMHG
SODIUM SERPL-SCNC: 144 MMOL/L (ref 136–145)
WBC # BLD AUTO: 7.4 X10*3/UL (ref 4.4–11.3)

## 2025-04-18 PROCEDURE — 2500000001 HC RX 250 WO HCPCS SELF ADMINISTERED DRUGS (ALT 637 FOR MEDICARE OP): Performed by: INTERNAL MEDICINE

## 2025-04-18 PROCEDURE — 83735 ASSAY OF MAGNESIUM: CPT | Performed by: INTERNAL MEDICINE

## 2025-04-18 PROCEDURE — 36415 COLL VENOUS BLD VENIPUNCTURE: CPT | Performed by: INTERNAL MEDICINE

## 2025-04-18 PROCEDURE — 2500000001 HC RX 250 WO HCPCS SELF ADMINISTERED DRUGS (ALT 637 FOR MEDICARE OP): Performed by: REGISTERED NURSE

## 2025-04-18 PROCEDURE — 93308 TTE F-UP OR LMTD: CPT | Performed by: STUDENT IN AN ORGANIZED HEALTH CARE EDUCATION/TRAINING PROGRAM

## 2025-04-18 PROCEDURE — 93325 DOPPLER ECHO COLOR FLOW MAPG: CPT | Performed by: STUDENT IN AN ORGANIZED HEALTH CARE EDUCATION/TRAINING PROGRAM

## 2025-04-18 PROCEDURE — 80069 RENAL FUNCTION PANEL: CPT | Performed by: INTERNAL MEDICINE

## 2025-04-18 PROCEDURE — 2500000001 HC RX 250 WO HCPCS SELF ADMINISTERED DRUGS (ALT 637 FOR MEDICARE OP)

## 2025-04-18 PROCEDURE — 82140 ASSAY OF AMMONIA: CPT | Performed by: INTERNAL MEDICINE

## 2025-04-18 PROCEDURE — 85027 COMPLETE CBC AUTOMATED: CPT | Performed by: INTERNAL MEDICINE

## 2025-04-18 PROCEDURE — 2500000001 HC RX 250 WO HCPCS SELF ADMINISTERED DRUGS (ALT 637 FOR MEDICARE OP): Performed by: PSYCHIATRY & NEUROLOGY

## 2025-04-18 PROCEDURE — 97535 SELF CARE MNGMENT TRAINING: CPT | Mod: GO

## 2025-04-18 PROCEDURE — 2500000002 HC RX 250 W HCPCS SELF ADMINISTERED DRUGS (ALT 637 FOR MEDICARE OP, ALT 636 FOR OP/ED): Performed by: STUDENT IN AN ORGANIZED HEALTH CARE EDUCATION/TRAINING PROGRAM

## 2025-04-18 PROCEDURE — 99232 SBSQ HOSP IP/OBS MODERATE 35: CPT | Performed by: NURSE PRACTITIONER

## 2025-04-18 PROCEDURE — 93321 DOPPLER ECHO F-UP/LMTD STD: CPT | Performed by: STUDENT IN AN ORGANIZED HEALTH CARE EDUCATION/TRAINING PROGRAM

## 2025-04-18 PROCEDURE — 2500000002 HC RX 250 W HCPCS SELF ADMINISTERED DRUGS (ALT 637 FOR MEDICARE OP, ALT 636 FOR OP/ED): Performed by: INTERNAL MEDICINE

## 2025-04-18 PROCEDURE — 2500000001 HC RX 250 WO HCPCS SELF ADMINISTERED DRUGS (ALT 637 FOR MEDICARE OP): Performed by: STUDENT IN AN ORGANIZED HEALTH CARE EDUCATION/TRAINING PROGRAM

## 2025-04-18 PROCEDURE — 2500000004 HC RX 250 GENERAL PHARMACY W/ HCPCS (ALT 636 FOR OP/ED): Mod: JZ | Performed by: INTERNAL MEDICINE

## 2025-04-18 PROCEDURE — 1100000001 HC PRIVATE ROOM DAILY

## 2025-04-18 PROCEDURE — 99233 SBSQ HOSP IP/OBS HIGH 50: CPT | Performed by: INTERNAL MEDICINE

## 2025-04-18 PROCEDURE — 93325 DOPPLER ECHO COLOR FLOW MAPG: CPT

## 2025-04-18 PROCEDURE — 97530 THERAPEUTIC ACTIVITIES: CPT | Mod: GP

## 2025-04-18 RX ORDER — PREGABALIN 25 MG/1
25 CAPSULE ORAL NIGHTLY
Status: DISCONTINUED | OUTPATIENT
Start: 2025-04-18 | End: 2025-04-22 | Stop reason: HOSPADM

## 2025-04-18 RX ADMIN — RIVAROXABAN 10 MG: 10 TABLET, FILM COATED ORAL at 10:31

## 2025-04-18 RX ADMIN — LABETALOL HYDROCHLORIDE 20 MG: 5 INJECTION INTRAVENOUS at 14:49

## 2025-04-18 RX ADMIN — CARBIDOPA AND LEVODOPA 1 TABLET: 25; 100 TABLET ORAL at 21:14

## 2025-04-18 RX ADMIN — DULOXETINE HYDROCHLORIDE 30 MG: 30 CAPSULE, DELAYED RELEASE ORAL at 21:14

## 2025-04-18 RX ADMIN — SENNOSIDES AND DOCUSATE SODIUM 2 TABLET: 50; 8.6 TABLET ORAL at 10:31

## 2025-04-18 RX ADMIN — NYSTATIN 400000 UNITS: 100000 SUSPENSION ORAL at 10:45

## 2025-04-18 RX ADMIN — ATORVASTATIN CALCIUM 20 MG: 20 TABLET, FILM COATED ORAL at 10:31

## 2025-04-18 RX ADMIN — Medication 25 MCG: at 10:31

## 2025-04-18 RX ADMIN — NYSTATIN 400000 UNITS: 100000 SUSPENSION ORAL at 21:13

## 2025-04-18 RX ADMIN — POTASSIUM CHLORIDE 40 MEQ: 1500 TABLET, EXTENDED RELEASE ORAL at 10:31

## 2025-04-18 RX ADMIN — METOPROLOL SUCCINATE 50 MG: 50 TABLET, EXTENDED RELEASE ORAL at 10:31

## 2025-04-18 RX ADMIN — CEFTRIAXONE SODIUM 1 G: 1 INJECTION, SOLUTION INTRAVENOUS at 15:04

## 2025-04-18 RX ADMIN — LABETALOL HYDROCHLORIDE 20 MG: 5 INJECTION INTRAVENOUS at 21:15

## 2025-04-18 RX ADMIN — Medication 25 MCG: at 21:13

## 2025-04-18 RX ADMIN — TORSEMIDE 40 MG: 20 TABLET ORAL at 10:31

## 2025-04-18 RX ADMIN — DULOXETINE HYDROCHLORIDE 30 MG: 30 CAPSULE, DELAYED RELEASE ORAL at 10:31

## 2025-04-18 RX ADMIN — NYSTATIN 400000 UNITS: 100000 SUSPENSION ORAL at 14:49

## 2025-04-18 RX ADMIN — AMLODIPINE BESYLATE 5 MG: 5 TABLET ORAL at 10:31

## 2025-04-18 RX ADMIN — LABETALOL HYDROCHLORIDE 20 MG: 5 INJECTION INTRAVENOUS at 04:26

## 2025-04-18 RX ADMIN — SENNOSIDES AND DOCUSATE SODIUM 2 TABLET: 50; 8.6 TABLET ORAL at 21:13

## 2025-04-18 RX ADMIN — DOXYCYCLINE 100 MG: 100 INJECTION, POWDER, LYOPHILIZED, FOR SOLUTION INTRAVENOUS at 17:55

## 2025-04-18 RX ADMIN — Medication 1000 MCG: at 10:31

## 2025-04-18 RX ADMIN — PREGABALIN 25 MG: 25 CAPSULE ORAL at 21:14

## 2025-04-18 RX ADMIN — CARBIDOPA AND LEVODOPA 1 TABLET: 25; 100 TABLET ORAL at 10:31

## 2025-04-18 RX ADMIN — DOXYCYCLINE 100 MG: 100 INJECTION, POWDER, LYOPHILIZED, FOR SOLUTION INTRAVENOUS at 04:22

## 2025-04-18 ASSESSMENT — COGNITIVE AND FUNCTIONAL STATUS - GENERAL
WALKING IN HOSPITAL ROOM: TOTAL
WALKING IN HOSPITAL ROOM: TOTAL
HELP NEEDED FOR BATHING: A LOT
MOVING TO AND FROM BED TO CHAIR: A LOT
EATING MEALS: A LOT
STANDING UP FROM CHAIR USING ARMS: A LOT
DRESSING REGULAR UPPER BODY CLOTHING: A LOT
DRESSING REGULAR LOWER BODY CLOTHING: TOTAL
DRESSING REGULAR LOWER BODY CLOTHING: A LOT
PERSONAL GROOMING: A LOT
CLIMB 3 TO 5 STEPS WITH RAILING: TOTAL
DAILY ACTIVITIY SCORE: 13
EATING MEALS: A LITTLE
DAILY ACTIVITIY SCORE: 10
TOILETING: TOTAL
HELP NEEDED FOR BATHING: A LOT
DRESSING REGULAR UPPER BODY CLOTHING: A LOT
MOVING FROM LYING ON BACK TO SITTING ON SIDE OF FLAT BED WITH BEDRAILS: A LOT
MOVING TO AND FROM BED TO CHAIR: TOTAL
TOILETING: A LOT
CLIMB 3 TO 5 STEPS WITH RAILING: TOTAL
MOBILITY SCORE: 10
PERSONAL GROOMING: A LOT
TURNING FROM BACK TO SIDE WHILE IN FLAT BAD: A LOT
MOBILITY SCORE: 9
STANDING UP FROM CHAIR USING ARMS: A LOT
MOVING FROM LYING ON BACK TO SITTING ON SIDE OF FLAT BED WITH BEDRAILS: A LOT
TURNING FROM BACK TO SIDE WHILE IN FLAT BAD: A LOT

## 2025-04-18 ASSESSMENT — ACTIVITIES OF DAILY LIVING (ADL): HOME_MANAGEMENT_TIME_ENTRY: 12

## 2025-04-18 ASSESSMENT — PAIN SCALES - GENERAL
PAINLEVEL_OUTOF10: 0 - NO PAIN

## 2025-04-18 ASSESSMENT — PAIN - FUNCTIONAL ASSESSMENT
PAIN_FUNCTIONAL_ASSESSMENT: 0-10
PAIN_FUNCTIONAL_ASSESSMENT: 0-10

## 2025-04-18 ASSESSMENT — PAIN SCALES - WONG BAKER: WONGBAKER_NUMERICALRESPONSE: NO HURT

## 2025-04-18 NOTE — CARE PLAN
Problem: Pain - Adult  Goal: Verbalizes/displays adequate comfort level or baseline comfort level  Outcome: Progressing     Problem: Safety - Adult  Goal: Free from fall injury  Outcome: Progressing     Problem: Discharge Planning  Goal: Discharge to home or other facility with appropriate resources  Outcome: Progressing     Problem: Chronic Conditions and Co-morbidities  Goal: Patient's chronic conditions and co-morbidity symptoms are monitored and maintained or improved  Outcome: Progressing     Problem: Nutrition  Goal: Nutrient intake appropriate for maintaining nutritional needs  Outcome: Progressing     Problem: Fall/Injury  Goal: Not fall by end of shift  Outcome: Progressing  Goal: Be free from injury by end of the shift  Outcome: Progressing  Goal: Verbalize understanding of personal risk factors for fall in the hospital  Outcome: Progressing  Goal: Verbalize understanding of risk factor reduction measures to prevent injury from fall in the home  Outcome: Progressing  Goal: Use assistive devices by end of the shift  Outcome: Progressing  Goal: Pace activities to prevent fatigue by end of the shift  Outcome: Progressing     Problem: Skin  Goal: Decreased wound size/increased tissue granulation at next dressing change  Outcome: Progressing  Goal: Participates in plan/prevention/treatment measures  Outcome: Progressing  Goal: Prevent/manage excess moisture  Outcome: Progressing  Goal: Prevent/minimize sheer/friction injuries  Outcome: Progressing  Goal: Promote/optimize nutrition  Outcome: Progressing  Goal: Promote skin healing  Outcome: Progressing

## 2025-04-18 NOTE — PROGRESS NOTES
Physical Therapy    Physical Therapy Treatment    Patient Name: Shreya Acuña  MRN: [id number]  Department: [facility]  Room: [address] [place] [address]  Today's Date: 4/18/2025  Time Calculation  Start Time: 0956  Stop Time: 1020  Time Calculation (min): 24 min         Assessment/Plan   PT Assessment  End of Session Communication: Bedside nurse  End of Session Patient Position: Bed, 2 rail up, Alarm on     PT Plan  Treatment/Interventions: Bed mobility, Transfer training, Gait training, Balance training, Strengthening, Therapeutic exercise, Therapeutic activity  PT Plan: Ongoing PT  PT Frequency: 3 times per week  PT Discharge Recommendations: Moderate intensity level of continued care  PT Recommended Transfer Status: Assist x2  PT - OK to Discharge: Yes (once medically cleared)      General Visit Information:   PT  Visit  PT Received On: 04/18/25  General  Family/Caregiver Present: Yes (Pt's spouse present and supportive.)  Co-Treatment: PT, OT  Co-Treatment Reason: for safety and to maximize therapeutic performance  Prior to Session Communication: Bedside nurse (Nurse cleared pt for therapy intervention.)  Patient Position Received: Bed, 3 rail up, Alarm on  General Comment: Pt with confusion throughout session    Subjective   Precautions:  Precautions  Medical Precautions: Fall precautions    Objective   Pain:  Pain Assessment  Pain Assessment: 0-10  0-10 (Numeric) Pain Score: 0 - No pain  Cognition:  Cognition  Overall Cognitive Status: Impaired     Treatments:  Therapeutic Activity  Therapeutic Activity Performed:  (Pt standing with walker for 90 seconds with min/mod assist x2.  Trunk and head forward flexion.  Fearful in standing.)    Balance/Neuromuscular Re-Education  Balance/Neuromuscular Re-Education Activity Performed:  (Pt seated edge of bed x15 minutes with up to min assist for static sitting balance. Focusing on midline awareness, core strength, anterior wt shift and postural control.)    Bed Mobility  Bed Mobility:   (Supine <> sit with max assist x2.)    Transfers  Transfer:  (Sit to stand from elevated bed with max assist x2.)    Outcome Measures:  [facility] Basic Mobility  Turning from your back to your side while in a flat bed without using bedrails: A lot  Moving from lying on your back to sitting on the side of a flat bed without using bedrails: A lot  Moving to and from bed to chair (including a wheelchair): Total  Standing up from a chair using your arms (e.g. wheelchair or bedside chair): A lot  To walk in hospital room: Total  Climbing 3-5 steps with railing: Total  Basic Mobility - Total Score: 9    Education Documentation  Mobility Training, taught by Kimberly Stokes, PT at 4/18/2025  1:26 PM.  Learner: Significant Other, Patient  Readiness: Acceptance  Method: Explanation, Demonstration  Response: Verbalizes Understanding, Needs Reinforcement    EDUCATION:  Education Comment:  (Pt and spouse educated on treatment intervention and goals of treatment.)    Encounter Problems       Encounter Problems (Active)       PT Problem       STG - Pt will perform a B LE ther ex program of 2-3 sets of 10  (Progressing)       Start:  04/14/25    Expected End:  04/28/25            STG - Pt will transition supine <> sitting with minAx1 (Progressing)       Start:  04/14/25    Expected End:  04/28/25            STG - Pt will transfer STS with minAx1 (Progressing)       Start:  04/14/25    Expected End:  04/28/25            STG - Pt will amb 50' using w/w with minAx1  (Progressing)       Start:  04/14/25    Expected End:  04/28/25               Pain - Adult

## 2025-04-18 NOTE — PROGRESS NOTES
Subjective   Patient remains confused, but more alert,less agitated, and more responsive, she was talking today and trying to answer questions, she started taking her oral medications, she was accompanied by her , she was responding with few words today and trying to engage in the conversation.     Objective   Vitals:    04/18/25 1449   BP: (!) 183/84   Pulse: 67   Resp:    Temp:    SpO2:        Physical Exam:   Constitutional: awake, remains confused,  no acute distress  ENMT: mucous membranes moist, conjunctivae clear, throat slightly hyperemic and inflamed.   Head/Neck: normocephalic, atraumatic; supple, trachea midline  Respiratory/Thorax: patent airways, CTAB; no wheezes, rales, or rhonchi  Cardiovascular: irregularly irregular rate and rhythm, no murmur appreciated  Gastrointestinal: soft, nondistended, non-tender, bowel sounds appreciated  : Peralta in place draining clear yellow urine  Extremities: palpable peripheral pulses, no edema  Neurological: AO x1, no focal deficits  Psychological:  more awake and but remains confused.   Skin: warm and dry    Scheduled Medications:   amLODIPine, 5 mg, oral, Daily  atorvastatin, 20 mg, oral, Daily  carbidopa-levodopa, 1 tablet, oral, BID  cefTRIAXone, 1 g, intravenous, q24h  cholecalciferol, 25 mcg, oral, BID  cyanocobalamin, 1,000 mcg, oral, Daily  doxycycline, 100 mg, intravenous, q12h  DULoxetine, 30 mg, oral, BID  melatonin, 3 mg, oral, Nightly  metoprolol succinate XL, 50 mg, oral, Daily  nystatin, 4 mL, Swish & Swallow, TID  polyethylene glycol, 17 g, oral, Daily  potassium chloride CR, 40 mEq, oral, Daily  pregabalin, 25 mg, oral, Nightly  rivaroxaban, 10 mg, oral, Daily  sennosides-docusate sodium, 2 tablet, oral, BID  [Held by provider] spironolactone, 25 mg, oral, Daily  torsemide, 40 mg, oral, Daily       Continuous Medications:         PRN Medications:   PRN medications: acetaminophen, bisacodyl, dextrose, dextrose, glucagon, glucagon,  HYDROmorphone, hydrOXYzine HCL, ipratropium-albuteroL, labetaloL, ondansetron **OR** ondansetron, phenoL    Assessment/Plan   Shreya Acuña is a 89 y.o. female     Acute encephalopathy, ?metabolic vs hospital delirium  Acute hypoxic respiratory failure  HTN urgency/emergency   MISTY on CKD, resolved  Urinary retention s/p Peralta  Abdominal distention, improved  Biliary dyskinesia s/p laparoscopic cholecystectomy on 4/10/25     Permanent Afib, on Xarelto  HTN, HLD  TAMMY, noncompliant on CPAP  Parkinsonism, on Sinemet  Tricuspid regurgitation, moderate to severe    Patient is still encephalopathic, she is more awake, responding  and started talking but still starring and not following commands.  Neurology consulted; appreciate recs.  Concern for possible Sinemet withdrawal, will resume BID, patient started taking oral medications.   CT head was obtained,no abnormality detected,  ABG with slight alkalosis and decreased PO2, Chest xray with congestion, will give lasix as needed the patient is not taking PO.   MRI ordered, negative for acute pathology.    She is now taking oral medications, advanced her diet  Continue Ceftriaxone and doxycycline for coverage of possible PNA.  Remains on 2L NC this AM. Wean O2 as tolerated.  Nephrology following for MISTY on CKD, which has resolved with IVF and Peralta placement.  Seen by general surgery for abdominal distension with recent lap letha. CT A/P shows postop changes without complications. Tolerating regular diet without issue, but switch for her sore throat.    - Discontinue Cardizem upon DC given relatively low HRs. Pt in permanent afib with controlled rate. Had actually been advised by Dr. Barnhart to discontinue this in 3/2025.  - Discussed stopping home Oxybutynin upon DC given urinary retention.    DVT PPX: Xarelto  Code status: FULL    Dispo: Patient is slowly improving, continue management as above.     Gibran Gauthier, DO  Hospital Medicine

## 2025-04-18 NOTE — PROGRESS NOTES
Nephrology Consult Progress Note    Shreya Acuña is a 89 y.o. female on day 6 of admission presenting with Acute kidney injury superimposed on CKD.      Subjective   Improved agitation, able to take pills and now on liquid diet, family at bedside, remains on NC O2       Objective          Physical Exam    Vitals 24HR  Heart Rate:  [63-81]   Temp:  [36.4 °C (97.5 °F)-37 °C (98.6 °F)]   Resp:  [18]   BP: (162-190)/(75-91)   SpO2:  [93 %-95 %]           AAOx3, on NC O2, answers questions appropriately  Decreased AEBL  RRR  Abd soft  Chronic skin changes, edema  Peralta+              I&O 24HR    Intake/Output Summary (Last 24 hours) at 4/18/2025 1305  Last data filed at 4/18/2025 0900  Gross per 24 hour   Intake 1018 ml   Output --   Net 1018 ml         Medications  Medications Prior to Admission   Medication Sig Dispense Refill Last Dose/Taking    acetaminophen (Tylenol) 500 mg tablet Take 2 tablets (1,000 mg) by mouth 3 times a day.       albuterol 90 mcg/actuation inhaler Inhale 2 puffs every 4 hours if needed for wheezing. (Patient not taking: Reported on 4/7/2025) 18 g 0     atorvastatin (Lipitor) 20 mg tablet Take 1 tablet (20 mg) by mouth once daily.       carbidopa-levodopa (Sinemet)  mg tablet Take 3 tablets by mouth 3 times a day. (Patient taking differently: Take 2 tablets by mouth 3 times a day.) 270 tablet 11     cholecalciferol (Vitamin D-3) 25 MCG (1000 UT) capsule Take 1 capsule (25 mcg) by mouth 2 times a day.       cyanocobalamin (Vitamin B-12) 1,000 mcg tablet Take 1 tablet (1,000 mcg) by mouth once daily.       DILT- mg 24 hr capsule Take 1 capsule (120 mg) by mouth once daily. 90 capsule 3     DULoxetine (Cymbalta) 30 mg DR capsule Take 1 capsule (30 mg) by mouth 2 times a day. 180 capsule 2     Gemtesa 75 mg tablet Take 1 tablet (75 mg) by mouth early in the morning..       metoprolol succinate XL (Toprol-XL) 50 mg 24 hr tablet Take 1 tablet (50 mg) by mouth once daily.        mv-min-FA-vit K-lutein-zeaxant (PreserVision AREDS 2 Plus MV) 200 mcg-15 mcg- 5 mg-1 mg capsule Take 1 capsule by mouth 2 times a day.       [] oxyCODONE (Roxicodone) 5 mg immediate release tablet Take 1 tablet (5 mg) by mouth every 6 hours if needed for severe pain (7 - 10) for up to 5 days. 10 tablet 0     pregabalin (Lyrica) 100 mg capsule Take 1 capsule (100 mg) by mouth 2 times a day. 180 capsule 2     rivaroxaban (Xarelto) 10 mg tablet Take 1 tablet (10 mg) by mouth once daily. Resume taking on        spironolactone (Aldactone) 25 mg tablet Take 1 tablet (25 mg) by mouth once daily. (Patient taking differently: Take 1 tablet (25 mg) by mouth 3 times a day.) 90 tablet 3     torsemide (Demadex) 20 mg tablet Take 1 tablet (20 mg) by mouth once daily. (Patient taking differently: Take 1 tablet (20 mg) by mouth once daily at bedtime.) 90 tablet 3       Scheduled medications  amLODIPine, 5 mg, oral, Daily  atorvastatin, 20 mg, oral, Daily  carbidopa-levodopa, 1 tablet, oral, BID  cefTRIAXone, 1 g, intravenous, q24h  cholecalciferol, 25 mcg, oral, BID  cyanocobalamin, 1,000 mcg, oral, Daily  doxycycline, 100 mg, intravenous, q12h  DULoxetine, 30 mg, oral, BID  melatonin, 3 mg, oral, Nightly  metoprolol succinate XL, 50 mg, oral, Daily  nystatin, 4 mL, Swish & Swallow, TID  polyethylene glycol, 17 g, oral, Daily  potassium chloride CR, 40 mEq, oral, Daily  pregabalin, 25 mg, oral, Nightly  rivaroxaban, 10 mg, oral, Daily  sennosides-docusate sodium, 2 tablet, oral, BID  [Held by provider] spironolactone, 25 mg, oral, Daily  torsemide, 40 mg, oral, Daily      Continuous medications     PRN medications  PRN medications: acetaminophen, bisacodyl, dextrose, dextrose, glucagon, glucagon, HYDROmorphone, hydrOXYzine HCL, ipratropium-albuteroL, labetaloL, ondansetron **OR** ondansetron, phenoL    Relevant Results      No results displayed because visit has over 200 results.         Imaging  MR brain wo IV  contrast  Result Date: 4/17/2025  No acute intracranial abnormality. Chronic changes above.   Signed by: Belkis Chi 4/17/2025 2:22 PM Dictation workstation:   [id number]    CT head wo IV contrast  Result Date: 4/15/2025  NO ACUTE INTRACRANIAL PROCESS   MACRO: None   Signed by: Mingo Barrios 4/15/2025 3:38 PM Dictation workstation:   [id number]    XR chest 1 view  Result Date: 4/15/2025  Diffuse interstitial infiltrates bilaterally, as above. Clinical correlation and continued follow-up until clearing is recommended.   MACRO: None.   Signed by: Geremias Cardenas 4/15/2025 1:17 PM Dictation workstation:   [id number]    XR chest 1 view  Result Date: 4/13/2025  Findings suggestive of a degree of pulmonary edema/CHF.   MACRO: None   Signed by: Peña Wilkins 4/13/2025 1:14 PM Dictation workstation:   [id number]    CT abdomen pelvis wo IV contrast  Result Date: 4/12/2025  1. Post laparoscopic cholecystectomy with slight postsurgical changes but no suspicious abnormalities. 2. Tiny fat containing umbilical hernia similar to the prior exam 3. Left renal cortical 5.3 cm hypodensity most likely a renal cyst. This is similar to the prior exam. Ultrasound may confirm this impression. 4. Marked degenerative changes of the lumbar spine with lumbar canal stenosis most severe L4-5     Signed by: Savannah García 4/12/2025 7:16 AM Dictation workstation:   [id number]      Cardiology, Vascular, and Other Imaging  ECG 12 lead  Result Date: 4/15/2025  Atrial fibrillation Nonspecific ST and T wave abnormality Abnormal ECG When compared with ECG of 21-JAN-2025 22:53, PREVIOUS ECG IS PRESENT          ASSESSMENT AND PLAN    89 y.o. female with PMH of CKD stage 4, Afib on AC, CHF, HL, HTN, empyema, smoking and biliary dyskinasia recent elective laparoscopic cholecystectomy on 4/10 presenting with abnormal labs as OP, found to have MISTY and hyponatremia     MISTY on CKD, baseline creatinine of 0.9 until 2023, in the 1.6-1.7 since, was 2.59 on  admission associated with high BUN, resolved  UA pending  Urine Na 16 on admission  Prerenal, was on torsemide 20 mg every day and aldactone 25 mg every day before admission     Hyponatremia in setting of MISTY/impaired free water clearance     HTN, severe TR as above, on diuretics, cardizem and toprol before admission     Volume status- improved     Anemia, mild, chronic     L renal cyst        Plan  - resolved MISTY, BP remains high, restarted on norvasc and toprol  - continue diuresis with torsemide po    - CXR w/ vascular congestion,  received iv lasix for the past 2 days, restrict fluids once jr to advance diet     - please record IOs  - daily lytes and replete for K>4 and mag>2, daily kdur  - ECHO as above with severe TR but RVSP wnl  - avoid hypotension and nephrotoxins  - do not resume ditropan on discharge     MARS reviewed, dose for GFR<60    D/w  at bedside        Thank you for the opportunity to assist in the care of this patient, please call with questions  Danielle Mcintosh MD PhD

## 2025-04-18 NOTE — PROGRESS NOTES
"Shreya Acuña is a 89 y.o. female on day 6 of admission presenting with Acute kidney injury superimposed on CKD.      Subjective   Minor improvement is noted to patient's neurologic exam, as she is able to identify both her daughter and son-in-law who are at the bedside, and is able to somewhat converse with writer of this note.  Initial concerns were verbalized by daughter as to why she is even receiving Sinemet because she was under the impression that the patient does not truly have Parkinson's disease.  Further review into patient's previous medical encounters notes at her follow-up appointment with neurology this past February both the patient and her  felt as if her level of tremor had improved since taking this medication, and had requested to continue it but add to the lower dose of 2 tabs 3 times daily.  This information has been relayed to patient's daughter, who is in agreement with continuing of this medication at this time but at currently prescribed dose of 2 tabs twice daily.  She is also requesting for Lyrica to continue at current dose of 25 mg p.o. at bedtime for now.        Objective     Last Recorded Vitals  Blood pressure (!) 183/84, pulse 67, temperature 36.6 °C (97.9 °F), resp. rate 18, height 1.6 m (5' 3\"), weight 109 kg (241 lb), SpO2 95%.    Physical exam/neurological exam  Patient seen and examined at this time; upon entering room she is awake and resting quietly in bed family x 2 at bedside. Appears fully developed and well nourished.  Obese.  Mental status: A&Ox0. Memory testing, fund of knowledge and concentration pleasantly impaired.  Patient is able to identify the names of both her daughter and son-in-law whom are at bedside, but otherwise is unable to tell writer of this note her own name, her age, her location, or the date.     Cranial Nerves:  Optic II/ Oculomotor III: Fundoscopic exam was technically difficult. PERRL +2. Visual fields are full. Convergence and " accomodation noted without difficulty. Negative for deficits to visual acuity confrontation via static-finger wiggle test. Eyes appear aligned and free of exophthalmos and ptosis. Sclera are white bilaterally and lens are free from clouding.   Oculomotor III/ Trochlear IV/ Abducens VI: Extraocular movements are full, with no evidence of nystagmus. Negative for diplopia.   Trigeminal V: Facial sensation is intact to light touch. Corneal reflex responsive when threatened bilaterally.  Facial VII: intact; nose is midline, with no evidence of flattening to nasolabial folds noted and mouth is negative for evidence of droop. Patient is successfully able to follow commands to raise eyebrows, squeeze eyes shut, smile and show teeth, frown, and puff out cheeks.   Acoustic VIII: Hearing is intact bilaterally.  Glossopharngyeal IX/ Vagus X: Palate elevates symmetrically to phonation. Findings are negative for uvula deviation or dysphagia.   Spinal accessory XI: Sternocleidomastoid/ upper trapezius is 5/5 to strength testing. No asymmetry noted to strength, bulk, or tone.   Hypoglossal XII: Tongue is midline and without deviations. Phonation is articulate and is negative for findings of dysarthria or aphasia.     Motor exam: Positive for evidence of involuntary movements or fasiculations in the form of the very mild tremor noted to BUE, left greater than right. BUE flexion of biceps and brachioradialis graded 5/5, in addition to extension of triceps at elbow and wrists. BUE  strength 5/5, along with finger abduction and thumb opposition. BLE hip flexion, extension, adduction, and abduction 4/5. Knee extension & flexion 4/5. Ankle dorsiflexion and plantarflexion 4/5.  Normal bulk.  Increased tone noted to LUE.    Sensory exam: Sensation is intact to light touch throughout.    Reflexes: Reflexes are 1+ and symmetric. Bilateral plantar responses are flexor. Ankle jerks symmetric.     Coordination: finger-to-nose testing is  negative for signs of dysmetria. Pronator drift testing to BUE negative.     Gait exam: Not tested as patient is a high fall risk.    Relevant Results  Scheduled medications  Scheduled Medications[1]  Continuous medications  Continuous Medications[2]  PRN medications  PRN Medications[3]  Img  Results for orders placed or performed during the hospital encounter of 04/12/25 (from the past 24 hours)   CBC   Result Value Ref Range    WBC 7.4 4.4 - 11.3 x10*3/uL    nRBC 0.0 0.0 - 0.0 /100 WBCs    RBC 3.46 (L) 4.00 - 5.20 x10*6/uL    Hemoglobin 11.2 (L) 12.0 - 16.0 g/dL    Hematocrit 35.4 (L) 36.0 - 46.0 %     (H) 80 - 100 fL    MCH 32.4 26.0 - 34.0 pg    MCHC 31.6 (L) 32.0 - 36.0 g/dL    RDW 13.5 11.5 - 14.5 %    Platelets 165 150 - 450 x10*3/uL   Magnesium   Result Value Ref Range    Magnesium 2.20 1.60 - 2.40 mg/dL   Renal Function Panel   Result Value Ref Range    Glucose 119 (H) 74 - 99 mg/dL    Sodium 144 136 - 145 mmol/L    Potassium 3.6 3.5 - 5.3 mmol/L    Chloride 107 98 - 107 mmol/L    Bicarbonate 30 21 - 32 mmol/L    Anion Gap 11 10 - 20 mmol/L    Urea Nitrogen 22 6 - 23 mg/dL    Creatinine 1.02 0.50 - 1.05 mg/dL    eGFR 53 (L) >60 mL/min/1.73m*2    Calcium 9.1 8.6 - 10.3 mg/dL    Phosphorus 3.3 2.5 - 4.9 mg/dL    Albumin 3.6 3.4 - 5.0 g/dL   Ammonia   Result Value Ref Range    Ammonia 33 16 - 53 umol/L   Transthoracic Echo (TTE) Complete   Result Value Ref Range    AV pk eliu 1.01 m/s    AV mn grad 2 mmHg    LVOT diam 2.10 cm    LV EF 53 %    LVIDd 4.20 cm    RVSP 29.0 mmHg    Aortic Valve Area by Continuity of VTI 3.18 cm2    Aortic Valve Area by Continuity of Peak Velocity 2.71 cm2    AV pk grad 4 mmHg    LV A4C EF 56.1      *Note: Due to a large number of results and/or encounters for the requested time period, some results have not been displayed. A complete set of results can be found in Results Review.                         [place] Coma Scale  Best Eye Response: Spontaneous  Best Verbal Response:  Confused  Best Motor Response: Follows commands  [place] Coma Scale Score: 15                              Assessment & Plan  Delirium  - Mild improvement is noted to cognition status post restarting Sinemet at lower dose of 25/100 mg 2 tabs p.o. twice daily and Lyrica 25 mg p.o. at bedtime.  Recommend continuing medication at current doses both for continued evaluation into neurologic functioning, in addition to family request.  It does appear that patient does have a follow-up appointment with her outpatient neurologist this coming July, with recommendations for family to attempt to move that appointment up if possible.  - Recommendations for needs during hospitalization and discharge via PT and OT  - Continue management of all metabolic abnormalities and active sources of infection that could be attributing to encephalopathy  - Continue Xarelto for CVA prophylaxis.  - Continue to promote good sleep hygiene practices in this patient as sleep deprivation could also be attributing to encephalopathy.  Continue administration of melatonin 3 mg p.o. at bedtime to assist with establishing good bedtime routine.      I spent 30 minutes in the professional and overall care of this patient.      Nessa Castro, APRN-CNP       [1] amLODIPine, 5 mg, oral, Daily  atorvastatin, 20 mg, oral, Daily  carbidopa-levodopa, 1 tablet, oral, BID  cefTRIAXone, 1 g, intravenous, q24h  cholecalciferol, 25 mcg, oral, BID  cyanocobalamin, 1,000 mcg, oral, Daily  doxycycline, 100 mg, intravenous, q12h  DULoxetine, 30 mg, oral, BID  melatonin, 3 mg, oral, Nightly  metoprolol succinate XL, 50 mg, oral, Daily  nystatin, 4 mL, Swish & Swallow, TID  polyethylene glycol, 17 g, oral, Daily  potassium chloride CR, 40 mEq, oral, Daily  pregabalin, 25 mg, oral, Nightly  rivaroxaban, 10 mg, oral, Daily  sennosides-docusate sodium, 2 tablet, oral, BID  [Held by provider] spironolactone, 25 mg, oral, Daily  torsemide, 40 mg, oral, Daily  [2]    [3] PRN  medications: acetaminophen, bisacodyl, dextrose, dextrose, glucagon, glucagon, HYDROmorphone, hydrOXYzine HCL, ipratropium-albuteroL, labetaloL, ondansetron **OR** ondansetron, phenoL

## 2025-04-18 NOTE — PROGRESS NOTES
Occupational Therapy    OT Treatment    Patient Name: Shreya Acuña  MRN: [id number]  Department: [facility] NON  Room: [address] [place] [address]  Today's Date: 4/18/2025  Time Calculation  Start Time: 0956  Stop Time: 1020  Time Calculation (min): 24 min        Assessment:  End of Session Communication: Bedside nurse  End of Session Patient Position: Bed, 2 rail up, Alarm on     Plan:  Treatment Interventions: ADL retraining, Functional transfer training, UE strengthening/ROM, Endurance training, Equipment evaluation/education, Fine motor coordination activities  OT Frequency: 3 times per week  OT Discharge Recommendations: Moderate intensity level of continued care  OT - OK to Discharge: Yes (once medically appropriate to next level of care)  Treatment Interventions: ADL retraining, Functional transfer training, UE strengthening/ROM, Endurance training, Equipment evaluation/education, Fine motor coordination activities    Subjective   Previous Visit Info:  OT Last Visit  OT Received On: 04/18/25  General:  General  Prior to Session Communication: Bedside nurse  Patient Position Received: Bed, 3 rail up, Alarm on  General Comment: patient remains expressively aphasic, confusion throughtout  Precautions:  Precautions Comment: Fall Precautions     Date/Time Vitals Session Patient Position Pulse Resp SpO2 BP MAP (mmHg)    04/18/25 12:14:28 --  --  63  --  95 %  190/84  121           Vital Signs Comment: 1L O2 via NC     Pain:  Pain Assessment  Pain Assessment: 0-10  0-10 (Numeric) Pain Score: 0 - No pain    Objective    Cognition:  Cognition  Overall Cognitive Status: Impaired  Activities of Daily Living:    Grooming  Grooming Comments: MAX A for hair care while seated EOB, patient with significant matting requiring increased time for thorough hair care    Bed Mobility/Transfers: Bed Mobility  Bed Mobility: Yes  Bed Mobility 1  Bed Mobility Comments 1: MAX A x2 for lifting trunk/mobilizing B LEs    Transfers  Transfer: Yes  Transfer  1  Trials/Comments 1: sit <> stand from EOB with MAX A x2 with FWW assist for steadying; able to standing for 90 seconds at MIN A x2 level    Therapy/Activity:    Balance/Neuromuscular Re-Education  Balance/Neuromuscular Re-Education Activity Performed:  (sitting EOB x15 minutes with MIN A - CGA level to address sitting balance and ADLs)  Outcome Measures:[facility] Daily Activity  Putting on and taking off regular lower body clothing: Total  Bathing (including washing, rinsing, drying): A lot  Putting on and taking off regular upper body clothing: A lot  Toileting, which includes using toilet, bedpan or urinal: Total  Taking care of personal grooming such as brushing teeth: A lot  Eating Meals: A lot  Daily Activity - Total Score: 10        Education Documentation  Body Mechanics, taught by Ambreen Burgos OT at 4/18/2025 12:56 PM.  Learner: Family, Patient  Readiness: Acceptance  Method: Explanation  Response: Needs Reinforcement    ADL Training, taught by Ambreen Burgos OT at 4/18/2025 12:56 PM.  Learner: Family, Patient  Readiness: Acceptance  Method: Explanation  Response: Needs Reinforcement    Education Comments  No comments found.    Goals:  Encounter Problems       Encounter Problems (Active)       OT Goals       STG- patient will complete LB dressing at MIN A with use of ae/ad/dme prn  (Progressing)       Start:  04/14/25    Expected End:  04/28/25            STG- patient will complete toileting with MIN A with use of ae/ad/dme prn (Progressing)       Start:  04/14/25    Expected End:  04/28/25            STG- patient will complete transfers to/from bed, chair, commode with MIN A with use of ae/ad/dme prn (Progressing)       Start:  04/14/25    Expected End:  04/28/25            STG- patient will complete simple mobility with MIN A with use of ae/ad/dme prn (Progressing)       Start:  04/14/25    Expected End:  04/28/25            STG- patient will complete grooming with supervision with use of ae/ad/dme  prn (Progressing)       Start:  04/14/25    Expected End:  04/28/25

## 2025-04-18 NOTE — CARE PLAN
The patient's goals for the shift include      The clinical goals for the shift include safety and comfort,  cooperate with care    Over the shift, the patient did not make progress toward the following goals. Barriers to progression include . Recommendations to address these barriers include .

## 2025-04-18 NOTE — ASSESSMENT & PLAN NOTE
- Mild improvement is noted to cognition status post restarting Sinemet at lower dose of 25/100 mg 2 tabs p.o. twice daily and Lyrica 25 mg p.o. at bedtime.  Recommend continuing medication at current doses both for continued evaluation into neurologic functioning, in addition to family request.  It does appear that patient does have a follow-up appointment with her outpatient neurologist this coming July, with recommendations for family to attempt to move that appointment up if possible.  - Recommendations for needs during hospitalization and discharge via PT and OT  - Continue management of all metabolic abnormalities and active sources of infection that could be attributing to encephalopathy  - Continue Xarelto for CVA prophylaxis.  - Continue to promote good sleep hygiene practices in this patient as sleep deprivation could also be attributing to encephalopathy.  Continue administration of melatonin 3 mg p.o. at bedtime to assist with establishing good bedtime routine.

## 2025-04-19 LAB
ALBUMIN SERPL BCP-MCNC: 3.7 G/DL (ref 3.4–5)
ANION GAP SERPL CALC-SCNC: 12 MMOL/L (ref 10–20)
BUN SERPL-MCNC: 25 MG/DL (ref 6–23)
CALCIUM SERPL-MCNC: 9.2 MG/DL (ref 8.6–10.3)
CHLORIDE SERPL-SCNC: 102 MMOL/L (ref 98–107)
CO2 SERPL-SCNC: 32 MMOL/L (ref 21–32)
CREAT SERPL-MCNC: 1.11 MG/DL (ref 0.5–1.05)
EGFRCR SERPLBLD CKD-EPI 2021: 48 ML/MIN/1.73M*2
ERYTHROCYTE [DISTWIDTH] IN BLOOD BY AUTOMATED COUNT: 13.4 % (ref 11.5–14.5)
GLUCOSE SERPL-MCNC: 120 MG/DL (ref 74–99)
HCT VFR BLD AUTO: 37.2 % (ref 36–46)
HGB BLD-MCNC: 11.6 G/DL (ref 12–16)
MAGNESIUM SERPL-MCNC: 2.13 MG/DL (ref 1.6–2.4)
MCH RBC QN AUTO: 32 PG (ref 26–34)
MCHC RBC AUTO-ENTMCNC: 31.2 G/DL (ref 32–36)
MCV RBC AUTO: 103 FL (ref 80–100)
NRBC BLD-RTO: 0 /100 WBCS (ref 0–0)
PHOSPHATE SERPL-MCNC: 3.6 MG/DL (ref 2.5–4.9)
PLATELET # BLD AUTO: 192 X10*3/UL (ref 150–450)
POTASSIUM SERPL-SCNC: 3.5 MMOL/L (ref 3.5–5.3)
RBC # BLD AUTO: 3.63 X10*6/UL (ref 4–5.2)
SODIUM SERPL-SCNC: 142 MMOL/L (ref 136–145)
WBC # BLD AUTO: 7.8 X10*3/UL (ref 4.4–11.3)

## 2025-04-19 PROCEDURE — 1100000001 HC PRIVATE ROOM DAILY

## 2025-04-19 PROCEDURE — 2500000001 HC RX 250 WO HCPCS SELF ADMINISTERED DRUGS (ALT 637 FOR MEDICARE OP): Performed by: PSYCHIATRY & NEUROLOGY

## 2025-04-19 PROCEDURE — 2500000001 HC RX 250 WO HCPCS SELF ADMINISTERED DRUGS (ALT 637 FOR MEDICARE OP): Performed by: INTERNAL MEDICINE

## 2025-04-19 PROCEDURE — 85027 COMPLETE CBC AUTOMATED: CPT | Performed by: INTERNAL MEDICINE

## 2025-04-19 PROCEDURE — 2500000001 HC RX 250 WO HCPCS SELF ADMINISTERED DRUGS (ALT 637 FOR MEDICARE OP): Performed by: STUDENT IN AN ORGANIZED HEALTH CARE EDUCATION/TRAINING PROGRAM

## 2025-04-19 PROCEDURE — 2500000002 HC RX 250 W HCPCS SELF ADMINISTERED DRUGS (ALT 637 FOR MEDICARE OP, ALT 636 FOR OP/ED): Performed by: STUDENT IN AN ORGANIZED HEALTH CARE EDUCATION/TRAINING PROGRAM

## 2025-04-19 PROCEDURE — 83735 ASSAY OF MAGNESIUM: CPT | Performed by: INTERNAL MEDICINE

## 2025-04-19 PROCEDURE — 36415 COLL VENOUS BLD VENIPUNCTURE: CPT | Performed by: INTERNAL MEDICINE

## 2025-04-19 PROCEDURE — 80069 RENAL FUNCTION PANEL: CPT | Performed by: INTERNAL MEDICINE

## 2025-04-19 PROCEDURE — 2500000002 HC RX 250 W HCPCS SELF ADMINISTERED DRUGS (ALT 637 FOR MEDICARE OP, ALT 636 FOR OP/ED): Performed by: INTERNAL MEDICINE

## 2025-04-19 PROCEDURE — 99232 SBSQ HOSP IP/OBS MODERATE 35: CPT | Performed by: INTERNAL MEDICINE

## 2025-04-19 PROCEDURE — 2500000001 HC RX 250 WO HCPCS SELF ADMINISTERED DRUGS (ALT 637 FOR MEDICARE OP): Performed by: REGISTERED NURSE

## 2025-04-19 PROCEDURE — 2500000001 HC RX 250 WO HCPCS SELF ADMINISTERED DRUGS (ALT 637 FOR MEDICARE OP)

## 2025-04-19 PROCEDURE — 2500000004 HC RX 250 GENERAL PHARMACY W/ HCPCS (ALT 636 FOR OP/ED): Mod: JZ | Performed by: INTERNAL MEDICINE

## 2025-04-19 PROCEDURE — 2500000005 HC RX 250 GENERAL PHARMACY W/O HCPCS: Performed by: INTERNAL MEDICINE

## 2025-04-19 PROCEDURE — 2500000004 HC RX 250 GENERAL PHARMACY W/ HCPCS (ALT 636 FOR OP/ED): Performed by: INTERNAL MEDICINE

## 2025-04-19 RX ADMIN — TORSEMIDE 40 MG: 20 TABLET ORAL at 10:14

## 2025-04-19 RX ADMIN — SENNOSIDES AND DOCUSATE SODIUM 2 TABLET: 50; 8.6 TABLET ORAL at 21:11

## 2025-04-19 RX ADMIN — LABETALOL HYDROCHLORIDE 20 MG: 5 INJECTION INTRAVENOUS at 05:10

## 2025-04-19 RX ADMIN — CEFTRIAXONE SODIUM 1 G: 1 INJECTION, SOLUTION INTRAVENOUS at 11:39

## 2025-04-19 RX ADMIN — Medication 25 MCG: at 21:12

## 2025-04-19 RX ADMIN — Medication 25 MCG: at 10:14

## 2025-04-19 RX ADMIN — ACETAMINOPHEN 650 MG: 325 TABLET, FILM COATED ORAL at 21:11

## 2025-04-19 RX ADMIN — POLYETHYLENE GLYCOL 3350 17 G: 17 POWDER, FOR SOLUTION ORAL at 10:26

## 2025-04-19 RX ADMIN — DULOXETINE HYDROCHLORIDE 30 MG: 30 CAPSULE, DELAYED RELEASE ORAL at 21:11

## 2025-04-19 RX ADMIN — SENNOSIDES AND DOCUSATE SODIUM 2 TABLET: 50; 8.6 TABLET ORAL at 10:14

## 2025-04-19 RX ADMIN — Medication 1000 MCG: at 10:14

## 2025-04-19 RX ADMIN — HYDROXYZINE HYDROCHLORIDE 25 MG: 25 TABLET, FILM COATED ORAL at 21:11

## 2025-04-19 RX ADMIN — RIVAROXABAN 10 MG: 10 TABLET, FILM COATED ORAL at 10:14

## 2025-04-19 RX ADMIN — PREGABALIN 25 MG: 25 CAPSULE ORAL at 21:12

## 2025-04-19 RX ADMIN — ATORVASTATIN CALCIUM 20 MG: 20 TABLET, FILM COATED ORAL at 10:14

## 2025-04-19 RX ADMIN — CARBIDOPA AND LEVODOPA 1 TABLET: 25; 100 TABLET ORAL at 10:14

## 2025-04-19 RX ADMIN — CARBIDOPA AND LEVODOPA 1 TABLET: 25; 100 TABLET ORAL at 21:12

## 2025-04-19 RX ADMIN — DOXYCYCLINE 100 MG: 100 INJECTION, POWDER, LYOPHILIZED, FOR SOLUTION INTRAVENOUS at 05:23

## 2025-04-19 RX ADMIN — DULOXETINE HYDROCHLORIDE 30 MG: 30 CAPSULE, DELAYED RELEASE ORAL at 10:14

## 2025-04-19 RX ADMIN — METOPROLOL SUCCINATE 50 MG: 50 TABLET, EXTENDED RELEASE ORAL at 10:13

## 2025-04-19 RX ADMIN — NYSTATIN 400000 UNITS: 100000 SUSPENSION ORAL at 10:15

## 2025-04-19 RX ADMIN — POTASSIUM CHLORIDE 40 MEQ: 1500 TABLET, EXTENDED RELEASE ORAL at 10:13

## 2025-04-19 RX ADMIN — DOXYCYCLINE 100 MG: 100 INJECTION, POWDER, LYOPHILIZED, FOR SOLUTION INTRAVENOUS at 17:19

## 2025-04-19 RX ADMIN — NYSTATIN 400000 UNITS: 100000 SUSPENSION ORAL at 14:56

## 2025-04-19 RX ADMIN — AMLODIPINE BESYLATE 5 MG: 5 TABLET ORAL at 10:14

## 2025-04-19 RX ADMIN — NYSTATIN 400000 UNITS: 100000 SUSPENSION ORAL at 21:12

## 2025-04-19 RX ADMIN — Medication 3 MG: at 21:11

## 2025-04-19 ASSESSMENT — COGNITIVE AND FUNCTIONAL STATUS - GENERAL
TOILETING: TOTAL
WALKING IN HOSPITAL ROOM: TOTAL
STANDING UP FROM CHAIR USING ARMS: TOTAL
DRESSING REGULAR LOWER BODY CLOTHING: TOTAL
MOVING FROM LYING ON BACK TO SITTING ON SIDE OF FLAT BED WITH BEDRAILS: TOTAL
DAILY ACTIVITIY SCORE: 10
MOBILITY SCORE: 6
CLIMB 3 TO 5 STEPS WITH RAILING: TOTAL
EATING MEALS: A LITTLE
MOVING TO AND FROM BED TO CHAIR: TOTAL
TURNING FROM BACK TO SIDE WHILE IN FLAT BAD: TOTAL
PERSONAL GROOMING: A LITTLE
HELP NEEDED FOR BATHING: TOTAL
DRESSING REGULAR UPPER BODY CLOTHING: TOTAL

## 2025-04-19 ASSESSMENT — PAIN SCALES - GENERAL: PAINLEVEL_OUTOF10: 0 - NO PAIN

## 2025-04-19 ASSESSMENT — PAIN SCALES - PAIN ASSESSMENT IN ADVANCED DEMENTIA (PAINAD)
BREATHING: NORMAL
BODYLANGUAGE: RELAXED
TOTALSCORE: REPOSITIONED
TOTALSCORE: 2
CONSOLABILITY: DISTRACTED OR REASSURED BY VOICE/TOUCH
NEGVOCALIZATION: OCCASIONAL MOAN/GROAN, LOW SPEECH, NEGATIVE/DISAPPROVING QUALITY
FACIALEXPRESSION: SMILING OR INEXPRESSIVE

## 2025-04-19 ASSESSMENT — PAIN SCALES - WONG BAKER: WONGBAKER_NUMERICALRESPONSE: NO HURT

## 2025-04-19 NOTE — CARE PLAN
The patient's goals for the shift include      The clinical goals for the shift include safety and comfort,  cooperate with care    Over the shift, the patient did not make progress toward the following goals. Barriers to progression include weakness. Recommendations to address these barriers include reorient patient.

## 2025-04-19 NOTE — DOCUMENTATION CLARIFICATION NOTE
"    PATIENT:               VIRI WATSON  ACCT #:                  [id number]  MRN:                       [id number]  :                       1935  ADMIT DATE:       2025 12:26 AM  DISCH DATE:  RESPONDING PROVIDER #:        [id number]          PROVIDER RESPONSE TEXT:    Chronic Diastolic Congestive Heart Failure    CDI QUERY TEXT:    Clarification    Instruction:    Based on your assessment of the patient and the clinical information, please provide the requested documentation by clicking on the appropriate radio button and enter any additional information if prompted.    Question: Please further clarify the type and acuity of congestive heart failure    When answering this query, please exercise your independent professional judgment. The fact that a question is being asked, does not imply that any particular answer is desired or expected.    The patient's clinical indicators include:  Clinical Information: 88 y/o with recent surgery, arrived to ED per PCP recommendation for abnormal labs and history of CHF.    Clinical Indicators:  Vital Signs:  @ 2203 T 36.2, HR 65, Resp 18, /58, SPO2 91% on RA   @0047 SPO2 84%, 0053 noted oxygen at 2 L/NC  4/15 @ 0955 remains on oxygen at 1.5L/NC     CXR: Findings suggestive of a degree of pulmonary edema/CHF.    2024 Echo: CONCLUSIONS: 1. Left ventricular ejection fraction is normal, by visual estimate at 60-65%.     Nephrology Note: \"PMH of CKD stage 4, Afib on AC, CHF\"     General Surg Note: \"history significant for  HFpEF\"     Progress Note: \"On 2L NC this AM. CXR from  shows some pulmonary edema. S/p Lasix 20mg IVP, restarted home Torsemide. Wean O2 as tolerated.\"    Treatment: Demadex 20mg PO daily, Aldactone 25mg PO Daily. oxygen  Completed Meds: Lasix 20mg IV x1    Risk Factors: use of diuretics on home med list, recent surgery,  Options provided:  -- Acute on Chronic Diastolic Congestive Heart Failure  -- Chronic Diastolic " Congestive Heart Failure  -- Other - I will add my own diagnosis  -- Refer to Clinical Documentation Reviewer    Query created by: Minnie Das on 4/15/2025 2:48 PM      Electronically signed by:  MALLORY CARREON DO 4/19/2025 8:53 AM

## 2025-04-19 NOTE — CARE PLAN
The patient's goals for the shift include      The clinical goals for the shift include safety and comfort    Over the shift, the patient did make progress toward the following goals.

## 2025-04-19 NOTE — PROGRESS NOTES
Subjective   Patient remains confused, but more alert,less agitated, and more responsive, she was talking today and trying to answer questions, she started taking her oral medications, she was accompanied by her , she was responding with few words today and trying to engage in the conversation.     Objective   Vitals:    04/19/25 1205   BP: 178/75   Pulse: 67   Resp:    Temp: 35.5 °C (95.9 °F)   SpO2: 96%       Physical Exam:   Constitutional: awake, remains confused,  no acute distress  ENMT: mucous membranes moist, conjunctivae clear, throat slightly hyperemic and inflamed.   Head/Neck: normocephalic, atraumatic; supple, trachea midline  Respiratory/Thorax: patent airways, CTAB; no wheezes, rales, or rhonchi  Cardiovascular: irregularly irregular rate and rhythm, no murmur appreciated  Gastrointestinal: soft, nondistended, non-tender, bowel sounds appreciated  : Peralta in place draining clear yellow urine  Extremities: palpable peripheral pulses, no edema  Neurological: AO x1, no focal deficits  Psychological:  more awake and but remains confused.   Skin: warm and dry    Scheduled Medications:   amLODIPine, 5 mg, oral, Daily  atorvastatin, 20 mg, oral, Daily  carbidopa-levodopa, 1 tablet, oral, BID  cefTRIAXone, 1 g, intravenous, q24h  cholecalciferol, 25 mcg, oral, BID  cyanocobalamin, 1,000 mcg, oral, Daily  doxycycline, 100 mg, intravenous, q12h  DULoxetine, 30 mg, oral, BID  melatonin, 3 mg, oral, Nightly  metoprolol succinate XL, 50 mg, oral, Daily  nystatin, 4 mL, Swish & Swallow, TID  polyethylene glycol, 17 g, oral, Daily  potassium chloride CR, 40 mEq, oral, Daily  pregabalin, 25 mg, oral, Nightly  rivaroxaban, 10 mg, oral, Daily  sennosides-docusate sodium, 2 tablet, oral, BID  [Held by provider] spironolactone, 25 mg, oral, Daily  torsemide, 40 mg, oral, Daily       Continuous Medications:         PRN Medications:   PRN medications: acetaminophen, bisacodyl, dextrose, dextrose, glucagon,  glucagon, HYDROmorphone, hydrOXYzine HCL, ipratropium-albuteroL, labetaloL, ondansetron **OR** ondansetron, phenoL    Assessment/Plan   Shreya Acuña is a 89 y.o. female     Acute encephalopathy, ?metabolic vs hospital delirium  Acute hypoxic respiratory failure  HTN urgency/emergency   MISTY on CKD, resolved  Urinary retention s/p Peralta  Abdominal distention, improved  Biliary dyskinesia s/p laparoscopic cholecystectomy on 4/10/25     Permanent Afib, on Xarelto  HTN, HLD  TAMMY, noncompliant on CPAP  Parkinsonism, on Sinemet  Tricuspid regurgitation, moderate to severe    Patient is still encephalopathic, she is more awake, responding  and started talking but still starring and not following commands.  Neurology consulted; appreciate recs.  Concern for possible Sinemet withdrawal, will resume BID, patient started taking oral medications.   CT head was obtained,no abnormality detected,  ABG with slight alkalosis and decreased PO2, Chest xray with congestion, will give lasix as needed the patient is not taking PO.   MRI ordered, negative for acute pathology.    She is now taking oral medications, advanced her diet  Continue Ceftriaxone and doxycycline for coverage of possible PNA.  Remains on 2L NC this AM. Wean O2 as tolerated.  Nephrology following for MISTY on CKD, which has resolved with IVF and Peralta placement.  Seen by general surgery for abdominal distension with recent lap letha. CT A/P shows postop changes without complications. Tolerating regular diet without issue, but switch for her sore throat.    - Discontinue Cardizem upon DC given relatively low HRs. Pt in permanent afib with controlled rate. Had actually been advised by Dr. Barnhart to discontinue this in 3/2025.  - Discussed stopping home Oxybutynin upon DC given urinary retention.    DVT PPX: Xarelto  Code status: FULL    Dispo: Patient is slowly improving, continue management as above.     Gibran Gauthier, DO  Hospital Medicine

## 2025-04-19 NOTE — DOCUMENTATION CLARIFICATION NOTE
PATIENT:               VIRI WATSON  ACCT #:                  [id number]  MRN:                       [id number]  :                       1935  ADMIT DATE:       2025 12:26 AM  DISCH DATE:  RESPONDING PROVIDER #:        [id number]          PROVIDER RESPONSE TEXT:    Low platelets not requiring treatment or evaluation    CDI QUERY TEXT:    Clarification      Instruction:    Based on your assessment of the patient and the clinical information, please provide the requested documentation by clicking on the appropriate radio button and enter any additional information if prompted.    Question: Is there a diagnosis indicative of the lab values    When answering this query, please exercise your independent professional judgment. The fact that a question is being asked, does not imply that any particular answer is desired or expected.    The patient's clinical indicators include:  Clinical Information: 88 y/o female presented to [facility] ED by direction of her surgeon for abnormal labs.    Clinical Indicators:  Labs:  Hgb 11.0, Plt 144   Hgb 11.3, Plt 138  4/15 Hgb 11.1, Plt 145     Nursing Flowsheet @  scattered bruising    Treatment: frequent monitoring of labs    Risk Factors: recent surgery, PMHx PAF on lower dose of Xarelto, history anemia  Options provided:  -- Thrombocytopenia is clinically significant and required treatment/monitoring  -- Low platelets not requiring treatment or evaluation  -- Other - I will add my own diagnosis  -- Refer to Clinical Documentation Reviewer    Query created by: Minnie Das on 4/15/2025 3:00 PM      Electronically signed by:  MALLORY CARREON DO 2025 8:53 AM

## 2025-04-20 VITALS
TEMPERATURE: 98.1 F | WEIGHT: 241 LBS | SYSTOLIC BLOOD PRESSURE: 156 MMHG | HEIGHT: 63 IN | OXYGEN SATURATION: 95 % | BODY MASS INDEX: 42.7 KG/M2 | RESPIRATION RATE: 16 BRPM | DIASTOLIC BLOOD PRESSURE: 70 MMHG | HEART RATE: 75 BPM

## 2025-04-20 LAB
ALBUMIN SERPL BCP-MCNC: 3.8 G/DL (ref 3.4–5)
ANION GAP SERPL CALC-SCNC: 14 MMOL/L (ref 10–20)
BUN SERPL-MCNC: 27 MG/DL (ref 6–23)
CALCIUM SERPL-MCNC: 9.3 MG/DL (ref 8.6–10.3)
CHLORIDE SERPL-SCNC: 98 MMOL/L (ref 98–107)
CO2 SERPL-SCNC: 30 MMOL/L (ref 21–32)
CREAT SERPL-MCNC: 1.25 MG/DL (ref 0.5–1.05)
EGFRCR SERPLBLD CKD-EPI 2021: 42 ML/MIN/1.73M*2
ERYTHROCYTE [DISTWIDTH] IN BLOOD BY AUTOMATED COUNT: 13.3 % (ref 11.5–14.5)
GLUCOSE SERPL-MCNC: 128 MG/DL (ref 74–99)
HCT VFR BLD AUTO: 43.4 % (ref 36–46)
HGB BLD-MCNC: 13.3 G/DL (ref 12–16)
MAGNESIUM SERPL-MCNC: 2.04 MG/DL (ref 1.6–2.4)
MCH RBC QN AUTO: 31.8 PG (ref 26–34)
MCHC RBC AUTO-ENTMCNC: 30.6 G/DL (ref 32–36)
MCV RBC AUTO: 104 FL (ref 80–100)
NRBC BLD-RTO: 0 /100 WBCS (ref 0–0)
PHOSPHATE SERPL-MCNC: 4.3 MG/DL (ref 2.5–4.9)
PLATELET # BLD AUTO: 221 X10*3/UL (ref 150–450)
POTASSIUM SERPL-SCNC: 3.3 MMOL/L (ref 3.5–5.3)
RBC # BLD AUTO: 4.18 X10*6/UL (ref 4–5.2)
SODIUM SERPL-SCNC: 139 MMOL/L (ref 136–145)
WBC # BLD AUTO: 8.3 X10*3/UL (ref 4.4–11.3)

## 2025-04-20 PROCEDURE — 2500000004 HC RX 250 GENERAL PHARMACY W/ HCPCS (ALT 636 FOR OP/ED): Mod: JZ | Performed by: INTERNAL MEDICINE

## 2025-04-20 PROCEDURE — 2500000001 HC RX 250 WO HCPCS SELF ADMINISTERED DRUGS (ALT 637 FOR MEDICARE OP): Performed by: STUDENT IN AN ORGANIZED HEALTH CARE EDUCATION/TRAINING PROGRAM

## 2025-04-20 PROCEDURE — 2500000001 HC RX 250 WO HCPCS SELF ADMINISTERED DRUGS (ALT 637 FOR MEDICARE OP): Performed by: INTERNAL MEDICINE

## 2025-04-20 PROCEDURE — 1100000001 HC PRIVATE ROOM DAILY

## 2025-04-20 PROCEDURE — 2500000002 HC RX 250 W HCPCS SELF ADMINISTERED DRUGS (ALT 637 FOR MEDICARE OP, ALT 636 FOR OP/ED): Performed by: INTERNAL MEDICINE

## 2025-04-20 PROCEDURE — 2500000001 HC RX 250 WO HCPCS SELF ADMINISTERED DRUGS (ALT 637 FOR MEDICARE OP)

## 2025-04-20 PROCEDURE — 85027 COMPLETE CBC AUTOMATED: CPT | Performed by: INTERNAL MEDICINE

## 2025-04-20 PROCEDURE — 80069 RENAL FUNCTION PANEL: CPT | Performed by: INTERNAL MEDICINE

## 2025-04-20 PROCEDURE — 99233 SBSQ HOSP IP/OBS HIGH 50: CPT | Performed by: INTERNAL MEDICINE

## 2025-04-20 PROCEDURE — 2500000002 HC RX 250 W HCPCS SELF ADMINISTERED DRUGS (ALT 637 FOR MEDICARE OP, ALT 636 FOR OP/ED): Performed by: STUDENT IN AN ORGANIZED HEALTH CARE EDUCATION/TRAINING PROGRAM

## 2025-04-20 PROCEDURE — 2500000001 HC RX 250 WO HCPCS SELF ADMINISTERED DRUGS (ALT 637 FOR MEDICARE OP): Performed by: REGISTERED NURSE

## 2025-04-20 PROCEDURE — 99232 SBSQ HOSP IP/OBS MODERATE 35: CPT | Performed by: PSYCHIATRY & NEUROLOGY

## 2025-04-20 PROCEDURE — 2500000001 HC RX 250 WO HCPCS SELF ADMINISTERED DRUGS (ALT 637 FOR MEDICARE OP): Performed by: PSYCHIATRY & NEUROLOGY

## 2025-04-20 PROCEDURE — 36415 COLL VENOUS BLD VENIPUNCTURE: CPT | Performed by: INTERNAL MEDICINE

## 2025-04-20 PROCEDURE — 83735 ASSAY OF MAGNESIUM: CPT | Performed by: INTERNAL MEDICINE

## 2025-04-20 RX ADMIN — NYSTATIN 400000 UNITS: 100000 SUSPENSION ORAL at 08:40

## 2025-04-20 RX ADMIN — CEFTRIAXONE SODIUM 1 G: 1 INJECTION, SOLUTION INTRAVENOUS at 11:04

## 2025-04-20 RX ADMIN — NYSTATIN 400000 UNITS: 100000 SUSPENSION ORAL at 14:05

## 2025-04-20 RX ADMIN — HYDROMORPHONE HYDROCHLORIDE 0.2 MG: 0.2 INJECTION, SOLUTION INTRAMUSCULAR; INTRAVENOUS; SUBCUTANEOUS at 19:25

## 2025-04-20 RX ADMIN — Medication 1000 MCG: at 08:39

## 2025-04-20 RX ADMIN — SENNOSIDES AND DOCUSATE SODIUM 2 TABLET: 50; 8.6 TABLET ORAL at 20:02

## 2025-04-20 RX ADMIN — POTASSIUM CHLORIDE 40 MEQ: 1500 TABLET, EXTENDED RELEASE ORAL at 08:39

## 2025-04-20 RX ADMIN — ATORVASTATIN CALCIUM 20 MG: 20 TABLET, FILM COATED ORAL at 08:39

## 2025-04-20 RX ADMIN — PREGABALIN 25 MG: 25 CAPSULE ORAL at 20:12

## 2025-04-20 RX ADMIN — Medication 25 MCG: at 20:02

## 2025-04-20 RX ADMIN — SENNOSIDES AND DOCUSATE SODIUM 2 TABLET: 50; 8.6 TABLET ORAL at 08:39

## 2025-04-20 RX ADMIN — RIVAROXABAN 10 MG: 10 TABLET, FILM COATED ORAL at 08:39

## 2025-04-20 RX ADMIN — Medication 25 MCG: at 08:39

## 2025-04-20 RX ADMIN — DOXYCYCLINE 100 MG: 100 INJECTION, POWDER, LYOPHILIZED, FOR SOLUTION INTRAVENOUS at 05:34

## 2025-04-20 RX ADMIN — DULOXETINE HYDROCHLORIDE 30 MG: 30 CAPSULE, DELAYED RELEASE ORAL at 08:39

## 2025-04-20 RX ADMIN — CARBIDOPA AND LEVODOPA 1 TABLET: 25; 100 TABLET ORAL at 08:39

## 2025-04-20 RX ADMIN — METOPROLOL SUCCINATE 50 MG: 50 TABLET, EXTENDED RELEASE ORAL at 08:39

## 2025-04-20 RX ADMIN — DULOXETINE HYDROCHLORIDE 30 MG: 30 CAPSULE, DELAYED RELEASE ORAL at 20:02

## 2025-04-20 RX ADMIN — AMLODIPINE BESYLATE 5 MG: 5 TABLET ORAL at 08:40

## 2025-04-20 RX ADMIN — SODIUM CHLORIDE, POTASSIUM CHLORIDE, SODIUM LACTATE AND CALCIUM CHLORIDE 500 ML: 600; 310; 30; 20 INJECTION, SOLUTION INTRAVENOUS at 09:18

## 2025-04-20 RX ADMIN — CARBIDOPA AND LEVODOPA 1 TABLET: 25; 100 TABLET ORAL at 20:02

## 2025-04-20 RX ADMIN — NYSTATIN 400000 UNITS: 100000 SUSPENSION ORAL at 20:02

## 2025-04-20 ASSESSMENT — COGNITIVE AND FUNCTIONAL STATUS - GENERAL
HELP NEEDED FOR BATHING: TOTAL
DRESSING REGULAR LOWER BODY CLOTHING: TOTAL
TURNING FROM BACK TO SIDE WHILE IN FLAT BAD: TOTAL
TURNING FROM BACK TO SIDE WHILE IN FLAT BAD: TOTAL
MOVING FROM LYING ON BACK TO SITTING ON SIDE OF FLAT BED WITH BEDRAILS: TOTAL
DAILY ACTIVITIY SCORE: 8
HELP NEEDED FOR BATHING: TOTAL
EATING MEALS: A LITTLE
STANDING UP FROM CHAIR USING ARMS: TOTAL
MOVING FROM LYING ON BACK TO SITTING ON SIDE OF FLAT BED WITH BEDRAILS: TOTAL
DRESSING REGULAR UPPER BODY CLOTHING: TOTAL
MOVING TO AND FROM BED TO CHAIR: TOTAL
MOBILITY SCORE: 6
STANDING UP FROM CHAIR USING ARMS: TOTAL
WALKING IN HOSPITAL ROOM: TOTAL
MOVING TO AND FROM BED TO CHAIR: TOTAL
CLIMB 3 TO 5 STEPS WITH RAILING: TOTAL
PERSONAL GROOMING: TOTAL
WALKING IN HOSPITAL ROOM: TOTAL
PERSONAL GROOMING: TOTAL
CLIMB 3 TO 5 STEPS WITH RAILING: TOTAL
TOILETING: TOTAL
DRESSING REGULAR UPPER BODY CLOTHING: TOTAL
DAILY ACTIVITIY SCORE: 8
EATING MEALS: A LITTLE
MOBILITY SCORE: 6
TOILETING: TOTAL
DRESSING REGULAR LOWER BODY CLOTHING: TOTAL

## 2025-04-20 ASSESSMENT — PAIN SCALES - PAIN ASSESSMENT IN ADVANCED DEMENTIA (PAINAD)
CONSOLABILITY: NO NEED TO CONSOLE
BODYLANGUAGE: RELAXED
FACIALEXPRESSION: SMILING OR INEXPRESSIVE
BREATHING: NORMAL
TOTALSCORE: 0

## 2025-04-20 ASSESSMENT — PAIN SCALES - GENERAL
PAINLEVEL_OUTOF10: 0 - NO PAIN
PAINLEVEL_OUTOF10: 0 - NO PAIN
PAINLEVEL_OUTOF10: 7

## 2025-04-20 ASSESSMENT — PAIN - FUNCTIONAL ASSESSMENT: PAIN_FUNCTIONAL_ASSESSMENT: 0-10

## 2025-04-20 NOTE — PROGRESS NOTES
"Patient seen and examined.  Is not really eating or drinking that much according to family.  Having some mild nausea.  Creatinine was noted to be elevated compared to yesterday.  Diuretics placed on hold.    Scheduled medications  Scheduled Medications[1]  Continuous medications  Continuous Medications[2]  PRN medications  PRN Medications[3]    Blood pressure 133/74, pulse 70, temperature 35.5 °C (95.9 °F), temperature source Temporal, resp. rate 20, height 1.6 m (5' 3\"), weight 109 kg (241 lb), SpO2 94%.      Intake/Output Summary (Last 24 hours) at 4/20/2025 1712  Last data filed at 4/20/2025 1155  Gross per 24 hour   Intake 1410 ml   Output 1201 ml   Net 209 ml     Gen: NAD, mildly confused  Resp: diminished bilaterally  CV: no rub  Abd: soft  Ext: 1+ edema    Cr 1.25, K 3.3  Hb 13.3    Impression:  1.  Acute kidney injury that had resolved now with slight increase in creatinine probably due to volume depletion in the setting of diuretic therapy and poor p.o. intake  2.  Mild hypokalemia on replacement    Plan:  1.  Hold torsemide and spironolactone  2.  IV fluid bolus given today  3.  Was given potassium by mouth today but will hold off on daily replacement while in mild MISTY  4.  Labs in the morning    Bismark Palomo MD           [1] amLODIPine, 5 mg, oral, Daily  atorvastatin, 20 mg, oral, Daily  carbidopa-levodopa, 1 tablet, oral, BID  cefTRIAXone, 1 g, intravenous, q24h  cholecalciferol, 25 mcg, oral, BID  cyanocobalamin, 1,000 mcg, oral, Daily  DULoxetine, 30 mg, oral, BID  melatonin, 3 mg, oral, Nightly  metoprolol succinate XL, 50 mg, oral, Daily  nystatin, 4 mL, Swish & Swallow, TID  polyethylene glycol, 17 g, oral, Daily  pregabalin, 25 mg, oral, Nightly  rivaroxaban, 10 mg, oral, Daily  sennosides-docusate sodium, 2 tablet, oral, BID  [2]    [3] PRN medications: acetaminophen, bisacodyl, dextrose, dextrose, glucagon, glucagon, HYDROmorphone, hydrOXYzine HCL, ipratropium-albuteroL, labetaloL, " ondansetron **OR** ondansetron, phenoL

## 2025-04-20 NOTE — PROGRESS NOTES
"Shreya Acuña is a 89 y.o. female on day 8 of admission presenting with Acute kidney injury superimposed on CKD.      Subjective   Much improved today. Still confused at times per family but much better.        Objective     Last Recorded Vitals  Blood pressure 133/74, pulse 70, temperature 35.5 °C (95.9 °F), temperature source Temporal, resp. rate 20, height 1.6 m (5' 3\"), weight 109 kg (241 lb), SpO2 94%.    Physical Exam  Neurological Exam    Alert, oriented to person, place and month, able to name family members at bedside.   Relevant Results  Scheduled medications  Scheduled Medications[1]  Continuous medications  Continuous Medications[2]  PRN medications  PRN Medications[3]                  [place] Coma Scale  Best Eye Response: Spontaneous  Best Verbal Response: Confused  Best Motor Response: Follows commands  [place] Coma Scale Score: 14                                  Assessment & Plan  Delirium    Ms. Acuña is a 89 year old woman being seen for delirium.  Overall has greatly improved. Would continue with lower doses of sinemet and Lyrica. She should continue to improve slowly if she continues to sleep at night. Try to avoid any other sedating medications, keep her awake during the day and promote sleep at night. Would recommend to continue these medications at the lower doses at discharge. She may not need the Lyrica at all after discharge if she finds that pain is tolerable without it. Or it could be slowly titrated back up.      Will sign off, please call with questions or concerns.       I spent 35 minutes in the professional and overall care of this patient.      Madie Morgan, DO       [1] amLODIPine, 5 mg, oral, Daily  atorvastatin, 20 mg, oral, Daily  carbidopa-levodopa, 1 tablet, oral, BID  cefTRIAXone, 1 g, intravenous, q24h  cholecalciferol, 25 mcg, oral, BID  cyanocobalamin, 1,000 mcg, oral, Daily  DULoxetine, 30 mg, oral, BID  melatonin, 3 mg, oral, Nightly  metoprolol succinate XL, 50 " mg, oral, Daily  nystatin, 4 mL, Swish & Swallow, TID  polyethylene glycol, 17 g, oral, Daily  potassium chloride CR, 40 mEq, oral, Daily  pregabalin, 25 mg, oral, Nightly  rivaroxaban, 10 mg, oral, Daily  sennosides-docusate sodium, 2 tablet, oral, BID  [Held by provider] spironolactone, 25 mg, oral, Daily  [Held by provider] torsemide, 40 mg, oral, Daily  [2]    [3] PRN medications: acetaminophen, bisacodyl, dextrose, dextrose, glucagon, glucagon, HYDROmorphone, hydrOXYzine HCL, ipratropium-albuteroL, labetaloL, ondansetron **OR** ondansetron, phenoL

## 2025-04-20 NOTE — CARE PLAN
Problem: Pain - Adult  Goal: Verbalizes/displays adequate comfort level or baseline comfort level  Outcome: Progressing  Flowsheets (Taken 4/19/2025 2201)  Verbalizes/displays adequate comfort level or baseline comfort level:   Assess pain using appropriate pain scale   Consider cultural and social influences on pain and pain management

## 2025-04-20 NOTE — NURSING NOTE
Upon arrival at the patients bedside the patient AO+1, vss, c/o generalized aches and pain,  at the bedside ensuring the patients needs are met. Head to toe assessment, findings recorded, no distress, ABC's intact, pills crushed in applesauce, rounding ensued , no changes of condition to report.

## 2025-04-20 NOTE — CARE PLAN
Problem: Skin  Goal: Participates in plan/prevention/treatment measures  Outcome: Progressing  Flowsheets (Taken 4/19/2025 8397)  Participates in plan/prevention/treatment measures:   Increase activity/out of bed for meals   Elevate heels

## 2025-04-21 ENCOUNTER — PHARMACY VISIT (OUTPATIENT)
Dept: PHARMACY | Facility: CLINIC | Age: 89
End: 2025-04-21
Payer: MEDICARE

## 2025-04-21 ENCOUNTER — HOME HEALTH ADMISSION (OUTPATIENT)
Dept: HOME HEALTH SERVICES | Facility: HOME HEALTH | Age: 89
End: 2025-04-21
Payer: MEDICARE

## 2025-04-21 VITALS
RESPIRATION RATE: 18 BRPM | DIASTOLIC BLOOD PRESSURE: 67 MMHG | TEMPERATURE: 97.2 F | HEIGHT: 63 IN | SYSTOLIC BLOOD PRESSURE: 151 MMHG | OXYGEN SATURATION: 94 % | WEIGHT: 241 LBS | HEART RATE: 74 BPM | BODY MASS INDEX: 42.7 KG/M2

## 2025-04-21 PROBLEM — R41.0 DELIRIUM: Status: RESOLVED | Noted: 2025-04-18 | Resolved: 2025-04-21

## 2025-04-21 PROBLEM — N17.9 AKI (ACUTE KIDNEY INJURY): Status: RESOLVED | Noted: 2025-04-12 | Resolved: 2025-04-21

## 2025-04-21 PROBLEM — N18.9 ACUTE KIDNEY INJURY SUPERIMPOSED ON CKD: Status: RESOLVED | Noted: 2025-04-12 | Resolved: 2025-04-21

## 2025-04-21 PROBLEM — R14.0 ABDOMINAL DISTENTION: Status: RESOLVED | Noted: 2025-04-12 | Resolved: 2025-04-21

## 2025-04-21 PROBLEM — N17.9 ACUTE KIDNEY INJURY SUPERIMPOSED ON CKD: Status: RESOLVED | Noted: 2025-04-12 | Resolved: 2025-04-21

## 2025-04-21 LAB
ALBUMIN SERPL BCP-MCNC: 3.5 G/DL (ref 3.4–5)
ANION GAP SERPL CALC-SCNC: 12 MMOL/L (ref 10–20)
BUN SERPL-MCNC: 28 MG/DL (ref 6–23)
CALCIUM SERPL-MCNC: 9.4 MG/DL (ref 8.6–10.3)
CHLORIDE SERPL-SCNC: 101 MMOL/L (ref 98–107)
CO2 SERPL-SCNC: 31 MMOL/L (ref 21–32)
CREAT SERPL-MCNC: 1.09 MG/DL (ref 0.5–1.05)
EGFRCR SERPLBLD CKD-EPI 2021: 49 ML/MIN/1.73M*2
GLUCOSE BLD MANUAL STRIP-MCNC: 127 MG/DL (ref 74–99)
GLUCOSE SERPL-MCNC: 108 MG/DL (ref 74–99)
HOLD SPECIMEN: NORMAL
PHOSPHATE SERPL-MCNC: 3.4 MG/DL (ref 2.5–4.9)
POTASSIUM SERPL-SCNC: 3.6 MMOL/L (ref 3.5–5.3)
SODIUM SERPL-SCNC: 140 MMOL/L (ref 136–145)

## 2025-04-21 PROCEDURE — 80069 RENAL FUNCTION PANEL: CPT | Performed by: INTERNAL MEDICINE

## 2025-04-21 PROCEDURE — 2500000005 HC RX 250 GENERAL PHARMACY W/O HCPCS: Performed by: INTERNAL MEDICINE

## 2025-04-21 PROCEDURE — 36415 COLL VENOUS BLD VENIPUNCTURE: CPT | Performed by: INTERNAL MEDICINE

## 2025-04-21 PROCEDURE — 2500000001 HC RX 250 WO HCPCS SELF ADMINISTERED DRUGS (ALT 637 FOR MEDICARE OP): Performed by: INTERNAL MEDICINE

## 2025-04-21 PROCEDURE — 82947 ASSAY GLUCOSE BLOOD QUANT: CPT

## 2025-04-21 PROCEDURE — 2500000001 HC RX 250 WO HCPCS SELF ADMINISTERED DRUGS (ALT 637 FOR MEDICARE OP)

## 2025-04-21 PROCEDURE — 2500000001 HC RX 250 WO HCPCS SELF ADMINISTERED DRUGS (ALT 637 FOR MEDICARE OP): Performed by: REGISTERED NURSE

## 2025-04-21 PROCEDURE — 2500000002 HC RX 250 W HCPCS SELF ADMINISTERED DRUGS (ALT 637 FOR MEDICARE OP, ALT 636 FOR OP/ED): Performed by: STUDENT IN AN ORGANIZED HEALTH CARE EDUCATION/TRAINING PROGRAM

## 2025-04-21 PROCEDURE — 2500000001 HC RX 250 WO HCPCS SELF ADMINISTERED DRUGS (ALT 637 FOR MEDICARE OP): Performed by: PSYCHIATRY & NEUROLOGY

## 2025-04-21 PROCEDURE — 2500000001 HC RX 250 WO HCPCS SELF ADMINISTERED DRUGS (ALT 637 FOR MEDICARE OP): Performed by: STUDENT IN AN ORGANIZED HEALTH CARE EDUCATION/TRAINING PROGRAM

## 2025-04-21 PROCEDURE — 2500000004 HC RX 250 GENERAL PHARMACY W/ HCPCS (ALT 636 FOR OP/ED): Mod: JZ | Performed by: INTERNAL MEDICINE

## 2025-04-21 PROCEDURE — RXMED WILLOW AMBULATORY MEDICATION CHARGE

## 2025-04-21 PROCEDURE — 99239 HOSP IP/OBS DSCHRG MGMT >30: CPT | Performed by: INTERNAL MEDICINE

## 2025-04-21 PROCEDURE — 1100000001 HC PRIVATE ROOM DAILY

## 2025-04-21 RX ORDER — AMLODIPINE BESYLATE 5 MG/1
5 TABLET ORAL DAILY
Qty: 30 TABLET | Refills: 2 | Status: SHIPPED | OUTPATIENT
Start: 2025-04-22 | End: 2025-07-21

## 2025-04-21 RX ORDER — AMOXICILLIN AND CLAVULANATE POTASSIUM 875; 125 MG/1; MG/1
875 TABLET, FILM COATED ORAL 2 TIMES DAILY
Qty: 6 TABLET | Refills: 0 | Status: SHIPPED | OUTPATIENT
Start: 2025-04-22 | End: 2025-04-25

## 2025-04-21 RX ORDER — OXYCODONE HYDROCHLORIDE 5 MG/1
5 TABLET ORAL EVERY 6 HOURS PRN
Start: 2025-04-21

## 2025-04-21 RX ORDER — PREGABALIN 25 MG/1
25 CAPSULE ORAL NIGHTLY
Start: 2025-04-21

## 2025-04-21 RX ORDER — CARBIDOPA AND LEVODOPA 25; 100 MG/1; MG/1
1 TABLET ORAL 2 TIMES DAILY
Qty: 60 TABLET | Refills: 1 | Status: SHIPPED | OUTPATIENT
Start: 2025-04-21 | End: 2025-06-20

## 2025-04-21 RX ADMIN — DULOXETINE HYDROCHLORIDE 30 MG: 30 CAPSULE, DELAYED RELEASE ORAL at 10:08

## 2025-04-21 RX ADMIN — DULOXETINE HYDROCHLORIDE 30 MG: 30 CAPSULE, DELAYED RELEASE ORAL at 21:09

## 2025-04-21 RX ADMIN — NYSTATIN 400000 UNITS: 100000 SUSPENSION ORAL at 10:08

## 2025-04-21 RX ADMIN — CARBIDOPA AND LEVODOPA 1 TABLET: 25; 100 TABLET ORAL at 21:10

## 2025-04-21 RX ADMIN — NYSTATIN 400000 UNITS: 100000 SUSPENSION ORAL at 15:45

## 2025-04-21 RX ADMIN — METOPROLOL SUCCINATE 50 MG: 50 TABLET, EXTENDED RELEASE ORAL at 10:08

## 2025-04-21 RX ADMIN — ACETAMINOPHEN 650 MG: 325 TABLET, FILM COATED ORAL at 16:55

## 2025-04-21 RX ADMIN — SENNOSIDES AND DOCUSATE SODIUM 2 TABLET: 50; 8.6 TABLET ORAL at 21:10

## 2025-04-21 RX ADMIN — Medication 1000 MCG: at 10:08

## 2025-04-21 RX ADMIN — CEFTRIAXONE SODIUM 1 G: 1 INJECTION, SOLUTION INTRAVENOUS at 10:20

## 2025-04-21 RX ADMIN — Medication 25 MCG: at 21:10

## 2025-04-21 RX ADMIN — ATORVASTATIN CALCIUM 20 MG: 20 TABLET, FILM COATED ORAL at 10:08

## 2025-04-21 RX ADMIN — RIVAROXABAN 10 MG: 10 TABLET, FILM COATED ORAL at 10:22

## 2025-04-21 RX ADMIN — AMLODIPINE BESYLATE 5 MG: 5 TABLET ORAL at 10:08

## 2025-04-21 RX ADMIN — CARBIDOPA AND LEVODOPA 1 TABLET: 25; 100 TABLET ORAL at 10:09

## 2025-04-21 RX ADMIN — PREGABALIN 25 MG: 25 CAPSULE ORAL at 21:12

## 2025-04-21 RX ADMIN — Medication 3 MG: at 21:12

## 2025-04-21 RX ADMIN — LABETALOL HYDROCHLORIDE 20 MG: 5 INJECTION INTRAVENOUS at 12:07

## 2025-04-21 RX ADMIN — NYSTATIN 4 ML: 100000 SUSPENSION ORAL at 21:23

## 2025-04-21 RX ADMIN — SENNOSIDES AND DOCUSATE SODIUM 2 TABLET: 50; 8.6 TABLET ORAL at 10:09

## 2025-04-21 RX ADMIN — SODIUM CHLORIDE, SODIUM LACTATE, POTASSIUM CHLORIDE, AND CALCIUM CHLORIDE 500 ML: .6; .31; .03; .02 INJECTION, SOLUTION INTRAVENOUS at 10:09

## 2025-04-21 ASSESSMENT — PAIN SCALES - GENERAL: PAINLEVEL_OUTOF10: 0 - NO PAIN

## 2025-04-21 NOTE — PROGRESS NOTES
Nephrology Consult Progress Note    Shreya Acuña is a 89 y.o. female on day 9 of admission presenting with Acute kidney injury superimposed on CKD.      Subjective   No acute events over the weekend, poor but somewhat improved po intake       Objective          Physical Exam    Vitals 24HR  Heart Rate:  [71-75]   Temp:  [36.1 °C (97 °F)-36.7 °C (98.1 °F)]   Resp:  [16-18]   BP: (156-177)/(64-88)   SpO2:  [94 %-95 %]           AAOx3, on NC O2, answers questions appropriately  Decreased AEBL  RRR  Abd soft  Chronic skin changes, edema  Peralta+              I&O 24HR    Intake/Output Summary (Last 24 hours) at 4/21/2025 1256  Last data filed at 4/21/2025 1245  Gross per 24 hour   Intake 750 ml   Output 800 ml   Net -50 ml         Medications  Medications Prior to Admission   Medication Sig Dispense Refill Last Dose/Taking    acetaminophen (Tylenol) 500 mg tablet Take 2 tablets (1,000 mg) by mouth 3 times a day.       albuterol 90 mcg/actuation inhaler Inhale 2 puffs every 4 hours if needed for wheezing. (Patient not taking: Reported on 4/7/2025) 18 g 0     atorvastatin (Lipitor) 20 mg tablet Take 1 tablet (20 mg) by mouth once daily.       carbidopa-levodopa (Sinemet)  mg tablet Take 3 tablets by mouth 3 times a day. (Patient taking differently: Take 2 tablets by mouth 3 times a day.) 270 tablet 11     cholecalciferol (Vitamin D-3) 25 MCG (1000 UT) capsule Take 1 capsule (25 mcg) by mouth 2 times a day.       cyanocobalamin (Vitamin B-12) 1,000 mcg tablet Take 1 tablet (1,000 mcg) by mouth once daily.       DILT- mg 24 hr capsule Take 1 capsule (120 mg) by mouth once daily. 90 capsule 3     DULoxetine (Cymbalta) 30 mg DR capsule Take 1 capsule (30 mg) by mouth 2 times a day. 180 capsule 2     Gemtesa 75 mg tablet Take 1 tablet (75 mg) by mouth early in the morning..       metoprolol succinate XL (Toprol-XL) 50 mg 24 hr tablet Take 1 tablet (50 mg) by mouth once daily.       mv-min-FA-vit K-lutein-zeaxant  (PreserVision AREDS 2 Plus MV) 200 mcg-15 mcg- 5 mg-1 mg capsule Take 1 capsule by mouth 2 times a day.       [] oxyCODONE (Roxicodone) 5 mg immediate release tablet Take 1 tablet (5 mg) by mouth every 6 hours if needed for severe pain (7 - 10) for up to 5 days. 10 tablet 0     pregabalin (Lyrica) 100 mg capsule Take 1 capsule (100 mg) by mouth 2 times a day. 180 capsule 2     rivaroxaban (Xarelto) 10 mg tablet Take 1 tablet (10 mg) by mouth once daily. Resume taking on        spironolactone (Aldactone) 25 mg tablet Take 1 tablet (25 mg) by mouth once daily. (Patient taking differently: Take 1 tablet (25 mg) by mouth 3 times a day.) 90 tablet 3     torsemide (Demadex) 20 mg tablet Take 1 tablet (20 mg) by mouth once daily. (Patient taking differently: Take 1 tablet (20 mg) by mouth once daily at bedtime.) 90 tablet 3       Scheduled medications  amLODIPine, 5 mg, oral, Daily  atorvastatin, 20 mg, oral, Daily  carbidopa-levodopa, 1 tablet, oral, BID  cefTRIAXone, 1 g, intravenous, q24h  cholecalciferol, 25 mcg, oral, BID  cyanocobalamin, 1,000 mcg, oral, Daily  DULoxetine, 30 mg, oral, BID  melatonin, 3 mg, oral, Nightly  metoprolol succinate XL, 50 mg, oral, Daily  nystatin, 4 mL, Swish & Swallow, TID  polyethylene glycol, 17 g, oral, Daily  pregabalin, 25 mg, oral, Nightly  rivaroxaban, 10 mg, oral, Daily  sennosides-docusate sodium, 2 tablet, oral, BID      Continuous medications     PRN medications  PRN medications: acetaminophen, bisacodyl, dextrose, dextrose, glucagon, glucagon, HYDROmorphone, hydrOXYzine HCL, ipratropium-albuteroL, labetaloL, ondansetron **OR** ondansetron, phenoL    Relevant Results      No results displayed because visit has over 200 results.         Imaging  MR brain wo IV contrast  Result Date: 2025  No acute intracranial abnormality. Chronic changes above.   Signed by: Belkis Chi 2025 2:22 PM Dictation workstation:   [id number]    CT head wo IV contrast  Result  Date: 4/15/2025  NO ACUTE INTRACRANIAL PROCESS   MACRO: None   Signed by: Mingo Barrios 4/15/2025 3:38 PM Dictation workstation:   [id number]    XR chest 1 view  Result Date: 4/15/2025  Diffuse interstitial infiltrates bilaterally, as above. Clinical correlation and continued follow-up until clearing is recommended.   MACRO: None.   Signed by: Geremias Cardenas 4/15/2025 1:17 PM Dictation workstation:   [id number]      Cardiology, Vascular, and Other Imaging  Transthoracic Echo (TTE) Complete  Result Date: 4/18/2025   [facility], [facility] [place] [address]           Tel [phone] and Fax [phone] TRANSTHORACIC ECHOCARDIOGRAM REPORT  Patient Name:       VIRI WATSON     Reading Physician:    [id number] Brian Ramirez MD Study Date:         4/18/2025           Ordering Provider:    [id number] MALLORY CARREON MRN/PID:            [id number]            Fellow: Accession#:         [id number]        Nurse: Date of Birth/Age:  1935 / 89      Sonographer:          Jay Dill                     years                                     ACS, RDCS, FASE Gender assigned at  F                   Additional Staff: Birth: Height:             160.02 cm           Admit Date:           4/12/2025 Weight:             109.32 kg           Admission Status:     Inpatient -                                                               Routine BSA / BMI:          2.09 m2 / 42.69     Encounter#:           [id number]                     kg/m2 Blood Pressure:     190/84 mmHg         Department Location:  [facility] Study Type:    TRANSTHORACIC ECHO (TTE) COMPLETE Diagnosis/ICD: Shortness of breath-R06.02 Indication:    Congestive Heart Failure CPT Code:      Echo Limited-64745; Color Doppler-45435; Doppler Limited-61495 Patient History: Pertinent History: Dyspnea, CHF, HTN,  Hyperlipidemia and A-Fib. Study Detail: The following Echo studies were performed: 2D, M-Mode, Doppler and               color flow. Technically challenging study due to body habitus,               patient lying in supine position and Limited imaging planes. AF.  PHYSICIAN INTERPRETATION: Left Ventricle: Left ventricular ejection fraction is low normal, by visual estimate at 50-55%. There are no regional left ventricular wall motion abnormalities. The left ventricular cavity size is normal. There is mildly increased septal and normal posterior left ventricular wall thickness. Spectral Doppler shows a Grade III (restrictive pattern) of left ventricular diastolic filling with an elevated left atrial pressure. Left Atrium: The left atrium is enlarged. Right Ventricle: The right ventricle is normal in size. There is normal right ventricular global systolic function. Right Atrium: The right atrium is normal in size. Aortic Valve: The aortic valve is probably trileaflet. There is moderate aortic valve cusp calcification. There is moderate aortic valve thickening. There is evidence of mildly elevated transaortic gradients consistent with sclerosis of the aortic valve. The aortic valve dimensionless index is 0.92. There is trace to mild aortic valve regurgitation. The peak instantaneous gradient of the aortic valve is 4 mmHg. The mean gradient of the aortic valve is 2 mmHg. Mitral Valve: The mitral valve is mildly thickened. There is trace to mild mitral valve regurgitation. Tricuspid Valve: The tricuspid valve is structurally normal. There is mild tricuspid regurgitation. The Doppler estimated RVSP is mildly elevated at 29.0 mmHg. Pulmonic Valve: The pulmonic valve is not well visualized. The pulmonic valve regurgitation was not well visualized. Pericardium: There is no pericardial effusion noted. Aorta: The aortic root is normal.  CONCLUSIONS:  1. Left ventricular ejection fraction is low normal, by visual estimate at  50-55%.  2. Spectral Doppler shows a Grade III (restrictive pattern) of left ventricular diastolic filling with an elevated left atrial pressure.  3. There is normal right ventricular global systolic function.  4. The left atrium is enlarged.  5. Mildly elevated right ventricular systolic pressure.  6. Aortic valve sclerosis.  7. There is moderate aortic valve cusp calcification. QUANTITATIVE DATA SUMMARY:  2D MEASUREMENTS:          Normal Ranges: Ao Root d:       3.10 cm  (2.0-3.7cm) LAs:             3.50 cm  (2.7-4.0cm) IVSd:            1.10 cm  (0.6-1.1cm) LVPWd:           0.80 cm  (0.6-1.1cm) LVIDd:           4.20 cm  (3.9-5.9cm) LVIDs:           2.30 cm LV Mass Index:   61 g/m2 LVEDV Index:     32 ml/m2 LV % FS          45.2 %  M-MODE MEASUREMENTS:         Normal Ranges: Ao Root:             3.10 cm (2.0-3.7cm) LAs:                 5.40 cm (2.7-4.0cm)  LV SYSTOLIC FUNCTION:                      Normal Ranges: EF-A4C View:    56 % (>=55%) EF-A2C View:    58 % EF-Biplane:     55 % EF-Visual:      53 % LV EF Reported: 53 %  AORTIC VALVE:                     Normal Ranges: AoV Vmax:                1.01 m/s (<=1.7m/s) AoV Peak P.1 mmHg (<20mmHg) AoV Mean P.0 mmHg (1.7-11.5mmHg) LVOT Max Dxa:            0.79 m/s (<=1.1m/s) AoV VTI:                 16.10 cm (18-25cm) LVOT VTI:                14.80 cm LVOT Diameter:           2.10 cm  (1.8-2.4cm) AoV Area, VTI:           3.18 cm2 (2.5-5.5cm2) AoV Area,Vmax:           2.71 cm2 (2.5-4.5cm2) AoV Dimensionless Index: 0.92  TRICUSPID VALVE/RVSP:          Normal Ranges: Peak TR Velocity:     2.55 m/s RV Syst Pressure:     29 mmHg  (< 30mmHg)  [id number] Brian Ramirez MD Electronically signed on 2025 at 2:53:38 PM  ** Final **           ASSESSMENT AND PLAN    89 y.o. female with PMH of CKD stage 4, Afib on AC, CHF, HL, HTN, empyema, smoking and biliary dyskinasia recent elective laparoscopic cholecystectomy on 4/10 presenting with  abnormal labs as OP, found to have MISTY and hyponatremia     MISTY on CKD, baseline creatinine of 0.9 until 2023, in the 1.6-1.7 since, was 2.59 on admission associated with high BUN, resolved, then again creatinine on the rise  UA pending  Urine Na 16 on admission  Prerenal initially, was on torsemide 20 mg every day and aldactone 25 mg every day before admission     Hyponatremia in setting of MISTY/impaired free water clearance, resolved     HTN, severe TR as above, on diuretics, cardizem and toprol before admission     Volume status- improved     Anemia, mild, chronic     L renal cyst        Plan  - resolved MISTY, BP remains high, restarted on norvasc and toprol  - encourage po as tolerated, ivf today  - restarted on lower dose sinemet  - please record IOs  - daily lytes and replete for K>4 and mag>2, daily kdur  - ECHO as above with severe TR but RVSP wnl  - avoid hypotension and nephrotoxins  - do not resume ditropan on discharge     MARS reviewed, dose for GFR<60    D/w  at bedside        Thank you for the opportunity to assist in the care of this patient, please call with questions  Danielle Mcintosh MD PhD

## 2025-04-21 NOTE — CARE PLAN
The patient's goals for the shift include  pain control.     The clinical goals for the shift include maintain comfort and safety    Over the shift, the patient did  make progress toward the following goals.

## 2025-04-21 NOTE — PROGRESS NOTES
Subjective   Patient is much improved today, still slow when answering some questions but was able to talk and answer many questions, remote and recent memories, she was accompanied by her , then with her daughter and son in law, she was more awake and answering almost all questions. Denies any f/c, n/v, new onset headaches, vision changes, chest pain, dyspnea, abdominal pain, changes in BM, or urinary changes.    Objective   Vitals:    04/20/25 1138   BP: 133/74   Pulse: 70   Resp: 20   Temp: 35.5 °C (95.9 °F)   SpO2: 94%       Physical Exam:   Constitutional: awake, remains confused,  no acute distress  ENMT: mucous membranes moist, conjunctivae clear, throat slightly hyperemic and inflamed.   Head/Neck: normocephalic, atraumatic; supple, trachea midline  Respiratory/Thorax: patent airways, CTAB; no wheezes, rales, or rhonchi  Cardiovascular: irregularly irregular rate and rhythm, no murmur appreciated  Gastrointestinal: soft, nondistended, non-tender, bowel sounds appreciated  : Peralta in place draining clear yellow urine  Extremities: palpable peripheral pulses, no edema  Neurological: AO x2-3, no focal deficits  Psychological:  more awake and but remains confused.   Skin: warm and dry    Scheduled Medications:   amLODIPine, 5 mg, oral, Daily  atorvastatin, 20 mg, oral, Daily  carbidopa-levodopa, 1 tablet, oral, BID  cefTRIAXone, 1 g, intravenous, q24h  cholecalciferol, 25 mcg, oral, BID  cyanocobalamin, 1,000 mcg, oral, Daily  DULoxetine, 30 mg, oral, BID  melatonin, 3 mg, oral, Nightly  metoprolol succinate XL, 50 mg, oral, Daily  nystatin, 4 mL, Swish & Swallow, TID  polyethylene glycol, 17 g, oral, Daily  pregabalin, 25 mg, oral, Nightly  rivaroxaban, 10 mg, oral, Daily  sennosides-docusate sodium, 2 tablet, oral, BID       Continuous Medications:         PRN Medications:   PRN medications: acetaminophen, bisacodyl, dextrose, dextrose, glucagon, glucagon, HYDROmorphone, hydrOXYzine HCL,  ipratropium-albuteroL, labetaloL, ondansetron **OR** ondansetron, phenoL    Assessment/Plan   Shreya Acuña is a 89 y.o. female     Acute encephalopathy, ?metabolic vs hospital delirium (improving)  Acute hypoxic respiratory failure  HTN urgency/emergency   MISTY on CKD, resolved  Urinary retention s/p Peralta  Abdominal distention, improved  Biliary dyskinesia s/p laparoscopic cholecystectomy on 4/10/25     Permanent Afib, on Xarelto  HTN, HLD  TAMMY, noncompliant on CPAP  Parkinsonism, on Sinemet  Tricuspid regurgitation, moderate to severe    Patient is much improved, almost back to her baseline, she is answering questions appropriately or both remote and recent memories.  Neurology consulted; appreciate recs.  Concern for possible Sinemet withdrawal, will resume BID, patient started taking oral medications.   CT head was obtained,no abnormality detected,  ABG with slight alkalosis and decreased PO2, Chest xray with congestion   MRI ordered, negative for acute pathology.    She is now taking oral medications, advanced her diet  Continue Ceftriaxone, SP 3 days of doxycycline for coverage of possible PNA.  Remains on 2L NC this AM. Wean O2 as tolerated.  Nephrology following for MISTY on CKD, which has resolved with IVF and Peralta placement.  Seen by general surgery for abdominal distension with recent lap letha. CT A/P shows postop changes without complications. Tolerating regular diet without issue, but switch for her sore throat.    - Discontinue Cardizem upon DC given relatively low HRs. Pt in permanent afib with controlled rate. Had actually been advised by Dr. Barnhart to discontinue this in 3/2025.  - Discussed stopping home Oxybutynin upon DC given urinary retention.    DVT PPX: Xarelto  Code status: FULL    Dispo: Patient is greatly improving, possible discharge tomorrow if continues to improve and is hemodynamically stable.    Gibran Gauthier, DO  Hospital Medicine

## 2025-04-21 NOTE — CARE PLAN
The patient's goals for the shift include      The clinical goals for the shift include safety and comfort    Over the shift, the patient did not make progress toward the following goals. Barriers to progression include . Recommendations to address these barriers include .    Problem: Skin  Goal: Promote/optimize nutrition  Outcome: Not Progressing  Flowsheets (Taken 4/21/2025 8031)  Promote/optimize nutrition:   Assist with feeding   Consume > 50% meals/supplements

## 2025-04-22 ENCOUNTER — DOCUMENTATION (OUTPATIENT)
Dept: HOME HEALTH SERVICES | Facility: HOME HEALTH | Age: 89
End: 2025-04-22
Payer: MEDICARE

## 2025-04-22 NOTE — DISCHARGE SUMMARY
Discharge Diagnosis  Acute kidney injury superimposed on CKD           Issues Requiring Follow-Up  Follow up with urology in 1-2 weeks for outpatient voiding trial.  Hold torsemide and Aldactone temporarily until blood pressure stabilizes, stable blood pressure is systolic 120-140, and diastolic blood pressure between 60-90, and her appetite improves, you may resume the medicine.  Stop diltiazem 120 mg daily.  Continue to monitor blood pressure, blood pressures consistently high please reach out to your PCP or cardiologist.   Sinemet is changed into  1 tablet by mouth 2 times daily, and Lyrica changed to 25 mg by mouth daily at bedtime.  If symptoms recurs then back to the ED    Discharge Meds     Medication List      PAUSE taking these medications     spironolactone 25 mg tablet; Wait to take this until your doctor or   other care provider tells you to start again.; Until the patient is eating   and drinking normally; Commonly known as: Aldactone; Take 1 tablet (25 mg)   by mouth once daily.; What changed: when to take this   torsemide 20 mg tablet; Wait to take this until your doctor or other   care provider tells you to start again.; Until the patient is eating and   drinking normally; Commonly known as: Demadex; Take 1 tablet (20 mg) by   mouth once daily.; What changed: when to take this     START taking these medications     amLODIPine 5 mg tablet; Commonly known as: Norvasc; Take 1 tablet (5 mg)   by mouth once daily.; Start taking on: April 22, 2025   amoxicillin-clavulanate 875-125 mg tablet; Commonly known as: Augmentin;   Take 1 tablet (875 mg) by mouth 2 times a day for 3 days. Do not start   before April 22, 2025.; Start taking on: April 22, 2025     CHANGE how you take these medications     carbidopa-levodopa  mg tablet; Commonly known as: Sinemet; Take 1   tablet by mouth 2 times a day.; What changed: how much to take, when to   take this   pregabalin 25 mg capsule; Commonly known as:  Lyrica; Take 1 capsule (25   mg) by mouth once daily at bedtime.; What changed: medication strength,   how much to take, when to take this     CONTINUE taking these medications     acetaminophen 500 mg tablet; Commonly known as: Tylenol   albuterol 90 mcg/actuation inhaler; Inhale 2 puffs every 4 hours if   needed for wheezing.   atorvastatin 20 mg tablet; Commonly known as: Lipitor   cholecalciferol 25 mcg (1,000 units) capsule; Commonly known as: Vitamin   D-3   cyanocobalamin 1,000 mcg tablet; Commonly known as: Vitamin B-12   DULoxetine 30 mg DR capsule; Commonly known as: Cymbalta; Take 1 capsule   (30 mg) by mouth 2 times a day.   Gemtesa 75 mg tablet; Generic drug: vibegron   metoprolol succinate XL 50 mg 24 hr tablet; Commonly known as: Toprol-XL   oxyCODONE 5 mg immediate release tablet; Commonly known as: Roxicodone;   Take 1 tablet (5 mg) by mouth every 6 hours if needed for severe pain (7 -   10).   PreserVision AREDS 2 Plus  mcg-15 mcg- 5 mg-1 mg capsule; Generic   drug: mv-min-FA-vit K-lutein-zeaxant   rivaroxaban 10 mg tablet; Commonly known as: Xarelto; Take 1 tablet (10   mg) by mouth once daily. Resume taking on 4/14     STOP taking these medications     DILT- mg 24 hr capsule; Generic drug: dilTIAZem XR       Test Results Pending At Discharge  Pending Labs       No current pending labs.            Hospital Course   Shreya Acuña is a 89 y.o. female PMHx HTN, HLD, PAF on lower dose of Xarelto, CAD, biliary dyskinesia s/p laparoscopic cholecystectomy 4/11/25 presented to [facility] ED by direction of her surgeon for abnormal labs.  Her creatinine is up to 2.59, baseline is 1.62.  Patient had surgery to remove gallbladder yesterday, was discharged home.  She  was confused and  is present to provide history.  Since surgery she has been eating and drinking but has not had a BM or passed much gas.  Her abdomen looks more distended.  He also notices she has not urinated much.  Patient has  been taking oxycodone every 6 hours for pain and then also tylenol in between.    Patient was admitted and treated yesterday for MISTY on CKD.  CT AP was ordered to rule out SBO/ileus.  Torsemide and Aldactone were held due to MISTY.  Nephrology was consulted, patient was also having urinary retention.  Peralta was inserted, and urology will be following up for trial of void.  The patient was confused and encephalopathic for an extended period in the hospital.  There was no clear etiology, CT was negative as well as MRI.  She was not talking to anybody and was stating most of the time, and was also lethargic and hard to arouse during exam..  She was started on ceftriaxone and doxycycline for possible CAP and also possible pharyngitis/tonsillitis as she was complaining of her throat.  Neurology was consulted.  Patient started to improve with resuming her Parkinson's medications in addition to antibiotics.  She was noted that she has   diastolic CHF, she was diuresed as needed.  Improvement of her mental state was intermittent and happening over a few days.  She started remembering her kids and grandkids as well as her  and started to answer questions appropriately.  Her symptoms are likely related to delirium.  PT/OT evaluated her and the patient qualified for SNF, but with mutual agreement between the provider team and the family, the patient may have developed more confusion if she was to SNF.  Home health care has been ordered for her, and a referral to healthy at home program was also sent.  She will be discharged home in stable condition    Pt is stable and medically optimized for discharge and was advised to follow up with PCP and continue home meds. Discussed diagnosis and treatment in detail wit her  and answered all questions. Patient expressed understanding and agreed with plan.    Follow up with urology in 1-2 weeks for outpatient voiding trial.  Hold torsemide and Aldactone temporarily until blood  pressure stabilizes, stable blood pressure is systolic 120-140, and diastolic blood pressure between 60-90, and her appetite improves, you may resume the medicine.  Stop diltiazem 120 mg daily.  Continue to monitor blood pressure, blood pressures consistently high please reach out to your PCP or cardiologist.   Sinemet is changed into  1 tablet by mouth 2 times daily, and Lyrica changed to 25 mg by mouth daily at bedtime.  If symptoms recurs then back to the ED    Pertinent Physical Exam At Time of Discharge  Physical Exam  Constitutional:       Appearance: Normal appearance.   HENT:      Head: Normocephalic and atraumatic.      Right Ear: Tympanic membrane normal.      Left Ear: Tympanic membrane normal.      Nose: Nose normal.      Mouth/Throat:      Mouth: Mucous membranes are moist.      Pharynx: Oropharynx is clear.   Eyes:      General: No scleral icterus.        Right eye: No discharge.         Left eye: No discharge.      Extraocular Movements: Extraocular movements intact.      Conjunctiva/sclera: Conjunctivae normal.      Pupils: Pupils are equal, round, and reactive to light.   Neck:      Vascular: No carotid bruit.   Cardiovascular:      Rate and Rhythm: Normal rate and regular rhythm.      Pulses: Normal pulses.      Heart sounds: No murmur heard.     No friction rub. No gallop.   Abdominal:      General: Bowel sounds are normal. There is no distension.      Palpations: Abdomen is soft. There is no mass.      Tenderness: There is no abdominal tenderness. There is no right CVA tenderness, left CVA tenderness, guarding or rebound.      Hernia: No hernia is present.   Musculoskeletal:         General: No swelling, tenderness, deformity or signs of injury.      Cervical back: Normal range of motion and neck supple. No rigidity or tenderness.      Right lower leg: No edema.      Left lower leg: No edema.   Lymphadenopathy:      Cervical: No cervical adenopathy.   Skin:     General: Skin is warm.       Coloration: Skin is not jaundiced or pale.      Findings: No bruising, erythema, lesion or rash.   Neurological:      General: No focal deficit present.      Mental Status: She is alert and oriented to person, place, and time. Mental status is at baseline.   Psychiatric:         Mood and Affect: Mood normal.         Behavior: Behavior normal.         Thought Content: Thought content normal.         Judgment: Judgment normal.         Outpatient Follow-Up  Future Appointments   Date Time Provider Department [place]   5/8/2025  3:00 PM TAO [facility] [id number] [place]   5/9/2025  9:00 AM PAR NEURODG EMG EQUIP PARNEUD [place]   5/12/2025  2:00 PM Jose Luis Quiñones MD PAROPCPNM [place]   7/15/2025  4:00 PM Erik Spencer MD [id number] [facility]   9/22/2025  2:30 PM Daryn Barnhart MD [id number] [facility] spent >30 minutes in professional care and coordination of the patient.     Gibran Gauthier, DO

## 2025-04-22 NOTE — HH CARE COORDINATION
Home Care received a Referral for Nursing, Physical Therapy, and Occupational Therapy. We have processed the referral for a Start of Care on 4/23-4/24.     If you have any questions or concerns, please feel free to contact us at [phone]. Follow the prompts, enter your five digit zip code, and you will be directed to your care team on WEST 3.

## 2025-04-23 ENCOUNTER — HOME CARE VISIT (OUTPATIENT)
Dept: HOME HEALTH SERVICES | Facility: HOME HEALTH | Age: 89
End: 2025-04-23
Payer: MEDICARE

## 2025-04-23 VITALS
TEMPERATURE: 98.2 F | OXYGEN SATURATION: 95 % | DIASTOLIC BLOOD PRESSURE: 80 MMHG | HEART RATE: 64 BPM | SYSTOLIC BLOOD PRESSURE: 122 MMHG | RESPIRATION RATE: 18 BRPM

## 2025-04-23 PROCEDURE — G0299 HHS/HOSPICE OF RN EA 15 MIN: HCPCS | Mod: HHH

## 2025-04-23 PROCEDURE — 169592 NO-PAY CLAIM PROCEDURE

## 2025-04-23 PROCEDURE — G0152 HHCP-SERV OF OT,EA 15 MIN: HCPCS | Mod: HHH

## 2025-04-23 ASSESSMENT — ENCOUNTER SYMPTOMS
LAST BOWEL MOVEMENT: 67317
DENIES PAIN: 1
DENIES PAIN: 1
DYSPNEA ACTIVITY LEVEL: AFTER AMBULATING MORE THAN 20 FT
APPETITE LEVEL: GOOD
PERSON REPORTING PAIN: PATIENT
SHORTNESS OF BREATH: 1
PERSON REPORTING PAIN: PATIENT

## 2025-04-23 ASSESSMENT — ACTIVITIES OF DAILY LIVING (ADL)
FEEDING: MODERATE ASSIST
GROOMING_CURRENT_FUNCTION: MODERATE ASSIST
DRESSING_UB_CURRENT_FUNCTION: MODERATE ASSIST
ENTERING_EXITING_HOME: DEPENDENT
AMBULATION ASSISTANCE: NON-AMBULATORY
TOILETING: 1
LAUNDRY: DEPENDENT
FEEDING ASSESSED: 1
PREPARING MEALS: DEPENDENT
CURRENT_FUNCTION: MAXIMUM ASSIST
PHYSICAL TRANSFERS ASSESSED: 1
LAUNDRY ASSESSED: 1
BATHING ASSESSED: 1
GROOMING ASSESSED: 1
DRESSING_LB_CURRENT_FUNCTION: MAXIMUM ASSIST
BATHING_CURRENT_FUNCTION: MAXIMUM ASSIST
TOILETING: MODERATE ASSIST
OASIS_M1830: 03

## 2025-04-23 ASSESSMENT — LIFESTYLE VARIABLES: SMOKING_STATUS: 0

## 2025-04-23 NOTE — Clinical Note
pts  is wondering about transportation services. would you mind putting in referral for MSW? or fax us an order at [phone]..thank you

## 2025-04-23 NOTE — CASE COMMUNICATION
OT evaluation completed 4.23.25. Patient was sleeping most of the visit-spouse states it is probably due to antibiotics. Plan to see 1w1, 2w3. Share case with Abbie.

## 2025-04-23 NOTE — HOME HEALTH
Patient was recently hospitalized with MISTY on CKD on 4.12. She had a lap cholecystecomy on 4.11.25 and developed confusion/abnormal labs. She also had urinary retention and has a Peralta catheter. Patient was sleeping most of the visit, spouse states she is probably fatigued due to being on the antibiotics.     Lives with spouse in a ranch home with 1 step to enter the front door, ramp to enter the back door. Stays on 1st floor with hospital bed in the living room. They have a full bathroom with a walk-in shower.     Medical history of HTN, HLD, PAF, CAD, biliary dyskinesia, emphysema. She had shaking in her hands, and is treated with carbidopa-levidopa.    Patient has a hospital bed, wc, lift chair, shower chair, multiple grab bars in the bathroom, handheld shower.     Prior to hospitalization, she was non ambulatory. She has caregiver assistance M-F 10-2. She required assistance with showering, dressing, toileting, and needed set up for grooming. Patient was able to feed herself independently. Spouse/caregiver performed IADLs. Patient didn't drive.     UE AROM and strength WNL. Patient/spouse in agreement with OT POC. Plan to see 1w1, 2w3. Share case with MICHELLE Cali.

## 2025-04-23 NOTE — HOME HEALTH
pt had gallbladder removed a couple of weeks ago. went well, afterwards pt started to have kidney issues. after outpatient blood draw, md called and stated he wanted her to be seen in ed. was admtitted for corine. was dced to home. has hospital bed. has not walked independently for 3 years. has manual wheelchair, last fall was 4 months ago. fell going from toilet to shower, tried to get up on her own and fell. has not had any falls since. pt has an aide that comes daily for 4 hours. pt needs help w/ ADLs and feedings. she is able to feed herself if food is prepped. has hospital bed in the living room. can transfer to lift chair as well. denies any pain, does has some discomfort in her neck. aide says its been stiff. pt states ice helps the pain. i also encouraged repositioning. pt was using purwick at night prior to hospital stay. currently has a escudero. draining and patent. urine is garland. reinforced emptying before canister is full. educated them on what to do if there are any complications such as clogs. no issues noted w/ catheter. f/u appt w/ urology may 5. last bm was passed yesterday. appetite is good, she has a 1/5L fluid restriction. otherwise regular diet. incision unobserved at this time.  states its covered. no issues or pain noted w/ incision. meds and allergies reviewed and updated as needed with , aide, and pt, no issues found. pt and aide fill up pill organizer weekly. vitals wnl. pt will be evaled by PT and OT as well. will be seen by nursing until catheter is removed.

## 2025-04-24 ENCOUNTER — HOME CARE VISIT (OUTPATIENT)
Dept: HOME HEALTH SERVICES | Facility: HOME HEALTH | Age: 89
End: 2025-04-24
Payer: MEDICARE

## 2025-04-24 ENCOUNTER — PATIENT OUTREACH (OUTPATIENT)
Dept: CARE COORDINATION | Age: 89
End: 2025-04-24
Payer: MEDICARE

## 2025-04-24 VITALS — RESPIRATION RATE: 20 BRPM

## 2025-04-24 LAB
LABORATORY COMMENT REPORT: NORMAL
PATH REPORT.FINAL DX SPEC: NORMAL
PATH REPORT.GROSS SPEC: NORMAL
PATH REPORT.RELEVANT HX SPEC: NORMAL
PATH REPORT.TOTAL CANCER: NORMAL

## 2025-04-24 PROCEDURE — G0151 HHCP-SERV OF PT,EA 15 MIN: HCPCS | Mod: HHH

## 2025-04-24 SDOH — HEALTH STABILITY: PHYSICAL HEALTH: EXERCISE ACTIVITY: ANKLE PUMPS, QUAD AND GLUT SETS, HEEL SLIDES AND ABD, SAQ BRIDGING WITH PILLOW UNDER KNEES

## 2025-04-24 SDOH — HEALTH STABILITY: PHYSICAL HEALTH: EXERCISE ACTIVITIES SETS: 1

## 2025-04-24 SDOH — HEALTH STABILITY: PHYSICAL HEALTH: PHYSICAL EXERCISE: 5

## 2025-04-24 SDOH — HEALTH STABILITY: PHYSICAL HEALTH: EXERCISE TYPE: WHEP

## 2025-04-24 SDOH — HEALTH STABILITY: PHYSICAL HEALTH: PHYSICAL EXERCISE: SUPINE

## 2025-04-24 ASSESSMENT — ENCOUNTER SYMPTOMS
PERSON REPORTING PAIN: PATIENT
DENIES PAIN: 1

## 2025-04-28 ENCOUNTER — HOME CARE VISIT (OUTPATIENT)
Dept: HOME HEALTH SERVICES | Facility: HOME HEALTH | Age: 89
End: 2025-04-28
Payer: MEDICARE

## 2025-04-28 VITALS
HEART RATE: 73 BPM | DIASTOLIC BLOOD PRESSURE: 74 MMHG | TEMPERATURE: 97.2 F | OXYGEN SATURATION: 96 % | SYSTOLIC BLOOD PRESSURE: 138 MMHG

## 2025-04-28 PROCEDURE — G0158 HHC OT ASSISTANT EA 15: HCPCS | Mod: CO,HHH

## 2025-04-28 ASSESSMENT — ENCOUNTER SYMPTOMS
PERSON REPORTING PAIN: PATIENT
DENIES PAIN: 1

## 2025-04-30 ENCOUNTER — HOME CARE VISIT (OUTPATIENT)
Dept: HOME HEALTH SERVICES | Facility: HOME HEALTH | Age: 89
End: 2025-04-30
Payer: MEDICARE

## 2025-04-30 VITALS — DIASTOLIC BLOOD PRESSURE: 77 MMHG | SYSTOLIC BLOOD PRESSURE: 133 MMHG | HEART RATE: 69 BPM

## 2025-04-30 PROCEDURE — G0158 HHC OT ASSISTANT EA 15: HCPCS | Mod: CO,HHH

## 2025-04-30 PROCEDURE — G0157 HHC PT ASSISTANT EA 15: HCPCS | Mod: CQ,HHH

## 2025-04-30 ASSESSMENT — ENCOUNTER SYMPTOMS
PAIN LOCATION - PAIN SEVERITY: 6/10
PAIN LOCATION: RIGHT KNEE
PAIN LOCATION - PAIN SEVERITY: 7/10
DENIES PAIN: 1
PAIN: 1
PAIN LOCATION: LEFT KNEE
PERSON REPORTING PAIN: PATIENT
PERSON REPORTING PAIN: PATIENT

## 2025-05-02 ENCOUNTER — HOME CARE VISIT (OUTPATIENT)
Dept: HOME HEALTH SERVICES | Facility: HOME HEALTH | Age: 89
End: 2025-05-02
Payer: MEDICARE

## 2025-05-02 VITALS
RESPIRATION RATE: 18 BRPM | HEART RATE: 86 BPM | SYSTOLIC BLOOD PRESSURE: 110 MMHG | DIASTOLIC BLOOD PRESSURE: 80 MMHG | OXYGEN SATURATION: 99 % | TEMPERATURE: 97.3 F

## 2025-05-02 PROCEDURE — G0299 HHS/HOSPICE OF RN EA 15 MIN: HCPCS | Mod: HHH

## 2025-05-02 PROCEDURE — G0157 HHC PT ASSISTANT EA 15: HCPCS | Mod: CQ,HHH

## 2025-05-02 ASSESSMENT — ENCOUNTER SYMPTOMS
APPETITE LEVEL: FAIR
DENIES PAIN: 1
PERSON REPORTING PAIN: PATIENT
DENIES PAIN: 1
LAST BOWEL MOVEMENT: 67326
PERSON REPORTING PAIN: PATIENT

## 2025-05-02 NOTE — HOME HEALTH
pt seen for routine visit. pt is up and out of bed at time of arrival. aide has just given her a shower. denies any headaches, dizziness, or pain. ambulates w/ rollator and help of aide or . denies recent falls. denies sob, lungs are clear. passing bm regularly. last bm passed yesterday. urine is clear and yellow, draining from catheter. f/u w/ urology on monday for catheter removal.  reports appetite has only been fair. pt is on a regular diet. no changes in meds noted. vitals wnl. will return for nursing dc next week

## 2025-05-03 NOTE — PROGRESS NOTES
Subjective   Patient ID: Shreya Acuña is a 89 y.o. female who presents for AN EPISODE OF URINARY RETENTION AFTER UNDERGOING A CHOLECYSTECTOMY.  PT DEVELOPED A POST OP INFECTION  THAT OCCURED  4-12-25.   HPI:  ASSESSMENT / PLAN  A:  URINARY RETENTION AFTER UNDERGOING A CHOLECYSTECTOMY.  PATHOPHYSIOLOGY OF THE ABOVE DISCUSSED IN DETAIL   ALL QUESTIONS ANSWERED   PT S/P PTNS ( PERCUTANEOUS TIBIAL NERVE STIMULATION )   P:  STOP GEMTESA  PT TO  REMOVE THE JORDAN AT 8 AM ON WEDS  F/U WEDS AFTERNOON TO CK A PVR  Dhaval Roberts MD 05/03/25 4:42 PM

## 2025-05-05 ENCOUNTER — APPOINTMENT (OUTPATIENT)
Age: 89
End: 2025-05-05
Payer: MEDICARE

## 2025-05-05 ENCOUNTER — HOME CARE VISIT (OUTPATIENT)
Dept: HOME HEALTH SERVICES | Facility: HOME HEALTH | Age: 89
End: 2025-05-05
Payer: MEDICARE

## 2025-05-05 VITALS — HEART RATE: 63 BPM | SYSTOLIC BLOOD PRESSURE: 108 MMHG | DIASTOLIC BLOOD PRESSURE: 63 MMHG

## 2025-05-05 VITALS — SYSTOLIC BLOOD PRESSURE: 120 MMHG | DIASTOLIC BLOOD PRESSURE: 59 MMHG | HEART RATE: 70 BPM

## 2025-05-05 DIAGNOSIS — R33.9 URINARY RETENTION: ICD-10-CM

## 2025-05-05 PROCEDURE — G0157 HHC PT ASSISTANT EA 15: HCPCS | Mod: CQ,HHH

## 2025-05-05 PROCEDURE — 1111F DSCHRG MED/CURRENT MED MERGE: CPT | Performed by: UROLOGY

## 2025-05-05 PROCEDURE — 1159F MED LIST DOCD IN RCRD: CPT | Performed by: UROLOGY

## 2025-05-05 PROCEDURE — G0158 HHC OT ASSISTANT EA 15: HCPCS | Mod: CO,HHH

## 2025-05-05 PROCEDURE — 3074F SYST BP LT 130 MM HG: CPT | Performed by: UROLOGY

## 2025-05-05 PROCEDURE — 99212 OFFICE O/P EST SF 10 MIN: CPT | Performed by: UROLOGY

## 2025-05-05 PROCEDURE — 1036F TOBACCO NON-USER: CPT | Performed by: UROLOGY

## 2025-05-05 PROCEDURE — 3078F DIAST BP <80 MM HG: CPT | Performed by: UROLOGY

## 2025-05-05 PROCEDURE — 1160F RVW MEDS BY RX/DR IN RCRD: CPT | Performed by: UROLOGY

## 2025-05-05 ASSESSMENT — ENCOUNTER SYMPTOMS
DENIES PAIN: 1
PERSON REPORTING PAIN: PATIENT
PAIN LOCATION - PAIN SEVERITY: 5/10
PERSON REPORTING PAIN: PATIENT
PAIN LOCATION: RIGHT KNEE
PAIN LOCATION - PAIN SEVERITY: 6/10
PAIN LOCATION: LEFT KNEE
PAIN: 1

## 2025-05-05 NOTE — LETTER
May 9, 2025     Casa Asencio MD  [address]  [address]  [facility] [address]    Patient: Shreya Acuña   YOB: 1935   Date of Visit: 5/5/2025       Dear Dr. Casa Asencio MD:    Thank you for referring Shreya Acuña to me for evaluation. Below are my notes for this consultation.  If you have questions, please do not hesitate to call me. I look forward to following your patient along with you.       Sincerely,     Dhaval Roberts MD      CC: No Recipients  ______________________________________________________________________________________    Subjective  Patient ID: Shreya Acuña is a 89 y.o. female who presents for AN EPISODE OF URINARY RETENTION AFTER UNDERGOING A CHOLECYSTECTOMY.  PT DEVELOPED A POST OP INFECTION  THAT OCCURED  4-12-25.   HPI:  ASSESSMENT / PLAN  A:  URINARY RETENTION AFTER UNDERGOING A CHOLECYSTECTOMY.  PATHOPHYSIOLOGY OF THE ABOVE DISCUSSED IN DETAIL   ALL QUESTIONS ANSWERED   PT S/P PTNS ( PERCUTANEOUS TIBIAL NERVE STIMULATION )   P:  STOP GEMTESA  PT TO  REMOVE THE JORDAN AT 8 AM ON WEDS  F/U WEDS AFTERNOON TO CK A PVR  Dhaval Roberts MD 05/03/25 4:42 PM

## 2025-05-07 ENCOUNTER — HOME CARE VISIT (OUTPATIENT)
Dept: HOME HEALTH SERVICES | Facility: HOME HEALTH | Age: 89
End: 2025-05-07
Payer: MEDICARE

## 2025-05-07 ENCOUNTER — OFFICE VISIT (OUTPATIENT)
Age: 89
End: 2025-05-07
Payer: MEDICARE

## 2025-05-07 VITALS — SYSTOLIC BLOOD PRESSURE: 136 MMHG | HEART RATE: 72 BPM | DIASTOLIC BLOOD PRESSURE: 85 MMHG

## 2025-05-07 VITALS — SYSTOLIC BLOOD PRESSURE: 136 MMHG | HEART RATE: 72 BPM | DIASTOLIC BLOOD PRESSURE: 85 MMHG | OXYGEN SATURATION: 96 %

## 2025-05-07 DIAGNOSIS — R33.9 URINARY RETENTION: ICD-10-CM

## 2025-05-07 PROCEDURE — 1036F TOBACCO NON-USER: CPT | Performed by: UROLOGY

## 2025-05-07 PROCEDURE — 1160F RVW MEDS BY RX/DR IN RCRD: CPT | Performed by: UROLOGY

## 2025-05-07 PROCEDURE — 51798 US URINE CAPACITY MEASURE: CPT | Performed by: UROLOGY

## 2025-05-07 PROCEDURE — 1111F DSCHRG MED/CURRENT MED MERGE: CPT | Performed by: UROLOGY

## 2025-05-07 PROCEDURE — 1159F MED LIST DOCD IN RCRD: CPT | Performed by: UROLOGY

## 2025-05-07 PROCEDURE — 3079F DIAST BP 80-89 MM HG: CPT | Performed by: UROLOGY

## 2025-05-07 PROCEDURE — 99212 OFFICE O/P EST SF 10 MIN: CPT | Performed by: UROLOGY

## 2025-05-07 PROCEDURE — G0158 HHC OT ASSISTANT EA 15: HCPCS | Mod: CO,HHH

## 2025-05-07 PROCEDURE — 3075F SYST BP GE 130 - 139MM HG: CPT | Performed by: UROLOGY

## 2025-05-07 ASSESSMENT — ENCOUNTER SYMPTOMS
DENIES PAIN: 1
PERSON REPORTING PAIN: PATIENT

## 2025-05-07 NOTE — PROGRESS NOTES
Subjective   Patient ID: Shreya Acuña is a 89 y.o. female who presents for pvr check.  PT REMOVED HER JORDAN AT 8 AM  THIS MORNING . PT HAS VIIDED TWO TIMES THIS AM.   HPI:  Are you experiencing:  Burning on urination --NO  Pain on urination  -- NO  Urinary frequency --NO  Urinary urgency --YES  Urge incontinence --YES  Urinary stress incontinence  -- NO  Hematuria -- NO    PT VOIDED INTO HER PULL UP  PVR--32 ML    A:  URINARY RETENTION AFTER UNDERGOING A CHOLECYSTECTOMY.   JORDAN REMOVED THIS AM-- PT IS VOIDING WITHOUT DIFFICULTY   PT S/P PTNS ( PERCUTANEOUS TIBIAL NERVE STIMULATION ) FOR URINARY URGE INCONTINENCE   P:  F/U ON AN OPEN DOOR POLICY-- WILL WORK THE PT IN ANYTIME SHE CALLS  PT TO STAY OFF THE BadenTES FOR NOW  Dhaval Roberts MD 05/07/25 1:46 PM

## 2025-05-07 NOTE — LETTER
May 9, 2025     Casa Asencio MD  [address]  [address]  [facility] [address]    Patient: Shreya Acuña   YOB: 1935   Date of Visit: 5/7/2025       Dear Dr. Casa Asencio MD:    Thank you for referring Shreya Acuña to me for evaluation. Below are my notes for this consultation.  If you have questions, please do not hesitate to call me. I look forward to following your patient along with you.       Sincerely,     Dhaval Roberts MD      CC: No Recipients  ______________________________________________________________________________________    Subjective  Patient ID: Shreya Acuña is a 89 y.o. female who presents for pvr check.  PT REMOVED HER JORDAN AT 8 AM  THIS MORNING . PT HAS VIIDED TWO TIMES THIS AM.   HPI:  Are you experiencing:  Burning on urination --NO  Pain on urination  -- NO  Urinary frequency --NO  Urinary urgency --YES  Urge incontinence --YES  Urinary stress incontinence  -- NO  Hematuria -- NO    PT VOIDED INTO HER PULL UP  PVR--32 ML    A:  URINARY RETENTION AFTER UNDERGOING A CHOLECYSTECTOMY.   JORDAN REMOVED THIS AM-- PT IS VOIDING WITHOUT DIFFICULTY   PT S/P PTNS ( PERCUTANEOUS TIBIAL NERVE STIMULATION ) FOR URINARY URGE INCONTINENCE   P:  F/U ON AN OPEN DOOR POLICY-- WILL WORK THE PT IN ANYTIME SHE CALLS  PT TO STAY OFF THE GEMTESA FOR NOW  Dhaval Roberts MD 05/07/25 1:46 PM

## 2025-05-08 ENCOUNTER — APPOINTMENT (OUTPATIENT)
Dept: UROLOGY | Facility: CLINIC | Age: 89
End: 2025-05-08
Payer: MEDICARE

## 2025-05-08 ENCOUNTER — OFFICE VISIT (OUTPATIENT)
Dept: SURGERY | Facility: CLINIC | Age: 89
End: 2025-05-08
Payer: MEDICARE

## 2025-05-08 VITALS
SYSTOLIC BLOOD PRESSURE: 122 MMHG | HEIGHT: 63 IN | DIASTOLIC BLOOD PRESSURE: 75 MMHG | TEMPERATURE: 97.6 F | HEART RATE: 61 BPM | RESPIRATION RATE: 17 BRPM | BODY MASS INDEX: 42.7 KG/M2 | WEIGHT: 241 LBS | OXYGEN SATURATION: 97 %

## 2025-05-08 DIAGNOSIS — Z90.49 S/P LAPAROSCOPIC CHOLECYSTECTOMY: ICD-10-CM

## 2025-05-08 DIAGNOSIS — Z09 POSTOPERATIVE EXAMINATION: ICD-10-CM

## 2025-05-08 PROBLEM — K82.8 BILIARY DYSKINESIA: Status: RESOLVED | Noted: 2025-03-12 | Resolved: 2025-05-08

## 2025-05-08 PROBLEM — G56.03 BILATERAL CARPAL TUNNEL SYNDROME: Status: ACTIVE | Noted: 2025-03-28

## 2025-05-08 PROBLEM — K80.20 CHOLELITHIASIS: Status: RESOLVED | Noted: 2025-01-22 | Resolved: 2025-05-08

## 2025-05-08 PROCEDURE — 1036F TOBACCO NON-USER: CPT | Performed by: SURGERY

## 2025-05-08 PROCEDURE — 1111F DSCHRG MED/CURRENT MED MERGE: CPT | Performed by: SURGERY

## 2025-05-08 PROCEDURE — 99024 POSTOP FOLLOW-UP VISIT: CPT | Performed by: SURGERY

## 2025-05-08 PROCEDURE — 3074F SYST BP LT 130 MM HG: CPT | Performed by: SURGERY

## 2025-05-08 PROCEDURE — 1126F AMNT PAIN NOTED NONE PRSNT: CPT | Performed by: SURGERY

## 2025-05-08 PROCEDURE — 1159F MED LIST DOCD IN RCRD: CPT | Performed by: SURGERY

## 2025-05-08 PROCEDURE — 3078F DIAST BP <80 MM HG: CPT | Performed by: SURGERY

## 2025-05-08 RX ORDER — LOSARTAN POTASSIUM 100 MG/1
TABLET ORAL
COMMUNITY
Start: 2025-05-07

## 2025-05-08 ASSESSMENT — PAIN SCALES - GENERAL: PAINLEVEL_OUTOF10: 0-NO PAIN

## 2025-05-08 NOTE — PROGRESS NOTES
"Subjective   The patient is status post Laparoscopic Cholecystectomy and Repair, Hernia, Umbilical.  The patient is not having any pain. Patient is eating well, without constipation, and voiding without difficulty.    Readmitted for MISTY on CKD.  She states she has a band like epigastric discomfort with deep breaths even after surgery.    Objective   Physical Exam:  /75 (BP Location: Right arm, Patient Position: Sitting, BP Cuff Size: Adult)   Pulse 61   Temp 36.4 °C (97.6 °F) (Temporal)   Resp 17   Ht 1.6 m (5' 3\")   Wt 109 kg (241 lb)   SpO2 97%   BMI 42.69 kg/m²   Constitutional: alert, not in acute distress, well developed, and well nourished  Abdomen: soft, bowel sounds active, non-tender, no abnormal masses, no hernias noted  Incision(s): healing well, no drainage, no erythema, well approximated  Tenderness at incision site: none  Pathology: Chronic cholecystitis    Assessment/Plan   Diagnoses and all orders for this visit:  S/P laparoscopic cholecystectomy  Postoperative examination    Followup with GI. Return as needed.  "

## 2025-05-09 ENCOUNTER — HOME CARE VISIT (OUTPATIENT)
Dept: HOME HEALTH SERVICES | Facility: HOME HEALTH | Age: 89
End: 2025-05-09
Payer: MEDICARE

## 2025-05-09 ENCOUNTER — HOSPITAL ENCOUNTER (OUTPATIENT)
Dept: NEUROLOGY | Facility: HOSPITAL | Age: 89
Discharge: HOME | End: 2025-05-09
Payer: MEDICARE

## 2025-05-09 VITALS
SYSTOLIC BLOOD PRESSURE: 98 MMHG | TEMPERATURE: 98.5 F | RESPIRATION RATE: 18 BRPM | HEART RATE: 70 BPM | OXYGEN SATURATION: 98 % | DIASTOLIC BLOOD PRESSURE: 58 MMHG

## 2025-05-09 DIAGNOSIS — G62.9 NEUROPATHY: ICD-10-CM

## 2025-05-09 PROCEDURE — G0299 HHS/HOSPICE OF RN EA 15 MIN: HCPCS | Mod: HHH

## 2025-05-09 ASSESSMENT — ENCOUNTER SYMPTOMS
LAST BOWEL MOVEMENT: 67332
APPETITE LEVEL: GOOD
DENIES PAIN: 1
PERSON REPORTING PAIN: PATIENT

## 2025-05-09 NOTE — HOME HEALTH
pt seen for nursing dc. saw surgeon yesterday, surgeon is satisfied w/ progress. pt does not report any recent falls. no pain today. denies any sob, lungs are clear. appetite has been good, follows regular diet. passing bm normally, last went 2 days ago. urinating well since removal of urinary catheter. vitals wnl. incision on abdomen are healed and rayshawn. no other concerns, pt dced from nursing. pt and  agreeable to plan.

## 2025-05-12 ENCOUNTER — OFFICE VISIT (OUTPATIENT)
Dept: PAIN MEDICINE | Facility: CLINIC | Age: 89
End: 2025-05-12
Payer: MEDICARE

## 2025-05-12 VITALS
TEMPERATURE: 98.1 F | HEIGHT: 62 IN | WEIGHT: 241 LBS | OXYGEN SATURATION: 96 % | BODY MASS INDEX: 44.35 KG/M2 | DIASTOLIC BLOOD PRESSURE: 67 MMHG | RESPIRATION RATE: 15 BRPM | HEART RATE: 64 BPM | SYSTOLIC BLOOD PRESSURE: 147 MMHG

## 2025-05-12 DIAGNOSIS — M47.816 LUMBAR SPONDYLOSIS: Primary | ICD-10-CM

## 2025-05-12 DIAGNOSIS — M51.26 LUMBAR DISC HERNIATION: ICD-10-CM

## 2025-05-12 DIAGNOSIS — M48.07 LUMBOSACRAL STENOSIS: ICD-10-CM

## 2025-05-12 DIAGNOSIS — M54.17 LUMBOSACRAL NEURITIS: ICD-10-CM

## 2025-05-12 PROCEDURE — 99214 OFFICE O/P EST MOD 30 MIN: CPT | Performed by: ANESTHESIOLOGY

## 2025-05-12 ASSESSMENT — PAIN DESCRIPTION - DESCRIPTORS: DESCRIPTORS: ACHING;SHARP

## 2025-05-12 ASSESSMENT — ENCOUNTER SYMPTOMS
OCCASIONAL FEELINGS OF UNSTEADINESS: 1
LOSS OF SENSATION IN FEET: 1

## 2025-05-12 ASSESSMENT — PAIN SCALES - GENERAL
PAINLEVEL_OUTOF10: 10-WORST PAIN EVER
PAINLEVEL_OUTOF10: 10 - WORST POSSIBLE PAIN

## 2025-05-12 ASSESSMENT — PAIN - FUNCTIONAL ASSESSMENT: PAIN_FUNCTIONAL_ASSESSMENT: 0-10

## 2025-05-12 NOTE — PROGRESS NOTES
History Of Present Illness  Shreya Acuña is a 89 y.o. female presenting with   Chief Complaint   Patient presents with    Follow-up     Patient returns regarding slow return of her chronic low back pain, BUT continued LEG PAIN worse on the right side.     Pt was in the ED with UTI and is now on Cipro for 7 days started on 6-.      Recall: The pt saw Dr. Barron in August of 2023 and is not considering total shoulder replacement.      Recall: She reports since early August 2018 she awoke one morning with severe pain in her neck and that radiates into both of her shoulders. Worse with rotating her neck.      The low back pain is constant, worse with prolonged standing and walking and better with rest. The pain is aching in her back and LLE. Denies LE paresthesias, weakness, saddle anesthesia, bowel or bladder incontinence. To manage this pain the patient has attempted Gabapentin 300mg TID and is now on Topamax and Cymbalta. The patients chronic HTN, DLD, A.FIB ON XARELTO, are stable.     PAIN SCORE: 10/10 with moving and walking and 0/10 with sitting      Cards: Dr. Barnhart  PCP: Dr. Asencio     Past Medical History  She has a past medical history of Biliary dyskinesia (03/12/2025), Cholelithiasis (01/22/2025), Emphysema lung (Multi), Hypertension, and Other specified health status.    Surgical History  She has a past surgical history that includes Other surgical history (12/02/2020).     Social History  She reports that she quit smoking about 42 years ago. Her smoking use included cigarettes. She has never used smokeless tobacco. She reports that she does not currently use alcohol. She reports that she does not use drugs.    Family History  No family history on file.     Allergies  Patient has no known allergies.    Review of Systems    All other systems reviewed and negative for any deficits. Pertinent positives and negatives were considered in the medical decision making process.        Physical  Exam  /67   Pulse 64   Temp 36.7 °C (98.1 °F)   Resp 15   Wt 109 kg (241 lb)   SpO2 96%     General: Pt appears stated age     Eyes: Conjunctiva non-icteric and lids without obvious rash or drooping. Pupils are symmetric     ENT: Hearing is grossly intact     Neck: No JVD noted, tracheal position midline.     Musculoskeletal: Gait is grossly normal, favoring her right side      Digits/nails show no clubbing or cyanosis     Exam of muscles/joints/bones shows no gross atrophy and no abnormal/involuntary movements in the head/neckNo asymmetry or masses noted in the head/neck     Stability: no subluxation noted on movement of bilateral upper extremities or head/neck     Strength: 4/5 in B/L lower extremities      Positive straight leg raise bilaterally      Range of Motion: WNL (with exception as noted above) LIMITED LEFT SHOULDER ABDUCTION, limited to 45 deg     Sensation: decreased to sharp touch in LLE      Cranial nerves 2-12 are grossly intact     Psychiatric: Pt is alert and oriented to time, place and person.         Assessment/Plan   No diagnosis found.            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 I have previously provided the patient with a list of physical therapy exercises to learn and perform.  The pt works on water therapy at home on a regular basis during the summer season.     Recall: I discussed referring her to water therapy but the pt did not want go to water therapy in the winter.      2. The pt was PREVIOUSLY taking Gabapentin 300mg TID to help with nerve related pain. She did report LE edema and weight gain. She was weaned off of Gabapentin and is now on PREGABALIN 100mg BID instead without any side effects thus far.       She also tried Topamax 25mg in the AM and 50mg at bedtime, but reported hair loss since June of 2019 she did wean off of this medication.      I would recommend pt continue on Cymbalta 30mg BID to help with nerve pain. We discussed the risks, benefits, and side effects to this medication including the mechanism of action and the pt understands and agrees.     She signed a Lyrica contract on 1- and again on 11-2-2022.  UDS was completed on 12- and again on 11-2-2022.     GFR was 38 in August of 2020.      3. She previously completed water therapy extensively. She reports she stopped doing water therapy exercises due to it being too cold out.      Pt working on stretching at home in her hot tub daily however, her hot tub broke and has still not been repaired.      4. Previously, I extensively reviewed the patients MRI findings in detail, including review of the actual images and provided a detailed explanation of the findings using a spine model. The pt has a grade I spondylolisthesis at L4 on L5 and L5 on S1 with moderate to severe canal stenosis L4/5 with a disc bulge at the L3/4 level.      5. The patient is a candidate for an RFA of bilateral L4/5 and L5/S1 medial branch nerves under fluoro guidance. She underwent the diagnostic procedure on 4-5-2024 and prior to that 2-2-2024 and she reported 80% relief of her pain that lasted for 2 weeks EACH time and gradually wore off.     Her RFA was on 7-9-2024 and she reported a 80% reduction in her pain lasting for 6 months time. She was able to stand/walk with much less pain, however, it has slowly worn off now.     6. She is also is a candidate for an LESI at L5/S1 and LEFT SHOULDER JOINT INJECTION with RIGHT to LEFT sided bias to treat back and radicular pain. I spent time with the patient discussing all of the risks, benefits, and alternatives to this measure. Including but not limited to spinal infection, epidural hematoma/abscess, paralysis, nerve  injury, steroid effects, and spinal headache. The patient understands and agrees to proceed as needed.      Her last injection was an LESI at L5/S1 on 8- and prior to that 11- that injection provided her with 80% relief of her pain that has since worn off after 2.5 months. She reports since her last injection worked so well she was able to stand and cook with less pain. Since her injection has worn off her pain has become progressively worse with standing to cook.      We will contact the patients CARDIOLOGIST (Dr. Barnhart) to determine if it is safe to stop Xarelto for 5 days prior to A REPEAT LUMBAR BLOCK IF NEEDED. If Pt is to be bridged on Lovenox they will need to be off of Lovenox for 24 hours prior to the procedure. They will also need to have their INR checked the day of the procedure. We did discuss the risks for stopping anticoagulation including, but not limited to any cardiovascular event, embolism, and even death. The patient understands these risks and would like to proceed with the procedure.     7. In the past she underwent a NEIL on 8-28-18 and she reports 100% relief of her neck pain that is ongoing for her.      8. The pt is interested in following up with Ortho regarding her bilateral knee pain. She has attempted steroid injections with no relief. I did previously provide her with a referral to Ortho. She previously saw Dr. Jones and underwent steroid injection / synthetic cartilage in her bilateral knees with 50% relief for approx 1 month.     Of note she was scheduled for Synthetic cartilage injections with Dr. Jones on Aug 1, 8, 15th, 2023.      8. 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 May be a candidate for GENICULAR nerve blocks followed by RFA.     I spent time with the patient discussing all of the risks, benefits, and alternatives to this measure. Including but not limited to joint infection, hematoma/abscess and nerve  injury. The patient understands and agrees to proceed.        I spent time with the patient reviewing their imaging and discussing the risks benefits and alternatives to the above plan. A total of 30 minutes was spent reviewing the data and greater than 50% of that time was with the patient during the face to face encounter discussing treatment options both surgical, non-surgical, and minimally invasive techniques.       Jose Luis Quiñones MD

## 2025-05-13 ENCOUNTER — HOME CARE VISIT (OUTPATIENT)
Dept: HOME HEALTH SERVICES | Facility: HOME HEALTH | Age: 89
End: 2025-05-13
Payer: MEDICARE

## 2025-05-13 ENCOUNTER — TELEPHONE (OUTPATIENT)
Dept: CARDIOLOGY | Facility: CLINIC | Age: 89
End: 2025-05-13

## 2025-05-13 NOTE — TELEPHONE ENCOUNTER
Ender calling because their PCP is retiring and asking Dr. Barnhart has any recommendations, preferably at [facility]?    Please advise. Thanks.

## 2025-05-14 ENCOUNTER — HOME CARE VISIT (OUTPATIENT)
Dept: HOME HEALTH SERVICES | Facility: HOME HEALTH | Age: 89
End: 2025-05-14
Payer: MEDICARE

## 2025-05-14 PROCEDURE — G0157 HHC PT ASSISTANT EA 15: HCPCS | Mod: CQ,HHH

## 2025-05-14 ASSESSMENT — ENCOUNTER SYMPTOMS
DENIES PAIN: 1
PERSON REPORTING PAIN: PATIENT

## 2025-05-16 ENCOUNTER — HOME CARE VISIT (OUTPATIENT)
Dept: HOME HEALTH SERVICES | Facility: HOME HEALTH | Age: 89
End: 2025-05-16
Payer: MEDICARE

## 2025-05-16 VITALS
OXYGEN SATURATION: 96 % | RESPIRATION RATE: 20 BRPM | SYSTOLIC BLOOD PRESSURE: 110 MMHG | DIASTOLIC BLOOD PRESSURE: 60 MMHG

## 2025-05-16 PROCEDURE — G0151 HHCP-SERV OF PT,EA 15 MIN: HCPCS | Mod: HHH

## 2025-05-16 SDOH — HEALTH STABILITY: PHYSICAL HEALTH: EXERCISE ACTIVITIES SETS: 1

## 2025-05-16 SDOH — HEALTH STABILITY: PHYSICAL HEALTH: EXERCISE TYPE: WHEP

## 2025-05-16 SDOH — HEALTH STABILITY: PHYSICAL HEALTH: PHYSICAL EXERCISE: SITTING

## 2025-05-16 SDOH — HEALTH STABILITY: PHYSICAL HEALTH: EXERCISE ACTIVITY: STRENGTHENING

## 2025-05-16 SDOH — HEALTH STABILITY: PHYSICAL HEALTH: PHYSICAL EXERCISE: 15

## 2025-05-16 ASSESSMENT — ENCOUNTER SYMPTOMS
PERSON REPORTING PAIN: PATIENT
DENIES PAIN: 1

## 2025-05-16 NOTE — CASE COMMUNICATION
Patient to be discharged from PT with OT still active 5.16.25 with max potential.  Instructed patient in signs and symptoms of infection, when to call MD or 911, safe progression of WHEP, fall prevention, safety with household transfers and ambulation on level and stairs.

## 2025-05-16 NOTE — HOME HEALTH
There was no answer at the door upon arrival at the home for scheduled OT DC visit. Called spouse, he states patient has a doctor's appointment. Will call him to reschedule next week.

## 2025-05-20 ENCOUNTER — HOME CARE VISIT (OUTPATIENT)
Dept: HOME HEALTH SERVICES | Facility: HOME HEALTH | Age: 89
End: 2025-05-20
Payer: MEDICARE

## 2025-05-20 PROCEDURE — G0152 HHCP-SERV OF OT,EA 15 MIN: HCPCS | Mod: HHH

## 2025-05-20 ASSESSMENT — ENCOUNTER SYMPTOMS
LOWEST PAIN SEVERITY IN PAST 24 HOURS: 0/10
PAIN LOCATION - PAIN SEVERITY: 0/10
PERSON REPORTING PAIN: PATIENT
PAIN: 1

## 2025-05-20 ASSESSMENT — ACTIVITIES OF DAILY LIVING (ADL)
OASIS_M1830: 03
HOME_HEALTH_OASIS: 01

## 2025-05-20 NOTE — HOME HEALTH
Subjective: Patient seen with spouse for OT OASIS DC visit.  Primary Reason for Home Care: Decline in mobility and function due to recent hospitalization with MISTY on CKD.  Skilled Needs: ADL training, bathroom transfer training, home safety instruction.  Precautions: Transfer with assistance, has 24/7 care.  Instruction provided: See note below.  Caregiver response to instruction: Demonstrates independent understanding.     Medication Reconciliation:    Demonstrates Adherence : Yes  Issues/Concerns: No  Provider Notified of Issues/Concerns: N/A  Caregiver Notified of Issues/Concerns: N/A  Pill bottles visualized during treatment: Yes    Discipline: Occupational Therapy  Reason for discharge: GOALS MET  Summary of care provided: Instructed on safety techniques and equipment use for most ADL areas, bathroom transfers and home safety.  Discharge instructions given: Continue to follow safety instructions.  Patient and caregiver in agreement with discharge plan and instructions.    Services remaining: None  NOMNC obtained: Already signed

## 2025-06-01 LAB
ATRIAL RATE: 288 BPM
Q ONSET: 221 MS
QRS COUNT: 12 BEATS
QRS DURATION: 84 MS
QT INTERVAL: 378 MS
QTC CALCULATION(BAZETT): 422 MS
QTC FREDERICIA: 406 MS
R AXIS: -1 DEGREES
T AXIS: 12 DEGREES
T OFFSET: 410 MS
VENTRICULAR RATE: 75 BPM

## 2025-06-05 ENCOUNTER — APPOINTMENT (OUTPATIENT)
Dept: PAIN MEDICINE | Facility: CLINIC | Age: 89
End: 2025-06-05
Payer: MEDICARE

## 2025-06-18 ENCOUNTER — TELEPHONE (OUTPATIENT)
Dept: PAIN MEDICINE | Facility: CLINIC | Age: 89
End: 2025-06-18
Payer: MEDICARE

## 2025-06-18 NOTE — TELEPHONE ENCOUNTER
Pt  called asking if she can come in before her scheduled injection on 7-18.  He said she fell about a year ago and he said she has pain from it.   Her  is saying that the pain from this fall is ongoing and he said it is causing her stomach pain.     He also said there are several scans in the system that he feels need to be reviewed to see where this pain is coming from.

## 2025-06-26 ENCOUNTER — APPOINTMENT (OUTPATIENT)
Dept: PAIN MEDICINE | Facility: CLINIC | Age: 89
End: 2025-06-26
Payer: MEDICARE

## 2025-07-09 ENCOUNTER — APPOINTMENT (OUTPATIENT)
Dept: CARDIOLOGY | Facility: HOSPITAL | Age: 89
DRG: 092 | End: 2025-07-09
Payer: MEDICARE

## 2025-07-09 ENCOUNTER — APPOINTMENT (OUTPATIENT)
Dept: RADIOLOGY | Facility: HOSPITAL | Age: 89
DRG: 092 | End: 2025-07-09
Payer: MEDICARE

## 2025-07-09 ENCOUNTER — HOSPITAL ENCOUNTER (INPATIENT)
Facility: HOSPITAL | Age: 89
LOS: 3 days | Discharge: HOME HEALTH | DRG: 092 | End: 2025-07-12
Attending: EMERGENCY MEDICINE | Admitting: PHYSICIAN ASSISTANT
Payer: MEDICARE

## 2025-07-09 DIAGNOSIS — N30.00 ACUTE CYSTITIS WITHOUT HEMATURIA: ICD-10-CM

## 2025-07-09 DIAGNOSIS — I10 PRIMARY HYPERTENSION: ICD-10-CM

## 2025-07-09 DIAGNOSIS — I50.32 CHRONIC DIASTOLIC HEART FAILURE: ICD-10-CM

## 2025-07-09 DIAGNOSIS — R47.01 APHASIA: Primary | ICD-10-CM

## 2025-07-09 PROBLEM — I16.0 HYPERTENSIVE URGENCY: Status: ACTIVE | Noted: 2025-07-09

## 2025-07-09 PROBLEM — E87.6 HYPOKALEMIA: Status: ACTIVE | Noted: 2025-07-09

## 2025-07-09 PROBLEM — N39.0 UTI (URINARY TRACT INFECTION): Status: ACTIVE | Noted: 2025-07-09

## 2025-07-09 PROBLEM — R79.89 ELEVATED TROPONIN I LEVEL: Status: ACTIVE | Noted: 2025-07-09

## 2025-07-09 LAB
ALBUMIN SERPL BCP-MCNC: 4 G/DL (ref 3.4–5)
ALP SERPL-CCNC: 104 U/L (ref 33–136)
ALT SERPL W P-5'-P-CCNC: 6 U/L (ref 7–45)
ANION GAP SERPL CALC-SCNC: 11 MMOL/L (ref 10–20)
APPEARANCE UR: CLEAR
APTT PPP: 36 SECONDS (ref 26–36)
AST SERPL W P-5'-P-CCNC: 14 U/L (ref 9–39)
BACTERIA #/AREA URNS AUTO: ABNORMAL /HPF
BASOPHILS # BLD AUTO: 0.02 X10*3/UL (ref 0–0.1)
BASOPHILS NFR BLD AUTO: 0.2 %
BILIRUB SERPL-MCNC: 0.7 MG/DL (ref 0–1.2)
BILIRUB UR STRIP.AUTO-MCNC: NEGATIVE MG/DL
BNP SERPL-MCNC: 147 PG/ML (ref 0–99)
BUN SERPL-MCNC: 15 MG/DL (ref 6–23)
CALCIUM SERPL-MCNC: 8.8 MG/DL (ref 8.6–10.3)
CARDIAC TROPONIN I PNL SERPL HS: 17 NG/L (ref 0–13)
CHLORIDE SERPL-SCNC: 107 MMOL/L (ref 98–107)
CHOLEST SERPL-MCNC: 119 MG/DL (ref 0–199)
CHOLESTEROL/HDL RATIO: 2.6
CO2 SERPL-SCNC: 26 MMOL/L (ref 21–32)
COLOR UR: ABNORMAL
CREAT SERPL-MCNC: 0.87 MG/DL (ref 0.5–1.05)
EGFRCR SERPLBLD CKD-EPI 2021: 65 ML/MIN/1.73M*2
EOSINOPHIL # BLD AUTO: 0.16 X10*3/UL (ref 0–0.4)
EOSINOPHIL NFR BLD AUTO: 2 %
ERYTHROCYTE [DISTWIDTH] IN BLOOD BY AUTOMATED COUNT: 14.9 % (ref 11.5–14.5)
GLUCOSE BLD MANUAL STRIP-MCNC: 101 MG/DL (ref 74–99)
GLUCOSE BLD MANUAL STRIP-MCNC: 95 MG/DL (ref 74–99)
GLUCOSE SERPL-MCNC: 95 MG/DL (ref 74–99)
GLUCOSE UR STRIP.AUTO-MCNC: NORMAL MG/DL
HCT VFR BLD AUTO: 43.2 % (ref 36–46)
HDLC SERPL-MCNC: 46.6 MG/DL
HGB BLD-MCNC: 14 G/DL (ref 12–16)
IMM GRANULOCYTES # BLD AUTO: 0.04 X10*3/UL (ref 0–0.5)
IMM GRANULOCYTES NFR BLD AUTO: 0.5 % (ref 0–0.9)
INR PPP: 1.4 (ref 0.9–1.1)
KETONES UR STRIP.AUTO-MCNC: NEGATIVE MG/DL
LDLC SERPL CALC-MCNC: 44 MG/DL
LEUKOCYTE ESTERASE UR QL STRIP.AUTO: ABNORMAL
LYMPHOCYTES # BLD AUTO: 1.41 X10*3/UL (ref 0.8–3)
LYMPHOCYTES NFR BLD AUTO: 17.5 %
MAGNESIUM SERPL-MCNC: 1.98 MG/DL (ref 1.6–2.4)
MCH RBC QN AUTO: 33.1 PG (ref 26–34)
MCHC RBC AUTO-ENTMCNC: 32.4 G/DL (ref 32–36)
MCV RBC AUTO: 102 FL (ref 80–100)
MONOCYTES # BLD AUTO: 0.95 X10*3/UL (ref 0.05–0.8)
MONOCYTES NFR BLD AUTO: 11.8 %
NEUTROPHILS # BLD AUTO: 5.47 X10*3/UL (ref 1.6–5.5)
NEUTROPHILS NFR BLD AUTO: 68 %
NITRITE UR QL STRIP.AUTO: ABNORMAL
NON HDL CHOLESTEROL: 72 MG/DL (ref 0–149)
NRBC BLD-RTO: 0 /100 WBCS (ref 0–0)
PH UR STRIP.AUTO: 6 [PH]
PLATELET # BLD AUTO: 171 X10*3/UL (ref 150–450)
POTASSIUM SERPL-SCNC: 3.4 MMOL/L (ref 3.5–5.3)
PROT SERPL-MCNC: 6.2 G/DL (ref 6.4–8.2)
PROT UR STRIP.AUTO-MCNC: NEGATIVE MG/DL
PROTHROMBIN TIME: 15.4 SECONDS (ref 9.8–12.4)
RBC # BLD AUTO: 4.23 X10*6/UL (ref 4–5.2)
RBC # UR STRIP.AUTO: ABNORMAL MG/DL
RBC #/AREA URNS AUTO: ABNORMAL /HPF
SODIUM SERPL-SCNC: 141 MMOL/L (ref 136–145)
SP GR UR STRIP.AUTO: 1.04
SQUAMOUS #/AREA URNS AUTO: ABNORMAL /HPF
TRIGL SERPL-MCNC: 141 MG/DL (ref 0–149)
UROBILINOGEN UR STRIP.AUTO-MCNC: NORMAL MG/DL
VLDL: 28 MG/DL (ref 0–40)
WBC # BLD AUTO: 8.1 X10*3/UL (ref 4.4–11.3)
WBC #/AREA URNS AUTO: ABNORMAL /HPF

## 2025-07-09 PROCEDURE — G0427 INPT/ED TELECONSULT70: HCPCS | Performed by: STUDENT IN AN ORGANIZED HEALTH CARE EDUCATION/TRAINING PROGRAM

## 2025-07-09 PROCEDURE — 70450 CT HEAD/BRAIN W/O DYE: CPT

## 2025-07-09 PROCEDURE — 83036 HEMOGLOBIN GLYCOSYLATED A1C: CPT | Mod: PARLAB | Performed by: PHYSICIAN ASSISTANT

## 2025-07-09 PROCEDURE — 70496 CT ANGIOGRAPHY HEAD: CPT | Performed by: RADIOLOGY

## 2025-07-09 PROCEDURE — 96374 THER/PROPH/DIAG INJ IV PUSH: CPT

## 2025-07-09 PROCEDURE — 85730 THROMBOPLASTIN TIME PARTIAL: CPT | Performed by: EMERGENCY MEDICINE

## 2025-07-09 PROCEDURE — 80053 COMPREHEN METABOLIC PANEL: CPT | Performed by: EMERGENCY MEDICINE

## 2025-07-09 PROCEDURE — 2500000004 HC RX 250 GENERAL PHARMACY W/ HCPCS (ALT 636 FOR OP/ED): Performed by: PHYSICIAN ASSISTANT

## 2025-07-09 PROCEDURE — 36415 COLL VENOUS BLD VENIPUNCTURE: CPT | Performed by: EMERGENCY MEDICINE

## 2025-07-09 PROCEDURE — 84484 ASSAY OF TROPONIN QUANT: CPT | Performed by: EMERGENCY MEDICINE

## 2025-07-09 PROCEDURE — 99285 EMERGENCY DEPT VISIT HI MDM: CPT | Mod: 25 | Performed by: EMERGENCY MEDICINE

## 2025-07-09 PROCEDURE — 80061 LIPID PANEL: CPT | Performed by: PHYSICIAN ASSISTANT

## 2025-07-09 PROCEDURE — 2500000001 HC RX 250 WO HCPCS SELF ADMINISTERED DRUGS (ALT 637 FOR MEDICARE OP): Performed by: PHYSICIAN ASSISTANT

## 2025-07-09 PROCEDURE — 70498 CT ANGIOGRAPHY NECK: CPT

## 2025-07-09 PROCEDURE — 1200000002 HC GENERAL ROOM WITH TELEMETRY DAILY

## 2025-07-09 PROCEDURE — 87086 URINE CULTURE/COLONY COUNT: CPT | Mod: PARLAB | Performed by: EMERGENCY MEDICINE

## 2025-07-09 PROCEDURE — 99223 1ST HOSP IP/OBS HIGH 75: CPT | Performed by: PHYSICIAN ASSISTANT

## 2025-07-09 PROCEDURE — 70450 CT HEAD/BRAIN W/O DYE: CPT | Performed by: RADIOLOGY

## 2025-07-09 PROCEDURE — 70498 CT ANGIOGRAPHY NECK: CPT | Performed by: RADIOLOGY

## 2025-07-09 PROCEDURE — 2500000004 HC RX 250 GENERAL PHARMACY W/ HCPCS (ALT 636 FOR OP/ED): Performed by: EMERGENCY MEDICINE

## 2025-07-09 PROCEDURE — 85610 PROTHROMBIN TIME: CPT | Performed by: EMERGENCY MEDICINE

## 2025-07-09 PROCEDURE — 81001 URINALYSIS AUTO W/SCOPE: CPT | Performed by: EMERGENCY MEDICINE

## 2025-07-09 PROCEDURE — 85025 COMPLETE CBC W/AUTO DIFF WBC: CPT | Performed by: EMERGENCY MEDICINE

## 2025-07-09 PROCEDURE — 2550000001 HC RX 255 CONTRASTS: Performed by: EMERGENCY MEDICINE

## 2025-07-09 PROCEDURE — 82947 ASSAY GLUCOSE BLOOD QUANT: CPT

## 2025-07-09 PROCEDURE — 83880 ASSAY OF NATRIURETIC PEPTIDE: CPT | Performed by: PHYSICIAN ASSISTANT

## 2025-07-09 PROCEDURE — 83735 ASSAY OF MAGNESIUM: CPT | Performed by: PHYSICIAN ASSISTANT

## 2025-07-09 PROCEDURE — 93005 ELECTROCARDIOGRAM TRACING: CPT

## 2025-07-09 RX ORDER — HYDRALAZINE HYDROCHLORIDE 20 MG/ML
10 INJECTION INTRAMUSCULAR; INTRAVENOUS ONCE
Status: COMPLETED | OUTPATIENT
Start: 2025-07-09 | End: 2025-07-09

## 2025-07-09 RX ORDER — DULOXETIN HYDROCHLORIDE 30 MG/1
30 CAPSULE, DELAYED RELEASE ORAL 2 TIMES DAILY
Status: DISCONTINUED | OUTPATIENT
Start: 2025-07-09 | End: 2025-07-12 | Stop reason: HOSPADM

## 2025-07-09 RX ORDER — ACETAMINOPHEN 160 MG/5ML
650 SOLUTION ORAL EVERY 4 HOURS PRN
Status: DISCONTINUED | OUTPATIENT
Start: 2025-07-09 | End: 2025-07-12 | Stop reason: HOSPADM

## 2025-07-09 RX ORDER — ATORVASTATIN CALCIUM 20 MG/1
20 TABLET, FILM COATED ORAL DAILY
Status: DISCONTINUED | OUTPATIENT
Start: 2025-07-10 | End: 2025-07-12 | Stop reason: HOSPADM

## 2025-07-09 RX ORDER — CARBIDOPA AND LEVODOPA 25; 100 MG/1; MG/1
1 TABLET ORAL 2 TIMES DAILY
Status: DISCONTINUED | OUTPATIENT
Start: 2025-07-09 | End: 2025-07-12 | Stop reason: HOSPADM

## 2025-07-09 RX ORDER — CHOLECALCIFEROL (VITAMIN D3) 25 MCG
25 TABLET ORAL 2 TIMES DAILY
Status: DISCONTINUED | OUTPATIENT
Start: 2025-07-09 | End: 2025-07-12 | Stop reason: HOSPADM

## 2025-07-09 RX ORDER — POTASSIUM CHLORIDE 1.5 G/1.58G
40 POWDER, FOR SOLUTION ORAL ONCE
Status: COMPLETED | OUTPATIENT
Start: 2025-07-09 | End: 2025-07-09

## 2025-07-09 RX ORDER — MELOXICAM 15 MG/1
1 TABLET ORAL DAILY
COMMUNITY
Start: 2025-06-19 | End: 2025-07-09 | Stop reason: ENTERED-IN-ERROR

## 2025-07-09 RX ORDER — ACETAMINOPHEN 325 MG/1
650 TABLET ORAL EVERY 4 HOURS PRN
Status: DISCONTINUED | OUTPATIENT
Start: 2025-07-09 | End: 2025-07-12 | Stop reason: HOSPADM

## 2025-07-09 RX ORDER — ALBUTEROL SULFATE 0.83 MG/ML
2.5 SOLUTION RESPIRATORY (INHALATION) EVERY 4 HOURS PRN
Status: DISCONTINUED | OUTPATIENT
Start: 2025-07-09 | End: 2025-07-12 | Stop reason: HOSPADM

## 2025-07-09 RX ORDER — PREGABALIN 100 MG/1
100 CAPSULE ORAL NIGHTLY
COMMUNITY

## 2025-07-09 RX ORDER — PREGABALIN 50 MG/1
100 CAPSULE ORAL NIGHTLY
Status: DISCONTINUED | OUTPATIENT
Start: 2025-07-09 | End: 2025-07-12 | Stop reason: HOSPADM

## 2025-07-09 RX ORDER — CEFTRIAXONE 1 G/50ML
1 INJECTION, SOLUTION INTRAVENOUS EVERY 24 HOURS
Status: DISCONTINUED | OUTPATIENT
Start: 2025-07-09 | End: 2025-07-12

## 2025-07-09 RX ORDER — HYDRALAZINE HYDROCHLORIDE 25 MG/1
25 TABLET, FILM COATED ORAL EVERY 6 HOURS PRN
Status: DISCONTINUED | OUTPATIENT
Start: 2025-07-11 | End: 2025-07-12 | Stop reason: HOSPADM

## 2025-07-09 RX ORDER — ASPIRIN 81 MG/1
81 TABLET ORAL DAILY
Status: DISCONTINUED | OUTPATIENT
Start: 2025-07-09 | End: 2025-07-12 | Stop reason: HOSPADM

## 2025-07-09 RX ORDER — ACETAMINOPHEN 650 MG/1
650 SUPPOSITORY RECTAL EVERY 4 HOURS PRN
Status: DISCONTINUED | OUTPATIENT
Start: 2025-07-09 | End: 2025-07-12 | Stop reason: HOSPADM

## 2025-07-09 RX ORDER — METOPROLOL SUCCINATE 50 MG/1
50 TABLET, EXTENDED RELEASE ORAL DAILY
Status: DISCONTINUED | OUTPATIENT
Start: 2025-07-10 | End: 2025-07-12 | Stop reason: HOSPADM

## 2025-07-09 RX ORDER — HYDRALAZINE HYDROCHLORIDE 20 MG/ML
10 INJECTION INTRAMUSCULAR; INTRAVENOUS
Status: ACTIVE | OUTPATIENT
Start: 2025-07-09 | End: 2025-07-11

## 2025-07-09 RX ORDER — LABETALOL HYDROCHLORIDE 5 MG/ML
10 INJECTION, SOLUTION INTRAVENOUS EVERY 10 MIN PRN
Status: ACTIVE | OUTPATIENT
Start: 2025-07-09 | End: 2025-07-11

## 2025-07-09 RX ORDER — LANOLIN ALCOHOL/MO/W.PET/CERES
1000 CREAM (GRAM) TOPICAL DAILY
Status: DISCONTINUED | OUTPATIENT
Start: 2025-07-10 | End: 2025-07-12 | Stop reason: HOSPADM

## 2025-07-09 RX ADMIN — HYDRALAZINE HYDROCHLORIDE 10 MG: 20 INJECTION INTRAMUSCULAR; INTRAVENOUS at 17:24

## 2025-07-09 RX ADMIN — POTASSIUM CHLORIDE 40 MEQ: 1.5 POWDER, FOR SOLUTION ORAL at 23:01

## 2025-07-09 RX ADMIN — IOHEXOL 75 ML: 350 INJECTION, SOLUTION INTRAVENOUS at 15:43

## 2025-07-09 RX ADMIN — DULOXETINE 30 MG: 30 CAPSULE, DELAYED RELEASE ORAL at 23:01

## 2025-07-09 RX ADMIN — ASPIRIN 81 MG: 81 TABLET, COATED ORAL at 23:01

## 2025-07-09 RX ADMIN — CEFTRIAXONE 1 G: 1 INJECTION, SOLUTION INTRAVENOUS at 23:02

## 2025-07-09 RX ADMIN — CARBIDOPA AND LEVODOPA 1 TABLET: 25; 100 TABLET ORAL at 23:01

## 2025-07-09 ASSESSMENT — PAIN - FUNCTIONAL ASSESSMENT: PAIN_FUNCTIONAL_ASSESSMENT: 0-10

## 2025-07-09 ASSESSMENT — LIFESTYLE VARIABLES
HAVE YOU EVER FELT YOU SHOULD CUT DOWN ON YOUR DRINKING: NO
HAVE PEOPLE ANNOYED YOU BY CRITICIZING YOUR DRINKING: NO
TOTAL SCORE: 0
EVER FELT BAD OR GUILTY ABOUT YOUR DRINKING: NO
EVER HAD A DRINK FIRST THING IN THE MORNING TO STEADY YOUR NERVES TO GET RID OF A HANGOVER: NO

## 2025-07-09 ASSESSMENT — PAIN SCALES - GENERAL: PAINLEVEL_OUTOF10: 0 - NO PAIN

## 2025-07-09 NOTE — PROGRESS NOTES
Pharmacy Medication History Review    Shreya Acuña is a 89 y.o. female admitted for No Principal Problem: There is no principal problem currently on the Problem List. Please update the Problem List and refresh.. Pharmacy reviewed the patient's [id number] medications and allergies for accuracy.    The list below reflectives the updated PTA list. Please review each medication in order reconciliation for additional clarification and justification.  Prior to Admission medications    Medication Sig Start Date End Date Authorizing Provider   acetaminophen (Tylenol) 500 mg tablet Take 2 tablets (1,000 mg) by mouth 3 times a day.   Historical Provider, MD   atorvastatin (Lipitor) 20 mg tablet Take 1 tablet (20 mg) by mouth once daily.   Historical Provider, MD   carbidopa-levodopa (Sinemet)  mg tablet Take 1 tablet by mouth 2 times a day.   Gibran Gauthier,    cholecalciferol (Vitamin D-3) 25 MCG (1000 UT) capsule Take 1 capsule (25 mcg) by mouth 2 times a day.   Historical Provider, MD   cyanocobalamin (Vitamin B-12) 1,000 mcg tablet Take 1 tablet (1,000 mcg) by mouth once daily.   Historical Provider, MD   DULoxetine (Cymbalta) 30 mg DR capsule Take 1 capsule (30 mg) by mouth 2 times a day.   Jose Luis Quiñones MD   metoprolol succinate XL (Toprol-XL) 50 mg 24 hr tablet Take 1 tablet (50 mg) by mouth once daily.   Historical Provider, MD   mv-min-FA-vit K-lutein-zeaxant (PreserVision AREDS 2 Plus MV) 200 mcg-15 mcg- 5 mg-1 mg capsule Take 1 capsule by mouth 2 times a day.   Historical Provider, MD   pregabalin (Lyrica) 100 mg capsule Take 1 capsule (100 mg) by mouth once daily at bedtime.   Historical Provider, MD   rivaroxaban (Xarelto) 10 mg tablet Take 1 tablet (10 mg) by mouth once daily.   Vilma Lott MD        The list below reflectives the updated allergy list. Please review each documented allergy for additional clarification and justification.  Allergies  Reviewed by Alejandro Rendon RN on  7/9/2025   No Known Allergies         Below are additional concerns with the patient's PTA list.      Catherine Valdez

## 2025-07-09 NOTE — H&P
History Of Present Illness  Shreya Acuña is a 89 y.o. female with past medical history significant for obesity, A-fib (on Xarelto), HTN, CHF, HLD, CKD, history of nicotine use disorder, and TAMMY who presents to the ED for evaluation of strokelike symptoms.   at bedside earlier, stated patient has had new dysarthria x 1 week which also coincided with apparent hearing loss. States he has been turning up the TV louder over the past week compared to normal. Did state patient has had difficulty with word finding for the past year, however has still been able to communicate pretty well. States symptoms appeared worse this morning around 10 AM, reporting patient's speech was indiscernible. Denies noticing any facial droop, patient denies any extremity weakness/numbness.  Patient does take daily Xarelto, says she last took it this morning.   no longer at bedside and unable to currently reach via telephone.  Difficulty obtaining additional information from patient secondary to aphasia.  Patient having difficulty formulating discernible sentences at this time.  She is getting slightly frustrated because she knows what she is saying is not making sense.  Reports headache and generalized weakness earlier, however states that has been improving.  Denies chest pain or shortness of breath.  Denies home oxygen use.  Patient does have extensive history of cigarette use, however no longer smokes.  Has been compliant with her home medications.    ED course: On arrival to the ED, patient afebrile, hypertensive with a blood pressure 191/94, otherwise hemodynamically stable with SpO2 94% on room air.  Glucose 95, sodium 141, potassium 3.4, renal function WNL, LFTs WNL, initial troponin 17.  PT 15.4, INR 1.4.  WBC 8.1, hemoglobin/hematocrit WNL, platelets 171.  CT head shows mild atrophy and minimal chronic microvascular ischemic disease with no acute intracranial process, subcentimeter stable meningioma overlying left  cerebral convexity.  CTA head/neck shows no evidence for significant stenosis of cervical vessels, no significant stenosis or large branch vessel cutoffs of intracranial vessels, incidental note of very, normal variation in anatomy (infundibular widening of bilateral posterior communicating artery origins).  Patient given 10 mg IV hydralazine in the ED. Thrombolysis contraindicated due to taking Xarelto this morning.    Admitting providers Dr. Mcbride     Past Medical History  Medical History[1]    Surgical History  Surgical History[2]     Social History  She reports that she quit smoking about 42 years ago. Her smoking use included cigarettes. She has never used smokeless tobacco. She reports that she does not currently use alcohol. She reports that she does not use drugs.    Family History  Family History[3]     Allergies  Patient has no known allergies.    Review of Systems  10 point review system negative except as noted above in HPI    Physical Exam  Constitutional:       General: She is not in acute distress.     Appearance: She is not ill-appearing or toxic-appearing.   HENT:      Head: Normocephalic and atraumatic.      Ears:      Comments: Hard of hearing     Mouth/Throat:      Comments: Aphasia.  Eyes:      Conjunctiva/sclera: Conjunctivae normal.   Cardiovascular:      Rate and Rhythm: Normal rate and regular rhythm.      Pulses: Normal pulses.      Heart sounds: Normal heart sounds.      Comments: Hypertensive  Pulmonary:      Effort: Pulmonary effort is normal. No respiratory distress.      Breath sounds: Normal breath sounds. No wheezing.   Abdominal:      General: Bowel sounds are normal. There is no distension.      Palpations: Abdomen is soft.      Tenderness: There is no abdominal tenderness.   Musculoskeletal:         General: No tenderness. Normal range of motion.      Right lower leg: No edema.      Left lower leg: No edema.   Skin:     General: Skin is warm and dry.   Neurological:      Mental  "Status: She is alert and oriented to person, place, and time.      Comments: Symmetric strength bilateral upper/lower extremities, no drift.  No facial droop.   Psychiatric:         Mood and Affect: Mood normal.         Behavior: Behavior normal.       Last Recorded Vitals  Blood pressure (!) 231/116, pulse 79, temperature 36.8 °C (98.2 °F), resp. rate (!) 40, height 1.575 m (5' 2\"), weight 96 kg (211 lb 10.3 oz), SpO2 96%.    Relevant Results  Results for orders placed or performed during the hospital encounter of 07/09/25 (from the past 24 hours)   POCT GLUCOSE   Result Value Ref Range    POCT Glucose 101 (H) 74 - 99 mg/dL   CBC and Auto Differential   Result Value Ref Range    WBC 8.1 4.4 - 11.3 x10*3/uL    nRBC 0.0 0.0 - 0.0 /100 WBCs    RBC 4.23 4.00 - 5.20 x10*6/uL    Hemoglobin 14.0 12.0 - 16.0 g/dL    Hematocrit 43.2 36.0 - 46.0 %     (H) 80 - 100 fL    MCH 33.1 26.0 - 34.0 pg    MCHC 32.4 32.0 - 36.0 g/dL    RDW 14.9 (H) 11.5 - 14.5 %    Platelets 171 150 - 450 x10*3/uL    Neutrophils % 68.0 40.0 - 80.0 %    Immature Granulocytes %, Automated 0.5 0.0 - 0.9 %    Lymphocytes % 17.5 13.0 - 44.0 %    Monocytes % 11.8 2.0 - 10.0 %    Eosinophils % 2.0 0.0 - 6.0 %    Basophils % 0.2 0.0 - 2.0 %    Neutrophils Absolute 5.47 1.60 - 5.50 x10*3/uL    Immature Granulocytes Absolute, Automated 0.04 0.00 - 0.50 x10*3/uL    Lymphocytes Absolute 1.41 0.80 - 3.00 x10*3/uL    Monocytes Absolute 0.95 (H) 0.05 - 0.80 x10*3/uL    Eosinophils Absolute 0.16 0.00 - 0.40 x10*3/uL    Basophils Absolute 0.02 0.00 - 0.10 x10*3/uL   Comprehensive metabolic panel   Result Value Ref Range    Glucose 95 74 - 99 mg/dL    Sodium 141 136 - 145 mmol/L    Potassium 3.4 (L) 3.5 - 5.3 mmol/L    Chloride 107 98 - 107 mmol/L    Bicarbonate 26 21 - 32 mmol/L    Anion Gap 11 10 - 20 mmol/L    Urea Nitrogen 15 6 - 23 mg/dL    Creatinine 0.87 0.50 - 1.05 mg/dL    eGFR 65 >60 mL/min/1.73m*2    Calcium 8.8 8.6 - 10.3 mg/dL    Albumin 4.0 3.4 - " 5.0 g/dL    Alkaline Phosphatase 104 33 - 136 U/L    Total Protein 6.2 (L) 6.4 - 8.2 g/dL    AST 14 9 - 39 U/L    Bilirubin, Total 0.7 0.0 - 1.2 mg/dL    ALT 6 (L) 7 - 45 U/L   Troponin I, High Sensitivity   Result Value Ref Range    Troponin I, High Sensitivity 17 (H) 0 - 13 ng/L   Protime-INR   Result Value Ref Range    Protime 15.4 (H) 9.8 - 12.4 seconds    INR 1.4 (H) 0.9 - 1.1   APTT   Result Value Ref Range    aPTT 36 26 - 36 seconds   Gray Top   Result Value Ref Range    Extra Tube Hold for add-ons.    POCT GLUCOSE   Result Value Ref Range    POCT Glucose 95 74 - 99 mg/dL     *Note: Due to a large number of results and/or encounters for the requested time period, some results have not been displayed. A complete set of results can be found in Results Review.          CT brain attack angio head and neck W and WO IV contrast   Final Result   1. No evidence for significant stenosis of the cervical vessels.   2. No evidence for significant stenosis or large branch vessel   cutoffs of the intracranial vessels.   3. Incidental note of a very common normal variation in the anatomy:   Infundibular widening of bilateral posterior communicating artery   origins.        I personally reviewed the images/study and I agree with Dayanna Powers DO's (radiology resident) findings as stated. This study   was interpreted at [place], [place].        MACRO:   None        Signed by: Stefan Freeman 7/9/2025 5:00 PM   Dictation workstation:   [id number]      CT brain attack head wo IV contrast   Final Result   Mild atrophy and minimal chronic microvascular ischemic disease with   no acute intracranial process. Subcentimeter stable meningioma   overlying the left cerebral convexity.        MACRO:   Iram Titus discussed the significance and urgency of this critical   finding by EPIC secure chat with  ELYSE KLERMAN on 7/9/2025 at 3:37   pm.  (**-RCF-**) Findings:  See findings.                   Signed by: Iram Titus 7/9/2025 3:38 PM   Dictation workstation:   [id number]            Assessment & Plan  Aphasia    UTI (urinary tract infection)    Hypertensive urgency    Elevated troponin I level    Hypokalemia      Shreya Acuña is a 89 y.o. female presents to the ED for evaluation of strokelike symptoms.   at bedside earlier, stated patient has had new dysarthria x 1 week which also coincided with apparent hearing loss. States he has been turning up the TV louder over the past week compared to normal. Did state patient has had difficulty with word finding for the past year, however has still been able to communicate pretty well. States symptoms appeared worse this morning around 10 AM, reporting patient's speech was indiscernible. Denies noticing any facial droop, patient denies any extremity weakness/numbness.  Patient does take daily Xarelto, says she last took it this morning. Difficulty obtaining additional information from patient secondary to aphasia.  Patient having difficulty formulating discernible sentences at this time.  She is getting slightly frustrated because she knows what she is saying is not making sense.     CODE STATUS: Full code    #Suspect acute CVA  #Aphasia-currently having difficulty formulating comprehendible sentences  #Hypertensive urgency  #Mildly elevated troponin (initial troponin 17) - denies any current chest pains   #Worsening hearing x 1 week  #Concern for chronic neurodegenerative process  #Generalized weakness  Admit as inpatient with telemetry monitor  Neurology consult  CT head and CTA head/neck unremarkable for acute processes  MRI head and MRA head/neck ordered for further evaluation  Limited echocardiogram ordered  BNP, A1c, lipid panel added onto lab work; mag level added onto lab work  Repeat troponin ordered, trend  Q 4 neurochecks, Q 4 vitals  PT/OT/speech therapy for evaluation and treatment; fall precautions  Allow for permissive  hypertension, IV antihypertensives for SBP greater than 220  Continue home Lipitor, will start on 81 mg aspirin daily  Breathing treatments, oxygen, Tylenol as needed  N.p.o. diet until patient passes swallow evaluation  CBC, BMP in the a.m.    - TTE, 4/18/2025: Low normal left ventricular ejection fraction, EF 50-55%, grade 3 (restrictive pattern) of left ventricular diastolic filling with an elevated left atrial pressure, normal right ventricular global systolic function, left atrium enlarged, mildly elevated RVSP, aortic valve sclerosis, moderate aortic valve cusp calcification.    #Suspect urinary tract infection  Urinalysis shows clear appearance, 1+ blood, 1+ nitrites, 250 leukocyte esterase, 1+ bacteria, and 6/10 WBCs.  Will start IV Rocephin daily  Await culture    #Hypokalemia  40 mEq potassium chloride packet ordered  Monitor with a.m. labs, replace as needed    Continue home medications as appropriate    Chronic conditions:  A-fib, HTN, CHF, TAMMY, HLD, CKD, history of nicotine use disorder    #DVT prophylaxis  Continue home oral anticoagulation  SCDs, ambulation as tolerated    I spent 60 minutes in the professional and overall care of this patient.    Duncan Little PA-C         [1]   Past Medical History:  Diagnosis Date    Biliary dyskinesia 03/12/2025    Cholelithiasis 01/22/2025    Emphysema lung (Multi)     Hypertension     Other specified health status     No pertinent past medical history   [2]   Past Surgical History:  Procedure Laterality Date    OTHER SURGICAL HISTORY  12/02/2020    Cardioversion   [3] No family history on file.

## 2025-07-09 NOTE — CONSULTS
"Inpatient consult to Neuro TeleStroke  Consult performed by: Ino Stock MD  Consult ordered by: Elyse H Klerman, MD      Virtual Visit start time: 355 pm    History Of Present Illness:  Historian: Patient, Family, and ED Provider   Shreya Acuña is a 89 y.o. female presenting with speech disturbance.   reports that she has chroni problem with language for the past year-difficulty finding words, difficulty completing a sentence intermittently but she is still able to fairly communicate.  For the past week she developed decreased hearing and worsening of speech problems and this morning at around 10 AM, became acutely worse where her speech was incomprehensible.      Here patient does not endorse any focal weakness or difficulty speaking.  .  Last known well: Ongoing, chronic issue  Had stroke symptoms resolved at time of presentation: No   Current antiplatelet/anticoagulant use: Xarelto      Stroke Risk Factors:  Hypertension and Atrial Fibrillation    Last Recorded Vitals:  Blood pressure (!) 191/94, pulse 72, temperature 36.8 °C (98.2 °F), resp. rate 18, height 1.575 m (5' 2\"), weight 96 kg (211 lb 10.3 oz), SpO2 94%.    Physical Exam:  Alert, oriented to self, not to month.  Able to follow simple commands.  Mild to moderate nonfluent aphasia.  Intact peripheral visual fields, symmetric face no drift in any of the extremities.  No appendicular ataxia    [facility] NIHSS:   NIH Stroke Scale:     1A. Level of Consciousness:  Alert (keenly responsive) (0)    1B. Ask Month and Age:  0 questions right (+2)    1C. Blink Eyes & Squeeze Hands:  Performs both tasks (0)    2. Best Gaze:  Normal (0)    3. Visual:  No visual loss (0)    4. Facial Palsy:  Normal symmetry (0)    5A. Motor - Left Arm:  No drift (0)    5B. Motor - Right Arm:  No drift (0)    6A. Motor - Left Leg:  No drift (0)    6B. Motor - Right Leg:  No drift (0)    7. Limb Ataxia:  No ataxia (0)    8. Sensory Loss:  Normal (no sensory loss) (0)    9. " Best Language:  Mild-moderate aphasia (+1)    10. Dysarthia:  Normal (0)    11. Extinction and Inattention:  No abnormality (0)    NIH Stroke Scale:  3           Relevant Results:  LABS:  INR   Date Value Ref Range Status   07/09/2025 1.4 (H) 0.9 - 1.1 Final      Results for orders placed or performed during the hospital encounter of 07/09/25 (from the past 24 hours)   POCT GLUCOSE   Result Value Ref Range    POCT Glucose 101 (H) 74 - 99 mg/dL   CBC and Auto Differential   Result Value Ref Range    WBC 8.1 4.4 - 11.3 x10*3/uL    nRBC 0.0 0.0 - 0.0 /100 WBCs    RBC 4.23 4.00 - 5.20 x10*6/uL    Hemoglobin 14.0 12.0 - 16.0 g/dL    Hematocrit 43.2 36.0 - 46.0 %     (H) 80 - 100 fL    MCH 33.1 26.0 - 34.0 pg    MCHC 32.4 32.0 - 36.0 g/dL    RDW 14.9 (H) 11.5 - 14.5 %    Platelets 171 150 - 450 x10*3/uL    Neutrophils % 68.0 40.0 - 80.0 %    Immature Granulocytes %, Automated 0.5 0.0 - 0.9 %    Lymphocytes % 17.5 13.0 - 44.0 %    Monocytes % 11.8 2.0 - 10.0 %    Eosinophils % 2.0 0.0 - 6.0 %    Basophils % 0.2 0.0 - 2.0 %    Neutrophils Absolute 5.47 1.60 - 5.50 x10*3/uL    Immature Granulocytes Absolute, Automated 0.04 0.00 - 0.50 x10*3/uL    Lymphocytes Absolute 1.41 0.80 - 3.00 x10*3/uL    Monocytes Absolute 0.95 (H) 0.05 - 0.80 x10*3/uL    Eosinophils Absolute 0.16 0.00 - 0.40 x10*3/uL    Basophils Absolute 0.02 0.00 - 0.10 x10*3/uL   Protime-INR   Result Value Ref Range    Protime 15.4 (H) 9.8 - 12.4 seconds    INR 1.4 (H) 0.9 - 1.1   APTT   Result Value Ref Range    aPTT 36 26 - 36 seconds   POCT GLUCOSE   Result Value Ref Range    POCT Glucose 95 74 - 99 mg/dL     *Note: Due to a large number of results and/or encounters for the requested time period, some results have not been displayed. A complete set of results can be found in Results Review.        CT Head Imaging:  CTH imaging personally reviewed, showed no acute ischemic / hemorrhagic changes     CTA Head and Neck Imaging:  CTA head and neck imaging  personally reviewed, no large vessel occlusion or severe stenosis seen      Assessment/Plan:  89-year-old with history of atrial fibrillation, on Xarelto presenting with progressive nonfluent aphasia and new hearing loss for the past week.    Overall history suspicious for chronic neurodegenerative process however reasonable to consider MRI brain to rule out acute process like stroke given acute worsening of aphasia and new hearing loss.    Further stroke workup with echo, LDL, A1c can be considered if MRI brain is positive for infarct.    IV Thrombolysis IV Thrombolysis Checklist        IV Thrombolysis Given: No; Thrombolysis contraindication reason: Time from Last Known Well (or stroke onset) is >4.5 hours           Patient is a candidate for thrombectomy:  yes/no: No; contraindication reason: No evidence of proximal occlusion      Disposition:  Patient will remain at referring facility for further evaluation and management.    Virtual or Telephone Consent    An interactive audio and video telecommunication system which permits real time communications between the patient (at the originating site) and provider (at the distant site) was utilized to provide this telehealth service.   Verbal consent was requested and obtained from Shreya Acuña on this date, 07/09/25 for a telehealth visit.      Telestroke is covered in shift work. If there are further Neurological questions or concerns please contact your regional neurologist on call during daytime hours or contact the transfer center with an ADT20 order.    nIo Stock MD

## 2025-07-09 NOTE — ED TRIAGE NOTES
Pt arrives with speech difficulties, getting progressively worse over the past 1 week. Per  markedly worse today. Presents to ed with expressive aphasia, garbled speech

## 2025-07-09 NOTE — ED PROVIDER NOTES
Emergency Department Provider Note       History of Present Illness     History provided by: Patient,   Limitations to History: dysarthria  External Records Reviewed with Brief Summary: Outpatient progress note from 7/3/25 which showed afib on xarelto HTN dCHF TAMMY    HPI:  Shreya Acuña is a 89 y.o. female presents with stroke-like sx - hx provided by pt and  at bedside -  reports pt with new dysarthria 1wk ago that coincided with apparent hearing loss (turned up the TV significantly) but sx seemed noticeably worse ~10am this morning - nearly unintelligible speech without change in hearing.  No facial droop, extremity weakness/numbness.  Pt did take her xarelto this morning.    Physical Exam   Triage vitals:  T 36.8 °C (98.2 °F)  HR 72  BP (!) 191/94  RR 18  O2 94 % None (Room air)    General: Awake, alert, in no acute distress  Eyes: Gaze conjugate.  No scleral icterus or injection  HENT: Normo-cephalic, atraumatic. No stridor  CV: Regular rate, regular rhythm  Resp: Breathing non-labored, speaking in full sentences.  Clear to auscultation bilaterally  GI: Soft, non-distended, non-tender. No rebound or guarding.  MSK/Extremities: No gross bony deformities. Moving all extremities  Skin: Warm. Appropriate color  Neuro: Alert. Oriented. Face symmetric. Speech is fluent but dysarthric.  Gross strength and sensation intact in b/l UE and LEs  Psych: Appropriate mood and affect      Medical Decision Making & ED Course   Medical Decision Makin y.o. female ***  ----  {Scoring Tools Utilized:17606}    Differential diagnoses considered include but are not limited to: ***    Social Determinants of Health which Significantly Impact Care: {Social Determinants of Health which Significantly Impact Care:60781} {The following actions were taken to address these social determinants (Optional):18195}    EKG Independent Interpretation: {EKG Independent Interpretation:80312}    Independent Result  "Review and Interpretation: {Independent Result Review and Interpretation:62288::\"Relevant laboratory and radiographic results were reviewed and independently interpreted by myself.  As necessary, they are commented on in the ED Course.\"}    Chronic conditions affecting the patient's care: {Chronic conditions affecting the patient's care:64929::\"As documented above in MDM\"}    The patient was discussed with the following consultants/services: {The patient was discussed with the following consultants/services:46572}    Care Considerations: {Care Considerations:47671::\"As documented above in MDM\"}    ED Course:       Disposition   {ED Disposition:74042}    Procedures   Procedures    {ED Provider Level (Optional):79411}    Elyse H Klerman, MD  Emergency Medicine                                                    "

## 2025-07-10 ENCOUNTER — APPOINTMENT (OUTPATIENT)
Dept: RADIOLOGY | Facility: HOSPITAL | Age: 89
DRG: 092 | End: 2025-07-10
Payer: MEDICARE

## 2025-07-10 ENCOUNTER — APPOINTMENT (OUTPATIENT)
Dept: CARDIOLOGY | Facility: HOSPITAL | Age: 89
DRG: 092 | End: 2025-07-10
Payer: MEDICARE

## 2025-07-10 LAB
ANION GAP SERPL CALC-SCNC: 11 MMOL/L (ref 10–20)
BUN SERPL-MCNC: 12 MG/DL (ref 6–23)
CALCIUM SERPL-MCNC: 9.5 MG/DL (ref 8.6–10.3)
CARDIAC TROPONIN I PNL SERPL HS: 18 NG/L (ref 0–13)
CHLORIDE SERPL-SCNC: 103 MMOL/L (ref 98–107)
CO2 SERPL-SCNC: 28 MMOL/L (ref 21–32)
CREAT SERPL-MCNC: 0.78 MG/DL (ref 0.5–1.05)
EGFRCR SERPLBLD CKD-EPI 2021: 74 ML/MIN/1.73M*2
EJECTION FRACTION APICAL 4 CHAMBER: 42.7
EJECTION FRACTION: 53 %
ERYTHROCYTE [DISTWIDTH] IN BLOOD BY AUTOMATED COUNT: 15.1 % (ref 11.5–14.5)
EST. AVERAGE GLUCOSE BLD GHB EST-MCNC: 105 MG/DL
FOLATE SERPL-MCNC: 13.8 NG/ML
GLUCOSE BLD MANUAL STRIP-MCNC: 105 MG/DL (ref 74–99)
GLUCOSE BLD MANUAL STRIP-MCNC: 110 MG/DL (ref 74–99)
GLUCOSE BLD MANUAL STRIP-MCNC: 111 MG/DL (ref 74–99)
GLUCOSE BLD MANUAL STRIP-MCNC: 142 MG/DL (ref 74–99)
GLUCOSE SERPL-MCNC: 103 MG/DL (ref 74–99)
HBA1C MFR BLD: 5.3 % (ref ?–5.7)
HCT VFR BLD AUTO: 41.4 % (ref 36–46)
HGB BLD-MCNC: 13.5 G/DL (ref 12–16)
HOLD SPECIMEN: NORMAL
HOLD SPECIMEN: NORMAL
LEFT VENTRICLE INTERNAL DIMENSION DIASTOLE: 4.15 CM (ref 3.5–6)
MCH RBC QN AUTO: 33.1 PG (ref 26–34)
MCHC RBC AUTO-ENTMCNC: 32.6 G/DL (ref 32–36)
MCV RBC AUTO: 102 FL (ref 80–100)
NRBC BLD-RTO: 0 /100 WBCS (ref 0–0)
PLATELET # BLD AUTO: 156 X10*3/UL (ref 150–450)
POTASSIUM SERPL-SCNC: 3.7 MMOL/L (ref 3.5–5.3)
Q ONSET: 228 MS
QRS COUNT: 14 BEATS
QRS DURATION: 72 MS
QT INTERVAL: 408 MS
QTC CALCULATION(BAZETT): 470 MS
QTC FREDERICIA: 449 MS
R AXIS: -18 DEGREES
RBC # BLD AUTO: 4.08 X10*6/UL (ref 4–5.2)
RIGHT VENTRICLE FREE WALL PEAK S': 11.9 CM/S
SODIUM SERPL-SCNC: 138 MMOL/L (ref 136–145)
T AXIS: -17 DEGREES
T OFFSET: 432 MS
TRICUSPID ANNULAR PLANE SYSTOLIC EXCURSION: 2.2 CM
TSH SERPL-ACNC: 2.61 MIU/L (ref 0.44–3.98)
VENTRICULAR RATE: 80 BPM
VIT B12 SERPL-MCNC: 872 PG/ML (ref 211–911)
WBC # BLD AUTO: 5.8 X10*3/UL (ref 4.4–11.3)

## 2025-07-10 PROCEDURE — 82746 ASSAY OF FOLIC ACID SERUM: CPT | Mod: PARLAB | Performed by: NURSE PRACTITIONER

## 2025-07-10 PROCEDURE — 93308 TTE F-UP OR LMTD: CPT | Performed by: INTERNAL MEDICINE

## 2025-07-10 PROCEDURE — 82607 VITAMIN B-12: CPT | Mod: PARLAB | Performed by: NURSE PRACTITIONER

## 2025-07-10 PROCEDURE — 2500000004 HC RX 250 GENERAL PHARMACY W/ HCPCS (ALT 636 FOR OP/ED): Performed by: PHYSICIAN ASSISTANT

## 2025-07-10 PROCEDURE — 93325 DOPPLER ECHO COLOR FLOW MAPG: CPT

## 2025-07-10 PROCEDURE — 2500000002 HC RX 250 W HCPCS SELF ADMINISTERED DRUGS (ALT 637 FOR MEDICARE OP, ALT 636 FOR OP/ED): Performed by: PHYSICIAN ASSISTANT

## 2025-07-10 PROCEDURE — 97161 PT EVAL LOW COMPLEX 20 MIN: CPT | Mod: GP

## 2025-07-10 PROCEDURE — 36415 COLL VENOUS BLD VENIPUNCTURE: CPT | Performed by: PHYSICIAN ASSISTANT

## 2025-07-10 PROCEDURE — 82947 ASSAY GLUCOSE BLOOD QUANT: CPT

## 2025-07-10 PROCEDURE — 80048 BASIC METABOLIC PNL TOTAL CA: CPT | Performed by: PHYSICIAN ASSISTANT

## 2025-07-10 PROCEDURE — 84443 ASSAY THYROID STIM HORMONE: CPT | Performed by: NURSE PRACTITIONER

## 2025-07-10 PROCEDURE — 97166 OT EVAL MOD COMPLEX 45 MIN: CPT | Mod: GO

## 2025-07-10 PROCEDURE — 1200000002 HC GENERAL ROOM WITH TELEMETRY DAILY

## 2025-07-10 PROCEDURE — 93325 DOPPLER ECHO COLOR FLOW MAPG: CPT | Performed by: INTERNAL MEDICINE

## 2025-07-10 PROCEDURE — 84484 ASSAY OF TROPONIN QUANT: CPT | Performed by: PHYSICIAN ASSISTANT

## 2025-07-10 PROCEDURE — 2500000001 HC RX 250 WO HCPCS SELF ADMINISTERED DRUGS (ALT 637 FOR MEDICARE OP): Performed by: PHYSICIAN ASSISTANT

## 2025-07-10 PROCEDURE — 99233 SBSQ HOSP IP/OBS HIGH 50: CPT | Performed by: PSYCHIATRY & NEUROLOGY

## 2025-07-10 PROCEDURE — 83921 ORGANIC ACID SINGLE QUANT: CPT | Performed by: NURSE PRACTITIONER

## 2025-07-10 PROCEDURE — 93321 DOPPLER ECHO F-UP/LMTD STD: CPT | Performed by: INTERNAL MEDICINE

## 2025-07-10 PROCEDURE — 92523 SPEECH SOUND LANG COMPREHEN: CPT | Mod: GN

## 2025-07-10 PROCEDURE — 85027 COMPLETE CBC AUTOMATED: CPT | Performed by: PHYSICIAN ASSISTANT

## 2025-07-10 RX ORDER — POTASSIUM CHLORIDE 1.5 G/1.58G
20 POWDER, FOR SOLUTION ORAL ONCE
Status: COMPLETED | OUTPATIENT
Start: 2025-07-10 | End: 2025-07-10

## 2025-07-10 RX ADMIN — RIVAROXABAN 10 MG: 10 TABLET, FILM COATED ORAL at 09:11

## 2025-07-10 RX ADMIN — CARBIDOPA AND LEVODOPA 1 TABLET: 25; 100 TABLET ORAL at 21:20

## 2025-07-10 RX ADMIN — ASPIRIN 81 MG: 81 TABLET, COATED ORAL at 09:09

## 2025-07-10 RX ADMIN — METOPROLOL SUCCINATE 50 MG: 50 TABLET, EXTENDED RELEASE ORAL at 09:10

## 2025-07-10 RX ADMIN — PREGABALIN 100 MG: 25 CAPSULE ORAL at 00:48

## 2025-07-10 RX ADMIN — DULOXETINE 30 MG: 30 CAPSULE, DELAYED RELEASE ORAL at 09:09

## 2025-07-10 RX ADMIN — CEFTRIAXONE 1 G: 1 INJECTION, SOLUTION INTRAVENOUS at 18:47

## 2025-07-10 RX ADMIN — PSYLLIUM HUSK 1 PACKET: 3.4 POWDER ORAL at 09:11

## 2025-07-10 RX ADMIN — CYANOCOBALAMIN TAB 1000 MCG 1000 MCG: 1000 TAB at 09:11

## 2025-07-10 RX ADMIN — Medication 25 MCG: at 00:48

## 2025-07-10 RX ADMIN — DULOXETINE 30 MG: 30 CAPSULE, DELAYED RELEASE ORAL at 21:21

## 2025-07-10 RX ADMIN — ATORVASTATIN CALCIUM 20 MG: 20 TABLET, FILM COATED ORAL at 09:10

## 2025-07-10 RX ADMIN — CARBIDOPA AND LEVODOPA 1 TABLET: 25; 100 TABLET ORAL at 09:10

## 2025-07-10 RX ADMIN — Medication 25 MCG: at 21:21

## 2025-07-10 RX ADMIN — POTASSIUM CHLORIDE 20 MEQ: 1.5 POWDER, FOR SOLUTION ORAL at 12:09

## 2025-07-10 RX ADMIN — Medication 25 MCG: at 09:10

## 2025-07-10 RX ADMIN — PREGABALIN 100 MG: 25 CAPSULE ORAL at 21:20

## 2025-07-10 SDOH — SOCIAL STABILITY: SOCIAL INSECURITY: DOES ANYONE TRY TO KEEP YOU FROM HAVING/CONTACTING OTHER FRIENDS OR DOING THINGS OUTSIDE YOUR HOME?: NO

## 2025-07-10 SDOH — SOCIAL STABILITY: SOCIAL INSECURITY: WITHIN THE LAST YEAR, HAVE YOU BEEN HUMILIATED OR EMOTIONALLY ABUSED IN OTHER WAYS BY YOUR PARTNER OR EX-PARTNER?: NO

## 2025-07-10 SDOH — ECONOMIC STABILITY: INCOME INSECURITY: IN THE PAST 12 MONTHS HAS THE ELECTRIC, GAS, OIL, OR WATER COMPANY THREATENED TO SHUT OFF SERVICES IN YOUR HOME?: NO

## 2025-07-10 SDOH — ECONOMIC STABILITY: FOOD INSECURITY: WITHIN THE PAST 12 MONTHS, YOU WORRIED THAT YOUR FOOD WOULD RUN OUT BEFORE YOU GOT THE MONEY TO BUY MORE.: NEVER TRUE

## 2025-07-10 SDOH — SOCIAL STABILITY: SOCIAL INSECURITY: WERE YOU ABLE TO COMPLETE ALL THE BEHAVIORAL HEALTH SCREENINGS?: YES

## 2025-07-10 SDOH — ECONOMIC STABILITY: FOOD INSECURITY: WITHIN THE PAST 12 MONTHS, THE FOOD YOU BOUGHT JUST DIDN'T LAST AND YOU DIDN'T HAVE MONEY TO GET MORE.: NEVER TRUE

## 2025-07-10 SDOH — SOCIAL STABILITY: SOCIAL INSECURITY: WITHIN THE LAST YEAR, HAVE YOU BEEN AFRAID OF YOUR PARTNER OR EX-PARTNER?: NO

## 2025-07-10 SDOH — SOCIAL STABILITY: SOCIAL INSECURITY: DO YOU FEEL UNSAFE GOING BACK TO THE PLACE WHERE YOU ARE LIVING?: NO

## 2025-07-10 SDOH — SOCIAL STABILITY: SOCIAL INSECURITY: ARE YOU OR HAVE YOU BEEN THREATENED OR ABUSED PHYSICALLY, EMOTIONALLY, OR SEXUALLY BY ANYONE?: NO

## 2025-07-10 SDOH — SOCIAL STABILITY: SOCIAL INSECURITY: HAVE YOU HAD THOUGHTS OF HARMING ANYONE ELSE?: NO

## 2025-07-10 SDOH — SOCIAL STABILITY: SOCIAL INSECURITY: ARE THERE ANY APPARENT SIGNS OF INJURIES/BEHAVIORS THAT COULD BE RELATED TO ABUSE/NEGLECT?: NO

## 2025-07-10 SDOH — SOCIAL STABILITY: SOCIAL INSECURITY: DO YOU FEEL ANYONE HAS EXPLOITED OR TAKEN ADVANTAGE OF YOU FINANCIALLY OR OF YOUR PERSONAL PROPERTY?: NO

## 2025-07-10 SDOH — SOCIAL STABILITY: SOCIAL INSECURITY: HAS ANYONE EVER THREATENED TO HURT YOUR FAMILY OR YOUR PETS?: NO

## 2025-07-10 SDOH — SOCIAL STABILITY: SOCIAL INSECURITY: ABUSE: ADULT

## 2025-07-10 ASSESSMENT — COGNITIVE AND FUNCTIONAL STATUS - GENERAL
MOBILITY SCORE: 9
MOVING TO AND FROM BED TO CHAIR: A LITTLE
WALKING IN HOSPITAL ROOM: A LOT
MOBILITY SCORE: 15
WALKING IN HOSPITAL ROOM: TOTAL
TOILETING: A LITTLE
EATING MEALS: A LITTLE
HELP NEEDED FOR BATHING: A LOT
CLIMB 3 TO 5 STEPS WITH RAILING: TOTAL
TOILETING: TOTAL
HELP NEEDED FOR BATHING: A LITTLE
STANDING UP FROM CHAIR USING ARMS: A LOT
DRESSING REGULAR LOWER BODY CLOTHING: TOTAL
PATIENT BASELINE BEDBOUND: NO
MOVING TO AND FROM BED TO CHAIR: TOTAL
TURNING FROM BACK TO SIDE WHILE IN FLAT BAD: A LOT
MOVING FROM LYING ON BACK TO SITTING ON SIDE OF FLAT BED WITH BEDRAILS: A LITTLE
MOVING FROM LYING ON BACK TO SITTING ON SIDE OF FLAT BED WITH BEDRAILS: A LOT
DRESSING REGULAR LOWER BODY CLOTHING: A LITTLE
CLIMB 3 TO 5 STEPS WITH RAILING: TOTAL
DAILY ACTIVITIY SCORE: 13
DAILY ACTIVITIY SCORE: 18
STANDING UP FROM CHAIR USING ARMS: A LITTLE
PERSONAL GROOMING: A LITTLE
DRESSING REGULAR UPPER BODY CLOTHING: A LITTLE
TURNING FROM BACK TO SIDE WHILE IN FLAT BAD: A LITTLE
PERSONAL GROOMING: A LITTLE
DRESSING REGULAR UPPER BODY CLOTHING: A LOT

## 2025-07-10 ASSESSMENT — ACTIVITIES OF DAILY LIVING (ADL)
BATHING: NEEDS ASSISTANCE
LACK_OF_TRANSPORTATION: NO
HEARING - LEFT EAR: FUNCTIONAL
WALKS IN HOME: DEPENDENT
ADEQUATE_TO_COMPLETE_ADL: YES
HEARING - RIGHT EAR: FUNCTIONAL
ASSISTIVE_DEVICE: WHEELCHAIR
JUDGMENT_ADEQUATE_SAFELY_COMPLETE_DAILY_ACTIVITIES: YES
GROOMING: NEEDS ASSISTANCE
TOILETING: NEEDS ASSISTANCE
FEEDING YOURSELF: NEEDS ASSISTANCE
DRESSING YOURSELF: NEEDS ASSISTANCE
PATIENT'S MEMORY ADEQUATE TO SAFELY COMPLETE DAILY ACTIVITIES?: YES

## 2025-07-10 ASSESSMENT — PAIN SCALES - GENERAL
PAINLEVEL_OUTOF10: 0 - NO PAIN

## 2025-07-10 ASSESSMENT — LIFESTYLE VARIABLES
SKIP TO QUESTIONS 9-10: 1
AUDIT-C TOTAL SCORE: 0
HOW OFTEN DO YOU HAVE 6 OR MORE DRINKS ON ONE OCCASION: NEVER
HOW MANY STANDARD DRINKS CONTAINING ALCOHOL DO YOU HAVE ON A TYPICAL DAY: PATIENT DOES NOT DRINK
AUDIT-C TOTAL SCORE: 0
HOW OFTEN DO YOU HAVE A DRINK CONTAINING ALCOHOL: NEVER

## 2025-07-10 ASSESSMENT — PAIN - FUNCTIONAL ASSESSMENT
PAIN_FUNCTIONAL_ASSESSMENT: 0-10

## 2025-07-10 ASSESSMENT — ENCOUNTER SYMPTOMS: SPEECH DIFFICULTY: 1

## 2025-07-10 NOTE — CARE PLAN
Problem: General Stroke  Goal: No symptoms of aspiration throughout shift  Outcome: Progressing     Problem: Safety - Adult  Goal: Free from fall injury  Outcome: Progressing  Flowsheets (Taken 7/10/2025 0531)  Free from fall injury: Instruct family/caregiver on patient safety     Problem: Skin  Goal: Promote skin healing  Outcome: Progressing  Flowsheets (Taken 7/10/2025 0531)  Promote skin healing:   Assess skin/pad under line(s)/device(s)   Protective dressings over bony prominences   The patient's goals for the shift include      The clinical goals for the shift include pt will remain neurologically intact throughout shift.

## 2025-07-10 NOTE — PROGRESS NOTES
Speech-Language Pathology    SLP Adult Inpatient Speech-Language Cognition    Patient Name: Shreya Acuña  MRN: [id number]  Today's Date: 7/10/2025   Time Calculation  Start Time: 1145  Stop Time: 1215  Time Calculation (min): 30 min         Current Problem:   1. Aphasia  Transthoracic Echo (TTE) Limited    Transthoracic Echo (TTE) Limited            SLP Assessment:  SLP Assessment  SLP Assessment Results: Receptive Comprehension deficits, Expression deficits  Prognosis: Good  Strengths: Family/Caregiver Support  Barriers: Comorbidities    Patient seen bedside for speech/language evaluation. Pt's  Ender at bedside and providing some background information. Pt evaluated with portions of the Aphasia Diagnostic Profile and informal testing. Pt exhibiting a moderate-severe expressive aphasia and a suspected moderate receptive aphasia. Patient with difficulty answering open ended biographical questions including full name, address, age and . Some attempts to correct errors and re-state information a second time. At times pt looking to her  for answers. Difficult time explaining why she is here. Some periods of fluent speech when describing what is happening in a picture but pt often exhibiting word finding deficits and paraphasic speech errors when communicating. Pt was able to name 10/12 pictured objects and was receptive to cueing for improved naming abilities. 60% accurate for repetition of words with breakdown when information that needed to be repeated became lengthier and more complex.     Receptively, pt could follow 75% of one step commands. Did require intermittent need for repetition of directions throughout testing. Pointing to pictured objects with 90% accuracy. Answered yes/no questions related to a short auditorally presented paragraph with 75% accuracy and repetition needed of questions.     Patient is aware of current language difficulties.  reports some word finding difficulties  over the last year or so but acute onset and significantly increased difficulty this hospital admission.     Patient able to hold pencil in R dominant hand but writing legibility and letter formation and accuracy is significantly reduced. Reading not assessed this date.     No evidence of dysarthria in conversational speech. Oral [facility] exam informally is relatively functional. Pt and  do not endorse any new onset dysphagia since this hospital admission.       SLP Plan:  Plan  Inpatient/Swing Bed or Outpatient: Inpatient  Treatment/Interventions: Communication functioning  SLP TX Plan: Continue Plan of Care  SLP Plan: Skilled SLP  SLP Frequency: 3x per week  Duration: 2 weeks  SLP Discharge Recommendations: Continue skilled SLP services at the next level of care; pt would be a good rehab candidate pending MRI results and OT/PT needs.  Discussed POC: Patient, Caregiver/family  Discussed Risks/Benefits: Yes  Patient/Caregiver Agreeable: Yes    VERBAL/WRITTEN EXPRESSION STGs (established 7/10/25)    Patient will copy personal information with 80% % accuracy.   Patient will complete predictable fill-ins with 80% %accuracy and moderate cues.  Patient will complete confrontational naming tasks with 90 % accuracy and mild cues.  Patient will categorical naming tasks with 80 % accuracy and moderate cues.  Patient will provide biographical information with 80 % accuracy and moderate cues.  Assess reading skills at the single word, phrase and sentence level.   Patient/family education    STGs Receptive Language (established 7/10/25)  Patient will follow 1-step commands with 90% accuracy  Patient will answer biographical and lower level basic yes/no questions with 90% accuracy  Patient will answer general/abstract information questions with 75% accuracy  Patient will match objects to words in a field of 3-4 choices with 90% accuracy.       Subjective   Current Problem:  Aphasia    SLP Visit Info:  SLP Received On:  07/10/25      General Visit Information:  General Information  Prior to Session Communication: Bedside nurse    (Below copied from EMR)  History Of Present Illness  Shreya Acuña is a 89 y.o. female with past medical history significant for obesity, A-fib (on Xarelto), HTN, CHF, HLD, CKD, history of nicotine use disorder, and TAMMY who presents to the ED for evaluation of strokelike symptoms.   at bedside earlier, stated patient has had new dysarthria x 1 week which also coincided with apparent hearing loss. States he has been turning up the TV louder over the past week compared to normal. Did state patient has had difficulty with word finding for the past year, however has still been able to communicate pretty well. States symptoms appeared worse this morning around 10 AM, reporting patient's speech was indiscernible. Denies noticing any facial droop, patient denies any extremity weakness/numbness.  Patient does take daily Xarelto, says she last took it this morning.   no longer at bedside and unable to currently reach via telephone.  Difficulty obtaining additional information from patient secondary to aphasia.  Patient having difficulty formulating discernible sentences at this time.  She is getting slightly frustrated because she knows what she is saying is not making sense.  Reports headache and generalized weakness earlier, however states that has been improving.  Denies chest pain or shortness of breath.  Denies home oxygen use.  Patient does have extensive history of cigarette use, however no longer smokes.  Has been compliant with her home medications.    Patient is from home with . Pt does not drive.  does endorse STM deficits over the last 2 years. Pt does appear to be Native.     MRI ordered; pending results.       CT brain 7/9/25: IMPRESSION:  Mild atrophy and minimal chronic microvascular ischemic disease with  no acute intracranial process. Subcentimeter stable  meningioma  overlying the left cerebral convexity.    Objective       Pain:  Pain Assessment  Pain Assessment: 0-10  0-10 (Numeric) Pain Score: 0 - No pain      Cognition:   DNT/baseline STM deficits per .        Motor Speech Production:  Functional       Auditory Comprehension:    Impaired      Visual recognition:  DNT       Reading Comprehension:   DNT      Verbal:   Impaired      Written Expression:  Written Expression  Dominant Hand: Right  Formulation: Impairment         Inpatient:  Education Documentation  Educated patient and  on results of testing and recommendations for ST follow up to address acute onset aphasia pending MRI results;  and patient both verbalize understanding and appreciative of all given information.

## 2025-07-10 NOTE — CONSULTS
Inpatient consult to Neurology  Consult performed by: Rodolfo Mcdermott MD  Consult ordered by: Duncan Little PA-C          History Of Present Illness  Shreya Acuña is a 89 y.o. female presenting with dysarthria.  The patient is a poor historian and history is from the patient and the chart.  The patient states that she had new dysarthria about a week ago that apparently coincided with a hearing loss.  The patient states that she has been turning up the TV louder and louder over the past week compared to normal.  She did state that she had difficulties with word finding for the last year however she has been communicating well.  At around 10 AM in the morning of admission she states that her symptoms appeared worse.  She reported that her speech was indiscernible.  She denied any facial droop or extremity weakness or numbness.  The patient states that she is compliant with her Xarelto.  The patient does report a headache and generalized weakness earlier but states that this has been improving.  The patient was admitted through the ER and a stroke team was called.  Her NIH stroke scale score is 3.  The patient has CT scan brain done that was negative for any acute process.  The patient also had a CT angiogram neck and brain that showed no significant stenosis.  IV TNK was not given as the patient was well out of the time window.  Currently the patient feels that her speech is significantly proved but not back to baseline.  She denies any any other neurological problems.  Past Medical History  Medical History[1]  The patient has a history of atrial fibrillation, CHF, hyperlipidemia, chronic kidney disease, nicotine use disorder and sleep apnea.  Surgical History  Surgical History[2]  Social History  Social History[3]  Allergies  Patient has no known allergies.  Prescriptions Prior to Admission[4]    Review of Systems   Neurological:  Positive for speech difficulty.   All other systems reviewed and are  "negative.        Neurological Exam  Physical Exam  Last Recorded Vitals  Blood pressure (!) 191/91, pulse 76, temperature 37 °C (98.6 °F), resp. rate 19, height 1.575 m (5' 2\"), weight 99.1 kg (218 lb 8 oz), SpO2 93%.      The patient is a well developed, [moderately obese] [female] in no acute distress.    The patient's funduscopic examination shows no papilledema bilaterally.    The patient's extremity examination shows that the pulses are 2+ in the upper and lower extremities bilaterally and there is no edema in the lower extremities bilaterally.    The patient's mental status testing is alert and oriented ×1 with moderate confusion but with no evidence of aphasia or dysarthria.  The patient's memory testing, fund of knowledge and concentration are all poor.  The patient's cranial nerves 2, 3, 4, 5, 6, 7, 8, 9, 10, 11 and 12 are all within normal limits with the exception that the patient has poor hearing bilaterally.  The patient's motor testing shows normal tone and bulk with 5-/5 strength in the upper and lower extremities bilaterally.  The patient's sensory testing is intact to light touch in the upper and lower extremities bilaterally.  The patient's cerebellar testing is intact in the upper and lower extremities bilaterally.  The patient's station and gait were not tested as the patient is a high fall risk.  The patient's reflexes are 1+ in the upper and lower extremities and symmetrical.    Relevant Results        NIH Stroke Scale  1A. Level of Consciousness: Alert, Keenly Responsive  1B. Ask Month and Age: No Questions Right  1C. Blink Eyes & Squeeze Hands: Performs Both Tasks  2. Best Gaze: Normal  3. Visual: No Visual Loss  4. Facial Palsy: Normal Symmetrical Movements  5A. Motor - Left Arm: No Drift  5B. Motor - Right Arm: No Drift  6A. Motor - Left Leg: No Drift  6B. Motor - Right Leg: No Drift  7. Limb Ataxia: Absent  8. Sensory Loss: Normal  9. Best Language: No Aphasia  10. Dysarthria: Normal  11. " Extinction and Inattention: No Abnormality  NIH Stroke Scale: 2           [place] Coma Scale  Best Eye Response: Spontaneous  Best Verbal Response: Inappropriate words  Best Motor Response: Follows commands  [place] Coma Scale Score: 13        Scheduled medications  Scheduled Medications[5]  Continuous medications  Continuous Medications[6]  PRN medications  PRN Medications[7]    Results for orders placed or performed during the hospital encounter of 07/09/25 (from the past 96 hours)   POCT GLUCOSE   Result Value Ref Range    POCT Glucose 101 (H) 74 - 99 mg/dL   CBC and Auto Differential   Result Value Ref Range    WBC 8.1 4.4 - 11.3 x10*3/uL    nRBC 0.0 0.0 - 0.0 /100 WBCs    RBC 4.23 4.00 - 5.20 x10*6/uL    Hemoglobin 14.0 12.0 - 16.0 g/dL    Hematocrit 43.2 36.0 - 46.0 %     (H) 80 - 100 fL    MCH 33.1 26.0 - 34.0 pg    MCHC 32.4 32.0 - 36.0 g/dL    RDW 14.9 (H) 11.5 - 14.5 %    Platelets 171 150 - 450 x10*3/uL    Neutrophils % 68.0 40.0 - 80.0 %    Immature Granulocytes %, Automated 0.5 0.0 - 0.9 %    Lymphocytes % 17.5 13.0 - 44.0 %    Monocytes % 11.8 2.0 - 10.0 %    Eosinophils % 2.0 0.0 - 6.0 %    Basophils % 0.2 0.0 - 2.0 %    Neutrophils Absolute 5.47 1.60 - 5.50 x10*3/uL    Immature Granulocytes Absolute, Automated 0.04 0.00 - 0.50 x10*3/uL    Lymphocytes Absolute 1.41 0.80 - 3.00 x10*3/uL    Monocytes Absolute 0.95 (H) 0.05 - 0.80 x10*3/uL    Eosinophils Absolute 0.16 0.00 - 0.40 x10*3/uL    Basophils Absolute 0.02 0.00 - 0.10 x10*3/uL   Comprehensive metabolic panel   Result Value Ref Range    Glucose 95 74 - 99 mg/dL    Sodium 141 136 - 145 mmol/L    Potassium 3.4 (L) 3.5 - 5.3 mmol/L    Chloride 107 98 - 107 mmol/L    Bicarbonate 26 21 - 32 mmol/L    Anion Gap 11 10 - 20 mmol/L    Urea Nitrogen 15 6 - 23 mg/dL    Creatinine 0.87 0.50 - 1.05 mg/dL    eGFR 65 >60 mL/min/1.73m*2    Calcium 8.8 8.6 - 10.3 mg/dL    Albumin 4.0 3.4 - 5.0 g/dL    Alkaline Phosphatase 104 33 - 136 U/L    Total Protein  6.2 (L) 6.4 - 8.2 g/dL    AST 14 9 - 39 U/L    Bilirubin, Total 0.7 0.0 - 1.2 mg/dL    ALT 6 (L) 7 - 45 U/L   Troponin I, High Sensitivity   Result Value Ref Range    Troponin I, High Sensitivity 17 (H) 0 - 13 ng/L   Protime-INR   Result Value Ref Range    Protime 15.4 (H) 9.8 - 12.4 seconds    INR 1.4 (H) 0.9 - 1.1   APTT   Result Value Ref Range    aPTT 36 26 - 36 seconds   Gray Top   Result Value Ref Range    Extra Tube Hold for add-ons.    Lipid Panel   Result Value Ref Range    Cholesterol 119 0 - 199 mg/dL    HDL-Cholesterol 46.6 mg/dL    Cholesterol/HDL Ratio 2.6     LDL Calculated 44 <=99 mg/dL    VLDL 28 0 - 40 mg/dL    Triglycerides 141 0 - 149 mg/dL    Non HDL Cholesterol 72 0 - 149 mg/dL   Hemoglobin A1C   Result Value Ref Range    Hemoglobin A1C 5.3 See comment %    Estimated Average Glucose 105 Not Established mg/dL   B-Type Natriuretic Peptide   Result Value Ref Range     (H) 0 - 99 pg/mL   Magnesium   Result Value Ref Range    Magnesium 1.98 1.60 - 2.40 mg/dL   POCT GLUCOSE   Result Value Ref Range    POCT Glucose 95 74 - 99 mg/dL   ECG 12 lead   Result Value Ref Range    Ventricular Rate 80 BPM    QRS Duration 72 ms    QT Interval 408 ms    QTC Calculation(Bazett) 470 ms    R Axis -18 degrees    T Axis -17 degrees    QRS Count 14 beats    Q Onset 228 ms    T Offset 432 ms    QTC Fredericia 449 ms   Urinalysis with Reflex Culture and Microscopic   Result Value Ref Range    Color, Urine Light-Yellow Light-Yellow, Yellow, Dark-Yellow    Appearance, Urine Clear Clear    Specific Gravity, Urine 1.040 (N) 1.005 - 1.035    pH, Urine 6.0 5.0, 5.5, 6.0, 6.5, 7.0, 7.5, 8.0    Protein, Urine NEGATIVE NEGATIVE, 10 (TRACE), 20 (TRACE) mg/dL    Glucose, Urine Normal Normal mg/dL    Blood, Urine 0.06 (1+) (A) NEGATIVE mg/dL    Ketones, Urine NEGATIVE NEGATIVE mg/dL    Bilirubin, Urine NEGATIVE NEGATIVE mg/dL    Urobilinogen, Urine Normal Normal mg/dL    Nitrite, Urine 1+ (A) NEGATIVE    Leukocyte Esterase,  Urine 250 [place]/uL (A) NEGATIVE   Extra Urine Gray Tube   Result Value Ref Range    Extra Tube     Microscopic Only, Urine   Result Value Ref Range    WBC, Urine 6-10 (A) 1-5, NONE /HPF    RBC, Urine 3-5 NONE, 1-2, 3-5 /HPF    Squamous Epithelial Cells, Urine 1-9 (SPARSE) Reference range not established. /HPF    Bacteria, Urine 1+ (A) NONE SEEN /HPF   Troponin I, High Sensitivity   Result Value Ref Range    Troponin I, High Sensitivity 18 (H) 0 - 13 ng/L   CBC   Result Value Ref Range    WBC 5.8 4.4 - 11.3 x10*3/uL    nRBC 0.0 0.0 - 0.0 /100 WBCs    RBC 4.08 4.00 - 5.20 x10*6/uL    Hemoglobin 13.5 12.0 - 16.0 g/dL    Hematocrit 41.4 36.0 - 46.0 %     (H) 80 - 100 fL    MCH 33.1 26.0 - 34.0 pg    MCHC 32.6 32.0 - 36.0 g/dL    RDW 15.1 (H) 11.5 - 14.5 %    Platelets 156 150 - 450 x10*3/uL   Basic metabolic panel   Result Value Ref Range    Glucose 103 (H) 74 - 99 mg/dL    Sodium 138 136 - 145 mmol/L    Potassium 3.7 3.5 - 5.3 mmol/L    Chloride 103 98 - 107 mmol/L    Bicarbonate 28 21 - 32 mmol/L    Anion Gap 11 10 - 20 mmol/L    Urea Nitrogen 12 6 - 23 mg/dL    Creatinine 0.78 0.50 - 1.05 mg/dL    eGFR 74 >60 mL/min/1.73m*2    Calcium 9.5 8.6 - 10.3 mg/dL   POCT GLUCOSE   Result Value Ref Range    POCT Glucose 105 (H) 74 - 99 mg/dL   POCT GLUCOSE   Result Value Ref Range    POCT Glucose 110 (H) 74 - 99 mg/dL   Transthoracic Echo (TTE) Limited   Result Value Ref Range    BSA 2.08 m2     *Note: Due to a large number of results and/or encounters for the requested time period, some results have not been displayed. A complete set of results can be found in Results Review.              I have personally reviewed the following imaging results:   Imaging  CT brain attack angio head and neck W and WO IV contrast  Result Date: 7/9/2025  1. No evidence for significant stenosis of the cervical vessels. 2. No evidence for significant stenosis or large branch vessel cutoffs of the intracranial vessels. 3. Incidental note of a  very common normal variation in the anatomy: Infundibular widening of bilateral posterior communicating artery origins.   I personally reviewed the images/study and I agree with Dayanna Powers DO's (radiology resident) findings as stated. This study was interpreted at [facility], [place], [place].   MACRO: None   Signed by: Stefan Freeman 7/9/2025 5:00 PM Dictation workstation:   [id number]    CT brain attack head wo IV contrast  Result Date: 7/9/2025  Mild atrophy and minimal chronic microvascular ischemic disease with no acute intracranial process. Subcentimeter stable meningioma overlying the left cerebral convexity.   MACRO: Iram Titus discussed the significance and urgency of this critical finding by EPIC secure chat with  ELYSE KLERMAN on 7/9/2025 at 3:37 pm.  (**-RCF-**) Findings:  See findings.       Signed by: Iram Titus 7/9/2025 3:38 PM Dictation workstation:   [id number]      Cardiology, Vascular, and Other Imaging  ECG 12 lead  Result Date: 7/10/2025  Atrial fibrillation Nonspecific ST abnormality Abnormal ECG When compared with ECG of 13-APR-2025 21:13, No significant change was found         Assessment/Plan   Assessment & Plan  Aphasia    UTI (urinary tract infection)    Hypertensive urgency      Impression: The patient is an 89-year-old female with multiple stroke risk factors who presents with hearing loss and difficulties with speech.  Her neurological examination is abnormal and noted above.  The differential diagnosis for her dysarthria includes TIA, cerebral infarction and seizure.  I am concerned that the patient has an underlying dementia.    Plan: The patient needs an MRI of the brain without contrast.  The patient also needs a workup for the reversible cause of dementia.  The patient should continue her Xarelto and aspirin.  I would certainly treat any underlying infections and normalize any metabolic abnormalities.  The patient needs to continue  stroke risk factor modification.   The patient needs a PT, OT, social service, rehab and speech therapy consult.  The patient needs DVT prophylaxis.  The patient needs neurochecks as per protocol.  I will send the note to Dr. Mcbride.  Thank you very much for sending me this very interesting consultation.  I discussed all these issues in detail with the patient and answered all their questions.  I will continue to follow the patient while they are in the hospital.  The patient needs follow-up with their primary care doctor within 2 weeks of discharge.  On discharge, the patient will follow up with me in the office in 4 months.      Rodolfo Mcdermott MD       [1]   Past Medical History:  Diagnosis Date    Biliary dyskinesia 2025    Cholelithiasis 2025    Emphysema lung (Multi)     Hypertension     Other specified health status     No pertinent past medical history   [2]   Past Surgical History:  Procedure Laterality Date    OTHER SURGICAL HISTORY  2020    Cardioversion   [3]   Social History  Tobacco Use    Smoking status: Former     Current packs/day: 0.00     Types: Cigarettes     Quit date: 1983     Years since quittin.4    Smokeless tobacco: Never   Substance Use Topics    Alcohol use: Not Currently    Drug use: Never   [4]   Medications Prior to Admission   Medication Sig Dispense Refill Last Dose/Taking    acetaminophen (Tylenol) 500 mg tablet Take 2 tablets (1,000 mg) by mouth 3 times a day.   2025 Morning    atorvastatin (Lipitor) 20 mg tablet Take 1 tablet (20 mg) by mouth once daily.   2025 Morning    carbidopa-levodopa (Sinemet)  mg tablet Take 1 tablet by mouth 2 times a day. 60 tablet 1 2025 Morning    cholecalciferol (Vitamin D-3) 25 MCG (1000 UT) capsule Take 1 capsule (25 mcg) by mouth 2 times a day.   2025 Morning    cyanocobalamin (Vitamin B-12) 1,000 mcg tablet Take 1 tablet (1,000 mcg) by mouth once daily.   2025 Morning    DULoxetine (Cymbalta)  30 mg DR capsule Take 1 capsule (30 mg) by mouth 2 times a day. 180 capsule 2 7/9/2025 Morning    metoprolol succinate XL (Toprol-XL) 50 mg 24 hr tablet Take 1 tablet (50 mg) by mouth once daily.   7/9/2025 Morning    mv-min-FA-vit K-lutein-zeaxant (PreserVision AREDS 2 Plus MV) 200 mcg-15 mcg- 5 mg-1 mg capsule Take 1 capsule by mouth 2 times a day.   7/9/2025 Morning    pregabalin (Lyrica) 100 mg capsule Take 1 capsule (100 mg) by mouth once daily at bedtime.   7/8/2025 Bedtime    rivaroxaban (Xarelto) 10 mg tablet Take 1 tablet (10 mg) by mouth once daily. Resume taking on 4/14 7/9/2025 Morning    albuterol 90 mcg/actuation inhaler Inhale 2 puffs every 4 hours if needed for wheezing. (Patient not taking: Reported on 4/7/2025) 18 g 0     amLODIPine (Norvasc) 5 mg tablet Take 1 tablet (5 mg) by mouth once daily. (Patient not taking: Reported on 7/9/2025) 30 tablet 2 Not Taking    oxyCODONE (Roxicodone) 5 mg immediate release tablet Take 1 tablet (5 mg) by mouth every 6 hours if needed for severe pain (7 - 10). (Patient not taking: Reported on 5/20/2025)   Not Taking    pregabalin (Lyrica) 25 mg capsule Take 1 capsule (25 mg) by mouth once daily at bedtime. (Patient not taking: Reported on 7/9/2025)   Not Taking    [Paused] spironolactone (Aldactone) 25 mg tablet Take 1 tablet (25 mg) by mouth once daily. 90 tablet 3 Unknown    [Paused] torsemide (Demadex) 20 mg tablet Take 1 tablet (20 mg) by mouth once daily. (Patient taking differently: Take 1 tablet (20 mg) by mouth once daily at bedtime.) 90 tablet 3 Unknown   [5] aspirin, 81 mg, oral, Daily  atorvastatin, 20 mg, oral, Daily  carbidopa-levodopa, 1 tablet, oral, BID  cefTRIAXone, 1 g, intravenous, q24h  cholecalciferol, 25 mcg, oral, BID  cyanocobalamin, 1,000 mcg, oral, Daily  DULoxetine, 30 mg, oral, BID  metoprolol succinate XL, 50 mg, oral, Daily  pregabalin, 100 mg, oral, Nightly  psyllium, 1 packet, oral, Daily  rivaroxaban, 10 mg, oral, Daily  [6]     [7] PRN medications: acetaminophen **OR** acetaminophen **OR** acetaminophen, albuterol, hydrALAZINE **FOLLOWED BY** [START ON 7/11/2025] hydrALAZINE, labetaloL, oxygen

## 2025-07-10 NOTE — PROGRESS NOTES
Physical Therapy    Physical Therapy Evaluation    Patient Name: Shreya Acuña  MRN: [id number]  Today's Date: 7/10/2025   Time Calculation  Start Time: 0954  Stop Time: 1012  Time Calculation (min): 18 min  614/614-A    Assessment/Plan   PT Assessment  PT Assessment Results: Decreased strength, Decreased endurance, Impaired balance, Decreased mobility  Rehab Prognosis: Fair  Barriers to Discharge Home: Physical needs  Physical Needs: Ambulating household distances limited by function/safety, High falls risk due to function or environment  End of Session Communication: Bedside nurse  Assessment Comment: Pt demonstrates impaired mobility throughout the session requiring increased level of assistance. Pt not at baseline and will benefit from further acute PT services and therapy s/p discharge to regain function and independence.  End of Session Patient Position: Alarm on, Up in chair (all needs in reach and no complaints noted)  IP OR SWING BED PT PLAN  Inpatient or Swing Bed: Inpatient  PT Plan  Treatment/Interventions: Bed mobility, Transfer training, Gait training  PT Plan: Ongoing PT  PT Frequency: 3 times per week (during this acute inpatient hospitalization)  PT Discharge Recommendations: Moderate intensity level of continued care (Based on current functional status and rehab potential, patient is anticipated to tolerate and benefit from 5 or more days per week of skilled rehabilitative therapy after discharge from this acute inpatient hospitalization.)  PT Recommended Transfer Status: Assist x2  PT - OK to Discharge: Yes    Subjective     Current Problem:  1. Aphasia  Transthoracic Echo (TTE) Limited    Transthoracic Echo (TTE) Limited        Problem List[1]    General Visit Information:  General  Reason for Referral: PT Eval and Treat  Referred By: Duncan Little PA-C  Past Medical History Relevant to Rehab: 89 y.o. female with past medical history significant for obesity, A-fib (on Xarelto), HTN, CHF,  HLD, CKD, history of nicotine use disorder, and TAMMY who presents to the ED for evaluation of strokelike symptoms.   at bedside earlier, stated patient has had new dysarthria x 1 week which also coincided with apparent hearing loss. States he has been turning up the TV louder over the past week compared to normal. Did state patient has had difficulty with word finding for the past year, however has still been able to communicate pretty well. States symptoms appeared worse this morning around 10 AM, reporting patient's speech was indiscernible.  Family/Caregiver Present: No  Co-Treatment: OT  Co-Treatment Reason: maximize safety and functional mobility  Prior to Session Communication: Bedside nurse  Patient Position Received: Alarm on, Up in chair  General Comment: Pt agreeable to PT.    Home Living:  Home Living  Type of Home: House  Lives With: Spouse (and HHA M-F)  Home Adaptive Equipment:  (rollator)  Home Layout: One level, Laundry main level  Home Access:  (unable to state)    Prior Level of Function:  Prior Function Per Pt/Caregiver Report  Level of [place]:  (Pt amb with rollator, HHA assists with all ADLs,  does cooking and laundry, has a cleaning person,  drives)    Precautions:  Precautions  Medical Precautions: Fall precautions    Vital Signs:  Vital Signs  Vital Signs Comment: VSS    Objective     Pain:  Pain Assessment  Pain Assessment:  (0/10)    Cognition:  Cognition  Overall Cognitive Status:  (Pt presenting with continued expressive aphasia and some confusion. Pt oriented to self and location, not date, with extended time required to answer simple questions. Pt did demonstrate difficulty recalling home set up and prior level of function.)    General Assessments:  Sensation  Light Touch: No apparent deficits  Strength  Strength Comments: B LE ROM WFL, B LE strength 3+/5  Static Sitting Balance  Static Sitting-Level of Assistance: Close supervision  Static Standing  Balance  Static Standing-Level of Assistance: Moderate assistance  Dynamic Standing Balance  Dynamic Standing-Level of Assistance: Maximum assistance    Functional Assessments:  Bed Mobility  Bed Mobility: Yes  Bed Mobility 1  Bed Mobility Comments 1: supine to sitting: mod A x 1, sit to supine: mod A x 2  Transfers  Transfer: Yes  Transfer 1  Trials/Comments 1: STS from EOB: max A x 2  Ambulation/Gait Training  Ambulation/Gait Training Performed: No (pt attempted side stepping using RW with max A x 2, however pt was unable to advance either LE with poor tolerance in standing.)     Outcome Measures:  [facility] Basic Mobility  Turning from your back to your side while in a flat bed without using bedrails: A lot  Moving from lying on your back to sitting on the side of a flat bed without using bedrails: A lot  Moving to and from bed to chair (including a wheelchair): Total  Standing up from a chair using your arms (e.g. wheelchair or bedside chair): A lot  To walk in hospital room: Total  Climbing 3-5 steps with railing: Total  Basic Mobility - Total Score: 9    Goals:  Encounter Problems       Encounter Problems (Active)       PT Problem       STG - Pt will transition supine <> sitting with min A x 1  (Progressing)       Start:  07/10/25    Expected End:  07/24/25            STG - Pt will transfer STS with mod A x 1  (Progressing)       Start:  07/10/25    Expected End:  07/24/25            STG - Pt will amb 15' using RW with mod A x 1  (Progressing)       Start:  07/10/25    Expected End:  07/24/25                 Education Documentation  Mobility Training, taught by Wanda Rust PT at 7/10/2025 11:52 AM.  Learner: Patient  Readiness: Acceptance  Method: Explanation  Response: Needs Reinforcement  Comment: PT POC    Education Comments  No comments found.               [1]   Patient Active Problem List  Diagnosis    Chronic diastolic heart failure    Chronic low back pain    Coronary artery disease    Frequent  urination    Hyperlipidemia    Hypertension    Hypoxia    Leg edema    Lumbar disc herniation    Lumbosacral neuritis    Lumbosacral stenosis    Nocturia    Class 3 severe obesity with body mass index (BMI) of 40.0 to 44.9 in adult    Obstructive sleep apnea, adult    Osteoarthritis of knees, bilateral    Permanent atrial fibrillation (Multi)    Shortness of breath at rest    Spondylolisthesis, lumbar region    Chronic kidney disease, stage 4 (severe) (Multi)    Stress incontinence, female    Urinary urgency    Venous insufficiency    Chronic left shoulder pain    Altered mental status    Atypical chest pain    Fall in home    Metabolic encephalopathy    Neck pain    Parkinson's disease    Preoperative cardiovascular examination    Bilateral carpal tunnel syndrome    Lumbar spondylosis    Aphasia    UTI (urinary tract infection)    Hypertensive urgency    Elevated troponin I level    Hypokalemia

## 2025-07-10 NOTE — PROGRESS NOTES
Occupational Therapy    Evaluation    Patient Name: Shreya Acuña  MRN: [id number]  Today's Date: 7/10/2025  Time Calculation  Start Time: 0953  Stop Time: 1012  Time Calculation (min): 19 min  614/614-A    Assessment  IP OT Assessment  OT Assessment: This hospitalization 7/9/2025: presents to the ED for evaluation of strokelike symptoms.   stated patient has had new dysarthria x 1 week which also coincided with apparent hearing loss.  Patient would benefit from further OT to address ADL's and functional transfers/mobility due to generalized weakness and limited standing balance/tolerance.  Prognosis: Good  End of Session Communication: Bedside nurse  End of Session Patient Position: Bed, 2 rail up, Alarm on; call-light within reach    Plan:  Treatment Interventions: ADL retraining, Patient/family training, Equipment evaluation/education, Functional transfer training, Compensatory technique education  OT Frequency: 3 times per week (during this acute inpatient hospitalization)  OT Discharge Recommendations: Moderate intensity level of continued care (Based on current functional status and rehab potential, patient is anticipated to tolerate and benefit from 5 or more days per week of skilled rehabilitative therapy after discharge from this acute inpatient hospitalization.)  OT - OK to Discharge: Yes to next level of care when medically cleared by physician/medical team    Subjective   Current Problem:  1. Aphasia  Transthoracic Echo (TTE) Limited    Transthoracic Echo (TTE) Limited    CANCELED: Transthoracic Echo (TTE) Limited    CANCELED: Transthoracic Echo (TTE) Limited        General:  General  Reason for Referral: stroke  Referred By: Duncan Little PA-C  Past Medical History Relevant to Rehab: obesity, A-fib (on Xarelto), HTN, CHF, HLD, CKD, history of nicotine use disorder, TAMMY  Co-Treatment: PT  Co-Treatment Reason: to maximize safetyduring mobility  Prior to Session Communication: Bedside nurse  who confirmed that patient is medically stable to participate in this OT session  Patient Position Received: Bed, 2 rail up, Alarm on  General Comment: Patient seen in room; cooperative, motivated    Precautions:  Medical Precautions: Fall precautions  Precautions Comment: expressive aphasia    Pain:  Pain Assessment  Pain Assessment: 0-10  0-10 (Numeric) Pain Score: 0 - No pain    Objective   Cognition:  Overall Cognitive Status: Within Functional Limits (able to follow simple/one-step commands with cues as needed; noted confusion; expressive aphasia)  Processing Speed: Delayed     Home Living:  Type of Home: House  Lives With: Spouse  Home Adaptive Equipment:  (rollator)  Home Layout: One level, Laundry main level     Prior Function:  Level of [place]: Needs assistance with ADLs (home aide M-F to assist for lower body dressing, bathing/showering)  Homemaking Assistance: Needs assistance (by  and hired assist for cleaning)  Ambulatory Assistance: Independent (rollator)  Hand Dominance: Right  Prior Function Comments:  drives, shops    ADL:  UE Dressing Assistance: Maximal  LE Dressing Assistance: Total  ADL Comments: Estimate above due to generalized weakness and decrease cognition. To further address in OT sessions using ADL adaptive equipment/assistive techniques to facilitate independence and ease in performance.    Activity Tolerance:  Endurance: Decreased tolerance for upright activites    Bed Mobility/Transfers:  Required step-by-step instructions/cues for all mobility tasks to promote safety due to limited insight and awareness    Bed Mobility 1  Bed Mobility 1: Supine to sitting  Level of Assistance 1: Moderate assistance, Maximum verbal cues  Bed Mobility Comments 1: HOB elevated  Bed Mobility 2  Bed Mobility  2: Sitting to supine  Level of Assistance 2: Moderate assistance, +2, Maximum verbal cues  Transfers  Transfer: Yes  Transfer 1  Transfer From 1: Sit to, Stand to  Transfer to 1:  Bed  Transfer Device 1: Walker  Transfer Level of Assistance 1: Maximum assistance, +2, Maximum verbal cues  Trials/Comments 1: cues to stand upright/weight shift    Ambulation/Gait Training:  Functional Mobility  Functional Mobility Performed:  (attempted however patient unable to perform despite assist of 2)    Sitting Balance:  Static Sitting Balance  Static Sitting-Level of Assistance: Contact guard    Standing Balance:  Static Standing Balance  Static Standing-Level of Assistance: Moderate assistance (of 2; fair (-))    Sensation:  Light Touch: No apparent deficits    Coordination:  Coordination Comment: WFL     Extremities: RUE   RUE : Within Functional Limits (comparable BUE) and LUE   LUE: Within Functional Limits    Outcome Measures: [facility] Daily Activity  Putting on and taking off regular lower body clothing: Total  Bathing (including washing, rinsing, drying): A lot  Putting on and taking off regular upper body clothing: A lot  Toileting, which includes using toilet, bedpan or urinal: Total  Taking care of personal grooming such as brushing teeth: A little  Eating Meals: None  Daily Activity - Total Score: 13     EDUCATION:  Education Documentation  Mobility Training, taught by Oscar Centeno OT at 7/10/2025  2:11 PM.  Learner: Patient  Readiness: Acceptance  Method: Explanation  Response: Needs Reinforcement, Demonstrated Understanding    Goals:   Encounter Problems       Encounter Problems (Active)       OT Goals       Patient will complete grooming and upper bathing/dressing with supervision; lower body self-care and toileting with minimal assist using assistive techniques/adaptive equipment as needed  (Progressing)       Start:  07/10/25    Expected End:  07/24/25            Patient will perform bed mobility and functional transfers safely with minimal assist:  bed, chair, commode using DME as needed  (Progressing)       Start:  07/10/25    Expected End:  07/24/25            Patient will tolerate  sitting for 10 minutes in prep for ADL's and functional transfers  (Progressing)       Start:  07/10/25    Expected End:  07/24/25            Patient will tolerate standing for 5 mins. and show overall good (-) standing balance during ADL's and functional transfers/mobility  (Progressing)       Start:  07/10/25    Expected End:  07/24/25

## 2025-07-10 NOTE — CARE PLAN
The patient's goals for the shift include  getting MRI done.    The clinical goals for the shift include getting MRI form done + pt demonstrating improvement in neurological exam throughout shift.      Problem: General Stroke  Goal: Maintain BP within ordered limits throughout shift  Outcome: Progressing     Problem: General Stroke  Goal: Participate in treatment (ie., meds, therapy) throughout shift  Outcome: Progressing     Problem: General Stroke  Goal: No symptoms of aspiration throughout shift  Outcome: Progressing     Problem: General Stroke  Goal: No symptoms of hemorrhage throughout shift  Outcome: Progressing     Problem: General Stroke  Goal: No symptoms of hemorrhage throughout shift  Outcome: Progressing

## 2025-07-10 NOTE — H&P
History Of Present Illness  Shreya Acuña is a 89 y.o. female with past medical history significant for obesity, A-fib (on Xarelto), HTN, CHF, HLD, CKD, history of nicotine use disorder, and TAMMY who presents to the ED for evaluation of strokelike symptoms.   at bedside earlier, stated patient has had new dysarthria x 1 week which also coincided with apparent hearing loss. States he has been turning up the TV louder over the past week compared to normal. Did state patient has had difficulty with word finding for the past year, however has still been able to communicate pretty well. States symptoms appeared worse this morning around 10 AM, reporting patient's speech was indiscernible. Denies noticing any facial droop, patient denies any extremity weakness/numbness.  Patient does take daily Xarelto, says she last took it this morning.   no longer at bedside and unable to currently reach via telephone.  Difficulty obtaining additional information from patient secondary to aphasia.  Patient having difficulty formulating discernible sentences at this time.  She is getting slightly frustrated because she knows what she is saying is not making sense.  Reports headache and generalized weakness earlier, however states that has been improving.  Denies chest pain or shortness of breath.  Denies home oxygen use.  Patient does have extensive history of cigarette use, however no longer smokes.  Has been compliant with her home medications.    ED course: On arrival to the ED, patient afebrile, hypertensive with a blood pressure 191/94, otherwise hemodynamically stable with SpO2 94% on room air.  Glucose 95, sodium 141, potassium 3.4, renal function WNL, LFTs WNL, initial troponin 17.  PT 15.4, INR 1.4.  WBC 8.1, hemoglobin/hematocrit WNL, platelets 171.  CT head shows mild atrophy and minimal chronic microvascular ischemic disease with no acute intracranial process, subcentimeter stable meningioma overlying left  cerebral convexity.  CTA head/neck shows no evidence for significant stenosis of cervical vessels, no significant stenosis or large branch vessel cutoffs of intracranial vessels, incidental note of very, normal variation in anatomy (infundibular widening of bilateral posterior communicating artery origins).  Patient given 10 mg IV hydralazine in the ED. Thrombolysis contraindicated due to taking Xarelto this morning.    Admitting providers Dr. Mcbride     Past Medical History  Medical History[1]    Surgical History  Surgical History[2]     Social History  She reports that she quit smoking about 42 years ago. Her smoking use included cigarettes. She has never used smokeless tobacco. She reports that she does not currently use alcohol. She reports that she does not use drugs.    Family History  Family History[3]     Allergies  Patient has no known allergies.    Review of Systems  10 point review system negative except as noted above in HPI    Physical Exam  Constitutional:       General: She is not in acute distress.     Appearance: She is not ill-appearing or toxic-appearing.   HENT:      Head: Normocephalic and atraumatic.      Ears:      Comments: Hard of hearing     Mouth/Throat:      Comments: Aphasia.  Eyes:      Conjunctiva/sclera: Conjunctivae normal.   Cardiovascular:      Rate and Rhythm: Normal rate and regular rhythm.      Pulses: Normal pulses.      Heart sounds: Normal heart sounds.      Comments: Hypertensive  Pulmonary:      Effort: Pulmonary effort is normal. No respiratory distress.      Breath sounds: Normal breath sounds. No wheezing.   Abdominal:      General: Bowel sounds are normal. There is no distension.      Palpations: Abdomen is soft.      Tenderness: There is no abdominal tenderness.   Musculoskeletal:         General: No tenderness. Normal range of motion.      Right lower leg: No edema.      Left lower leg: No edema.   Skin:     General: Skin is warm and dry.   Neurological:      Mental  "Status: She is alert and oriented to person, place, and time.      Comments: Symmetric strength bilateral upper/lower extremities, no drift.  No facial droop.   Psychiatric:         Mood and Affect: Mood normal.         Behavior: Behavior normal.       Last Recorded Vitals  Blood pressure 168/84, pulse 81, temperature 36.2 °C (97.2 °F), resp. rate 19, height 1.575 m (5' 2\"), weight 99.1 kg (218 lb 8 oz), SpO2 92%.    Relevant Results  Results for orders placed or performed during the hospital encounter of 07/09/25 (from the past 24 hours)   POCT GLUCOSE   Result Value Ref Range    POCT Glucose 101 (H) 74 - 99 mg/dL   CBC and Auto Differential   Result Value Ref Range    WBC 8.1 4.4 - 11.3 x10*3/uL    nRBC 0.0 0.0 - 0.0 /100 WBCs    RBC 4.23 4.00 - 5.20 x10*6/uL    Hemoglobin 14.0 12.0 - 16.0 g/dL    Hematocrit 43.2 36.0 - 46.0 %     (H) 80 - 100 fL    MCH 33.1 26.0 - 34.0 pg    MCHC 32.4 32.0 - 36.0 g/dL    RDW 14.9 (H) 11.5 - 14.5 %    Platelets 171 150 - 450 x10*3/uL    Neutrophils % 68.0 40.0 - 80.0 %    Immature Granulocytes %, Automated 0.5 0.0 - 0.9 %    Lymphocytes % 17.5 13.0 - 44.0 %    Monocytes % 11.8 2.0 - 10.0 %    Eosinophils % 2.0 0.0 - 6.0 %    Basophils % 0.2 0.0 - 2.0 %    Neutrophils Absolute 5.47 1.60 - 5.50 x10*3/uL    Immature Granulocytes Absolute, Automated 0.04 0.00 - 0.50 x10*3/uL    Lymphocytes Absolute 1.41 0.80 - 3.00 x10*3/uL    Monocytes Absolute 0.95 (H) 0.05 - 0.80 x10*3/uL    Eosinophils Absolute 0.16 0.00 - 0.40 x10*3/uL    Basophils Absolute 0.02 0.00 - 0.10 x10*3/uL   Comprehensive metabolic panel   Result Value Ref Range    Glucose 95 74 - 99 mg/dL    Sodium 141 136 - 145 mmol/L    Potassium 3.4 (L) 3.5 - 5.3 mmol/L    Chloride 107 98 - 107 mmol/L    Bicarbonate 26 21 - 32 mmol/L    Anion Gap 11 10 - 20 mmol/L    Urea Nitrogen 15 6 - 23 mg/dL    Creatinine 0.87 0.50 - 1.05 mg/dL    eGFR 65 >60 mL/min/1.73m*2    Calcium 8.8 8.6 - 10.3 mg/dL    Albumin 4.0 3.4 - 5.0 g/dL    " Alkaline Phosphatase 104 33 - 136 U/L    Total Protein 6.2 (L) 6.4 - 8.2 g/dL    AST 14 9 - 39 U/L    Bilirubin, Total 0.7 0.0 - 1.2 mg/dL    ALT 6 (L) 7 - 45 U/L   Troponin I, High Sensitivity   Result Value Ref Range    Troponin I, High Sensitivity 17 (H) 0 - 13 ng/L   Protime-INR   Result Value Ref Range    Protime 15.4 (H) 9.8 - 12.4 seconds    INR 1.4 (H) 0.9 - 1.1   APTT   Result Value Ref Range    aPTT 36 26 - 36 seconds   Gray Top   Result Value Ref Range    Extra Tube Hold for add-ons.    Lipid Panel   Result Value Ref Range    Cholesterol 119 0 - 199 mg/dL    HDL-Cholesterol 46.6 mg/dL    Cholesterol/HDL Ratio 2.6     LDL Calculated 44 <=99 mg/dL    VLDL 28 0 - 40 mg/dL    Triglycerides 141 0 - 149 mg/dL    Non HDL Cholesterol 72 0 - 149 mg/dL   Hemoglobin A1C   Result Value Ref Range    Hemoglobin A1C 5.3 See comment %    Estimated Average Glucose 105 Not Established mg/dL   B-Type Natriuretic Peptide   Result Value Ref Range     (H) 0 - 99 pg/mL   Magnesium   Result Value Ref Range    Magnesium 1.98 1.60 - 2.40 mg/dL   POCT GLUCOSE   Result Value Ref Range    POCT Glucose 95 74 - 99 mg/dL   ECG 12 lead   Result Value Ref Range    Ventricular Rate 80 BPM    QRS Duration 72 ms    QT Interval 408 ms    QTC Calculation(Bazett) 470 ms    R Axis -18 degrees    T Axis -17 degrees    QRS Count 14 beats    Q Onset 228 ms    T Offset 432 ms    QTC Fredericia 449 ms   Urinalysis with Reflex Culture and Microscopic   Result Value Ref Range    Color, Urine Light-Yellow Light-Yellow, Yellow, Dark-Yellow    Appearance, Urine Clear Clear    Specific Gravity, Urine 1.040 (N) 1.005 - 1.035    pH, Urine 6.0 5.0, 5.5, 6.0, 6.5, 7.0, 7.5, 8.0    Protein, Urine NEGATIVE NEGATIVE, 10 (TRACE), 20 (TRACE) mg/dL    Glucose, Urine Normal Normal mg/dL    Blood, Urine 0.06 (1+) (A) NEGATIVE mg/dL    Ketones, Urine NEGATIVE NEGATIVE mg/dL    Bilirubin, Urine NEGATIVE NEGATIVE mg/dL    Urobilinogen, Urine Normal Normal mg/dL     Nitrite, Urine 1+ (A) NEGATIVE    Leukocyte Esterase, Urine 250 [place]ma/uL (A) NEGATIVE   Extra Urine Gray Tube   Result Value Ref Range    Extra Tube     Microscopic Only, Urine   Result Value Ref Range    WBC, Urine 6-10 (A) 1-5, NONE /HPF    RBC, Urine 3-5 NONE, 1-2, 3-5 /HPF    Squamous Epithelial Cells, Urine 1-9 (SPARSE) Reference range not established. /HPF    Bacteria, Urine 1+ (A) NONE SEEN /HPF   Troponin I, High Sensitivity   Result Value Ref Range    Troponin I, High Sensitivity 18 (H) 0 - 13 ng/L   CBC   Result Value Ref Range    WBC 5.8 4.4 - 11.3 x10*3/uL    nRBC 0.0 0.0 - 0.0 /100 WBCs    RBC 4.08 4.00 - 5.20 x10*6/uL    Hemoglobin 13.5 12.0 - 16.0 g/dL    Hematocrit 41.4 36.0 - 46.0 %     (H) 80 - 100 fL    MCH 33.1 26.0 - 34.0 pg    MCHC 32.6 32.0 - 36.0 g/dL    RDW 15.1 (H) 11.5 - 14.5 %    Platelets 156 150 - 450 x10*3/uL   Basic metabolic panel   Result Value Ref Range    Glucose 103 (H) 74 - 99 mg/dL    Sodium 138 136 - 145 mmol/L    Potassium 3.7 3.5 - 5.3 mmol/L    Chloride 103 98 - 107 mmol/L    Bicarbonate 28 21 - 32 mmol/L    Anion Gap 11 10 - 20 mmol/L    Urea Nitrogen 12 6 - 23 mg/dL    Creatinine 0.78 0.50 - 1.05 mg/dL    eGFR 74 >60 mL/min/1.73m*2    Calcium 9.5 8.6 - 10.3 mg/dL   POCT GLUCOSE   Result Value Ref Range    POCT Glucose 105 (H) 74 - 99 mg/dL     *Note: Due to a large number of results and/or encounters for the requested time period, some results have not been displayed. A complete set of results can be found in Results Review.          CT brain attack angio head and neck W and WO IV contrast   Final Result   1. No evidence for significant stenosis of the cervical vessels.   2. No evidence for significant stenosis or large branch vessel   cutoffs of the intracranial vessels.   3. Incidental note of a very common normal variation in the anatomy:   Infundibular widening of bilateral posterior communicating artery   origins.        I personally reviewed the images/study  and I agree with Dayanna Powers DO's (radiology resident) findings as stated. This study   was interpreted at [facility],   [place], [place].        MACRO:   None        Signed by: Stefan Freeman 7/9/2025 5:00 PM   Dictation workstation:   [id number]      CT brain attack head wo IV contrast   Final Result   Mild atrophy and minimal chronic microvascular ischemic disease with   no acute intracranial process. Subcentimeter stable meningioma   overlying the left cerebral convexity.        MACRO:   Iram Titus discussed the significance and urgency of this critical   finding by EPIC secure chat with  ELYSE KLERMAN on 7/9/2025 at 3:37   pm.  (**-RCF-**) Findings:  See findings.                  Signed by: Iram Titus 7/9/2025 3:38 PM   Dictation workstation:   [id number]      MR brain wo IV contrast    (Results Pending)   MR angio head wo IV contrast    (Results Pending)   MR angio neck wo IV contrast    (Results Pending)   Transthoracic Echo (TTE) Limited    (Results Pending)         Assessment & Plan  Aphasia    UTI (urinary tract infection)    Hypertensive urgency    Elevated troponin I level    Hypokalemia      Shreya Acuña is a 89 y.o. female presents to the ED for evaluation of strokelike symptoms.   at bedside earlier, stated patient has had new dysarthria x 1 week which also coincided with apparent hearing loss. States he has been turning up the TV louder over the past week compared to normal. Did state patient has had difficulty with word finding for the past year, however has still been able to communicate pretty well. States symptoms appeared worse this morning around 10 AM, reporting patient's speech was indiscernible. Denies noticing any facial droop, patient denies any extremity weakness/numbness.  Patient does take daily Xarelto, says she last took it this morning. Difficulty obtaining additional information from patient secondary to aphasia.  Patient  having difficulty formulating discernible sentences at this time.  She is getting slightly frustrated because she knows what she is saying is not making sense.     CODE STATUS: Full code    #Suspect acute CVA  #Aphasia-currently having difficulty formulating comprehendible sentences  #Hypertensive urgency  #Mildly elevated troponin (initial troponin 17) - denies any current chest pains   #Worsening hearing x 1 week  #Concern for chronic neurodegenerative process  #Generalized weakness  Admit as inpatient with telemetry monitor  Neurology consult  CT head and CTA head/neck unremarkable for acute processes  MRI head and MRA head/neck ordered for further evaluation  Limited echocardiogram ordered  BNP, A1c, lipid panel added onto lab work; mag level added onto lab work  Repeat troponin ordered, trend  Q 4 neurochecks, Q 4 vitals  PT/OT/speech therapy for evaluation and treatment; fall precautions  Allow for permissive hypertension, IV antihypertensives for SBP greater than 220  Continue home Lipitor, will start on 81 mg aspirin daily  Breathing treatments, oxygen, Tylenol as needed  N.p.o. diet until patient passes swallow evaluation  CBC, BMP in the a.m.    - TTE, 4/18/2025: Low normal left ventricular ejection fraction, EF 50-55%, grade 3 (restrictive pattern) of left ventricular diastolic filling with an elevated left atrial pressure, normal right ventricular global systolic function, left atrium enlarged, mildly elevated RVSP, aortic valve sclerosis, moderate aortic valve cusp calcification.    #Suspect urinary tract infection  Urinalysis shows clear appearance, 1+ blood, 1+ nitrites, 250 leukocyte esterase, 1+ bacteria, and 6/10 WBCs.  Will start IV Rocephin daily  Await culture    #Hypokalemia  40 mEq potassium chloride packet ordered  Monitor with a.m. labs, replace as needed    Continue home medications as appropriate    Chronic conditions:  A-fib, HTN, CHF, TAMMY, HLD, CKD, history of nicotine use  disorder    #DVT prophylaxis  Continue home oral anticoagulation  SCDs, ambulation as tolerated    I spent 60 minutes in the professional and overall care of this patient.    Alejandro Mcbride DO           [1]   Past Medical History:  Diagnosis Date    Biliary dyskinesia 03/12/2025    Cholelithiasis 01/22/2025    Emphysema lung (Multi)     Hypertension     Other specified health status     No pertinent past medical history   [2]   Past Surgical History:  Procedure Laterality Date    OTHER SURGICAL HISTORY  12/02/2020    Cardioversion   [3] No family history on file.

## 2025-07-11 ENCOUNTER — APPOINTMENT (OUTPATIENT)
Dept: RADIOLOGY | Facility: HOSPITAL | Age: 89
DRG: 092 | End: 2025-07-11
Payer: MEDICARE

## 2025-07-11 LAB
GLUCOSE BLD MANUAL STRIP-MCNC: 109 MG/DL (ref 74–99)
GLUCOSE BLD MANUAL STRIP-MCNC: 135 MG/DL (ref 74–99)
GLUCOSE BLD MANUAL STRIP-MCNC: 86 MG/DL (ref 74–99)
HOLD SPECIMEN: NORMAL

## 2025-07-11 PROCEDURE — 2500000002 HC RX 250 W HCPCS SELF ADMINISTERED DRUGS (ALT 637 FOR MEDICARE OP, ALT 636 FOR OP/ED): Performed by: PHYSICIAN ASSISTANT

## 2025-07-11 PROCEDURE — 70553 MRI BRAIN STEM W/O & W/DYE: CPT

## 2025-07-11 PROCEDURE — 2500000004 HC RX 250 GENERAL PHARMACY W/ HCPCS (ALT 636 FOR OP/ED): Performed by: PHYSICIAN ASSISTANT

## 2025-07-11 PROCEDURE — 2500000001 HC RX 250 WO HCPCS SELF ADMINISTERED DRUGS (ALT 637 FOR MEDICARE OP): Performed by: PHYSICIAN ASSISTANT

## 2025-07-11 PROCEDURE — 2550000001 HC RX 255 CONTRASTS: Performed by: INTERNAL MEDICINE

## 2025-07-11 PROCEDURE — 99232 SBSQ HOSP IP/OBS MODERATE 35: CPT | Performed by: NURSE PRACTITIONER

## 2025-07-11 PROCEDURE — 1200000002 HC GENERAL ROOM WITH TELEMETRY DAILY

## 2025-07-11 PROCEDURE — A9575 INJ GADOTERATE MEGLUMI 0.1ML: HCPCS | Performed by: INTERNAL MEDICINE

## 2025-07-11 PROCEDURE — 70553 MRI BRAIN STEM W/O & W/DYE: CPT | Performed by: RADIOLOGY

## 2025-07-11 PROCEDURE — 82947 ASSAY GLUCOSE BLOOD QUANT: CPT

## 2025-07-11 PROCEDURE — 2500000002 HC RX 250 W HCPCS SELF ADMINISTERED DRUGS (ALT 637 FOR MEDICARE OP, ALT 636 FOR OP/ED): Performed by: INTERNAL MEDICINE

## 2025-07-11 RX ORDER — SPIRONOLACTONE 25 MG/1
25 TABLET ORAL DAILY
Status: DISCONTINUED | OUTPATIENT
Start: 2025-07-11 | End: 2025-07-12 | Stop reason: HOSPADM

## 2025-07-11 RX ORDER — GADOTERATE MEGLUMINE 376.9 MG/ML
19 INJECTION INTRAVENOUS
Status: COMPLETED | OUTPATIENT
Start: 2025-07-11 | End: 2025-07-11

## 2025-07-11 RX ADMIN — RIVAROXABAN 10 MG: 10 TABLET, FILM COATED ORAL at 08:58

## 2025-07-11 RX ADMIN — Medication 25 MCG: at 21:03

## 2025-07-11 RX ADMIN — PREGABALIN 100 MG: 25 CAPSULE ORAL at 21:03

## 2025-07-11 RX ADMIN — Medication 25 MCG: at 08:58

## 2025-07-11 RX ADMIN — GADOTERATE MEGLUMINE 19 ML: 376.9 INJECTION INTRAVENOUS at 11:52

## 2025-07-11 RX ADMIN — SPIRONOLACTONE 25 MG: 25 TABLET, FILM COATED ORAL at 17:34

## 2025-07-11 RX ADMIN — DULOXETINE 30 MG: 30 CAPSULE, DELAYED RELEASE ORAL at 21:03

## 2025-07-11 RX ADMIN — DULOXETINE 30 MG: 30 CAPSULE, DELAYED RELEASE ORAL at 08:59

## 2025-07-11 RX ADMIN — CARBIDOPA AND LEVODOPA 1 TABLET: 25; 100 TABLET ORAL at 21:03

## 2025-07-11 RX ADMIN — PSYLLIUM HUSK 1 PACKET: 3.4 POWDER ORAL at 08:56

## 2025-07-11 RX ADMIN — METOPROLOL SUCCINATE 50 MG: 50 TABLET, EXTENDED RELEASE ORAL at 08:58

## 2025-07-11 RX ADMIN — CEFTRIAXONE 1 G: 1 INJECTION, SOLUTION INTRAVENOUS at 18:41

## 2025-07-11 RX ADMIN — ASPIRIN 81 MG: 81 TABLET, COATED ORAL at 08:57

## 2025-07-11 RX ADMIN — CYANOCOBALAMIN TAB 1000 MCG 1000 MCG: 1000 TAB at 08:58

## 2025-07-11 RX ADMIN — CARBIDOPA AND LEVODOPA 1 TABLET: 25; 100 TABLET ORAL at 08:59

## 2025-07-11 RX ADMIN — ATORVASTATIN CALCIUM 20 MG: 20 TABLET, FILM COATED ORAL at 08:57

## 2025-07-11 ASSESSMENT — COGNITIVE AND FUNCTIONAL STATUS - GENERAL
DRESSING REGULAR LOWER BODY CLOTHING: A LITTLE
HELP NEEDED FOR BATHING: A LITTLE
MOBILITY SCORE: 16
PERSONAL GROOMING: A LITTLE
EATING MEALS: A LITTLE
DAILY ACTIVITIY SCORE: 18
TOILETING: A LITTLE
DRESSING REGULAR UPPER BODY CLOTHING: A LITTLE
TURNING FROM BACK TO SIDE WHILE IN FLAT BAD: A LITTLE
WALKING IN HOSPITAL ROOM: A LOT
MOVING TO AND FROM BED TO CHAIR: A LITTLE
STANDING UP FROM CHAIR USING ARMS: A LITTLE
CLIMB 3 TO 5 STEPS WITH RAILING: A LOT
MOVING FROM LYING ON BACK TO SITTING ON SIDE OF FLAT BED WITH BEDRAILS: A LITTLE

## 2025-07-11 ASSESSMENT — PAIN SCALES - GENERAL: PAINLEVEL_OUTOF10: 0 - NO PAIN

## 2025-07-11 NOTE — PROGRESS NOTES
Speech-Language Pathology                 Therapy Communication Note    Patient Name: Shreya Acuña  MRN: [id number]  Department: [facility]  Room: [address] [place] [address]  Today's Date: 7/11/2025     Discipline: Speech Language Pathology    Missed Visit Reason:  Unable to wake    Missed Time: Attempt    Comment: SLP was unable to wake patient with light verbal and tactile stimulus. ST POC remains in place. Will follow up with patient as schedule allows. Brain MRI showing no evidence of any acute findings secondary to CVA or ICH.

## 2025-07-11 NOTE — PROGRESS NOTES
07/11/25 1441   Discharge Planning   Living Arrangements Spouse/significant other   Support Systems Spouse/significant other   Type of Residence Private residence   Number of Stairs to Enter Residence 0   Number of Stairs Within Residence 11   Expected Discharge Disposition Home Health     I met with patient at the bedside, verified insurance and demographics.  Patient lives with her .  She mostly uses a wheelchair, also ambulates with a rollator.  Patient denies falls and requires extensive assistance with ADLs.  Per patient she has a caregiver for 4-5hrs a day but could not name the agency.  I called, spoke with patient's  Ender.  Per Ender, patient has a private duty caregiver [phone].  Ender works in his home office while caregiver is present and cares for patient himself the remainder of the time.  Ender requests C for ST and is agreeable to PT/OT as well.  Patient was recently active with [facility] (5-6/2025) and verbalized preference to use their services again.  Care Coordination team following.  Mohan PARRA TCC

## 2025-07-11 NOTE — PROGRESS NOTES
Shreya Acuña is a 89 y.o. female on day 2 of admission presenting with Aphasia.      Subjective   No events overnight. Patient seen and examined at bedside. She has no complaints.       Objective     Last Recorded Vitals  /83   Pulse 73   Temp 36.2 °C (97.2 °F)   Resp 17   Wt 99.1 kg (218 lb 8 oz)   SpO2 92%   Intake/Output last 3 Shifts:    Intake/Output Summary (Last 24 hours) at 7/11/2025 1709  Last data filed at 7/11/2025 0448  Gross per 24 hour   Intake --   Output 850 ml   Net -850 ml       Admission Weight  Weight: 96 kg (211 lb 10.3 oz) (07/09/25 1528)    Daily Weight  07/10/25 : 99.1 kg (218 lb 8 oz)    Image Results  MR brain w and wo IV contrast  Narrative: Interpreted By:  Zach Lee and Kaur Arashdeep   STUDY:  MR BRAIN W AND WO IV CONTRAST;  7/11/2025 11:55 am      INDICATION:  Signs/Symptoms:dysarthria, worsening hearing loss x 1 week,  meningioma noted on CTH. Had MRI brain w/o 3 months ago and isnt  noted..          COMPARISON:  CT BRAIN ATTACK ANGIO HEAD AND NECK W AND WO IV CONTRAST 7/9/2025, MR  BRAIN WO IV CONTRAST 4/17/2025      ACCESSION NUMBER(S):  [id number]      ORDERING CLINICIAN:  RUPERT PATE      TECHNIQUE:  Standard multiplanar multisequence MR imaging was performed through  the brain prior to and following administration of 19 ML Dotarem  intravenous contrast.      FINDINGS:  A tiny 7 mm x 6 mm x 4 mm meningioma along the left frontal convexity  is unchanged (series 16, image 52, series 14, image 51). No  associated mass effect.      There is no diffusion restriction to suggest acute infarct. There is  residual superficial siderosis involving the parasagittal left  parieto-occipital lobes, unchanged in the interim. No evidence of  recent hemorrhage. No intracranial mass effect. Moderate confluent  and patchy T2/FLAIR white matter hyperintensities without diffusion  restriction, compatible with microangiopathy. Remote bilateral basal  ganglia lacunar infarcts. No  abnormal parenchymal or leptomeningeal  enhancement.      Similar parenchymal volume loss and brain atrophy, more pronounced  along the bifrontal and superior frontoparietal convexities. Basal  cisterns are patent. No evidence of hydrocephalus. No extra-axial  fluid collection.      Intracranial flow voids appear patent.      Status post bilateral cataract surgery.      Minimal mucosal thickening is seen within bilateral ethmoid sinuses.  Rest of the visualized paranasal sinuses are unremarkable. Bilateral  mastoid cells are clear.      No suspicious/destructive marrow signal within the overlying  calvarium.      Impression: 1. No acute infarct, recent hemorrhage, or intracranial mass effect.  No abnormal parenchymal or leptomeningeal enhancement.  2. Moderate chronic small vessel ischemic disease with remote  bilateral basal ganglia lacunar infarcts. Similar brain atrophy.  3. Stable 7 mm diameter calcified left frontal meningioma.          I personally reviewed the images/study and I agree with the findings  as stated by Codey Webber MD.  This study was interpreted at  [facility], [place], [place].      MACRO:  None      Signed by: Zach Lee 7/11/2025 12:41 PM  Dictation workstation:   [id number]      Physical Exam  Constitutional:       General: She is not in acute distress.     Appearance: She is not ill-appearing or toxic-appearing.   HENT:      Head: Normocephalic and atraumatic.      Ears:      Comments: Hard of hearing     Mouth/Throat:      Comments: Aphasia.  Eyes:      Conjunctiva/sclera: Conjunctivae normal.   Cardiovascular:      Rate and Rhythm: Normal rate and regular rhythm.      Pulses: Normal pulses.      Heart sounds: Normal heart sounds.      Comments: Hypertensive  Pulmonary:      Effort: Pulmonary effort is normal. No respiratory distress.      Breath sounds: Normal breath sounds. No wheezing.   Abdominal:      General: Bowel sounds are normal. There is  no distension.      Palpations: Abdomen is soft.      Tenderness: There is no abdominal tenderness.   Musculoskeletal:         General: No tenderness. Normal range of motion.      Right lower leg: No edema.      Left lower leg: No edema.   Skin:     General: Skin is warm and dry.   Neurological:      Mental Status: She is alert and oriented to person, place, and time.      Comments: Symmetric strength bilateral upper/lower extremities, no drift.  No facial droop.   Psychiatric:         Mood and Affect: Mood normal.         Behavior: Behavior normal.      Relevant Results                              Assessment & Plan  Aphasia    UTI (urinary tract infection)    Hypertensive urgency    Elevated troponin I level    Hypokalemia    Shreya Acuña is a 89 y.o. female presents to the ED for evaluation of strokelike symptoms.   at bedside earlier, stated patient has had new dysarthria x 1 week which also coincided with apparent hearing loss. States he has been turning up the TV louder over the past week compared to normal. Did state patient has had difficulty with word finding for the past year, however has still been able to communicate pretty well. States symptoms appeared worse this morning around 10 AM, reporting patient's speech was indiscernible. Denies noticing any facial droop, patient denies any extremity weakness/numbness.  Patient does take daily Xarelto, says she last took it this morning. Difficulty obtaining additional information from patient secondary to aphasia.  Patient having difficulty formulating discernible sentences at this time.  She is getting slightly frustrated because she knows what she is saying is not making sense.      CODE STATUS: Full code     #Suspect acute CVA  #Aphasia-currently having difficulty formulating comprehendible sentences  #Hypertensive urgency  #Mildly elevated troponin (initial troponin 17) - denies any current chest pains   #Worsening hearing x 1 week  #Concern for  chronic neurodegenerative process  #Generalized weakness  Admit as inpatient with telemetry monitor  Neurology consult  CT head and CTA head/neck unremarkable for acute processes  MRI head and MRA head/neck ordered for further evaluation  Limited echocardiogram ordered  BNP, A1c, lipid panel added onto lab work; mag level added onto lab work  Repeat troponin ordered, trend  Q 4 neurochecks, Q 4 vitals  PT/OT/speech therapy for evaluation and treatment; fall precautions  Allow for permissive hypertension, IV antihypertensives for SBP greater than 220  Continue home Lipitor, will start on 81 mg aspirin daily  Breathing treatments, oxygen, Tylenol as needed  N.p.o. diet until patient passes swallow evaluation  CBC, BMP in the a.m.     - TTE, 4/18/2025: Low normal left ventricular ejection fraction, EF 50-55%, grade 3 (restrictive pattern) of left ventricular diastolic filling with an elevated left atrial pressure, normal right ventricular global systolic function, left atrium enlarged, mildly elevated RVSP, aortic valve sclerosis, moderate aortic valve cusp calcification.     #Suspect urinary tract infection  Urinalysis shows clear appearance, 1+ blood, 1+ nitrites, 250 leukocyte esterase, 1+ bacteria, and 6/10 WBCs.  Will start IV Rocephin daily  Await culture     #Hypokalemia  40 mEq potassium chloride packet ordered  Monitor with a.m. labs, replace as needed     Continue home medications as appropriate     Chronic conditions:  A-fib, HTN, CHF, TAMMY, HLD, CKD, history of nicotine use disorder     #DVT prophylaxis  Continue home oral anticoagulation  SCDs, ambulation as tolerated     7/11: MRI negative for acute infarct but does show chronic changes. Echo shows EF 50-55%. Urine culture is growing Klebsiella. Continue antibiotics. Await final cultures. Resume spironolactone given hypertensive.           Alejandro Mcbride, DO

## 2025-07-11 NOTE — CARE PLAN
Problem: General Stroke  Goal: Demonstrate improvement in neurological exam throughout the shift  Outcome: Progressing  Goal: Maintain BP within ordered limits throughout shift  Outcome: Progressing     Problem: Safety - Adult  Goal: Free from fall injury  Outcome: Progressing  Flowsheets (Taken 7/11/2025 0517)  Free from fall injury: Instruct family/caregiver on patient safety     Problem: Skin  Goal: Promote skin healing  Outcome: Progressing  Flowsheets (Taken 7/11/2025 0517)  Promote skin healing: Assess skin/pad under line(s)/device(s)   The patient's goals for the shift include      The clinical goals for the shift include to remain neurologically intact

## 2025-07-11 NOTE — PROGRESS NOTES
"Shreya Acuña is a 89 y.o. female on day 2 of admission presenting with Aphasia.      Subjective   MRI brain without contrast has been completed, which is negative for any acute findings secondary to CVA or ICH.  Unchanged meningioma is noted at approximately 7 mm x 6 mm x 4 mm along the left frontal convexity.  No associated mass effect is noted.  Moderate chronic small vessel disease with remote bilateral basal ganglia lacunar infarcts are noted.  Patient to continue Xarelto, ASA, and statin for CVA prophylaxis.  Reversible causes of dementia labs are unremarkable.  Urine culture significant for Klebsiella of the urine, with patient receiving IV antibiotics.  General neurology to sign off on care.    Objective     Last Recorded Vitals  Blood pressure 171/82, pulse 72, temperature 36.1 °C (97 °F), resp. rate 17, height 1.575 m (5' 2\"), weight 99.1 kg (218 lb 8 oz), SpO2 91%.    Relevant Results  Scheduled medications  Scheduled Medications[1]  Continuous medications  Continuous Medications[2]  PRN medications  PRN Medications[3]  MR brain w and wo IV contrast  Result Date: 7/11/2025  Interpreted By:  Zach Lee and Kaur Arashdeep STUDY: MR BRAIN W AND WO IV CONTRAST;  7/11/2025 11:55 am   INDICATION: Signs/Symptoms:dysarthria, worsening hearing loss x 1 week, meningioma noted on CTH. Had MRI brain w/o 3 months ago and isnt noted..     COMPARISON: CT BRAIN ATTACK ANGIO HEAD AND NECK W AND WO IV CONTRAST 7/9/2025, MR BRAIN WO IV CONTRAST 4/17/2025   ACCESSION NUMBER(S): [id number]   ORDERING CLINICIAN: RUPERT PATE   TECHNIQUE: Standard multiplanar multisequence MR imaging was performed through the brain prior to and following administration of 19 ML Dotarem intravenous contrast.   FINDINGS: A tiny 7 mm x 6 mm x 4 mm meningioma along the left frontal convexity is unchanged (series 16, image 52, series 14, image 51). No associated mass effect.   There is no diffusion restriction to suggest acute infarct. " There is residual superficial siderosis involving the parasagittal left parieto-occipital lobes, unchanged in the interim. No evidence of recent hemorrhage. No intracranial mass effect. Moderate confluent and patchy T2/FLAIR white matter hyperintensities without diffusion restriction, compatible with microangiopathy. Remote bilateral basal ganglia lacunar infarcts. No abnormal parenchymal or leptomeningeal enhancement.   Similar parenchymal volume loss and brain atrophy, more pronounced along the bifrontal and superior frontoparietal convexities. Basal cisterns are patent. No evidence of hydrocephalus. No extra-axial fluid collection.   Intracranial flow voids appear patent.   Status post bilateral cataract surgery.   Minimal mucosal thickening is seen within bilateral ethmoid sinuses. Rest of the visualized paranasal sinuses are unremarkable. Bilateral mastoid cells are clear.   No suspicious/destructive marrow signal within the overlying calvarium.       1. No acute infarct, recent hemorrhage, or intracranial mass effect. No abnormal parenchymal or leptomeningeal enhancement. 2. Moderate chronic small vessel ischemic disease with remote bilateral basal ganglia lacunar infarcts. Similar brain atrophy. 3. Stable 7 mm diameter calcified left frontal meningioma.     I personally reviewed the images/study and I agree with the findings as stated by Codey Webber MD.  This study was interpreted at [facility], [place], [place].   MACRO: None   Signed by: Zach Lee 7/11/2025 12:41 PM Dictation workstation:   [id number]    Transthoracic Echo (TTE) Limited  Result Date: 7/10/2025   [facility], [address] [place] [address]           Tel [phone] and Fax [phone] TRANSTHORACIC ECHOCARDIOGRAM REPORT  Patient Name:       VIRI WATSON     Reading Physician:    [id number] Kendell Skinner MD Study Date:          7/10/2025           Ordering Provider:    [id number] RUPERT PATE MRN/PID:            [id number]            Fellow: Accession#:         [id number]        Nurse: Date of Birth/Age:  1935 / 89      Sonographer:          Sharon bacon                                     NAS Gender assigned at  F                   Additional Staff: Birth: Height:             157.00 cm           Admit Date:           7/9/2025 Weight:             99.00 kg            Admission Status:     Inpatient -                                                               Routine BSA / BMI:          1.98 m2 / 40.16     Encounter#:           [id number]                     kg/m2 Blood Pressure:     199/93 mmHg         Department Location:  [address]                                                               [facility] Study Type:    TRANSTHORACIC ECHO (TTE) LIMITED Diagnosis/ICD: Aphasia-R47.01 Indication:    Aphasia; CPT Code:      Echo Limited-78579; Doppler Limited-38084; Color Doppler-25938 Patient History: Pertinent History: Obesity, A-fib (on Xarelto), HTN, CHF, HLD, CKD, history of                    nicotine use disorder, and TAMMY. Study Detail: The following Echo studies were performed: 2D, M-Mode, Doppler and               color flow. Technically challenging study due to body habitus.               Unable to obtain suprasternal notch view.  PHYSICIAN INTERPRETATION: Left Ventricle: Left ventricular ejection fraction is low normal by visual estimate at 50-55%. There is moderate concentric left ventricular hypertrophy. There are no regional left ventricular wall motion abnormalities. The left ventricular cavity size is normal. There is moderately increased septal and mildly increased posterior left ventricular wall thickness. Left ventricular diastolic filling is indeterminate. Left Atrium: The left atrium is mildly dilated. A bubble study  using agitated saline was performed. Bubble study is negative. Right Ventricle: The right ventricle is normal in size. There is normal right ventricular global systolic function. Right Atrium: The right atrium is moderately dilated. Aortic Valve: The aortic valve is trileaflet. There is moderate aortic valve thickening. There is no evidence of aortic valve regurgitation. Mitral Valve: The mitral valve is mildly thickened. There is moderate mitral annular calcification. There is trace mitral valve regurgitation. Tricuspid Valve: The tricuspid valve is structurally normal. There is trace tricuspid regurgitation. Pulmonic Valve: The pulmonic valve is not well visualized. The pulmonic valve regurgitation was not well visualized. Pericardium: There is no pericardial effusion noted. Aorta: The aortic root is normal. There is no dilatation of the aortic root. Systemic Veins: The inferior vena cava appears normal in size, with IVC inspiratory collapse less than 50%.  CONCLUSIONS:  1. Left ventricular ejection fraction is low normal by visual estimate at 50-55%.  2. Left ventricular diastolic filling is indeterminate.  3. There is moderately increased septal thickness.  4. There is moderate concentric left ventricular hypertrophy.  5. There is normal right ventricular global systolic function.  6. The left atrium is mildly dilated.  7. The right atrium is moderately dilated.  8. There is moderate mitral annular calcification. QUANTITATIVE DATA SUMMARY:  2D MEASUREMENTS:          Normal Ranges: LAs:             4.00 cm  (2.7-4.0cm) IVSd:            1.30 cm  (0.6-1.1cm) LVPWd:           1.20 cm  (0.6-1.1cm) LVIDd:           4.15 cm  (3.9-5.9cm) LVIDs:           2.30 cm LV Mass Index:   116 g/m2 LVEDV Index:     40 ml/m2 LV % FS          44.6 %  LV SYSTOLIC FUNCTION:                      Normal Ranges: EF-A4C View:    43 % (>=55%) EF-A2C View:    59 % EF-Biplane:     53 % EF-Visual:      53 % LV EF Reported: 53 %  RIGHT  VENTRICLE: TAPSE: 21.6 mm RV s'  0.12 m/s  TRICUSPID VALVE/RVSP:         Normal Ranges: Est. RA Pressure:     8 IVC Diam:             1.40 cm  [id number] Kendell Skinner MD Electronically signed on 7/10/2025 at 4:52:32 PM  ** Final **     ECG 12 lead  Result Date: 7/10/2025  Atrial fibrillation Nonspecific ST abnormality Abnormal ECG When compared with ECG of 13-APR-2025 21:13, No significant change was found    CT brain attack angio head and neck W and WO IV contrast  Result Date: 7/9/2025  Interpreted By:  Stefan Freeman  and Gio Alan STUDY: CT BRAIN ATTACK ANGIO HEAD AND NECK W AND WO IV CONTRAST;  7/9/2025 3:41 pm   INDICATION: Signs/Symptoms:stroke.     COMPARISON: Same day CT head.   ACCESSION NUMBER(S): [id number]   ORDERING CLINICIAN: ELYSE KLERMAN   TECHNIQUE: 75 ML of Omnipaque 350 was administered intravenously and axial images of the head and neck were acquired.  Coronal, sagittal, and 3-D reconstructions were provided for review.   FINDINGS: CTA HEAD FINDINGS:   Anterior circulation: 2 x 2 mm outpouching of the right paraophthalmic internal carotid artery notably has a conical shape and a broad base, with smooth margins and it measures about 2 mm from the base to the apex. The left posterior communicating artery is very hypoplastic and difficult to see. However, I am able to successfully trace it to the apex of this outpouching. Therefore, this meets all of the imaging criteria of an infundibulum, rather than an aneurysm. There is no evidence for vasospasm. I note that the MRI study has demonstrated no evidence to suggest a prior subarachnoid hemorrhage, neither recent nor chronic. There is an infundibulum at the origin of the left posterior communicating artery is well although the left posterior communicating artery is congenitally very large with a fetal origin of the left PCA. Calcifications are noted of the cavernous internal carotid arteries without flow-limiting stenosis. Luminal narrowing  appears to be less than 50% at these locations, within the limitations of CTA imaging where arteries are small, densely calcified and tortuous. The bilateral carotid terminals, bilateral proximal anterior and middle cerebral arteries are normal.   Posterior circulation: Bilateral intracranial vertebral arteries, vertebrobasilar junction, basilar artery and proximal posterior cerebral arteries are normal. Fetal origin of the left PCA, normal anatomic variant.     CTA NECK FINDINGS:   Three-vessel configuration of the aortic arch.   Right carotid vessels: The common carotid artery has only a small smooth calcified plaque without any appreciable luminal narrowing. Delete the common carotid arteries are moderately tortuous and make abrupt bend which narrows the lumen but there is no evidence for plaque or other significant pathology at these sharp bends or kinks along the vessel course. Mild calcification of the proximal internal carotid artery. There is 0% stenosis  per NASCET criteria.   Left carotid vessels: The common carotid artery has mild calcified plaque without significant common carotid narrowing.. Mild calcification at the carotid bifurcation. The internal carotid artery in the neck is tortuous but otherwise nor. There is 0% stenosis  per NASCET criteria.   Vertebral vessels:  The visualized segments of the cervical vertebral arteries are normal in caliber.   Please see separately dictated CT head for nonvascular intracranial findings.       1. No evidence for significant stenosis of the cervical vessels. 2. No evidence for significant stenosis or large branch vessel cutoffs of the intracranial vessels. 3. Incidental note of a very common normal variation in the anatomy: Infundibular widening of bilateral posterior communicating artery origins.   I personally reviewed the images/study and I agree with Dayanna Powers DO's (radiology resident) findings as stated. This study was interpreted at [facility], [place].   MACRO: None   Signed by: Stefan Freeman 7/9/2025 5:00 PM Dictation workstation:   [id number]    CT brain attack head wo IV contrast  Result Date: 7/9/2025  Interpreted By:  Iram Titus, STUDY: CT BRAIN ATTACK HEAD WO IV CONTRAST;  7/9/2025 3:27 pm   INDICATION: Signs/Symptoms:Stroke Evaluation.     COMPARISON: 04/15/2025.   ACCESSION NUMBER(S): [id number]   ORDERING CLINICIAN: ELYSE KLERMAN   TECHNIQUE: Noncontrast axial CT scan of head was performed. Angled reformats in brain and bone windows were generated. The images were reviewed in bone, brain, blood and soft tissue windows.   FINDINGS: CSF Spaces: There is mild ventricular enlargement with mild prominence of the sulci, sylvian fissures, and basilar cisterns. There is no extraaxial fluid collection.   Parenchyma: There is a mild decrease in density in periventricular white matter indicating mild chronic microvascular ischemic disease. No hyperdense MCA sign is identified. The grey-white differentiation is intact. There is no mass effect or midline shift.  There is no intracranial hemorrhage. There is a 7 mm dural-based calcified mass overlying left cerebral hemisphere consistent with a small stable meningioma.   Calvarium: The calvarium is unremarkable.   Paranasal sinuses and mastoids: Visualized paranasal sinuses and mastoids are clear.       Mild atrophy and minimal chronic microvascular ischemic disease with no acute intracranial process. Subcentimeter stable meningioma overlying the left cerebral convexity.   MACRO: Iram Titus discussed the significance and urgency of this critical finding by [employer] secure chat with  ELYSE KLERMAN on 7/9/2025 at 3:37 pm.  (**-RCF-**) Findings:  See findings.       Signed by: Iram Titus 7/9/2025 3:38 PM Dictation workstation:   [id number]    Results for orders placed or performed during the hospital encounter of 07/09/25 (from the past 24 hours)   Transthoracic Echo  (TTE) Limited   Result Value Ref Range    Tricuspid annular plane systolic excursion 2.2 cm    LV EF 53 %    RV free wall pk S' 11.90 cm/s    LVIDd 4.15 cm    LV A4C EF 42.7    Vitamin B12   Result Value Ref Range    Vitamin B12 872 211 - 911 pg/mL   Folate   Result Value Ref Range    Folate, Serum 13.8 >5.0 ng/mL   Lavender Top   Result Value Ref Range    Extra Tube Hold for add-ons.    POCT GLUCOSE   Result Value Ref Range    POCT Glucose 111 (H) 74 - 99 mg/dL   POCT GLUCOSE   Result Value Ref Range    POCT Glucose 142 (H) 74 - 99 mg/dL   POCT GLUCOSE   Result Value Ref Range    POCT Glucose 86 74 - 99 mg/dL   POCT GLUCOSE   Result Value Ref Range    POCT Glucose 135 (H) 74 - 99 mg/dL     *Note: Due to a large number of results and/or encounters for the requested time period, some results have not been displayed. A complete set of results can be found in Results Review.                   Belia Coma Scale  Best Eye Response: Spontaneous  Best Verbal Response: Confused  Best Motor Response: Follows commands  Belia Coma Scale Score: 14                                Assessment & Plan  Aphasia    UTI (urinary tract infection)    Hypertensive urgency    -Continue Xarelto 10 mg p.o. daily, ASA 81 mg p.o. daily and atorvastatin 20 mg p.o. daily for CVA prophylaxis  -Continue management of confirmed UTI via IV antibiotics  -Recommendations for needs during hospitalization and at discharge via PT and OT  -Continue Sinemet as prescribed to assist with parkinsonian tremor  -Continue promotion of lifestyle modifications, such as: Strict BP and glycemic control, dietary habit changes, incorporation of daily exercise regimen, adherence to all prescription/OTC medication schedules, attendance to all follow-up appointments, cessation from smoking if applicable, abstinence from alcohol and illicit drug use if applicable  -Patient to follow-up with PCP in 1 to 2 weeks postdischarge  -Patient to follow-up with established  neurologist, Dr. Rhodes, as scheduled.    Neurology to sign off at this time. Thank you for the opportunity to be a part of this patient's multidisciplinary treatment team.  If any additional questions or concerns arise, please do not hesitate to contact me or the on-call neurologist via [employer] Secure Chat.    I spent 30 minutes in the professional and overall care of this patient.      Nessa Castro, APRN-CNP       [1] aspirin, 81 mg, oral, Daily  atorvastatin, 20 mg, oral, Daily  carbidopa-levodopa, 1 tablet, oral, BID  cefTRIAXone, 1 g, intravenous, q24h  cholecalciferol, 25 mcg, oral, BID  cyanocobalamin, 1,000 mcg, oral, Daily  DULoxetine, 30 mg, oral, BID  metoprolol succinate XL, 50 mg, oral, Daily  pregabalin, 100 mg, oral, Nightly  psyllium, 1 packet, oral, Daily  rivaroxaban, 10 mg, oral, Daily  [2]    [3] PRN medications: acetaminophen **OR** acetaminophen **OR** acetaminophen, albuterol, hydrALAZINE **FOLLOWED BY** hydrALAZINE, labetaloL, oxygen

## 2025-07-12 ENCOUNTER — HOME HEALTH ADMISSION (OUTPATIENT)
Dept: HOME HEALTH SERVICES | Facility: HOME HEALTH | Age: 89
End: 2025-07-12
Payer: MEDICARE

## 2025-07-12 VITALS
HEART RATE: 66 BPM | WEIGHT: 218.5 LBS | RESPIRATION RATE: 18 BRPM | HEIGHT: 62 IN | OXYGEN SATURATION: 96 % | DIASTOLIC BLOOD PRESSURE: 93 MMHG | BODY MASS INDEX: 40.21 KG/M2 | TEMPERATURE: 97 F | SYSTOLIC BLOOD PRESSURE: 193 MMHG

## 2025-07-12 LAB — BACTERIA UR CULT: ABNORMAL

## 2025-07-12 PROCEDURE — 2500000001 HC RX 250 WO HCPCS SELF ADMINISTERED DRUGS (ALT 637 FOR MEDICARE OP): Performed by: INTERNAL MEDICINE

## 2025-07-12 PROCEDURE — 2500000002 HC RX 250 W HCPCS SELF ADMINISTERED DRUGS (ALT 637 FOR MEDICARE OP, ALT 636 FOR OP/ED): Performed by: PHYSICIAN ASSISTANT

## 2025-07-12 PROCEDURE — 2500000002 HC RX 250 W HCPCS SELF ADMINISTERED DRUGS (ALT 637 FOR MEDICARE OP, ALT 636 FOR OP/ED): Performed by: INTERNAL MEDICINE

## 2025-07-12 PROCEDURE — 2500000001 HC RX 250 WO HCPCS SELF ADMINISTERED DRUGS (ALT 637 FOR MEDICARE OP): Performed by: PHYSICIAN ASSISTANT

## 2025-07-12 RX ORDER — CIPROFLOXACIN 500 MG/1
500 TABLET, FILM COATED ORAL EVERY 12 HOURS SCHEDULED
Qty: 7 TABLET | Refills: 0 | Status: SHIPPED | OUTPATIENT
Start: 2025-07-12 | End: 2025-07-16

## 2025-07-12 RX ORDER — AMLODIPINE BESYLATE 5 MG/1
5 TABLET ORAL DAILY
Status: DISCONTINUED | OUTPATIENT
Start: 2025-07-12 | End: 2025-07-12 | Stop reason: HOSPADM

## 2025-07-12 RX ORDER — CIPROFLOXACIN 500 MG/1
500 TABLET, FILM COATED ORAL EVERY 12 HOURS SCHEDULED
Status: DISCONTINUED | OUTPATIENT
Start: 2025-07-12 | End: 2025-07-12 | Stop reason: HOSPADM

## 2025-07-12 RX ORDER — SPIRONOLACTONE 25 MG/1
25 TABLET ORAL DAILY
Qty: 90 TABLET | Refills: 3 | Status: SHIPPED | OUTPATIENT
Start: 2025-07-12

## 2025-07-12 RX ORDER — AMLODIPINE BESYLATE 5 MG/1
5 TABLET ORAL DAILY
Qty: 90 TABLET | Refills: 3 | Status: SHIPPED | OUTPATIENT
Start: 2025-07-12

## 2025-07-12 RX ADMIN — METOPROLOL SUCCINATE 50 MG: 50 TABLET, EXTENDED RELEASE ORAL at 08:35

## 2025-07-12 RX ADMIN — AMLODIPINE BESYLATE 5 MG: 5 TABLET ORAL at 12:04

## 2025-07-12 RX ADMIN — ATORVASTATIN CALCIUM 20 MG: 20 TABLET, FILM COATED ORAL at 08:35

## 2025-07-12 RX ADMIN — ASPIRIN 81 MG: 81 TABLET, COATED ORAL at 08:35

## 2025-07-12 RX ADMIN — PSYLLIUM HUSK 1 PACKET: 3.4 POWDER ORAL at 08:36

## 2025-07-12 RX ADMIN — Medication 25 MCG: at 08:35

## 2025-07-12 RX ADMIN — RIVAROXABAN 10 MG: 10 TABLET, FILM COATED ORAL at 08:35

## 2025-07-12 RX ADMIN — SPIRONOLACTONE 25 MG: 25 TABLET, FILM COATED ORAL at 08:35

## 2025-07-12 RX ADMIN — DULOXETINE 30 MG: 30 CAPSULE, DELAYED RELEASE ORAL at 08:35

## 2025-07-12 RX ADMIN — CARBIDOPA AND LEVODOPA 1 TABLET: 25; 100 TABLET ORAL at 08:35

## 2025-07-12 RX ADMIN — CYANOCOBALAMIN TAB 1000 MCG 1000 MCG: 1000 TAB at 08:35

## 2025-07-12 RX ADMIN — CIPROFLOXACIN 500 MG: 500 TABLET ORAL at 15:08

## 2025-07-12 ASSESSMENT — COGNITIVE AND FUNCTIONAL STATUS - GENERAL
WALKING IN HOSPITAL ROOM: A LOT
STANDING UP FROM CHAIR USING ARMS: A LITTLE
EATING MEALS: A LITTLE
PERSONAL GROOMING: A LITTLE
DRESSING REGULAR LOWER BODY CLOTHING: A LITTLE
MOBILITY SCORE: 16
TURNING FROM BACK TO SIDE WHILE IN FLAT BAD: A LITTLE
MOVING FROM LYING ON BACK TO SITTING ON SIDE OF FLAT BED WITH BEDRAILS: A LITTLE
CLIMB 3 TO 5 STEPS WITH RAILING: A LOT
MOVING TO AND FROM BED TO CHAIR: A LITTLE
DAILY ACTIVITIY SCORE: 18
HELP NEEDED FOR BATHING: A LITTLE
TOILETING: A LITTLE
DRESSING REGULAR UPPER BODY CLOTHING: A LITTLE

## 2025-07-12 ASSESSMENT — PAIN - FUNCTIONAL ASSESSMENT: PAIN_FUNCTIONAL_ASSESSMENT: 0-10

## 2025-07-12 ASSESSMENT — PAIN SCALES - GENERAL: PAINLEVEL_OUTOF10: 0 - NO PAIN

## 2025-07-12 NOTE — CARE PLAN
The patient's goals for the shift include      The clinical goals for the shift include Maintain safety and comfort        Problem: Safety - Adult  Goal: Free from fall injury  Outcome: Met     Problem: Discharge Planning  Goal: Discharge to home or other facility with appropriate resources  Outcome: Met     Problem: Chronic Conditions and Co-morbidities  Goal: Patient's chronic conditions and co-morbidity symptoms are monitored and maintained or improved  Outcome: Progressing     Problem: Nutrition  Goal: Nutrient intake appropriate for maintaining nutritional needs  Outcome: Met

## 2025-07-12 NOTE — PROGRESS NOTES
Shreya Acuña is a 89 y.o. female on day 3 of admission presenting with Aphasia.  Record reviewed.  Patient has a written discharge order.  Per prior [facility] notes, discharge plan is home with [facility] for ST/PT/OT.  House APRN-CNP messaged for orders.  Care Transitions will continue to follow.  Naomi Pan RN BSN, TCC

## 2025-07-12 NOTE — DISCHARGE SUMMARY
Discharge Diagnosis  Aphasia           Issues Requiring Follow-Up  UTI    Discharge Meds     Medication List      START taking these medications     ciprofloxacin 500 mg tablet; Commonly known as: Cipro; Take 1 tablet   (500 mg) by mouth every 12 hours for 7 doses.   spironolactone 25 mg tablet; Commonly known as: Aldactone; Take 1 tablet   (25 mg) by mouth once daily.     CHANGE how you take these medications     pregabalin 100 mg capsule; Commonly known as: Lyrica; What changed:   Another medication with the same name was removed. Continue taking this   medication, and follow the directions you see here.     CONTINUE taking these medications     acetaminophen 500 mg tablet; Commonly known as: Tylenol   amLODIPine 5 mg tablet; Commonly known as: Norvasc; Take 1 tablet (5 mg)   by mouth once daily.   atorvastatin 20 mg tablet; Commonly known as: Lipitor   carbidopa-levodopa  mg tablet; Commonly known as: Sinemet; Take 1   tablet by mouth 2 times a day.   cholecalciferol 25 mcg (1,000 units) capsule; Commonly known as: Vitamin   D-3   cyanocobalamin 1,000 mcg tablet; Commonly known as: Vitamin B-12   DULoxetine 30 mg DR capsule; Commonly known as: Cymbalta; Take 1 capsule   (30 mg) by mouth 2 times a day.   metoprolol succinate XL 50 mg 24 hr tablet; Commonly known as: Toprol-XL   PreserVision AREDS 2 Plus  mcg-15 mcg- 5 mg-1 mg capsule; Generic   drug: mv-min-FA-vit K-lutein-zeaxant   rivaroxaban 10 mg tablet; Commonly known as: Xarelto; Take 1 tablet (10   mg) by mouth once daily. Resume taking on 4/14     STOP taking these medications     albuterol 90 mcg/actuation inhaler   oxyCODONE 5 mg immediate release tablet; Commonly known as: Roxicodone   torsemide 20 mg tablet; Commonly known as: Demadex       Test Results Pending At Discharge  Pending Labs       Order Current Status    Methylmalonic acid, serum In process            Hospital Course   Shreya Acuña is a 89 y.o. female presents to the ED for  evaluation of strokelike symptoms.   at bedside earlier, stated patient has had new dysarthria x 1 week which also coincided with apparent hearing loss. States he has been turning up the TV louder over the past week compared to normal. Did state patient has had difficulty with word finding for the past year, however has still been able to communicate pretty well. States symptoms appeared worse this morning around 10 AM, reporting patient's speech was indiscernible. Denies noticing any facial droop, patient denies any extremity weakness/numbness.  Patient does take daily Xarelto, says she last took it this morning. Difficulty obtaining additional information from patient secondary to aphasia.  Patient having difficulty formulating discernible sentences at this time.  She is getting slightly frustrated because she knows what she is saying is not making sense.      CODE STATUS: Full code     #Suspect acute CVA  #Aphasia-currently having difficulty formulating comprehendible sentences  #Hypertensive urgency  #Mildly elevated troponin (initial troponin 17) - denies any current chest pains   #Worsening hearing x 1 week  #Concern for chronic neurodegenerative process  #Generalized weakness  Admit as inpatient with telemetry monitor  Neurology consult  CT head and CTA head/neck unremarkable for acute processes  MRI head and MRA head/neck ordered for further evaluation  Limited echocardiogram ordered  BNP, A1c, lipid panel added onto lab work; mag level added onto lab work  Repeat troponin ordered, trend  Q 4 neurochecks, Q 4 vitals  PT/OT/speech therapy for evaluation and treatment; fall precautions  Allow for permissive hypertension, IV antihypertensives for SBP greater than 220  Continue home Lipitor, will start on 81 mg aspirin daily  Breathing treatments, oxygen, Tylenol as needed  N.p.o. diet until patient passes swallow evaluation  CBC, BMP in the a.m.     - TTE, 4/18/2025: Low normal left ventricular ejection  fraction, EF 50-55%, grade 3 (restrictive pattern) of left ventricular diastolic filling with an elevated left atrial pressure, normal right ventricular global systolic function, left atrium enlarged, mildly elevated RVSP, aortic valve sclerosis, moderate aortic valve cusp calcification.     #Suspect urinary tract infection  Urinalysis shows clear appearance, 1+ blood, 1+ nitrites, 250 leukocyte esterase, 1+ bacteria, and 6/10 WBCs.  Will start IV Rocephin daily  Await culture     #Hypokalemia  40 mEq potassium chloride packet ordered  Monitor with a.m. labs, replace as needed     Continue home medications as appropriate     Chronic conditions:  A-fib, HTN, CHF, TAMMY, HLD, CKD, history of nicotine use disorder     #DVT prophylaxis  Continue home oral anticoagulation  SCDs, ambulation as tolerated     7/11: MRI negative for acute infarct but does show chronic changes. Echo shows EF 50-55%. Urine culture is growing Klebsiella. Continue antibiotics. Await final cultures. Resume spironolactone given hypertensive.     7/12: UC grew Klebsiella with some resistance. Antibiotics changed to Cipro x7 days. Amlodipine resumed for hypertension. Patient discharged home. Discussed with  at bedside.     Pertinent Physical Exam At Time of Discharge  Physical Exam  Constitutional:       General: She is not in acute distress.     Appearance: She is not ill-appearing or toxic-appearing.   HENT:      Head: Normocephalic and atraumatic.      Ears:      Comments: Hard of hearing     Mouth/Throat:      Comments: Aphasia.  Eyes:      Conjunctiva/sclera: Conjunctivae normal.   Cardiovascular:      Rate and Rhythm: Normal rate and regular rhythm.      Pulses: Normal pulses.      Heart sounds: Normal heart sounds.      Comments: Hypertensive  Pulmonary:      Effort: Pulmonary effort is normal. No respiratory distress.      Breath sounds: Normal breath sounds. No wheezing.   Abdominal:      General: Bowel sounds are normal. There is no  distension.      Palpations: Abdomen is soft.      Tenderness: There is no abdominal tenderness.   Musculoskeletal:         General: No tenderness. Normal range of motion.      Right lower leg: No edema.      Left lower leg: No edema.   Skin:     General: Skin is warm and dry.   Neurological:      Mental Status: She is alert and oriented to person, place, and time.      Comments: Symmetric strength bilateral upper/lower extremities, no drift.  No facial droop.   Psychiatric:         Mood and Affect: Mood normal.         Behavior: Behavior normal.      Outpatient Follow-Up  Future Appointments   Date Time Provider Department Center   7/15/2025  4:00 PM Erik Spencer MD [id number] [facility]   7/18/2025  1:00 PM PAR OPCTR PAINMGMT PROCEDURE ROOM [facility]   9/22/2025  2:30 PM Daryn Barnhart MD [id number] [facility]         Alejandro Mcbride DO

## 2025-07-13 ENCOUNTER — DOCUMENTATION (OUTPATIENT)
Dept: HOME HEALTH SERVICES | Facility: HOME HEALTH | Age: 89
End: 2025-07-13
Payer: MEDICARE

## 2025-07-13 NOTE — HH CARE COORDINATION
Home Care received a Referral for Physical Therapy, Occupational Therapy, and Speech Language Pathology. We have processed the referral for a Start of Care on 7.13-7.14.25.     If you have any questions or concerns, please feel free to contact us at [phone]. Follow the prompts, enter your five digit zip code, and you will be directed to your care team on WEST 3.

## 2025-07-15 ENCOUNTER — APPOINTMENT (OUTPATIENT)
Dept: NEUROLOGY | Facility: CLINIC | Age: 89
End: 2025-07-15
Payer: MEDICARE

## 2025-07-15 VITALS — HEART RATE: 72 BPM | RESPIRATION RATE: 18 BRPM | SYSTOLIC BLOOD PRESSURE: 138 MMHG | DIASTOLIC BLOOD PRESSURE: 87 MMHG

## 2025-07-15 DIAGNOSIS — G20.C PARKINSONISM, UNSPECIFIED PARKINSONISM TYPE (MULTI): Primary | ICD-10-CM

## 2025-07-15 PROCEDURE — 3079F DIAST BP 80-89 MM HG: CPT | Performed by: PSYCHIATRY & NEUROLOGY

## 2025-07-15 PROCEDURE — 1160F RVW MEDS BY RX/DR IN RCRD: CPT | Performed by: PSYCHIATRY & NEUROLOGY

## 2025-07-15 PROCEDURE — 1159F MED LIST DOCD IN RCRD: CPT | Performed by: PSYCHIATRY & NEUROLOGY

## 2025-07-15 PROCEDURE — 3075F SYST BP GE 130 - 139MM HG: CPT | Performed by: PSYCHIATRY & NEUROLOGY

## 2025-07-15 PROCEDURE — 99215 OFFICE O/P EST HI 40 MIN: CPT | Performed by: PSYCHIATRY & NEUROLOGY

## 2025-07-15 PROCEDURE — 1036F TOBACCO NON-USER: CPT | Performed by: PSYCHIATRY & NEUROLOGY

## 2025-07-15 PROCEDURE — 1111F DSCHRG MED/CURRENT MED MERGE: CPT | Performed by: PSYCHIATRY & NEUROLOGY

## 2025-07-15 PROCEDURE — G2211 COMPLEX E/M VISIT ADD ON: HCPCS | Performed by: PSYCHIATRY & NEUROLOGY

## 2025-07-15 ASSESSMENT — UNIFIED PARKINSONS DISEASE RATING SCALE (UPDRS)
RIGIDITY_NECK: 1
CONSTANCY_TREMOR_ATREST: 1
AMPLITUDE_LUE: 0
TOETAPPING_LEFT: 3
RIGIDITY_LUE: 1
RIGIDITY_RLE: 1
AMPLITUDE_RUE: 1
LEVODOPA: YES
AMPLITUDE_RLE: 0
PARKINSONS_MEDS: YES
CHAIR_RISING_SCALE: 3
SPEECH: 2
TOETAPPING_RIGHT: 2
SPONTANEITY_OF_MOVEMENT: 2
TOTAL_SCORE: 41
FACIAL_EXPRESSION: 1
DYSKINESIAS_PRESENT: NO
FINGER_TAPPING_RIGHT: 2
LEG_AGILITY_LEFT: 2
PRONATION_SUPINATION_LEFT: 1
MINUTES_SINCE_LEVODOPA: 360
FINGER_TAPPING_LEFT: 3
KINETIC_TREMOR_RIGHTHAND: 0
POSTURAL_STABILITY: 3
GAIT: 3
AMPLITUDE_LIP_JAW: 0
HOEHN_YAHR: 2
CLINICAL_STATE: ON
RIGIDITY_LLE: 1
POSTURE: 2
POSTURAL_TREMOR_RIGHTHAND: 0
POSTURAL_TREMOR_LEFTHAND: 0
LEG_AGILITY_RIGHT: 2
PRONATION_SUPINATION_RIGHT: 0
KINETIC_TREMOR_LEFTHAND: 0
HANDMOVEMENTS_RIGHT: 1
FREEZING_GAIT: 0
AMPLITUDE_LLE: 0
RIGIDITY_RUE: 2

## 2025-07-15 ASSESSMENT — ENCOUNTER SYMPTOMS
DEPRESSION: 0
LOSS OF SENSATION IN FEET: 0
OCCASIONAL FEELINGS OF UNSTEADINESS: 1

## 2025-07-15 ASSESSMENT — PATIENT HEALTH QUESTIONNAIRE - PHQ9
2. FEELING DOWN, DEPRESSED OR HOPELESS: NOT AT ALL
1. LITTLE INTEREST OR PLEASURE IN DOING THINGS: NOT AT ALL
SUM OF ALL RESPONSES TO PHQ9 QUESTIONS 1 AND 2: 0

## 2025-07-15 NOTE — PATIENT INSTRUCTIONS
Parkinsonism, atypical  Gait disorder, multifactorial    -- carbidopa/levodopa 25/100 1 tab 3x/day.  If tolerated, 1 week later increase to 1.5 tabs 3x/day  -- follow up 4-6 months  --you can still set up the EMG  -- agree with home therapy

## 2025-07-15 NOTE — PROGRESS NOTES
"Subjective     Shreya Acuña is a 89 y.o. year old female who presents with with parkinsonism here for follow up visit.    HPI    She had a hospitalization for dysarthria and delirium, which resolved with treatment of UTI.      carbidopa/levodopa 25/100 caused confusion at 2 tabs TID.  She takes 1 tab BID which helps the tremor.    No dizziness.  No incontince.      Comes w/ her .    At 3 tabs TID it worsened her mood, made her lethargic. Decreased to 2 tabs TID--this has helped the side effect.   says tremors improved, not as frequent or severe.     MRI T spine does not explain sx    Her main complaint is numbness in her fingers, sometimes worse and better, pain from her elbow down in BUE. This is bothersome.   Never dx with neuropathy, denies hx of DM   No numbness or paresthesias in feet.     Ablation in lower back q3M with pain management and inj in knees q4M.     Social  Lives with:    Help with ADLs: dependent      Pertinent testing:  Labs for cognitive decline 7/30/25:  Folate-wnl  MMA-0.82 (H)  TSH with reflex to Free T4-wnl  Vitamin B1- 136    MRI thoracic spine 12/18/24:----Reviewed by Dr. Spencer \"Please let her know there is a slipped disc at T67 with mild indentation of the spinal cord, but it does not clearly explain her symptoms and signs; reviewed films, mild indentation but no signal change and there remains ample dorsal CSF space\"  IMPRESSION:  Mild spondylosis of the thoracic spine, most pronounced at T6-7 where  a disc protrusion results in ventral indentation of the spinal cord  without cord signal abnormality. No high-grade canal or foraminal  stenosis at any level.    MRI brain and c-spine 7/30/24  MRI brain:--------MRI brain reviewed by Dr. 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  This may correlate with her reported head trauma months ago without seeking medical care.  No significant findings on the " "spine MRI to explain balance issues.\"      1. There is hemosiderin deposition located along the linings of the  posterior left parasagittal parieto-occipital lobes and few scattered  punctate foci of hemosiderin located within the bilateral posterior  cerebral hemispheres.      Hemosiderin deposition along the linings of the brain parenchyma is  most consistent with sequela of prior subarachnoid hemorrhage.  Punctate foci of hemosiderin within the bilateral cerebral  hemispheres may be within sulci or within the gyri. Of note, some of  the foci of susceptibility artifact could reflect coursing vessels,  noting that evaluation is degraded due to patient motion. There is  mild volume loss involving the posterior left parietal lobe.      2. Findings of probable chronic small vessel ischemic changes.      3. Ventricles, sulci, and basilar cisterns are overall normal in  size, shape, and configuration for patient's age.      MRI cervical spine:  1. Multilevel degenerative changes of the cervical spine without  striking spinal canal stenosis at any level. The cervical spinal cord  is normal in signal and caliber, noting that evaluation is degraded  due to patient motion.      2. Variable degree of neural foraminal narrowing at multiple levels  as detailed above.      This study was interpreted at [facility].        Patient Health Questionnaire-2 Score: 0            Current Outpatient Medications:     acetaminophen (Tylenol) 500 mg tablet, Take 2 tablets (1,000 mg) by mouth 3 times a day., Disp: , Rfl:     amLODIPine (Norvasc) 5 mg tablet, Take 1 tablet (5 mg) by mouth once daily., Disp: 90 tablet, Rfl: 3    atorvastatin (Lipitor) 20 mg tablet, Take 1 tablet (20 mg) by mouth once daily., Disp: , Rfl:     carbidopa-levodopa (Sinemet)  mg tablet, Take 1 tablet by mouth 2 times a day., Disp: 60 tablet, Rfl: 1    cholecalciferol (Vitamin D-3) 25 MCG (1000 UT) capsule, Take 1 capsule " (25 mcg) by mouth 2 times a day., Disp: , Rfl:     ciprofloxacin (Cipro) 500 mg tablet, Take 1 tablet (500 mg) by mouth every 12 hours for 7 doses., Disp: 7 tablet, Rfl: 0    cyanocobalamin (Vitamin B-12) 1,000 mcg tablet, Take 1 tablet (1,000 mcg) by mouth once daily., Disp: , Rfl:     DULoxetine (Cymbalta) 30 mg DR capsule, Take 1 capsule (30 mg) by mouth 2 times a day., Disp: 180 capsule, Rfl: 2    metoprolol succinate XL (Toprol-XL) 50 mg 24 hr tablet, Take 1 tablet (50 mg) by mouth once daily., Disp: , Rfl:     mv-min-FA-vit K-lutein-zeaxant (PreserVision AREDS 2 Plus MV) 200 mcg-15 mcg- 5 mg-1 mg capsule, Take 1 capsule by mouth 2 times a day., Disp: , Rfl:     pregabalin (Lyrica) 100 mg capsule, Take 1 capsule (100 mg) by mouth once daily at bedtime., Disp: , Rfl:     rivaroxaban (Xarelto) 10 mg tablet, Take 1 tablet (10 mg) by mouth once daily. Resume taking on , Disp: , Rfl:     spironolactone (Aldactone) 25 mg tablet, Take 1 tablet (25 mg) by mouth once daily., Disp: 90 tablet, Rfl: 3       Objective   Vitals:    07/15/25 1603   BP: 138/87   BP Location: Left arm   Patient Position: Sitting   BP Cuff Size: Large adult   Pulse: 72   Resp: 18   Weight: Comment: wheelchair     MDS UPDRS 1st Score: Motor Examination  Is the patient on medication for treating the symptoms of Parkinson's Disease?: Yes  Patients receiving medication for treating the symptoms of Parkinson's Disease, efrain the patient's clinical state.: On  Is the patient on Levodopa?: Yes  Minutes since last Levodopa dose: 360  Speech: 2  Facial Expression: 1  Rigidty Neck: 1  Rigidty RUE: 2  Rigidity - LUE: 1  Rigidity RLE: 1  Rigidity LLE: 1  Finger Tapping Right Hand: 2  Finger Tapping Left Hand: 3  Hand Movements- Right Hand: 1  Hand Movements- Left Hand: 1  Pronatiaon-Supination Movments - Right Hand: 0  Pronatiaon-Supination Movments Left Hand: 1  Toe Tapping Right Foot: 2  Toe Tapping - Left Foot: 3  Leg Agility - Right Le  Leg  Agility - Left le  Arising from Chair: 3  Gait: 3  Freezing of Gait: 0  Postural Stability: 3  Posture: 2  Global Spontanteity of Movment ( Body Bradykinesia): 2  Postural Tremor - Right Hand: 0  Postural Tremor - Left hand: 0  Kinetic Tremor - Right hand: 0  Kinetic Tremor - Left hand: 0  Rest Tremor Amplitude - RUE: 1  Rest Tremor Amplitude - LUE: 0  Rest Tremor Amplitude - RLE: 0  Rest Tremor Amplitude - LLE: 0  Rest Tremor Amplitude - Lip/Jaw: 0  Constancy of Rest Tremor: 1  MDS UPDRS Total Score: 41  Were dyskinesias (chorea or dystonia) present during examination?: No  Hoen and Yahr Stage: 2              Physical Exam      Assessment/Plan   Ms. Shreya Acuña is a 89 y.o. F who presented with gradually worsening bilateral upper extremity tremor as well as being wheelchair-bound for the past few months after progressive difficulty with gait. She had hyperreflexia, memory deficits and bradykinesia LUE>RUE and LE predominant parkinsonism. T spine imaging unrevealing, L-spine imaging denied by insurance. 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  Her neuropathy may be contributory as well, and I advised her to get the EMG that had been previously ordered.  She is going to try slowly titrating Sinemet 1 more time.  If not tolerated, then may be Crexont can be tried.  SYN-ONE can be considered diagnostically in the future.  She will follow-up in about 4 months for reexamination.    40 minutes total were spent, reviewing medical records from [place] and from hospitalization, seeing the patient and examining her, and on documentation.

## 2025-07-17 LAB — METHYLMALONATE SERPL-SCNC: 0.27 UMOL/L (ref 0–0.4)

## 2025-07-18 ENCOUNTER — HOSPITAL ENCOUNTER (OUTPATIENT)
Dept: PAIN MEDICINE | Facility: CLINIC | Age: 89
Discharge: HOME | End: 2025-07-18
Payer: MEDICARE

## 2025-07-18 VITALS
TEMPERATURE: 98.1 F | RESPIRATION RATE: 16 BRPM | SYSTOLIC BLOOD PRESSURE: 142 MMHG | HEART RATE: 70 BPM | OXYGEN SATURATION: 97 % | WEIGHT: 215 LBS | DIASTOLIC BLOOD PRESSURE: 76 MMHG | HEIGHT: 63 IN | BODY MASS INDEX: 38.09 KG/M2

## 2025-07-18 DIAGNOSIS — M47.816 LUMBAR SPONDYLOSIS: ICD-10-CM

## 2025-07-18 PROCEDURE — 64636 DESTROY L/S FACET JNT ADDL: CPT | Mod: 50 | Performed by: ANESTHESIOLOGY

## 2025-07-18 PROCEDURE — 7100000010 HC PHASE TWO TIME - EACH INCREMENTAL 1 MINUTE

## 2025-07-18 PROCEDURE — 2720000007 HC OR 272 NO HCPCS

## 2025-07-18 PROCEDURE — 2500000004 HC RX 250 GENERAL PHARMACY W/ HCPCS (ALT 636 FOR OP/ED): Mod: JW | Performed by: ANESTHESIOLOGY

## 2025-07-18 PROCEDURE — 7100000009 HC PHASE TWO TIME - INITIAL BASE CHARGE

## 2025-07-18 RX ORDER — ROPIVACAINE HYDROCHLORIDE 5 MG/ML
INJECTION, SOLUTION EPIDURAL; INFILTRATION; PERINEURAL AS NEEDED
Status: COMPLETED | OUTPATIENT
Start: 2025-07-18 | End: 2025-07-18

## 2025-07-18 RX ORDER — LIDOCAINE HYDROCHLORIDE 10 MG/ML
INJECTION, SOLUTION EPIDURAL; INFILTRATION; INTRACAUDAL; PERINEURAL AS NEEDED
Status: COMPLETED | OUTPATIENT
Start: 2025-07-18 | End: 2025-07-18

## 2025-07-18 RX ADMIN — LIDOCAINE HYDROCHLORIDE 10 ML: 10 INJECTION, SOLUTION EPIDURAL; INFILTRATION; INTRACAUDAL; PERINEURAL at 13:15

## 2025-07-18 RX ADMIN — ROPIVACAINE HYDROCHLORIDE 10 ML: 5 INJECTION, SOLUTION EPIDURAL; INFILTRATION; PERINEURAL at 13:15

## 2025-07-18 ASSESSMENT — ENCOUNTER SYMPTOMS
NECK STIFFNESS: 0
DIARRHEA: 0
CONSTIPATION: 0
COUGH: 0
ARTHRALGIAS: 1
CHEST TIGHTNESS: 0
SPEECH DIFFICULTY: 0
WEAKNESS: 0
BLOOD IN STOOL: 0
DIAPHORESIS: 0
SEIZURES: 0
WHEEZING: 0
VOMITING: 0
CHILLS: 0
BACK PAIN: 1
FEVER: 0
NUMBNESS: 1
DIZZINESS: 0
DIFFICULTY URINATING: 0
NECK PAIN: 0
SHORTNESS OF BREATH: 0
EYE DISCHARGE: 0
NAUSEA: 0

## 2025-07-18 ASSESSMENT — PAIN - FUNCTIONAL ASSESSMENT
PAIN_FUNCTIONAL_ASSESSMENT: 0-10
PAIN_FUNCTIONAL_ASSESSMENT: 0-10

## 2025-07-18 ASSESSMENT — PAIN SCALES - GENERAL
PAINLEVEL_OUTOF10: 0 - NO PAIN
PAINLEVEL_OUTOF10: 10 - WORST POSSIBLE PAIN

## 2025-07-18 ASSESSMENT — PAIN DESCRIPTION - DESCRIPTORS: DESCRIPTORS: ACHING;SHARP;SHOOTING

## 2025-07-18 NOTE — Clinical Note
Rfa [id number]  [facility] [id number]  [facility] [phone]  Ref [id number]

## 2025-07-18 NOTE — H&P
History Of Present Illness  Shreya Acuña is a 89 y.o. female presenting with low back pain.     Past Medical History  Medical History[1]    Surgical History  Surgical History[2]     Social History  She reports that she quit smoking about 42 years ago. Her smoking use included cigarettes. She has never used smokeless tobacco. She reports that she does not currently use alcohol. She reports that she does not use drugs.    Family History  Family History[3]     Allergies  Patient has no known allergies.    Review of Systems   Constitutional:  Negative for chills, diaphoresis and fever.   HENT:  Negative for ear discharge and tinnitus.    Eyes:  Negative for discharge.   Respiratory:  Negative for cough, chest tightness, shortness of breath and wheezing.    Cardiovascular:  Negative for chest pain.   Gastrointestinal:  Negative for blood in stool, constipation, diarrhea, nausea and vomiting.   Endocrine: Negative for polyuria.   Genitourinary:  Negative for difficulty urinating.   Musculoskeletal:  Positive for arthralgias, back pain and gait problem. Negative for neck pain and neck stiffness.   Skin:  Negative for rash.   Neurological:  Positive for numbness. Negative for dizziness, seizures, speech difficulty and weakness.        Physical Exam  Constitutional:       Appearance: Normal appearance.   HENT:      Head: Normocephalic.      Nose: Nose normal.      Mouth/Throat:      Mouth: Mucous membranes are moist.      Pharynx: Oropharynx is clear.     Eyes:      Extraocular Movements: Extraocular movements intact.      Conjunctiva/sclera: Conjunctivae normal.      Pupils: Pupils are equal, round, and reactive to light.       Cardiovascular:      Rate and Rhythm: Normal rate.   Pulmonary:      Effort: Pulmonary effort is normal. No respiratory distress.      Breath sounds: No wheezing.   Chest:      Chest wall: No tenderness.   Abdominal:      Palpations: Abdomen is soft.     Musculoskeletal:      Cervical back: No  "rigidity.     Skin:     General: Skin is warm and dry.      Findings: No rash.     Neurological:      Mental Status: She is alert and oriented to person, place, and time.      Sensory: Sensory deficit present.      Motor: Weakness present.      Gait: Gait abnormal.     Psychiatric:         Mood and Affect: Mood normal.         Behavior: Behavior normal.          Last Recorded Vitals  Blood pressure 142/76, pulse 75, temperature 36.7 °C (98.1 °F), temperature source Tympanic, resp. rate 18, height 1.6 m (5' 3\"), weight 97.5 kg (215 lb), SpO2 95%.       Assessment/Plan   1. Lumbar spondylosis  Radiofrequency Ablation    Radiofrequency Ablation    FL pain management    FL pain management           Jose Luis Quiñones MD         [1]   Past Medical History:  Diagnosis Date    Atrial fib/flutter, transient (Multi)     Biliary dyskinesia 03/12/2025    Cholelithiasis 01/22/2025    Current use of long term anticoagulation     Emphysema lung (Multi)     Hypertension     Other specified health status     No pertinent past medical history   [2]   Past Surgical History:  Procedure Laterality Date    OTHER SURGICAL HISTORY  12/02/2020    Cardioversion   [3] No family history on file.    "

## 2025-07-22 ENCOUNTER — HOME CARE VISIT (OUTPATIENT)
Dept: HOME HEALTH SERVICES | Facility: HOME HEALTH | Age: 89
End: 2025-07-22
Payer: MEDICARE

## 2025-07-22 DIAGNOSIS — M47.816 LUMBAR SPONDYLOSIS: Primary | ICD-10-CM

## 2025-07-22 PROCEDURE — G0151 HHCP-SERV OF PT,EA 15 MIN: HCPCS | Mod: HHH

## 2025-07-22 RX ORDER — PREGABALIN 100 MG/1
100 CAPSULE ORAL NIGHTLY
Qty: 30 CAPSULE | Refills: 0 | Status: SHIPPED | OUTPATIENT
Start: 2025-07-22 | End: 2025-08-21

## 2025-07-23 ENCOUNTER — HOME CARE VISIT (OUTPATIENT)
Dept: HOME HEALTH SERVICES | Facility: HOME HEALTH | Age: 89
End: 2025-07-23
Payer: MEDICARE

## 2025-07-23 VITALS — OXYGEN SATURATION: 96 % | HEART RATE: 67 BPM

## 2025-07-23 VITALS — RESPIRATION RATE: 20 BRPM

## 2025-07-23 PROCEDURE — G0153 HHCP-SVS OF S/L PATH,EA 15MN: HCPCS | Mod: HHH

## 2025-07-23 ASSESSMENT — PAIN SCALES - PAIN ASSESSMENT IN ADVANCED DEMENTIA (PAINAD)
FACIALEXPRESSION: 0 - SMILING OR INEXPRESSIVE.
BODYLANGUAGE: 0
CONSOLABILITY: 0
CONSOLABILITY: 0 - NO NEED TO CONSOLE.
BREATHING: 0
NEGVOCALIZATION: 0
TOTALSCORE: 0
FACIALEXPRESSION: 0
NEGVOCALIZATION: 0 - NONE.
BODYLANGUAGE: 0 - RELAXED.

## 2025-07-23 ASSESSMENT — ENCOUNTER SYMPTOMS
PAIN LOCATION: LEFT KNEE
ANGER WITHIN DEFINED LIMITS: 1
AGGRESSION WITHIN DEFINED LIMITS: 1
PERSON REPORTING PAIN: PATIENT
HIGHEST PAIN SEVERITY IN PAST 24 HOURS: 8/10
PERSON REPORTING PAIN: PATIENT
SUBJECTIVE PAIN PROGRESSION: UNCHANGED
PAIN LOCATION - PAIN SEVERITY: 8/10
DENIES PAIN: 1
SLEEP QUALITY: ADEQUATE
PAIN: 1
PAIN LOCATION: LEFT KNEE
LOWEST PAIN SEVERITY IN PAST 24 HOURS: 0/10
PAIN LOCATION: RIGHT KNEE
PAIN SEVERITY GOAL: 0/10

## 2025-07-23 ASSESSMENT — ACTIVITIES OF DAILY LIVING (ADL)
CALENDAR_MANAGEMENT: SPOUSE MANAGES CALENDAR
ENTERING_EXITING_HOME: MODERATE ASSIST
OASIS_M1830: 03

## 2025-07-28 ENCOUNTER — HOME CARE VISIT (OUTPATIENT)
Dept: HOME HEALTH SERVICES | Facility: HOME HEALTH | Age: 89
End: 2025-07-28
Payer: MEDICARE

## 2025-07-28 PROCEDURE — G0152 HHCP-SERV OF OT,EA 15 MIN: HCPCS | Mod: HHH

## 2025-07-28 PROCEDURE — G0157 HHC PT ASSISTANT EA 15: HCPCS | Mod: CQ,HHH

## 2025-07-28 ASSESSMENT — ACTIVITIES OF DAILY LIVING (ADL)
DRESSING_UB_CURRENT_FUNCTION: MODERATE ASSIST
DRESSING_LB_CURRENT_FUNCTION: MODERATE ASSIST
BATHING ASSESSED: 1
TOILETING: INDEPENDENT
LAUNDRY: DEPENDENT
GROOMING_CURRENT_FUNCTION: SUPERVISION
TOILETING: 1
FEEDING ASSESSED: 1
BATHING_CURRENT_FUNCTION: MAXIMUM ASSIST
PREPARING MEALS: DEPENDENT
CURRENT_FUNCTION: MODERATE ASSIST
LAUNDRY ASSESSED: 1
PHYSICAL TRANSFERS ASSESSED: 1
GROOMING ASSESSED: 1
FEEDING: INDEPENDENT

## 2025-07-28 ASSESSMENT — ENCOUNTER SYMPTOMS
DENIES PAIN: 1
DENIES PAIN: 1
PERSON REPORTING PAIN: PATIENT
PERSON REPORTING PAIN: PATIENT

## 2025-07-28 NOTE — HOME HEALTH
Patient seen with spouse for OT evaluation visit. Patient was recently hospitalized with a UTI. She continues to have paid caregiver assistance M-F, 3 hours daily.    Lives with spouse in a ranch home with a ramp to enter the home. Stays on 1st floor with bedroom and full bathroom with a walk-in shower.     Medical history of [place], aphasia for about 6 months, HTN, HLD, PAF, CAD, biliary dyskinesia, emphysema, non ambulatory at baseline.      Patient has a transport wc, rollator walker (only uses for the transfers-she is non ambulatory), lift chair, wall mounted grab bars, handheld shower, toilet safety rails, Lif[employer] button.     Prior to hospitalization, had paid caregiver assistance during the week. She had assistance with caregivers/spouse for showering, dressing, toileting/toilet transfer. She could perform grooming with supervision and set up of equipment. She was independent with feeding herself. Spouse or caregiver performed IADLs.     UE AROM and strength WNL, except shoulder flex/abd to approximately 90 degrees, and strength 3M/5. Patient and spouse in agreement with OT POC and no further OT visits indicated. OT DC at this date, goals met. PT and ST services are still active.     Medication Reconciliation:    Demonstrates Adherence: Yes  Issues/Concerns: No  Provider Notified of Issues/Concerns: N/A  Caregiver Notified of Issues/Concerns: N/A  Pill bottles visualized during treatment: No, Explain: No med changes.
0.5

## 2025-07-30 ENCOUNTER — HOME CARE VISIT (OUTPATIENT)
Dept: HOME HEALTH SERVICES | Facility: HOME HEALTH | Age: 89
End: 2025-07-30
Payer: MEDICARE

## 2025-07-30 PROCEDURE — G0157 HHC PT ASSISTANT EA 15: HCPCS | Mod: CQ,HHH

## 2025-07-31 DIAGNOSIS — M54.17 LUMBOSACRAL NEURITIS: ICD-10-CM

## 2025-08-01 ENCOUNTER — APPOINTMENT (OUTPATIENT)
Dept: NEUROLOGY | Facility: HOSPITAL | Age: 89
End: 2025-08-01
Payer: MEDICARE

## 2025-08-03 LAB
Q ONSET: 228 MS
QRS COUNT: 14 BEATS
QRS DURATION: 72 MS
QT INTERVAL: 408 MS
QTC CALCULATION(BAZETT): 470 MS
QTC FREDERICIA: 449 MS
R AXIS: -18 DEGREES
T AXIS: -17 DEGREES
T OFFSET: 432 MS
VENTRICULAR RATE: 80 BPM

## 2025-08-04 ENCOUNTER — HOME CARE VISIT (OUTPATIENT)
Dept: HOME HEALTH SERVICES | Facility: HOME HEALTH | Age: 89
End: 2025-08-04
Payer: MEDICARE

## 2025-08-04 VITALS — TEMPERATURE: 97.3 F

## 2025-08-04 PROCEDURE — G0157 HHC PT ASSISTANT EA 15: HCPCS | Mod: CQ,HHH

## 2025-08-04 RX ORDER — DULOXETIN HYDROCHLORIDE 30 MG/1
30 CAPSULE, DELAYED RELEASE ORAL 2 TIMES DAILY
Qty: 180 CAPSULE | Refills: 0 | Status: SHIPPED | OUTPATIENT
Start: 2025-08-04

## 2025-08-04 ASSESSMENT — ENCOUNTER SYMPTOMS
PERSON REPORTING PAIN: PATIENT
DENIES PAIN: 1

## 2025-08-07 ENCOUNTER — HOME CARE VISIT (OUTPATIENT)
Dept: HOME HEALTH SERVICES | Facility: HOME HEALTH | Age: 89
End: 2025-08-07
Payer: MEDICARE

## 2025-08-07 PROCEDURE — G0157 HHC PT ASSISTANT EA 15: HCPCS | Mod: CQ,HHH

## 2025-08-07 ASSESSMENT — ENCOUNTER SYMPTOMS
DENIES PAIN: 1
PERSON REPORTING PAIN: PATIENT

## 2025-08-08 ENCOUNTER — HOME CARE VISIT (OUTPATIENT)
Dept: HOME HEALTH SERVICES | Facility: HOME HEALTH | Age: 89
End: 2025-08-08

## 2025-08-08 ENCOUNTER — OFFICE VISIT (OUTPATIENT)
Dept: URGENT CARE | Age: 89
End: 2025-08-08
Payer: MEDICARE

## 2025-08-08 VITALS
DIASTOLIC BLOOD PRESSURE: 79 MMHG | HEART RATE: 60 BPM | OXYGEN SATURATION: 95 % | RESPIRATION RATE: 16 BRPM | SYSTOLIC BLOOD PRESSURE: 121 MMHG | TEMPERATURE: 97.6 F

## 2025-08-08 DIAGNOSIS — H61.23 BILATERAL IMPACTED CERUMEN: ICD-10-CM

## 2025-08-08 DIAGNOSIS — N30.00 ACUTE CYSTITIS WITHOUT HEMATURIA: ICD-10-CM

## 2025-08-08 DIAGNOSIS — R35.0 URINARY FREQUENCY: Primary | ICD-10-CM

## 2025-08-08 LAB
POC APPEARANCE, URINE: CLEAR
POC BILIRUBIN, URINE: NEGATIVE
POC BLOOD, URINE: NEGATIVE
POC COLOR, URINE: YELLOW
POC GLUCOSE, URINE: NEGATIVE MG/DL
POC KETONES, URINE: NEGATIVE MG/DL
POC LEUKOCYTES, URINE: ABNORMAL
POC NITRITE,URINE: NEGATIVE
POC PH, URINE: 6 PH
POC PROTEIN, URINE: NEGATIVE MG/DL
POC SPECIFIC GRAVITY, URINE: 1.01
POC UROBILINOGEN, URINE: 0.2 EU/DL

## 2025-08-08 PROCEDURE — 99213 OFFICE O/P EST LOW 20 MIN: CPT | Performed by: STUDENT IN AN ORGANIZED HEALTH CARE EDUCATION/TRAINING PROGRAM

## 2025-08-08 PROCEDURE — 1111F DSCHRG MED/CURRENT MED MERGE: CPT | Performed by: STUDENT IN AN ORGANIZED HEALTH CARE EDUCATION/TRAINING PROGRAM

## 2025-08-08 PROCEDURE — 3078F DIAST BP <80 MM HG: CPT | Performed by: STUDENT IN AN ORGANIZED HEALTH CARE EDUCATION/TRAINING PROGRAM

## 2025-08-08 PROCEDURE — 81003 URINALYSIS AUTO W/O SCOPE: CPT | Performed by: STUDENT IN AN ORGANIZED HEALTH CARE EDUCATION/TRAINING PROGRAM

## 2025-08-08 PROCEDURE — 3074F SYST BP LT 130 MM HG: CPT | Performed by: STUDENT IN AN ORGANIZED HEALTH CARE EDUCATION/TRAINING PROGRAM

## 2025-08-08 PROCEDURE — 1159F MED LIST DOCD IN RCRD: CPT | Performed by: STUDENT IN AN ORGANIZED HEALTH CARE EDUCATION/TRAINING PROGRAM

## 2025-08-08 RX ORDER — POTASSIUM &MAGNESIUM ASPARTATE 250-250 MG
500 CAPSULE ORAL
COMMUNITY
Start: 2025-08-07

## 2025-08-08 RX ORDER — SULFAMETHOXAZOLE AND TRIMETHOPRIM 800; 160 MG/1; MG/1
1 TABLET ORAL 2 TIMES DAILY
COMMUNITY
Start: 2025-08-07 | End: 2025-08-14

## 2025-08-08 RX ORDER — ASPIRIN 81 MG
TABLET, DELAYED RELEASE (ENTERIC COATED) ORAL
COMMUNITY
Start: 2025-08-07

## 2025-08-08 ASSESSMENT — ENCOUNTER SYMPTOMS: FREQUENCY: 1

## 2025-08-08 NOTE — PROGRESS NOTES
Subjective   Patient ID: Shreya Acuña is a 89 y.o. female. They present today with a chief complaint of No chief complaint on file..    History of Present Illness  HPI  Patient presents to the urgent care in presence of  for a chief complaint of bilateral cerumen impaction and concern for UTI patient was seen yesterday at primary care who is a [facility] provider and started on Bactrim patient also started on Debrox and does have schedule appointment for ear irrigation next week, I did discuss with patient  that we are different from his primary care and this is an urgent care and also different organization and still wanted to be seen and have urine culture which I was agreeable  Past Medical History  Allergies as of 08/08/2025    (No Known Allergies)       Prescriptions Prior to Admission[1]     Medical History[2]    Surgical History[3]     reports that she quit smoking about 42 years ago. Her smoking use included cigarettes. She has never used smokeless tobacco. She reports that she does not currently use alcohol. She reports that she does not use drugs.    Review of Systems  Review of Systems   HENT:          Muffled hearing bilaterally   Genitourinary:  Positive for frequency and urgency.                                  Objective    Vitals:    08/08/25 1530   BP: 121/79   BP Location: Left arm   Patient Position: Sitting   BP Cuff Size: Large adult   Pulse: 60   Resp: 16   Temp: 36.4 °C (97.6 °F)   SpO2: 95%     No LMP recorded. (Menstrual status: Menopausal).    Physical Exam  Vitals and nursing note reviewed.   Constitutional:       General: She is not in acute distress.     Appearance: Normal appearance. She is not ill-appearing, toxic-appearing or diaphoretic.   HENT:      Right Ear: There is impacted cerumen.      Left Ear: There is impacted cerumen.     Cardiovascular:      Rate and Rhythm: Normal rate.   Pulmonary:      Effort: Pulmonary effort is normal. No respiratory distress.    Abdominal:      Tenderness: There is no right CVA tenderness or left CVA tenderness.     Neurological:      Mental Status: She is alert.         Procedures    Point of Care Test & Imaging Results from this visit  No results found for this visit on 08/08/25.   Imaging  No results found.    Cardiology, Vascular, and Other Imaging  No other imaging results found for the past 2 days      Diagnostic study results (if any) were reviewed by [facility].    Assessment/Plan   Allergies, medications, history, and pertinent labs/EKGs/Imaging reviewed by Gavin Alcala PA-C.     Medical Decision Making  I did discuss with patient and  that urinalysis does show leukocytes to continue Bactrim we will send urine for culture pending culture and sensitivity antibiotic regimen change to keep follow-up appointment with primary care for irrigation and to continue using Debrox    Orders and Diagnoses  Diagnoses and all orders for this visit:  Urinary frequency  Acute cystitis without hematuria  -     Urine Culture  Bilateral impacted cerumen      Medical Admin Record      Patient disposition: Home    Electronically signed by [facility]  3:45 PM           [1] (Not in a hospital admission)   [2]   Past Medical History:  Diagnosis Date    Atrial fib/flutter, transient (Multi)     Biliary dyskinesia 03/12/2025    Cholelithiasis 01/22/2025    Current use of long term anticoagulation     Emphysema lung (Multi)     Hypertension     Other specified health status     No pertinent past medical history   [3]   Past Surgical History:  Procedure Laterality Date    OTHER SURGICAL HISTORY  12/02/2020    Cardioversion

## 2025-08-08 NOTE — PATIENT INSTRUCTIONS
Please continue the use of Debrox bilaterally and keep follow-up appointment for ear irrigation with primary care, urine will be sent for culture antibiotic regimen may change pending urine culture and sensitivity please continue Bactrim in the meantime

## 2025-08-10 LAB — BACTERIA UR CULT: ABNORMAL

## 2025-08-13 ENCOUNTER — HOME CARE VISIT (OUTPATIENT)
Dept: HOME HEALTH SERVICES | Facility: HOME HEALTH | Age: 89
End: 2025-08-13
Payer: MEDICARE

## 2025-08-14 ENCOUNTER — HOME CARE VISIT (OUTPATIENT)
Dept: HOME HEALTH SERVICES | Facility: HOME HEALTH | Age: 89
End: 2025-08-14
Payer: MEDICARE

## 2025-08-18 DIAGNOSIS — M47.816 LUMBAR SPONDYLOSIS: ICD-10-CM

## 2025-08-19 RX ORDER — PREGABALIN 100 MG/1
100 CAPSULE ORAL NIGHTLY
Qty: 90 CAPSULE | Refills: 3 | Status: SHIPPED | OUTPATIENT
Start: 2025-08-19 | End: 2025-11-17

## 2025-08-20 ENCOUNTER — HOME CARE VISIT (OUTPATIENT)
Dept: HOME HEALTH SERVICES | Facility: HOME HEALTH | Age: 89
End: 2025-08-20
Payer: MEDICARE

## 2025-08-20 VITALS — OXYGEN SATURATION: 99 % | HEART RATE: 78 BPM

## 2025-08-20 PROCEDURE — G0153 HHCP-SVS OF S/L PATH,EA 15MN: HCPCS | Mod: HHH

## 2025-08-20 ASSESSMENT — PAIN SCALES - PAIN ASSESSMENT IN ADVANCED DEMENTIA (PAINAD)
NEGVOCALIZATION: 0
CONSOLABILITY: 0
CONSOLABILITY: 0 - NO NEED TO CONSOLE.
NEGVOCALIZATION: 0 - NONE.
FACIALEXPRESSION: 0
BODYLANGUAGE: 0
TOTALSCORE: 0
BREATHING: 0
FACIALEXPRESSION: 0 - SMILING OR INEXPRESSIVE.
BODYLANGUAGE: 0 - RELAXED.

## 2025-08-20 ASSESSMENT — ENCOUNTER SYMPTOMS
DENIES PAIN: 1
PERSON REPORTING PAIN: PATIENT

## 2025-08-21 ENCOUNTER — HOME CARE VISIT (OUTPATIENT)
Dept: HOME HEALTH SERVICES | Facility: HOME HEALTH | Age: 89
End: 2025-08-21
Payer: MEDICARE

## 2025-08-21 VITALS — RESPIRATION RATE: 20 BRPM

## 2025-08-21 PROCEDURE — G0151 HHCP-SERV OF PT,EA 15 MIN: HCPCS | Mod: HHH

## 2025-08-21 SDOH — HEALTH STABILITY: PHYSICAL HEALTH: PHYSICAL EXERCISE: 10

## 2025-08-21 SDOH — HEALTH STABILITY: PHYSICAL HEALTH: PHYSICAL EXERCISE: SITTING

## 2025-08-21 SDOH — HEALTH STABILITY: PHYSICAL HEALTH: EXERCISE ACTIVITY: SITTING STRENGTHENING

## 2025-08-21 SDOH — HEALTH STABILITY: PHYSICAL HEALTH: EXERCISE ACTIVITIES SETS: 1

## 2025-08-21 SDOH — HEALTH STABILITY: PHYSICAL HEALTH: EXERCISE TYPE: WHEP

## 2025-08-21 ASSESSMENT — ACTIVITIES OF DAILY LIVING (ADL)
AMBULATION ASSISTANCE ON FLAT SURFACES: 1
AMBULATION_DISTANCE/DURATION_TOLERATED: 5 FT
HOME_HEALTH_OASIS: 01
OASIS_M1830: 03

## 2025-08-21 ASSESSMENT — ENCOUNTER SYMPTOMS
HIGHEST PAIN SEVERITY IN PAST 24 HOURS: 5/10
SUBJECTIVE PAIN PROGRESSION: UNCHANGED
PAIN SEVERITY GOAL: 0/10
LOWEST PAIN SEVERITY IN PAST 24 HOURS: 0/10
PERSON REPORTING PAIN: PATIENT
PAIN: 1

## 2025-09-02 PROBLEM — M79.606 PAIN OF LOWER EXTREMITY: Status: ACTIVE | Noted: 2025-06-12

## 2025-09-02 PROBLEM — N28.1 RENAL CYST: Status: ACTIVE | Noted: 2025-08-14

## 2025-09-22 ENCOUNTER — APPOINTMENT (OUTPATIENT)
Dept: CARDIOLOGY | Facility: CLINIC | Age: 89
End: 2025-09-22
Payer: MEDICARE

## 2025-12-03 ENCOUNTER — APPOINTMENT (OUTPATIENT)
Dept: NEUROLOGY | Facility: CLINIC | Age: 89
End: 2025-12-03
Payer: MEDICARE

## (undated) DEVICE — NEEDLE, CLOSURE, OMNICLOSE

## (undated) DEVICE — SLEEVE, VASO PRESS, CALF GARMENT, MEDIUM, GREEN

## (undated) DEVICE — APPLICATOR, CHLORAPREP, W/ORANGE TINT, 26ML

## (undated) DEVICE — Device

## (undated) DEVICE — TROCAR, KII OPTICAL BLADELESS 5MM Z THREAD 100MM LNGTH

## (undated) DEVICE — CLEAN KIT, ANTIFOG SCOPE, SEE SHARP 150MM

## (undated) DEVICE — APPLICATOR, ENDOSCOPIC, F/SURGICEL POWDER

## (undated) DEVICE — TUBE SET, PNEUMOLAR HEATED, SMOKE EVACU, HIGH-FLOW

## (undated) DEVICE — ASSEMBLY, STRYKER FLOW 2, SUCTION IRRIGATOR, WITH TIP

## (undated) DEVICE — ELECTRODE, LAPAROSCOPY, L HOOK, 33CM, STERILE

## (undated) DEVICE — CLIP, ENDO APPLIER LIGAMAX 5MM

## (undated) DEVICE — TROCAR, OPTICAL, BLADELESS, 12MM, THREADED, 100MM LENGTH

## (undated) DEVICE — RETRIEVAL SYSTEM, MONARCH, 10MM DISP ENDOSCOPIC

## (undated) DEVICE — SLEEVE, KII, Z-THREAD, 5X100CM

## (undated) DEVICE — HEMOSTAT, SURGICEL POWDER 3.0GRAMS

## (undated) DEVICE — ELECTRODE, ELECTROSURGICAL, PENCIL, HAND CONTROL, BLADE, 10 FT CORD, LF